# Patient Record
Sex: MALE | Race: WHITE | Employment: OTHER | URBAN - NONMETROPOLITAN AREA
[De-identification: names, ages, dates, MRNs, and addresses within clinical notes are randomized per-mention and may not be internally consistent; named-entity substitution may affect disease eponyms.]

---

## 2017-01-16 PROBLEM — R55 SYNCOPE AND COLLAPSE: Status: ACTIVE | Noted: 2017-01-16

## 2017-01-16 PROBLEM — R41.82 ALTERED MENTAL STATE: Status: ACTIVE | Noted: 2017-01-16

## 2023-02-21 ENCOUNTER — HOSPITAL ENCOUNTER (INPATIENT)
Age: 83
LOS: 20 days | Discharge: NURSING, REHAB OR LONG-TERM CARE | DRG: 233 | End: 2023-03-16
Attending: EMERGENCY MEDICINE | Admitting: THORACIC SURGERY (CARDIOTHORACIC VASCULAR SURGERY)
Payer: MEDICARE

## 2023-02-21 ENCOUNTER — APPOINTMENT (OUTPATIENT)
Dept: GENERAL RADIOLOGY | Age: 83
DRG: 233 | End: 2023-02-21
Payer: MEDICARE

## 2023-02-21 DIAGNOSIS — Z95.1 S/P CABG X 3: Primary | ICD-10-CM

## 2023-02-21 DIAGNOSIS — R79.89 ELEVATED BRAIN NATRIURETIC PEPTIDE (BNP) LEVEL: ICD-10-CM

## 2023-02-21 DIAGNOSIS — R77.8 ELEVATED TROPONIN: ICD-10-CM

## 2023-02-21 DIAGNOSIS — Z87.891 PERSONAL HISTORY OF TOBACCO USE, PRESENTING HAZARDS TO HEALTH: ICD-10-CM

## 2023-02-21 DIAGNOSIS — J44.9 CHRONIC OBSTRUCTIVE PULMONARY DISEASE, UNSPECIFIED COPD TYPE (HCC): ICD-10-CM

## 2023-02-21 DIAGNOSIS — R59.0 LAD (LYMPHADENOPATHY), MEDIASTINAL: ICD-10-CM

## 2023-02-21 DIAGNOSIS — G47.30 SLEEP APNEA, UNSPECIFIED TYPE: ICD-10-CM

## 2023-02-21 DIAGNOSIS — R06.83 SNORING: ICD-10-CM

## 2023-02-21 DIAGNOSIS — Z95.1 S/P CABG (CORONARY ARTERY BYPASS GRAFT): ICD-10-CM

## 2023-02-21 DIAGNOSIS — G47.19 EXCESSIVE DAYTIME SLEEPINESS: ICD-10-CM

## 2023-02-21 DIAGNOSIS — R06.00 DYSPNEA, UNSPECIFIED TYPE: ICD-10-CM

## 2023-02-21 DIAGNOSIS — J90 BILATERAL PLEURAL EFFUSION: ICD-10-CM

## 2023-02-21 LAB
ALBUMIN SERPL BCG-MCNC: 4 G/DL (ref 3.5–5.1)
ALP SERPL-CCNC: 38 U/L (ref 38–126)
ALT SERPL W/O P-5'-P-CCNC: 15 U/L (ref 11–66)
ANION GAP SERPL CALC-SCNC: 12 MEQ/L (ref 8–16)
ARTERIAL PATENCY WRIST A: POSITIVE
AST SERPL-CCNC: 17 U/L (ref 5–40)
BASE EXCESS BLDA CALC-SCNC: -1.3 MMOL/L (ref -2.5–2.5)
BASOPHILS ABSOLUTE: 0 THOU/MM3 (ref 0–0.1)
BASOPHILS NFR BLD AUTO: 0.3 %
BDY SITE: ABNORMAL
BILIRUB SERPL-MCNC: 0.6 MG/DL (ref 0.3–1.2)
BUN SERPL-MCNC: 15 MG/DL (ref 7–22)
CALCIUM SERPL-MCNC: 8.6 MG/DL (ref 8.5–10.5)
CHLORIDE SERPL-SCNC: 107 MEQ/L (ref 98–111)
CO2 SERPL-SCNC: 22 MEQ/L (ref 23–33)
COLLECTED BY:: ABNORMAL
CREAT SERPL-MCNC: 0.9 MG/DL (ref 0.4–1.2)
D DIMER PPP IA.FEU-MCNC: 544 NG/ML FEU (ref 0–500)
DEPRECATED RDW RBC AUTO: 46.1 FL (ref 35–45)
EKG ATRIAL RATE: 73 BPM
EKG P AXIS: -19 DEGREES
EKG P-R INTERVAL: 242 MS
EKG Q-T INTERVAL: 402 MS
EKG QRS DURATION: 90 MS
EKG QTC CALCULATION (BAZETT): 442 MS
EKG R AXIS: -12 DEGREES
EKG T AXIS: 145 DEGREES
EKG VENTRICULAR RATE: 73 BPM
EOSINOPHIL NFR BLD AUTO: 5.4 %
EOSINOPHILS ABSOLUTE: 0.3 THOU/MM3 (ref 0–0.4)
ERYTHROCYTE [DISTWIDTH] IN BLOOD BY AUTOMATED COUNT: 12.3 % (ref 11.5–14.5)
FLUAV RNA RESP QL NAA+PROBE: NOT DETECTED
FLUBV RNA RESP QL NAA+PROBE: NOT DETECTED
GFR SERPL CREATININE-BSD FRML MDRD: > 60 ML/MIN/1.73M2
GLUCOSE SERPL-MCNC: 114 MG/DL (ref 70–108)
HCO3 BLDA-SCNC: 21 MMOL/L (ref 23–28)
HCT VFR BLD AUTO: 39.5 % (ref 42–52)
HGB BLD-MCNC: 13 GM/DL (ref 14–18)
IMM GRANULOCYTES # BLD AUTO: 0.02 THOU/MM3 (ref 0–0.07)
IMM GRANULOCYTES NFR BLD AUTO: 0.3 %
LYMPHOCYTES ABSOLUTE: 0.7 THOU/MM3 (ref 1–4.8)
LYMPHOCYTES NFR BLD AUTO: 11.9 %
MCH RBC QN AUTO: 33.8 PG (ref 26–33)
MCHC RBC AUTO-ENTMCNC: 32.9 GM/DL (ref 32.2–35.5)
MCV RBC AUTO: 102.6 FL (ref 80–94)
MONOCYTES ABSOLUTE: 0.8 THOU/MM3 (ref 0.4–1.3)
MONOCYTES NFR BLD AUTO: 12.4 %
NEUTROPHILS NFR BLD AUTO: 69.7 %
NRBC BLD AUTO-RTO: 0 /100 WBC
NT-PROBNP SERPL IA-MCNC: 1675 PG/ML (ref 0–449)
OSMOLALITY SERPL CALC.SUM OF ELEC: 283 MOSMOL/KG (ref 275–300)
PCO2 BLDA: 30 MMHG (ref 35–45)
PH BLDA: 7.47 [PH] (ref 7.35–7.45)
PLATELET # BLD AUTO: 267 THOU/MM3 (ref 130–400)
PMV BLD AUTO: 9.7 FL (ref 9.4–12.4)
PO2 BLDA: 65 MMHG (ref 71–104)
POTASSIUM SERPL-SCNC: 3.6 MEQ/L (ref 3.5–5.2)
PROT SERPL-MCNC: 6.7 G/DL (ref 6.1–8)
RBC # BLD AUTO: 3.85 MILL/MM3 (ref 4.7–6.1)
SAO2 % BLDA: 94 %
SARS-COV-2 RNA RESP QL NAA+PROBE: NOT DETECTED
SEGMENTED NEUTROPHILS ABSOLUTE COUNT: 4.3 THOU/MM3 (ref 1.8–7.7)
SODIUM SERPL-SCNC: 141 MEQ/L (ref 135–145)
T4 FREE SERPL-MCNC: 0.92 NG/DL (ref 0.93–1.76)
TROPONIN T: 0.16 NG/ML
TROPONIN T: 0.16 NG/ML
TROPONIN T: 0.17 NG/ML
TROPONIN T: 0.17 NG/ML
TSH SERPL DL<=0.005 MIU/L-ACNC: 4.13 UIU/ML (ref 0.4–4.2)
WBC # BLD AUTO: 6.2 THOU/MM3 (ref 4.8–10.8)

## 2023-02-21 PROCEDURE — 80053 COMPREHEN METABOLIC PANEL: CPT

## 2023-02-21 PROCEDURE — G0378 HOSPITAL OBSERVATION PER HR: HCPCS

## 2023-02-21 PROCEDURE — 94760 N-INVAS EAR/PLS OXIMETRY 1: CPT

## 2023-02-21 PROCEDURE — 84443 ASSAY THYROID STIM HORMONE: CPT

## 2023-02-21 PROCEDURE — 6370000000 HC RX 637 (ALT 250 FOR IP)

## 2023-02-21 PROCEDURE — 94669 MECHANICAL CHEST WALL OSCILL: CPT

## 2023-02-21 PROCEDURE — 85379 FIBRIN DEGRADATION QUANT: CPT

## 2023-02-21 PROCEDURE — 87636 SARSCOV2 & INF A&B AMP PRB: CPT

## 2023-02-21 PROCEDURE — 82803 BLOOD GASES ANY COMBINATION: CPT

## 2023-02-21 PROCEDURE — 96368 THER/DIAG CONCURRENT INF: CPT

## 2023-02-21 PROCEDURE — 2580000003 HC RX 258

## 2023-02-21 PROCEDURE — 99285 EMERGENCY DEPT VISIT HI MDM: CPT

## 2023-02-21 PROCEDURE — 6360000002 HC RX W HCPCS

## 2023-02-21 PROCEDURE — 83880 ASSAY OF NATRIURETIC PEPTIDE: CPT

## 2023-02-21 PROCEDURE — 96365 THER/PROPH/DIAG IV INF INIT: CPT

## 2023-02-21 PROCEDURE — 85025 COMPLETE CBC W/AUTO DIFF WBC: CPT

## 2023-02-21 PROCEDURE — 94640 AIRWAY INHALATION TREATMENT: CPT

## 2023-02-21 PROCEDURE — 87449 NOS EACH ORGANISM AG IA: CPT

## 2023-02-21 PROCEDURE — 93010 ELECTROCARDIOGRAM REPORT: CPT | Performed by: NUCLEAR MEDICINE

## 2023-02-21 PROCEDURE — 36415 COLL VENOUS BLD VENIPUNCTURE: CPT

## 2023-02-21 PROCEDURE — 96366 THER/PROPH/DIAG IV INF ADDON: CPT

## 2023-02-21 PROCEDURE — 99222 1ST HOSP IP/OBS MODERATE 55: CPT | Performed by: INTERNAL MEDICINE

## 2023-02-21 PROCEDURE — 6370000000 HC RX 637 (ALT 250 FOR IP): Performed by: STUDENT IN AN ORGANIZED HEALTH CARE EDUCATION/TRAINING PROGRAM

## 2023-02-21 PROCEDURE — 87899 AGENT NOS ASSAY W/OPTIC: CPT

## 2023-02-21 PROCEDURE — 84439 ASSAY OF FREE THYROXINE: CPT

## 2023-02-21 PROCEDURE — 84484 ASSAY OF TROPONIN QUANT: CPT

## 2023-02-21 PROCEDURE — 93005 ELECTROCARDIOGRAM TRACING: CPT | Performed by: STUDENT IN AN ORGANIZED HEALTH CARE EDUCATION/TRAINING PROGRAM

## 2023-02-21 PROCEDURE — 36600 WITHDRAWAL OF ARTERIAL BLOOD: CPT

## 2023-02-21 PROCEDURE — 71046 X-RAY EXAM CHEST 2 VIEWS: CPT

## 2023-02-21 PROCEDURE — 96372 THER/PROPH/DIAG INJ SC/IM: CPT

## 2023-02-21 RX ORDER — ENOXAPARIN SODIUM 100 MG/ML
40 INJECTION SUBCUTANEOUS EVERY 24 HOURS
Status: DISCONTINUED | OUTPATIENT
Start: 2023-02-21 | End: 2023-02-23

## 2023-02-21 RX ORDER — ASPIRIN 81 MG/1
81 TABLET, CHEWABLE ORAL DAILY
Status: DISCONTINUED | OUTPATIENT
Start: 2023-02-22 | End: 2023-02-28 | Stop reason: SDUPTHER

## 2023-02-21 RX ORDER — ONDANSETRON 4 MG/1
4 TABLET, ORALLY DISINTEGRATING ORAL EVERY 8 HOURS PRN
Status: DISCONTINUED | OUTPATIENT
Start: 2023-02-21 | End: 2023-02-28

## 2023-02-21 RX ORDER — CARBAMAZEPINE 100 MG/1
600 TABLET, EXTENDED RELEASE ORAL 2 TIMES DAILY
Status: DISCONTINUED | OUTPATIENT
Start: 2023-02-21 | End: 2023-03-01

## 2023-02-21 RX ORDER — IPRATROPIUM BROMIDE AND ALBUTEROL SULFATE 2.5; .5 MG/3ML; MG/3ML
1 SOLUTION RESPIRATORY (INHALATION) ONCE
Status: DISCONTINUED | OUTPATIENT
Start: 2023-02-21 | End: 2023-02-21

## 2023-02-21 RX ORDER — ESCITALOPRAM OXALATE 20 MG/1
20 TABLET ORAL DAILY
Status: DISCONTINUED | OUTPATIENT
Start: 2023-02-22 | End: 2023-03-16 | Stop reason: HOSPADM

## 2023-02-21 RX ORDER — ONDANSETRON 2 MG/ML
4 INJECTION INTRAMUSCULAR; INTRAVENOUS EVERY 6 HOURS PRN
Status: DISCONTINUED | OUTPATIENT
Start: 2023-02-21 | End: 2023-02-28

## 2023-02-21 RX ORDER — POLYETHYLENE GLYCOL 3350 17 G/17G
17 POWDER, FOR SOLUTION ORAL DAILY PRN
Status: DISCONTINUED | OUTPATIENT
Start: 2023-02-21 | End: 2023-02-28

## 2023-02-21 RX ORDER — SODIUM CHLORIDE 9 MG/ML
INJECTION, SOLUTION INTRAVENOUS PRN
Status: DISCONTINUED | OUTPATIENT
Start: 2023-02-21 | End: 2023-02-28

## 2023-02-21 RX ORDER — SODIUM CHLORIDE 0.9 % (FLUSH) 0.9 %
5-40 SYRINGE (ML) INJECTION PRN
Status: DISCONTINUED | OUTPATIENT
Start: 2023-02-21 | End: 2023-02-28

## 2023-02-21 RX ORDER — CLOPIDOGREL BISULFATE 75 MG/1
75 TABLET ORAL DAILY
Status: DISCONTINUED | OUTPATIENT
Start: 2023-02-22 | End: 2023-02-23

## 2023-02-21 RX ORDER — IPRATROPIUM BROMIDE AND ALBUTEROL SULFATE 2.5; .5 MG/3ML; MG/3ML
1 SOLUTION RESPIRATORY (INHALATION) ONCE
Status: COMPLETED | OUTPATIENT
Start: 2023-02-21 | End: 2023-02-21

## 2023-02-21 RX ORDER — ATORVASTATIN CALCIUM 40 MG/1
40 TABLET, FILM COATED ORAL DAILY
Status: DISCONTINUED | OUTPATIENT
Start: 2023-02-22 | End: 2023-03-16 | Stop reason: HOSPADM

## 2023-02-21 RX ORDER — ESCITALOPRAM OXALATE 20 MG/1
20 TABLET ORAL DAILY
Status: ON HOLD | COMMUNITY
End: 2023-03-16 | Stop reason: HOSPADM

## 2023-02-21 RX ORDER — ACETAMINOPHEN 650 MG/1
650 SUPPOSITORY RECTAL EVERY 6 HOURS PRN
Status: DISCONTINUED | OUTPATIENT
Start: 2023-02-21 | End: 2023-02-28

## 2023-02-21 RX ORDER — RISPERIDONE 0.25 MG/1
0.5 TABLET ORAL 2 TIMES DAILY
Status: DISCONTINUED | OUTPATIENT
Start: 2023-02-21 | End: 2023-03-16 | Stop reason: HOSPADM

## 2023-02-21 RX ORDER — ACETAMINOPHEN 325 MG/1
650 TABLET ORAL EVERY 6 HOURS PRN
Status: DISCONTINUED | OUTPATIENT
Start: 2023-02-21 | End: 2023-02-28

## 2023-02-21 RX ORDER — MULTIVITAMIN WITH IRON
1 TABLET ORAL DAILY
Status: DISCONTINUED | OUTPATIENT
Start: 2023-02-22 | End: 2023-03-16 | Stop reason: HOSPADM

## 2023-02-21 RX ORDER — METHYLPREDNISOLONE SODIUM SUCCINATE 125 MG/2ML
125 INJECTION, POWDER, LYOPHILIZED, FOR SOLUTION INTRAMUSCULAR; INTRAVENOUS ONCE
Status: DISCONTINUED | OUTPATIENT
Start: 2023-02-21 | End: 2023-02-21

## 2023-02-21 RX ORDER — IPRATROPIUM BROMIDE AND ALBUTEROL SULFATE 2.5; .5 MG/3ML; MG/3ML
1 SOLUTION RESPIRATORY (INHALATION)
Status: DISCONTINUED | OUTPATIENT
Start: 2023-02-21 | End: 2023-02-22

## 2023-02-21 RX ORDER — SODIUM CHLORIDE 0.9 % (FLUSH) 0.9 %
5-40 SYRINGE (ML) INJECTION EVERY 12 HOURS SCHEDULED
Status: DISCONTINUED | OUTPATIENT
Start: 2023-02-21 | End: 2023-02-28

## 2023-02-21 RX ORDER — RISPERIDONE 0.5 MG/1
0.5 TABLET ORAL 2 TIMES DAILY
Status: ON HOLD | COMMUNITY
End: 2023-03-16 | Stop reason: HOSPADM

## 2023-02-21 RX ORDER — LEVOTHYROXINE SODIUM 0.05 MG/1
50 TABLET ORAL DAILY
Status: DISCONTINUED | OUTPATIENT
Start: 2023-02-22 | End: 2023-03-16 | Stop reason: HOSPADM

## 2023-02-21 RX ADMIN — AZITHROMYCIN DIHYDRATE 500 MG: 500 INJECTION, POWDER, LYOPHILIZED, FOR SOLUTION INTRAVENOUS at 18:14

## 2023-02-21 RX ADMIN — IPRATROPIUM BROMIDE AND ALBUTEROL SULFATE 1 AMPULE: .5; 3 SOLUTION RESPIRATORY (INHALATION) at 15:39

## 2023-02-21 RX ADMIN — CARBAMAZEPINE 600 MG: 200 TABLET, EXTENDED RELEASE ORAL at 20:37

## 2023-02-21 RX ADMIN — IPRATROPIUM BROMIDE AND ALBUTEROL SULFATE 1 AMPULE: .5; 3 SOLUTION RESPIRATORY (INHALATION) at 10:36

## 2023-02-21 RX ADMIN — IPRATROPIUM BROMIDE AND ALBUTEROL SULFATE 1 AMPULE: .5; 3 SOLUTION RESPIRATORY (INHALATION) at 21:44

## 2023-02-21 RX ADMIN — ENOXAPARIN SODIUM 40 MG: 100 INJECTION SUBCUTANEOUS at 18:14

## 2023-02-21 RX ADMIN — RISPERIDONE 0.5 MG: 0.25 TABLET, FILM COATED ORAL at 20:37

## 2023-02-21 RX ADMIN — CEFTRIAXONE SODIUM 1000 MG: 1 INJECTION, POWDER, FOR SOLUTION INTRAMUSCULAR; INTRAVENOUS at 18:05

## 2023-02-21 ASSESSMENT — PAIN SCALES - GENERAL
PAINLEVEL_OUTOF10: 0

## 2023-02-21 ASSESSMENT — PAIN DESCRIPTION - ORIENTATION
ORIENTATION: LEFT
ORIENTATION: LEFT

## 2023-02-21 ASSESSMENT — ENCOUNTER SYMPTOMS
ALLERGIC/IMMUNOLOGIC NEGATIVE: 1
DIARRHEA: 1
COUGH: 1
EYES NEGATIVE: 1
SHORTNESS OF BREATH: 1

## 2023-02-21 ASSESSMENT — PAIN DESCRIPTION - LOCATION
LOCATION: ARM
LOCATION: ARM

## 2023-02-21 ASSESSMENT — PAIN - FUNCTIONAL ASSESSMENT
PAIN_FUNCTIONAL_ASSESSMENT: 0-10
PAIN_FUNCTIONAL_ASSESSMENT: NONE - DENIES PAIN

## 2023-02-21 ASSESSMENT — PAIN DESCRIPTION - DESCRIPTORS: DESCRIPTORS: ACHING;THROBBING

## 2023-02-21 NOTE — PALLIATIVE CARE
Initial Evaluation          Patient:   Kate Adam  YOB: 1940  Age:  81 y.o. [facility]  [facility]  MRN:  [id number]   Acct: [de-identified]    Date of Admission:  2/21/2023  8:37 AM  Date of Service:  2/21/2023  Completed By:  Omi Mendes RN                 Reason for Palliative Care Evaluation:-             [x] Code Status Discussion              [x] Goals of Care              [] Pain/Symptom Management               [] Emotional Support              [] Other:   Pt to ER for ongoing fatigue and shortness of breath. Pt was found to have elevated troponin and admitted for work up. Pt also with cough and brown sputum. Cardiology and pulmonary have been consulted. PMH includes seizures, brain abscess. Writer to unit, pt resting quietly ,family just left. Per discussion with pt's nurse,HANS Barker, pt does not have capacity for decisions. Advance Directives:-none on file.  Financial POA on file listing his wife   [] [facility] DNR Form  [] Living Will  [] Medical POA             Current Code Status:-  [x] Full Resuscitation  [] DNR-Comfort Care-Arrest  [] DNR-Comfort Care       [] Limited Resuscitation             [] No CPR            [] No shock            [] No ET intubation/reintubation            [] No resuscitative medications            [] Other limitation:              Palliative Performance Status:        [] 100%  Full ambulation; normal activity and work; no evidence of disease; able to do own self care; normal intake; fully conscious     [] 90%   Full ambulation; normal activity and work; some evidence of disease; able to do own self care; normal intake; fully conscious    [] 80%   Full ambulation; normal activity with effort; some evidence of disease; able to do own self care; normal or reduced intake; fully conscious    [] 70%  Ambulation reduced; unable to perform normal job/work; significant  disease; able to do own self care; normal or reduced intake; fully conscious      [] 60%  Ambulation reduced; Significant disease;Can't do hobbies/housework; intake normal or reduced; occasional assist; LOC full/confusion        [x] 50%  Mainly sit/lie; Extensive disease; Can't do any work; Considerable assist; intake normal or reduced; LOC full/confusion        [] 40%  Mainly in bed; Extensive disease; Mainly assist; intake normal or reduced; LOC full/confusion         [] 30%  Bed Bound; Extensive disease; Total care; intake reduced; LOC full/confusion        [] 20%  Bed Bound; Extensive disease; Total care; intake minimal; Drowsy/coma        [] 10%  Bed Bound; Extensive disease; Total care; Mouth care only; Drowsy/coma        [] 0  Death        Goals of care evaluation:-        The patient goals of care are to provide comfort care/supportive services/palliation & relieve suffering:  Goals of care discussed with:  [] Patient independently  [] Patient and Family  [x] Family or Healthcare DPOA independently  [] Unable to discuss with patient, family/DPOA not present         Family/Patient Discussion:  Call placed to pt's daughter,Consuelo, to discuss pt condition and code status. Rochelle is able to accurately explain why pt is admitted, consultants have not yet seen or placed notes, recommendations are unknown at this time. Code status options explained, along with what is entailed with each level. Discussed possible complications of rib fractures, brain and organ damage associated with resuscitative measures. Intubation and MIV for resp failure also explained along with what is entailed with that. All questions answered and  Rochelle states understanding. Rochelle states that she wants to contact pt's PCP, Dr Shakira Figueredo, before making and changes. Rochelle states that she will call back tomorrow to let us know if she requests a code status change. Contact info for palliative care office given. Rochelle states no further questions or needs. Emotional support given. Secure text sent to Chencho Rivera to update her. Plan/Follow-Up:  Palliative care will await call back from pt's daughter,Consuelo, regarding if she requests code status change. Palliative care will continue to follow.           Electronically signed by Karyle Canales, RN on 2/21/2023 at 4:29 PM           Palliative Care Office: [phone]

## 2023-02-21 NOTE — ED TRIAGE NOTES
Family reports pt has been acting \"off\" for the past week. They report these past 3 days he has been more quiet and resting more. Today grandson reports he noticed patient coughing more, but it has been progressing over the past week. When asked about pain, pt reports he doesn't know how to explain it, stating both of his arms and legs ache. Pt alert and oriented at this time. Extremity strength bilaterally equal. Hooked up to monitor and telemetry. EKG will be completed.  INT established prior to arrival.

## 2023-02-21 NOTE — ED PROVIDER NOTES
[address] [facility],[address] DEPT  EMERGENCY DEPARTMENT ENCOUNTER      Pt Name: Shruthi Pardo  MRN: [id number]  [place] 1940  Date of evaluation: 2/21/2023  Resident Physician: Osmar De Leon MD  Supervising Physician: Dr. Alexei Andrea, [facility]       Chief Complaint   Patient presents with    Fatigue    Shortness of Breath     HISTORY OF PRESENT ILLNESS    HPI  Shruthi Pardo is a 81 y.o. male with past medical history of anemia, brain abscess, seizures, former smoker approximately 40 pack year who presents to the emergency department for evaluation of fatigue, shortness of breath. Patient brought in by family as family has been reporting he has been more tired, fatigued for the past week however more so in the past 3 days. Patient has also been coughing. Today he did have some brown sputum. Today, patient verbalized that he was short of breath and had generalized muscle aches which prompted today's ED visit. Patient lives in a two-story home and states that it has been getting harder to walk around. Has been having dyspnea on exertion. Patient denies any chest pain. Denies any fevers or chills. Daughter and grandson at bedside stating that patient is compliant with his medications. The patient has no other acute complaints at this time. PAST MEDICAL AND SURGICAL HISTORY     Past Medical History:   Diagnosis Date    Cancer ([facility] [address]     Coma ([facility] [address]     Hyperlipidemia     Leukemia ([facility] [address]     Seizures ([facility] [address]      Past Surgical History:   Procedure Laterality Date    BRAIN SURGERY      infection    OTHER SURGICAL HISTORY      Tooth Abscess removed '95       MEDICATIONS   No current facility-administered medications for this encounter.     Current Outpatient Medications:     risperiDONE (RISPERDAL) 0.5 MG tablet, Take 0.5 mg by mouth 2 times daily, Disp: , Rfl:     escitalopram (LEXAPRO) 20 MG tablet, Take 20 mg by mouth daily, Disp: , Rfl:     levothyroxine (SYNTHROID) 50 MCG tablet, Take 50 mcg by mouth Daily, Disp: , Rfl:     acetaminophen 650 MG TABS, Take 650 mg by mouth every 6 hours as needed. , Disp: 120 tablet, Rfl:     carBAMazepine (TEGRETOL XR) 200 MG SR tablet, Take 600 mg by mouth 2 times daily, Disp: , Rfl:     clopidogrel (PLAVIX) 75 MG tablet, Take 75 mg by mouth daily. , Disp: , Rfl:     atorvastatin (LIPITOR) 40 MG tablet, Take 40 mg by mouth daily. , Disp: , Rfl:     therapeutic multivitamin-minerals (THERAGRAN-M) tablet, Take 1 tablet by mouth daily. , Disp: , Rfl:     Previous Medications    ACETAMINOPHEN 650 MG TABS    Take 650 mg by mouth every 6 hours as needed. ATORVASTATIN (LIPITOR) 40 MG TABLET    Take 40 mg by mouth daily. CARBAMAZEPINE (TEGRETOL XR) 200 MG SR TABLET    Take 600 mg by mouth 2 times daily    CLOPIDOGREL (PLAVIX) 75 MG TABLET    Take 75 mg by mouth daily. ESCITALOPRAM (LEXAPRO) 20 MG TABLET    Take 20 mg by mouth daily    LEVOTHYROXINE (SYNTHROID) 50 MCG TABLET    Take 50 mcg by mouth Daily    RISPERIDONE (RISPERDAL) 0.5 MG TABLET    Take 0.5 mg by mouth 2 times daily    THERAPEUTIC MULTIVITAMIN-MINERALS (THERAGRAN-M) TABLET    Take 1 tablet by mouth daily. SOCIAL HISTORY     Social History     Social History Narrative    Not on file     Social History     Tobacco Use    Smoking status: Former    Tobacco comments:     doesn't remember when he quit   Substance Use Topics    Alcohol use: No    Drug use: No       ALLERGIES   No Known Allergies    FAMILY HISTORY   History reviewed. No pertinent family history. PHYSICAL EXAM     ED Triage Vitals   BP Temp Temp Source Heart Rate Resp SpO2 Height Weight   02/21/23 0842 02/21/23 0842 02/21/23 0842 02/21/23 0842 02/21/23 0842 02/21/23 0842 -- 02/21/23 0848   129/63 97.7 °F (36.5 °C) Oral 82 18 93 %  180 lb (81.6 kg)     Initial vital signs and nursing assessment reviewed and normal. Body mass index is 28.19 kg/m².      Additional Vital Signs:  Vitals:    02/21/23 1045   BP: 100/77   Pulse: 77   Resp:    Temp:    SpO2:        Physical Exam  Constitutional:       General: He is not in acute distress. Appearance: He is obese. He is ill-appearing. He is not toxic-appearing. HENT:      Head: Normocephalic and atraumatic. Mouth/Throat:      Pharynx: Oropharynx is clear. No pharyngeal swelling or oropharyngeal exudate. Eyes:      Extraocular Movements: Extraocular movements intact. Pupils: Pupils are equal, round, and reactive to light. Cardiovascular:      Rate and Rhythm: Normal rate. Pulmonary:      Effort: Pulmonary effort is normal.      Breath sounds: Examination of the right-upper field reveals decreased breath sounds. Examination of the left-upper field reveals decreased breath sounds. Examination of the right-middle field reveals decreased breath sounds. Examination of the left-middle field reveals decreased breath sounds. Examination of the right-lower field reveals decreased breath sounds. Examination of the left-lower field reveals decreased breath sounds. Decreased breath sounds present. Abdominal:      Palpations: Abdomen is soft. Musculoskeletal:      Cervical back: Normal range of motion and neck supple. Right lower leg: No tenderness. No edema. Left lower leg: No tenderness. No edema. Neurological:      General: No focal deficit present. Mental Status: He is alert.    Psychiatric:         Mood and Affect: Mood normal.         Behavior: Behavior normal.         Sputum sample     ED RESULTS   Laboratory results (none if blank):  Labs Reviewed   CBC WITH AUTO DIFFERENTIAL - Abnormal; Notable for the following components:       Result Value    RBC 3.85 (*)     Hemoglobin 13.0 (*)     Hematocrit 39.5 (*)     .6 (*)     MCH 33.8 (*)     RDW-SD 46.1 (*)     Lymphocytes Absolute 0.7 (*)     All other components within normal limits   COMPREHENSIVE METABOLIC PANEL - Abnormal; Notable for the following components:    Glucose 114 (*)     CO2 22 (*) All other components within normal limits   TROPONIN - Abnormal; Notable for the following components:    Troponin T 0.166 (*)     All other components within normal limits   BRAIN NATRIURETIC PEPTIDE - Abnormal; Notable for the following components:    Pro-BNP 1675.0 (*)     All other components within normal limits   COVID-19 & INFLUENZA COMBO   ANION GAP   GLOMERULAR FILTRATION RATE, ESTIMATED   OSMOLALITY   TROPONIN     All laboratory results are individually reviewed and interpreted by me. See ED course below for results interpretation if applicable. (Any cultures that may have been sent were not resulted at the time of this patient ED visit)    Radiologic studies results available at the moment of this note (None if blank):  XR CHEST (2 VW)   Final Result   1. No interval change since previous study dated 1/15/2017.   2. Diffuse COPD and thickening off the interstitial lung markings. 3. Borderline cardiomegaly. 4. Thoracic spondylosis. Halima Balling **This report has been created using voice recognition software. It may contain minor errors which are inherent in voice recognition technology. **      Final report electronically signed by DR Gayla Landau on 2/21/2023 9:37 AM        See ED course below for my interpretation if applicable. All radiology images independently reviewed by me in addition to interpretation provided by the radiologist.    EKG interpretation (none if blank):  EKG rate 73. This rhythm with first-degree AV block. Left axis deviation. Narrow QRS. ST segment depression noted in lateral leads, 1 aVL V4 through V6.  possible Q waves noted in leads III and aVF. Poor R wave progression. All EKG results are individually reviewed and interpreted by me. All EKGs are also interpreted by our Cardiology department, final interpretation may not be available as of the writing of this note.     INITIAL ASSESSMENT   Comorbid conditions pertinent to this ED encounter:  Significant smoking history    Differential Diagnosis includes but is not limited to:  COPD exacerbation, pneumonia, COVID, flu, viral illness, congestive heart failure, pulmonary edema, PE    Although some of these diagnoses are unlikely, they were consider in my medical decision making. Decision Rules/Clinical Scores utilized: Wells Criteria for PE and Not Applicable. Code Status:  Not addressed during this ED visit    Social determinants of health impacting treatment or disposition:   Lives in two-story home    PREVIOUS RECORDS  AND EXTERNAL INFORMATION REVIEWED   History obtained from: child and the patient. Pertinent previous and/or external records reviewed:  Last seen in the hospital 2 years ago on 7/10/2020 for syncope and collapse . Case discussed with specialties other than Emergency Medicine: Not Applicable    ED COURSE   ED Medications administered this visit (None if left blank):   Medications   ipratropium-albuterol (DUONEB) nebulizer solution 1 ampule (1 ampule Inhalation Given 2/21/23 1036)       ED Course as of 02/21/23 1127   Tue Feb 21, 2023   0911 Wells score 0 [EL]   1003 WBC: 6.2 [EL]   1003 XR CHEST (2 VW)  IMPRESSION:  1. No interval change since previous study dated 1/15/2017.  2. Diffuse COPD and thickening off the interstitial lung markings. 3. Borderline cardiomegaly. 4. Thoracic spondylosis. . [EL]   1010 Troponin T(!): 0.166 [EL]   1010 Pro-BNP(!): 1675.0 [EL]      ED Course User Index  [EL] Bijan Rouse MD       CRITICAL CARE:  None    PROCEDURES: (None if blank)  Procedures:     MEDICATION CHANGES     New Prescriptions    No medications on file       FINAL DISPOSITION and MEDICAL DECISION MAKING   Medical Decision Making  81-year-old male past medical history significant for 40-pack-year, leukemia in remission presents to the ED due to chief complaint of fatigue and shortness of breath more so on exertion for the past week. Also having cough productive of brown sputum.   Denies any chest pain, fever or sick contacts.    On ED arrival, patient is nontoxic-appearing but does appear tired.  SPO2 93% however upon physical exam and moving patient, did note desaturation to 87%.  Lung sounds were diminished bilaterally but no wheezing.    Cardiopulmonary labs and imaging were obtained.  COVID and flu were negative.  Chest x-ray showing stable findings of diffuse COPD.  Troponin was elevated at 0.166 as well as proBNP 1675.  At this time, pending repeat troponin.  Patient did receive 1 DuoNeb in the ED with improvement of saturations from the initial 93% to 97% on bedside telemetry.    At this time, we will would like to admit this patient for exertional dyspnea, elevated troponin, elevated BNP.    Shared Decision-Making was performed, disposition discussed with the patient/family and questions answered.    Outpatient follow up (If applicable):  No follow-up provider specified.  Not applicable      FINAL DIAGNOSES:  Final diagnoses:   Elevated troponin   Elevated brain natriuretic peptide (BNP) level   Dyspnea, unspecified type       Condition: condition: fair  Dispo: Admit to med/surg floor  DISPOSITION        This transcription was electronically signed. It was dictated by use of voice recognition software and electronically transcribed. The transcription may contain errors not detected in proofreading.       Karen Alcocer MD  Resident  02/21/23 5951

## 2023-02-21 NOTE — ED NOTES
ED to inpatient nurses report    Chief Complaint   Patient presents with    Fatigue    Shortness of Breath      Present to ED from home  LOC: alert and orientated to name and place  Vital signs   Vitals:    02/21/23 1026 02/21/23 1045 02/21/23 1130 02/21/23 1304   BP: (!) 87/54 100/77 100/69 (!) 121/96   Pulse: 70 77 71 71   Resp: 18  17 18   Temp: 97.9 °F (36.6 °C)  97.5 °F (36.4 °C) 97.7 °F (36.5 °C)   TempSrc: Oral  Oral Oral   SpO2: 94%  95% 94%   Weight:          Oxygen Baseline 94% on RA    Current needs required none Bipap/Cpap No  LDAs:   Peripheral IV 02/21/23 Left Antecubital (Active)   Site Assessment Clean, dry & intact 02/21/23 0854   Line Status Blood return noted; Flushed;Normal saline locked 02/21/23 0854   Dressing Status Clean, dry & intact 02/21/23 0854   Dressing Type Transparent 02/21/23 0854   Dressing Intervention New 02/21/23 0854     Mobility: Requires assistance * 1  Pending ED orders: none  Present condition: stable      C-SSRS Risk of Suicide: No Risk  Swallow Screening    Preferred Language: [place]     Electronically signed by London Barboza RN on 2/21/2023 at 2:28 PM       London Barboza RN  02/21/23 1564 Pharmacokinetic Initial Assessment: IV Vancomycin    Assessment/Plan:    Initiate intravenous vancomycin with loading dose of 1000 mg once with subsequent doses when random concentrations are less than 20 mcg/mL. Desired empiric serum trough concentration is 15 to 20 mcg/mL. Draw vancomycin random level on 9/13 at 1300.    Pharmacy will continue to follow and monitor vancomycin.      Please contact pharmacy at [phone] with any questions regarding this assessment.     Thank you for the consult,   Ovidio       Patient brief summary:  Tanya Madrid is a 85 y.o. female initiated on antimicrobial therapy with IV Vancomycin for treatment of suspected lower respiratory infection    Drug Allergies:   Review of patient's allergies indicates:   Allergen Reactions    Sinemet [carbidopa-levodopa] Anxiety and Other (See Comments)     Becomes agitated and fidgety.    Xanax [alprazolam] Anxiety and Other (See Comments)     Becomes agitated and fidgety.        Actual Body Weight:   52.4 kg    Renal Function:   Estimated Creatinine Clearance: 27.6 mL/min (based on SCr of 1.2 mg/dL).,     Dialysis Method (if applicable):  N/A    CBC (last 72 hours):  Recent Labs   Lab Result Units 09/12/22  1620   WBC K/uL 7.29   Hemoglobin g/dL 11.5*   Hematocrit % 40.7   Platelets K/uL 156   Gran % % 76.7*   Lymph % % 15.9*   Mono % % 6.0   Eosinophil % % 0.3   Basophil % % 0.4   Differential Method  Automated       Metabolic Panel (last 72 hours):  Recent Labs   Lab Result Units 09/12/22  1620 09/12/22  1840   Sodium mmol/L 165*  --    Potassium mmol/L 3.8  --    Chloride mmol/L 125*  --    CO2 mmol/L 26  --    Glucose mg/dL 235*  --    Glucose, UA   --  Negative   BUN mg/dL 50*  --    Creatinine mg/dL 1.2  --    Albumin g/dL 2.2*  --    Total Bilirubin mg/dL 0.7  --    Alkaline Phosphatase U/L 150*  --    AST U/L 83*  --    ALT U/L 40  --        Drug levels (last 3 results):  No results for input(s): VANCOMYCINRA, VANCORANDOM,  VANCOMYCINPE, VANCOPEAK, VANCOMYCINTR, VANCOTROUGH in the last 72 hours.    Microbiologic Results:  Microbiology Results (last 7 days)       Procedure Component Value Units Date/Time    Influenza A & B by Molecular [[id number]] Collected: 09/12/22 1726    Order Status: Completed Specimen: Nasopharyngeal Swab Updated: 09/12/22 1808     Influenza A, Molecular Negative     Influenza B, Molecular Negative     Flu A & B Source Nasal swab    Blood culture x two cultures. Draw prior to antibiotics. [[id number]] Collected: 09/12/22 1619    Order Status: Sent Specimen: Blood from Peripheral, Upper Arm, Left Updated: 09/12/22 1620    Blood culture x two cultures. Draw prior to antibiotics. [[id number]] Collected: 09/12/22 1619    Order Status: Sent Specimen: Blood from Peripheral, Antecubital, Left Updated: 09/12/22 1619

## 2023-02-21 NOTE — ED NOTES
Pt transported to [facility] on cart in stable condition. Floor contacted before transport and spoke with Jovan Garcia.      Alma Chang  02/21/23 5848

## 2023-02-21 NOTE — H&P
HISTORY AND PHYSICAL             Date: 2/21/2023        Patient Name: Abeba Ham     YOB: 1940      Age:  81 y.o. Chief Complaint     Chief Complaint   Patient presents with    Fatigue    Shortness of Breath          History Obtained From   patient, family member - daughter and grandson    History of Present Illness   81 yr old male who lives with his daughter was brought in for weakness, sob, nausea, and pain in left shoulder. Pt has also been coughing for 1 week and today started to be productive with brown sputum. Pt denies any fevers or body chills, no significant chest pain other than arm pain, he denies any heart palpitations. Pt does not wear oxygen at home. Per family they did not know of COPD dx. Pt stopped smoking in 1980 stated he smoked for 20+ years, unknown amount of packs day/week. Per ED  SPO2 93% on arrival, desat to 87% on exertion with moving in bed. CXR-   Impression:     1. No interval change since previous study dated 1/15/2017.   2. Diffuse COPD and thickening off the interstitial lung markings. 3. Borderline cardiomegaly. 4. Thoracic spondylosis. .       Elevated troponin 0.166 and elevated BNP 1675.0    EKG- Sinus rhythm with 1st degree A-V block  ST & T wave abnormality, consider lateral ischemia  Abnormal ECG  When compared with ECG of 15-TERESA-2017 20:10,  [place] interval has increased  ST now depressed in Lateral leads  T wave inversion now evident in Anterior leads       Past Medical History     Past Medical History:   Diagnosis Date    Cancer ([address]     Coma ([address]     Hyperlipidemia     Leukemia ([address]     Seizures ([address]         Past Surgical History     Past Surgical History:   Procedure Laterality Date    BRAIN SURGERY      infection    OTHER SURGICAL HISTORY      Tooth Abscess removed '95        Medications Prior to Admission     Prior to Admission medications    Medication Sig Start Date End Date Taking?  Authorizing Provider   risperiDONE (RISPERDAL) 0.5 MG tablet Take 0.5 mg by mouth 2 times daily   Yes Historical Provider, MD   escitalopram (LEXAPRO) 20 MG tablet Take 20 mg by mouth daily   Yes Historical Provider, MD   levothyroxine (SYNTHROID) 50 MCG tablet Take 50 mcg by mouth Daily    Historical Provider, MD   acetaminophen 650 MG TABS Take 650 mg by mouth every 6 hours as needed. 3/4/14   Eneida Mendez MD   carBAMazepine (TEGRETOL XR) 200 MG SR tablet Take 600 mg by mouth 2 times daily    Historical Provider, MD   clopidogrel (PLAVIX) 75 MG tablet Take 75 mg by mouth daily. Historical Provider, MD   atorvastatin (LIPITOR) 40 MG tablet Take 40 mg by mouth daily. Historical Provider, MD   therapeutic multivitamin-minerals (THERAGRAN-M) tablet Take 1 tablet by mouth daily. Historical Provider, MD        Allergies   Patient has no known allergies. Social History     Social History       Tobacco History       Smoking Status  Former      Smokeless Tobacco Use  Unknown      Tobacco Comments  doesn't remember when he quit              Alcohol History       Alcohol Use Status  No              Drug Use       Drug Use Status  No              Sexual Activity       Sexually Active  Never                    Family History   History reviewed. No pertinent family history. Review of Systems   Review of Systems   Constitutional:  Positive for activity change, appetite change and fatigue. HENT: Negative. Eyes: Negative. Respiratory:  Positive for cough and shortness of breath. Cardiovascular: Negative. Gastrointestinal:  Positive for diarrhea. Endocrine: Negative. Genitourinary: Negative. Musculoskeletal: Negative. Allergic/Immunologic: Negative. Neurological:  Positive for weakness. Physical Exam   BP (!) 121/96   Pulse 71   Temp 97.7 °F (36.5 °C) (Oral)   Resp 18   Wt 180 lb (81.6 kg)   SpO2 94%   BMI 28.19 kg/m²     Physical Exam  Constitutional:       Appearance: He is obese. HENT:      Head: Normocephalic. Mouth/Throat:      Mouth: Mucous membranes are moist.   Eyes:      Extraocular Movements: Extraocular movements intact. Pupils: Pupils are equal, round, and reactive to light. Cardiovascular:      Rate and Rhythm: Normal rate. Pulses: Normal pulses. Pulmonary:      Effort: Pulmonary effort is normal.      Breath sounds: Decreased breath sounds present. Abdominal:      General: Bowel sounds are normal.      Palpations: Abdomen is soft. Musculoskeletal:         General: Normal range of motion. Cervical back: Normal range of motion and neck supple. Skin:     General: Skin is warm and dry. Neurological:      General: No focal deficit present. Mental Status: He is alert. Mental status is at baseline.        Labs      Recent Results (from the past 24 hour(s))   COVID-19 & Influenza Combo    Collection Time: 02/21/23  8:54 AM    Specimen: Nasopharyngeal Swab   Result Value Ref Range    SARS-CoV-2 RNA, RT PCR NOT DETECTED NOT DETECTED    INFLUENZA A NOT DETECTED NOT DETECTED    INFLUENZA B NOT DETECTED NOT DETECTED   EKG SOB    Collection Time: 02/21/23  8:54 AM   Result Value Ref Range    Ventricular Rate 73 BPM    Atrial Rate 73 BPM    P-R Interval 242 ms    QRS Duration 90 ms    Q-T Interval 402 ms    QTc Calculation (Bazett) 442 ms    P Axis -19 degrees    R Axis -12 degrees    T Axis 145 degrees   CBC with Auto Differential    Collection Time: 02/21/23  9:32 AM   Result Value Ref Range    WBC 6.2 4.8 - 10.8 thou/mm3    RBC 3.85 (L) 4.70 - 6.10 mill/mm3    Hemoglobin 13.0 (L) 14.0 - 18.0 gm/dl    Hematocrit 39.5 (L) 42.0 - 52.0 %    .6 (H) 80.0 - 94.0 fL    MCH 33.8 (H) 26.0 - 33.0 pg    MCHC 32.9 32.2 - 35.5 gm/dl    RDW-CV 12.3 11.5 - 14.5 %    RDW-SD 46.1 (H) 35.0 - 45.0 fL    Platelets 592 200 - 693 thou/mm3    MPV 9.7 9.4 - 12.4 fL    Seg Neutrophils 69.7 %    Lymphocytes 11.9 %    Monocytes 12.4 %    Eosinophils 5.4 %    Basophils 0.3 %    Immature Granulocytes 0.3 %    Segs Absolute 4.3 1.8 - 7.7 thou/mm3    Lymphocytes Absolute 0.7 (L) 1.0 - 4.8 thou/mm3    Monocytes Absolute 0.8 0.4 - 1.3 thou/mm3    Eosinophils Absolute 0.3 0.0 - 0.4 thou/mm3    Basophils Absolute 0.0 0.0 - 0.1 thou/mm3    Immature Grans (Abs) 0.02 0.00 - 0.07 thou/mm3    nRBC 0 /100 wbc   CMP    Collection Time: 02/21/23  9:32 AM   Result Value Ref Range    Glucose 114 (H) 70 - 108 mg/dL    Creatinine 0.9 0.4 - 1.2 mg/dL    BUN 15 7 - 22 mg/dL    Sodium 141 135 - 145 meq/L    Potassium 3.6 3.5 - 5.2 meq/L    Chloride 107 98 - 111 meq/L    CO2 22 (L) 23 - 33 meq/L    Calcium 8.6 8.5 - 10.5 mg/dL    AST 17 5 - 40 U/L    Alkaline Phosphatase 38 38 - 126 U/L    Total Protein 6.7 6.1 - 8.0 g/dL    Albumin 4.0 3.5 - 5.1 g/dL    Total Bilirubin 0.6 0.3 - 1.2 mg/dL    ALT 15 11 - 66 U/L   Troponin    Collection Time: 02/21/23  9:32 AM   Result Value Ref Range    Troponin T 0.166 (A) ng/ml   Brain Natriuretic Peptide    Collection Time: 02/21/23  9:32 AM   Result Value Ref Range    Pro-BNP 1675.0 (H) 0.0 - 449.0 pg/mL   Anion Gap    Collection Time: 02/21/23  9:32 AM   Result Value Ref Range    Anion Gap 12.0 8.0 - 16.0 meq/L   Glomerular Filtration Rate, Estimated    Collection Time: 02/21/23  9:32 AM   Result Value Ref Range    Est, Glom Filt Rate >60 >60 ml/min/1.73m2   Osmolality    Collection Time: 02/21/23  9:32 AM   Result Value Ref Range    Osmolality Calc 283.0 275.0 - 300.0 mOsmol/kg   Troponin    Collection Time: 02/21/23 12:34 PM   Result Value Ref Range    Troponin T 0.162 (A) ng/ml        Imaging/Diagnostics Last 24 Hours   XR CHEST (2 VW)    Result Date: 2/21/2023  PROCEDURE: XR CHEST (2 VW) CLINICAL INFORMATION: fatigue sob cough. COMPARISON: Plain radiographs dated 1/15/2017.. TECHNIQUE: PA and lateral views the chest. FINDINGS: There is borderline cardiomegaly..  The mediastinum is not widened.  There is diffuse COPD and thickening of the interstitial lung markings. There are no new pulmonary infiltrates  or effusions. The pulmonary vascularity is normal. There is thoracic spondylosis. 1. No interval change since previous study dated 1/15/2017. 2. Diffuse COPD and thickening off the interstitial lung markings. 3. Borderline cardiomegaly. 4. Thoracic spondylosis. Maegan Snyder **This report has been created using voice recognition software. It may contain minor errors which are inherent in voice recognition technology. ** Final report electronically signed by DR Chase Vilchis on 2/21/2023 9:37 AM      Assessment      Hospital Problems             Last Modified POA    * (Principal) Elevated troponin level 2/21/2023 Yes     COPD  Weakness  Dementia    Plan   Start Rocephin and Zithromax  Duonebs   Obtain ECHO  Obtain TSH, T4, D Dimer, legionella, strep pneumoniae  Trop sequence  Am labs  Lovenox DVT prophylaxis  PTOT  Orthostatic BPs  Full code    Consultations Ordered:  PALLIATIVE CARE EVAL  IP CONSULT TO CARDIOLOGY  IP CONSULT TO PULMONOLOGY    Electronically signed by ANGELIC Lopez CNP on 2/21/23 at 2:21 PM EST    Assessment and plan of care discussed with supervising physician, Dr Lb Lloyd. Addendum/attestation by Lo Bagley MD:  I have seen and examined the patient independently. Face to face evaluation and examination was performed. The above evaluation and note has been reviewed. Laboratory and radiological data were reviewed. I Have discussed with Danette Webb CNP about this patient in detail. The above assessment and plan has been reviewed. Please see my modifications mentioned below.       My modifications:  Dr Sade Aguilar resumes care in a.m    Electronically signed by Tammy Caballero MD on 2/21/2023 at 3:52 PM

## 2023-02-21 NOTE — CONSULTS
[facility]      Patient - Danny Ho   MRN -  [id number]   [facility]   - 1940      Date of Admission -  2023  8:37 AM  Date of evaluation -  2023  Room - 8A--A   Hospital Day - [address] Lizeth Bunn MD Primary Care Physician - Otto Cervantes MD     Problem List      Active Hospital Problems    Diagnosis Date Noted    Elevated troponin level [R77.8] 2023     Priority: Medium     Reason for Consult    COPD management  HPI   History Obtained From: Patient and electronic medical record. Danny Ho is a 81 y.o. male with a history of seizures, HLD and hypothyroidism who presented to [facility] for evaluation of cough and increased sputum production. Patient is a poor historian. No family at bedside to obtain history. History was obtained mostly by chart review. Patient has been reportedly coughing for 1 week and having increased brown-colored sputum production. Patient does not wear oxygen at baseline. No known COPD diagnosis. Patient smoked cigarettes for 20+ years, quit in , unknown pack years. Denies fever, chills, nausea, vomiting. No PFTs or spirometry per chart review. Not on any inhalers.         PMHx   Past Medical History      Diagnosis Date    Cancer ([facility]     Coma ([facility]     Hyperlipidemia     Leukemia ([facility]     Seizures ([facility] [facility]       Past Surgical History        Procedure Laterality Date    BRAIN SURGERY      infection    OTHER SURGICAL HISTORY      Tooth Abscess removed '95     Meds    Current Medications    sodium chloride flush  5-40 mL IntraVENous 2 times per day    [START ON 2023] aspirin  81 mg Oral Daily    enoxaparin  40 mg SubCUTAneous Q24H    ipratropium-albuterol  1 ampule Inhalation Q4H WA    cefTRIAXone (ROCEPHIN) IV  1,000 mg IntraVENous Q24H    azithromycin  500 mg IntraVENous Q24H     sodium chloride flush, sodium chloride, ondansetron **OR** ondansetron, acetaminophen **OR** acetaminophen, polyethylene glycol, perflutren lipid microspheres  IV Drips/Infusions   sodium chloride       Home Medications  Medications Prior to Admission: risperiDONE (RISPERDAL) 0.5 MG tablet, Take 0.5 mg by mouth 2 times daily  escitalopram (LEXAPRO) 20 MG tablet, Take 20 mg by mouth daily  levothyroxine (SYNTHROID) 50 MCG tablet, Take 50 mcg by mouth Daily  acetaminophen 650 MG TABS, Take 650 mg by mouth every 6 hours as needed.  carBAMazepine (TEGRETOL XR) 200 MG SR tablet, Take 600 mg by mouth 2 times daily  clopidogrel (PLAVIX) 75 MG tablet, Take 75 mg by mouth daily.  atorvastatin (LIPITOR) 40 MG tablet, Take 40 mg by mouth daily.  therapeutic multivitamin-minerals (THERAGRAN-M) tablet, Take 1 tablet by mouth daily.  Diet    ADULT DIET; Regular; Low Fat/Low Chol/High Fiber/LIBRADO; No Caffeine  Allergies    Patient has no known allergies.  Social History     Social History     Socioeconomic History    Marital status:      Spouse name: Not on file    Number of children: 2    Years of education: Not on file    Highest education level: Not on file   Occupational History    Not on file   Tobacco Use    Smoking status: Former    Smokeless tobacco: Not on file    Tobacco comments:     doesn't remember when he quit   Substance and Sexual Activity    Alcohol use: No    Drug use: No    Sexual activity: Never   Other Topics Concern    Not on file   Social History Narrative    Not on file     Social Determinants of Health     Financial Resource Strain: Not on file   Food Insecurity: Not on file   Transportation Needs: Not on file   Physical Activity: Not on file   Stress: Not on file   Social Connections: Not on file   Intimate Partner Violence: Not on file   Housing Stability: Not on file     Family History    History reviewed. No pertinent family history.  Sleep History    Never diagnosed with sleep apnea in the past.  Occupational history   Occupation:  He is current working: No  Type of profession: retired.   History of tobacco smoking:Yes  History of recreational or IV drug use in the past:NO     History of exposure to coal mines/coal dust: NO  History of exposure to foundry dust/welding: NO  History of exposure to quarry/silica/sandblasting: NO  History of exposure to asbestos/working with breaks/ships: NO  History of exposure to farm dust: NO  History of recent travel to long distances: NO  History of exposure to birds, pigeons, or chickens in the past:NO      History of pulmonary embolism in the past: No            History of DVT in the past:No                [place] of systems   Review of Systems   General/Constitutional: No recent loss of weight or appetite changes. No fever or chills. HENT: Negative. Eyes: Negative. Upper respiratory tract: No nasal stuffiness or post nasal drip. Lower respiratory tract/ lungs: Positive for cough and sputum production. Cardiovascular: No palpitations or chest pain. Gastrointestinal: No nausea or vomiting. Neurological: No focal neurologiacal weakness. Extremities: No edema. Musculoskeletal: No complaints. Genitourinary: No complaints. Hematological: Negative. Psychiatric/Behavioral: Negative. Skin: No itching. Vitals     weight is 180 lb (81.6 kg). His oral temperature is 98 °F (36.7 °C). His blood pressure is 151/71 (abnormal) and his pulse is 74. His respiration is 18 and oxygen saturation is 91%. Body mass index is 28.19 kg/m². SUPPLEMENTAL O2:       I/O    No intake or output data in the 24 hours ending 02/21/23 1518  No intake/output data recorded. Patient Vitals for the past 96 hrs (Last 3 readings):   Weight   02/21/23 0848 180 lb (81.6 kg)       Exam   Nursing note and vitals reviewed. Constitutional: Patient is oriented to person, place, and time. Patient appears well-developed and well-nourished. No distress. HENT:   Head: Normocephalic and atraumatic.    Right Ear: External ear normal.   Left Ear: External ear normal.   Mouth/Throat: Oropharynx is clear and moist. No oropharyngeal exudate. No oral thrush.  Eyes: Conjunctivae are normal. Pupils are equal, round, and reactive to light. Right eye exhibits no discharge. Left eye exhibits no discharge. No scleral icterus.   Neck: Neck supple. No JVD present. No tracheal deviation present. No thyromegaly present.   Cardiovascular: Normal rate, regular rhythm, normal heart sounds. Exam reveals no gallop and no friction rub. No murmur heard.   Pulmonary/Chest: Effort normal. Distant breath sounds. No stridor. No respiratory distress.  No wheezes. No rales. Patient exhibits no tenderness.   Abdominal: Soft. Bowel sounds are normal. Patient exhibits no distension and no mass. No tenderness. Patient has no rebound and no guarding.   Musculoskeletal: Normal range of motion.  Extremities: Patient exhibits no edema and no tenderness.   Lymphadenopathy: No cervical adenopathy.   Neurological: Patient is alert and oriented to person, place, and time.   Skin: Skin is warm and dry. No rash noted. Patient is not diaphoretic.   Psychiatric: Patient  has a normal mood and affect. Patient behavior is normal.       Labs  - Old records and notes have been reviewed in [facility]   ABG  No results found for: PH, PO2, PCO2, HCO3, O2SAT  No results found for: IFIO2, MODE, SETTIDVOL, SETPEEP  CBC  Recent Labs     02/21/23  0932   WBC 6.2   RBC 3.85*   HGB 13.0*   HCT 39.5*   .6*   MCH 33.8*   MCHC 32.9      MPV 9.7      BMP  Recent Labs     02/21/23  0932      K 3.6      CO2 22*   BUN 15   CREATININE 0.9   GLUCOSE 114*   CALCIUM 8.6     LFT  Recent Labs     02/21/23  0932   AST 17   ALT 15   BILITOT 0.6   ALKPHOS 38     TROP  Lab Results   Component Value Date/Time    TROPONINT 0.162 02/21/2023 12:34 PM    TROPONINT 0.166 02/21/2023 09:32 AM    TROPONINT < 0.010 01/16/2017 05:29 AM     BNP  No results for input(s): BNP in the last 72 hours.  Lactic Acid  No results for input(s): LACTA  in the last 72 hours. INR  No results for input(s): INR, PROTIME in the last 72 hours. PTT  No results for input(s): APTT in the last 72 hours. Glucose  No results for input(s): POCGLU in the last 72 hours. UA No results for input(s): SPECGRAV, PHUR, COLORU, CLARITYU, MUCUS, PROTEINU, BLOODU, RBCUA, WBCUA, BACTERIA, NITRU, GLUCOSEU, BILIRUBINUR, UROBILINOGEN, KETUA, LABCAST, LABCASTTY, AMORPHOS in the last 72 hours. Invalid input(s): CRYSTALS. PFTs   None in [facility]    Sleep studies   None in Epic    Cultures        EKG   Sinus rhythm with 1st degree A-V block  ST & T wave abnormality, consider lateral ischemia  Abnormal ECG  When compared with ECG of 15-TERESA-2017 20:10,  [place] interval has increased  ST now depressed in Lateral leads  T wave inversion now evident in Anterior leads    Echocardiogram   None in Epic    Radiology    CXR    1. No interval change since previous study dated 1/15/2017. 2. Diffuse COPD and thickening off the interstitial lung markings. 3. Borderline cardiomegaly. 4. Thoracic spondylosis. .       Assessment   COPD unspecified - hx of heavy smoking  Unspecified Dementia  Hypothyroidism  Hyperlipidemia  Hx of Seizures  Plan   ABG to rule out retention of CO2 in setting of acute confusion  Duo-Nebs every 4 hours while awake  Continue with Rocephin/Zithromax   Await Resp Cx, legionella / Strep Ag results  Incentive Spirometry every 4 hours  Acapella routinely  Will schedule PFTs as outpatient  DVT prophylaxis with Lovenox     \"Thank you for asking us to see this patient\"    Questions and concerns addressed. Electronically signed by   Karis Galindo MD on 2/21/2023 at 3:18 PM    Addendum by Dr. Zulema Richardson MD:  Patient seen by me independently including key components of medical care. Face to face evaluation and examination was performed. Case discussed with Stanley Caceres MD -resident physician. Italicized font, if present,  represents changes to the note made by me.    More than 50% of the encounter time involved with patient care by the Pulmonary & Critical care service team spent by me. Please see my modifications mentioned below:  He had a history of chronic tobacco smoking for more than 20 years. He used to smoke up to 1 pack of cigarettes per day for 20 years and then cut down to 0.5 packs of cigarettes per day. He quit smoking several years back. He was never diagnosed with obstructive sleep apnea. Lab Results   Component Value Date/Time    PH 7.47 02/21/2023 05:56 PM    PCO2 30 02/21/2023 05:56 PM    PO2 65 02/21/2023 05:56 PM    HCO3 21 02/21/2023 05:56 PM    O2SAT 94 02/21/2023 05:56 PM     No results found for: IFIO2, MODE, SETTIDVOL, SETPEEP    He had a history of leukemia in the past.    Plan: We will obtain bedside spirometry to evaluate for the severity of COPD  Continue DuoNebs 3 Marcie nebulization every 4 hourly while awake  Cardiology was consulted for management of increasing troponins  Patient educated about my impression and plan.     Electronically signed by   Ger Butts MD on 2/21/2023 at 6:49 PM

## 2023-02-22 ENCOUNTER — APPOINTMENT (OUTPATIENT)
Dept: CT IMAGING | Age: 83
DRG: 233 | End: 2023-02-22
Payer: MEDICARE

## 2023-02-22 PROBLEM — Z87.891 PERSONAL HISTORY OF TOBACCO USE, PRESENTING HAZARDS TO HEALTH: Status: ACTIVE | Noted: 2023-02-22

## 2023-02-22 PROBLEM — J44.9 CHRONIC OBSTRUCTIVE PULMONARY DISEASE (HCC): Status: ACTIVE | Noted: 2023-02-22

## 2023-02-22 PROBLEM — J90 PLEURAL EFFUSION, BILATERAL: Status: ACTIVE | Noted: 2023-02-22

## 2023-02-22 PROBLEM — R59.0 MEDIASTINAL LYMPHADENOPATHY: Status: ACTIVE | Noted: 2023-02-22

## 2023-02-22 LAB
DEPRECATED RDW RBC AUTO: 46.2 FL (ref 35–45)
DIGOXIN SERPL-MCNC: < 0.3 NG/ML (ref 0.5–2)
EKG ATRIAL RATE: 73 BPM
EKG P AXIS: 20 DEGREES
EKG P-R INTERVAL: 230 MS
EKG Q-T INTERVAL: 406 MS
EKG QRS DURATION: 92 MS
EKG QTC CALCULATION (BAZETT): 447 MS
EKG R AXIS: -11 DEGREES
EKG T AXIS: 156 DEGREES
EKG VENTRICULAR RATE: 73 BPM
ERYTHROCYTE [DISTWIDTH] IN BLOOD BY AUTOMATED COUNT: 12.3 % (ref 11.5–14.5)
HCT VFR BLD AUTO: 39.7 % (ref 42–52)
HGB BLD-MCNC: 12.8 GM/DL (ref 14–18)
LV EF: 50 %
LVEF MODALITY: NORMAL
MCH RBC QN AUTO: 33.3 PG (ref 26–33)
MCHC RBC AUTO-ENTMCNC: 32.2 GM/DL (ref 32.2–35.5)
MCV RBC AUTO: 103.4 FL (ref 80–94)
PLATELET # BLD AUTO: 256 THOU/MM3 (ref 130–400)
PMV BLD AUTO: 9.5 FL (ref 9.4–12.4)
RBC # BLD AUTO: 3.84 MILL/MM3 (ref 4.7–6.1)
STREP PNEUMO AG, UR: NEGATIVE
WBC # BLD AUTO: 5.8 THOU/MM3 (ref 4.8–10.8)

## 2023-02-22 PROCEDURE — 94669 MECHANICAL CHEST WALL OSCILL: CPT

## 2023-02-22 PROCEDURE — 93005 ELECTROCARDIOGRAM TRACING: CPT

## 2023-02-22 PROCEDURE — 99215 OFFICE O/P EST HI 40 MIN: CPT | Performed by: INTERNAL MEDICINE

## 2023-02-22 PROCEDURE — 6360000002 HC RX W HCPCS

## 2023-02-22 PROCEDURE — 96372 THER/PROPH/DIAG INJ SC/IM: CPT

## 2023-02-22 PROCEDURE — 93306 TTE W/DOPPLER COMPLETE: CPT

## 2023-02-22 PROCEDURE — 99233 SBSQ HOSP IP/OBS HIGH 50: CPT | Performed by: INTERNAL MEDICINE

## 2023-02-22 PROCEDURE — G0378 HOSPITAL OBSERVATION PER HR: HCPCS

## 2023-02-22 PROCEDURE — 36415 COLL VENOUS BLD VENIPUNCTURE: CPT

## 2023-02-22 PROCEDURE — 85027 COMPLETE CBC AUTOMATED: CPT

## 2023-02-22 PROCEDURE — G1010 CDSM STANSON: HCPCS

## 2023-02-22 PROCEDURE — 6370000000 HC RX 637 (ALT 250 FOR IP)

## 2023-02-22 PROCEDURE — 6360000004 HC RX CONTRAST MEDICATION: Performed by: INTERNAL MEDICINE

## 2023-02-22 PROCEDURE — 93010 ELECTROCARDIOGRAM REPORT: CPT | Performed by: NUCLEAR MEDICINE

## 2023-02-22 PROCEDURE — 94640 AIRWAY INHALATION TREATMENT: CPT

## 2023-02-22 PROCEDURE — 2580000003 HC RX 258

## 2023-02-22 PROCEDURE — 80162 ASSAY OF DIGOXIN TOTAL: CPT

## 2023-02-22 PROCEDURE — 96366 THER/PROPH/DIAG IV INF ADDON: CPT

## 2023-02-22 PROCEDURE — 94010 BREATHING CAPACITY TEST: CPT

## 2023-02-22 RX ORDER — SODIUM CHLORIDE 0.9 % (FLUSH) 0.9 %
5-40 SYRINGE (ML) INJECTION EVERY 12 HOURS SCHEDULED
Status: CANCELLED | OUTPATIENT
Start: 2023-02-22

## 2023-02-22 RX ORDER — SODIUM CHLORIDE 9 MG/ML
INJECTION, SOLUTION INTRAVENOUS PRN
Status: CANCELLED | OUTPATIENT
Start: 2023-02-22

## 2023-02-22 RX ORDER — SODIUM CHLORIDE 0.9 % (FLUSH) 0.9 %
5-40 SYRINGE (ML) INJECTION PRN
Status: CANCELLED | OUTPATIENT
Start: 2023-02-22

## 2023-02-22 RX ORDER — ALBUTEROL SULFATE 2.5 MG/3ML
2.5 SOLUTION RESPIRATORY (INHALATION) 2 TIMES DAILY
Status: DISCONTINUED | OUTPATIENT
Start: 2023-02-22 | End: 2023-02-23

## 2023-02-22 RX ORDER — IPRATROPIUM BROMIDE AND ALBUTEROL SULFATE 2.5; .5 MG/3ML; MG/3ML
1 SOLUTION RESPIRATORY (INHALATION) 2 TIMES DAILY
Status: DISCONTINUED | OUTPATIENT
Start: 2023-02-22 | End: 2023-02-22

## 2023-02-22 RX ADMIN — CLOPIDOGREL BISULFATE 75 MG: 75 TABLET ORAL at 08:13

## 2023-02-22 RX ADMIN — ASPIRIN 81 MG 81 MG: 81 TABLET ORAL at 08:13

## 2023-02-22 RX ADMIN — ALBUTEROL SULFATE 2.5 MG: 2.5 SOLUTION RESPIRATORY (INHALATION) at 16:32

## 2023-02-22 RX ADMIN — SODIUM CHLORIDE, PRESERVATIVE FREE 10 ML: 5 INJECTION INTRAVENOUS at 20:13

## 2023-02-22 RX ADMIN — IPRATROPIUM BROMIDE 0.5 MG: 0.5 SOLUTION RESPIRATORY (INHALATION) at 16:34

## 2023-02-22 RX ADMIN — CARBAMAZEPINE 600 MG: 200 TABLET, EXTENDED RELEASE ORAL at 20:12

## 2023-02-22 RX ADMIN — Medication 1 TABLET: at 08:13

## 2023-02-22 RX ADMIN — ACETAMINOPHEN 650 MG: 325 TABLET ORAL at 12:56

## 2023-02-22 RX ADMIN — CARBAMAZEPINE 600 MG: 200 TABLET, EXTENDED RELEASE ORAL at 08:13

## 2023-02-22 RX ADMIN — RISPERIDONE 0.5 MG: 0.25 TABLET, FILM COATED ORAL at 08:13

## 2023-02-22 RX ADMIN — CEFTRIAXONE SODIUM 1000 MG: 1 INJECTION, POWDER, FOR SOLUTION INTRAMUSCULAR; INTRAVENOUS at 16:20

## 2023-02-22 RX ADMIN — LEVOTHYROXINE SODIUM 50 MCG: 0.05 TABLET ORAL at 05:50

## 2023-02-22 RX ADMIN — RISPERIDONE 0.5 MG: 0.25 TABLET, FILM COATED ORAL at 20:12

## 2023-02-22 RX ADMIN — ESCITALOPRAM 20 MG: 20 TABLET, FILM COATED ORAL at 08:13

## 2023-02-22 RX ADMIN — IOPAMIDOL 85 ML: 755 INJECTION, SOLUTION INTRAVENOUS at 12:22

## 2023-02-22 RX ADMIN — SODIUM CHLORIDE, PRESERVATIVE FREE 10 ML: 5 INJECTION INTRAVENOUS at 08:17

## 2023-02-22 RX ADMIN — IPRATROPIUM BROMIDE AND ALBUTEROL SULFATE 1 AMPULE: .5; 3 SOLUTION RESPIRATORY (INHALATION) at 07:41

## 2023-02-22 RX ADMIN — AZITHROMYCIN DIHYDRATE 500 MG: 500 INJECTION, POWDER, LYOPHILIZED, FOR SOLUTION INTRAVENOUS at 17:07

## 2023-02-22 RX ADMIN — ATORVASTATIN CALCIUM 40 MG: 40 TABLET, FILM COATED ORAL at 08:13

## 2023-02-22 RX ADMIN — ENOXAPARIN SODIUM 40 MG: 100 INJECTION SUBCUTANEOUS at 18:41

## 2023-02-22 ASSESSMENT — PAIN DESCRIPTION - LOCATION
LOCATION: ELBOW;ARM
LOCATION: ELBOW;ARM

## 2023-02-22 ASSESSMENT — PAIN SCALES - GENERAL
PAINLEVEL_OUTOF10: 5
PAINLEVEL_OUTOF10: 5
PAINLEVEL_OUTOF10: 0
PAINLEVEL_OUTOF10: 0

## 2023-02-22 ASSESSMENT — PAIN DESCRIPTION - ORIENTATION
ORIENTATION: LEFT
ORIENTATION: LEFT

## 2023-02-22 ASSESSMENT — PAIN DESCRIPTION - DESCRIPTORS
DESCRIPTORS: ACHING;THROBBING
DESCRIPTORS: ACHING;THROBBING

## 2023-02-22 NOTE — RT PROTOCOL NOTE
RT Inhaler-Nebulizer Bronchodilator Protocol Note    There is a bronchodilator order in the chart from a provider indicating to follow the RT Bronchodilator Protocol and there is an Initiate RT Inhaler-Nebulizer Bronchodilator Protocol order as well (see protocol at bottom of note). CXR Findings:  No results found. The findings from the last RT Protocol Assessment were as follows:   History Pulmonary Disease: Smoker 15 pack years or more  Respiratory Pattern: Regular pattern and RR 12-20 bpm  Breath Sounds: Slightly diminished and/or crackles  Cough: Strong, productive  Indication for Bronchodilator Therapy:    Bronchodilator Assessment Score: 4    Aerosolized bronchodilator medication orders have been revised according to the RT Inhaler-Nebulizer Bronchodilator Protocol below. Respiratory Therapist to perform RT Therapy Protocol Assessment initially then follow the protocol. Repeat RT Therapy Protocol Assessment PRN for score 0-3 or on second treatment, BID, and PRN for scores above 3. No Indications - adjust the frequency to every 6 hours PRN wheezing or bronchospasm, if no treatments needed after 48 hours then discontinue using Per Protocol order mode. If indication present, adjust the RT bronchodilator orders based on the Bronchodilator Assessment Score as indicated below. Use Inhaler orders unless patient has one or more of the following: on home nebulizer, not able to hold breath for 10 seconds, is not alert and oriented, cannot activate and use MDI correctly, or respiratory rate 25 breaths per minute or more, then use the equivalent nebulizer order(s) with same Frequency and PRN reasons based on the score. If a patient is on this medication at home then do not decrease Frequency below that used at home.     0-3 - enter or revise RT bronchodilator order(s) to equivalent RT Bronchodilator order with Frequency of every 4 hours PRN for wheezing or increased work of breathing using Per Protocol order mode. 4-6 - enter or revise RT Bronchodilator order(s) to two equivalent RT bronchodilator orders with one order with BID Frequency and one order with Frequency of every 4 hours PRN wheezing or increased work of breathing using Per Protocol order mode. 7-10 - enter or revise RT Bronchodilator order(s) to two equivalent RT bronchodilator orders with one order with TID Frequency and one order with Frequency of every 4 hours PRN wheezing or increased work of breathing using Per Protocol order mode. 11-13 - enter or revise RT Bronchodilator order(s) to one equivalent RT bronchodilator order with QID Frequency and an Albuterol order with Frequency of every 4 hours PRN wheezing or increased work of breathing using Per Protocol order mode. Greater than 13 - enter or revise RT Bronchodilator order(s) to one equivalent RT bronchodilator order with every 4 hours Frequency and an Albuterol order with Frequency of every 2 hours PRN wheezing or increased work of breathing using Per Protocol order mode. RT to enter RT Home Evaluation for COPD & MDI Assessment order using Per Protocol order mode. Electronically signed by Tom Vieira RCP on 2/22/2023 at 7:39 AMRT Inhaler-Nebulizer Bronchodilator Protocol Note    There is a bronchodilator order in the chart from a provider indicating to follow the RT Bronchodilator Protocol and there is an Initiate RT Inhaler-Nebulizer Bronchodilator Protocol order as well (see protocol at bottom of note). CXR Findings:  No results found. The findings from the last RT Protocol Assessment were as follows:   History Pulmonary Disease:    Respiratory Pattern:    Breath Sounds:    Cough: Indication for Bronchodilator Therapy:    Bronchodilator Assessment Score:      Aerosolized bronchodilator medication orders have been revised according to the RT Inhaler-Nebulizer Bronchodilator Protocol below.     Respiratory Therapist to perform RT Therapy Protocol Assessment initially then follow the protocol. Repeat RT Therapy Protocol Assessment PRN for score 0-3 or on second treatment, BID, and PRN for scores above 3. No Indications - adjust the frequency to every 6 hours PRN wheezing or bronchospasm, if no treatments needed after 48 hours then discontinue using Per Protocol order mode. If indication present, adjust the RT bronchodilator orders based on the Bronchodilator Assessment Score as indicated below. Use Inhaler orders unless patient has one or more of the following: on home nebulizer, not able to hold breath for 10 seconds, is not alert and oriented, cannot activate and use MDI correctly, or respiratory rate 25 breaths per minute or more, then use the equivalent nebulizer order(s) with same Frequency and PRN reasons based on the score. If a patient is on this medication at home then do not decrease Frequency below that used at home. 0-3 - enter or revise RT bronchodilator order(s) to equivalent RT Bronchodilator order with Frequency of every 4 hours PRN for wheezing or increased work of breathing using Per Protocol order mode. 4-6 - enter or revise RT Bronchodilator order(s) to two equivalent RT bronchodilator orders with one order with BID Frequency and one order with Frequency of every 4 hours PRN wheezing or increased work of breathing using Per Protocol order mode. 7-10 - enter or revise RT Bronchodilator order(s) to two equivalent RT bronchodilator orders with one order with TID Frequency and one order with Frequency of every 4 hours PRN wheezing or increased work of breathing using Per Protocol order mode. 11-13 - enter or revise RT Bronchodilator order(s) to one equivalent RT bronchodilator order with QID Frequency and an Albuterol order with Frequency of every 4 hours PRN wheezing or increased work of breathing using Per Protocol order mode.       Greater than 13 - enter or revise RT Bronchodilator order(s) to one equivalent RT bronchodilator order with every 4 hours Frequency and an Albuterol order with Frequency of every 2 hours PRN wheezing or increased work of breathing using Per Protocol order mode. RT to enter RT Home Evaluation for COPD & MDI Assessment order using Per Protocol order mode.     Electronically signed by Ashwin Bautista RCP on 2/22/2023 at 7:36 AM

## 2023-02-22 NOTE — PROGRESS NOTES
Family at bedside.  Daughter who is POA expressed that they do not want Dr. Luna Tian services any longer and wishes to go with hospitalist.

## 2023-02-22 NOTE — PLAN OF CARE
Problem: Respiratory - Adult  Goal: Clear lung sounds  2/22/2023 1637 by Linda Meng RCP  Outcome: Progressing   Patient mutually agreed on goals.
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
## 2023-02-22 NOTE — CONSULTS
[facility]    Patient's Name/Date of Birth: Leia Mosqueda / 1940 (6 y.o.)    Date: February 22, 2023     Referring Provider: Shakira Mims MD    CHIEF COMPLAINT: Shortness of breath and fatigue  Cardiology consulted for: Elevated troponins      HPI: This is a pleasant 81 y.o. male PMH HLD, seizures, hypothyroidism presented to the hospital with fatigue and shortness of breath. Patient has been coughing about 1 week and started to have productive brown sputum. He denies any fevers or chills. Patient is a former smoker. On labs he was found to have elevated troponins for which cardiology was consulted. Patient denies any chest pain,  N/V, no pain or SOB at this time. Patient has no echoes in the past.  On EKG there are ST depressions in leads V5 V6 however no reciprocal changes. No previous Echos on file.     All labs, EKG's, diagnostic testing and images as well as cardiac cath, stress testing were reviewed during this encounter    Past Medical History:   Diagnosis Date    Cancer ([facility]     Coma ([facility]     Hyperlipidemia     Leukemia ([facility]     Seizures ([facility]      Past Surgical History:   Procedure Laterality Date    BRAIN SURGERY      infection    OTHER SURGICAL HISTORY      Tooth Abscess removed '95     Current Facility-Administered Medications   Medication Dose Route Frequency Provider Last Rate Last Admin    albuterol (PROVENTIL) nebulizer solution 2.5 mg  2.5 mg Nebulization BID Rogue Remak, APRN - CNP        And    ipratropium (ATROVENT) 0.02 % nebulizer solution 0.5 mg  0.5 mg Nebulization BID Rogue Remak, APRN - CNP        sodium chloride flush 0.9 % injection 5-40 mL  5-40 mL IntraVENous 2 times per day Rogue Remak, APRN - CNP   10 mL at 02/22/23 0817    sodium chloride flush 0.9 % injection 5-40 mL  5-40 mL IntraVENous PRN Rogue Remak, APRN - CNP        0.9 % sodium chloride infusion   IntraVENous PRN Rogue Remak, APRN - CNP        ondansetron (ZOFRAN-ODT) disintegrating tablet 4 mg  4 mg Oral Q8H PRN Noemicristiana Wilkins APRN - CNP        Or    ondansetron [facility]) injection 4 mg  4 mg IntraVENous Q6H PRN Noemicristiana Wilkins, APRN - CNP        acetaminophen (TYLENOL) tablet 650 mg  650 mg Oral Q6H PRN Noemicristiana Wilkins, APRN - CNP        Or    acetaminophen (TYLENOL) suppository 650 mg  650 mg Rectal Q6H PRN Noemicristiana Wilkins, APRN - CNP        polyethylene glycol (GLYCOLAX) packet 17 g  17 g Oral Daily PRN Noemicristiana Wilkins, APRN - CNP        aspirin chewable tablet 81 mg  81 mg Oral Daily Noemi Claudette, APRN - CNP   81 mg at 02/22/23 3191    perflutren lipid microspheres (DEFINITY) injection 1.5 mL  1.5 mL IntraVENous ONCE PRN Noemi Wilkins, APRN - CNP        enoxaparin (LOVENOX) injection 40 mg  40 mg SubCUTAneous Q24H Noemicristiana Wilkins, APRN - CNP   40 mg at 02/21/23 1814    cefTRIAXone (ROCEPHIN) 1,000 mg in sodium chloride 0.9 % 50 mL IVPB (mini-bag)  1,000 mg IntraVENous Q24H Noemi Wilkins, APRN - CNP   Stopped at 02/1940    azithromycin (ZITHROMAX) 500 mg in sodium chloride 0.9 % 250 mL IVPB ([id number])  500 mg IntraVENous Q24H Noemi Wilkins, APRN - CNP   Stopped at 02/1940    atorvastatin (LIPITOR) tablet 40 mg  40 mg Oral Daily Noemi Claudette, APRN - CNP   40 mg at 02/22/23 0813    carBAMazepine (TEGRETOL XR) extended release tablet 600 mg  600 mg Oral BID Noemi Claudette, APRN - CNP   600 mg at 02/22/23 4026    clopidogrel (PLAVIX) tablet 75 mg  75 mg Oral Daily Noemi Claudette, APRN - CNP   75 mg at 02/22/23 0813    escitalopram (LEXAPRO) tablet 20 mg  20 mg Oral Daily Noemi Claudette, APRN - CNP   20 mg at 02/22/23 0813    levothyroxine (SYNTHROID) tablet 50 mcg  50 mcg Oral Daily Noemi Claudette, APRN - CNP   50 mcg at 02/22/23 0550    risperiDONE (RISPERDAL) tablet 0.5 mg  0.5 mg Oral BID ANGELIC Doe - CNP   0.5 mg at 02/22/23 0813    multivitamin 1 tablet  1 tablet Oral Daily ANGELIC Doe - CNP   1 tablet at 02/22/23 0813     Prior to Admission medications    Medication Sig Start Date End Date Taking? Authorizing Provider   risperiDONE (RISPERDAL) 0.5 MG tablet Take 0.5 mg by mouth 2 times daily   Yes Historical Provider, MD   escitalopram (LEXAPRO) 20 MG tablet Take 20 mg by mouth daily   Yes Historical Provider, MD   levothyroxine (SYNTHROID) 50 MCG tablet Take 50 mcg by mouth Daily    Historical Provider, MD   acetaminophen 650 MG TABS Take 650 mg by mouth every 6 hours as needed. 3/4/14   Hafsa Jones MD   carBAMazepine (TEGRETOL XR) 200 MG SR tablet Take 600 mg by mouth 2 times daily    Historical Provider, MD   clopidogrel (PLAVIX) 75 MG tablet Take 75 mg by mouth daily. Historical Provider, MD   atorvastatin (LIPITOR) 40 MG tablet Take 40 mg by mouth daily. Historical Provider, MD   therapeutic multivitamin-minerals (THERAGRAN-M) tablet Take 1 tablet by mouth daily. Historical Provider, MD   Scheduled Meds:   albuterol  2.5 mg Nebulization BID    And    ipratropium  0.5 mg Nebulization BID    sodium chloride flush  5-40 mL IntraVENous 2 times per day    aspirin  81 mg Oral Daily    enoxaparin  40 mg SubCUTAneous Q24H    cefTRIAXone (ROCEPHIN) IV  1,000 mg IntraVENous Q24H    azithromycin  500 mg IntraVENous Q24H    atorvastatin  40 mg Oral Daily    carBAMazepine  600 mg Oral BID    clopidogrel  75 mg Oral Daily    escitalopram  20 mg Oral Daily    levothyroxine  50 mcg Oral Daily    risperiDONE  0.5 mg Oral BID    multivitamin  1 tablet Oral Daily     Continuous Infusions:   sodium chloride       PRN Meds:.sodium chloride flush, sodium chloride, ondansetron **OR** ondansetron, acetaminophen **OR** acetaminophen, polyethylene glycol, perflutren lipid microspheres    No Known Allergies  History reviewed. No pertinent family history. Social History     Socioeconomic History    Marital status:       Spouse name: Not on file    Number of children: 2    Years of education: Not on file    Highest education level: Not on file   Occupational History    Not on file   Tobacco Use    Smoking status: Former    Smokeless tobacco: Not on file    Tobacco comments:     doesn't remember when he quit   Substance and Sexual Activity    Alcohol use: No    Drug use: No    Sexual activity: Never   Other Topics Concern    Not on file   Social History Narrative    Not on file     Social Determinants of Health     Financial Resource Strain: Not on file   Food Insecurity: Not on file   Transportation Needs: Not on file   Physical Activity: Not on file   Stress: Not on file   Social Connections: Not on file   Intimate Partner Violence: Not on file   Housing Stability: Not on file     ROS:   Constitutional: Denies any recent wt change. Eyes:  Denies any blurring or double vision, no glaucoma  Ears/Nose/Mouth/Throat:  Denies any chronic sinus/rhinitis, bleeding gums  Cardiovascular:  As described above. Respiratory:  Denies any frequent cough, wheezing or coughing up blood  Genitourinary:  Denies difficulty with urination and kidney stones  Gastrointestinal:  Denies any chronic problems with abdominal pain, nausea, vomiting or diarrhea  Musculoskeletal:  Denies any joint pain, back pain, or difficulty walking  Integumentary:  Denies any rash  Neurological:  No numbness or tingling  Endocrine:  Denies any polydipsia. Hematologic/Lymphatic:  Denies any hemorrhage or lymphatic drainage problems. Labs:  CBC:   Recent Labs     02/21/23  0932 02/22/23  0609   WBC 6.2 5.8   HGB 13.0* 12.8*   HCT 39.5* 39.7*   .6* 103.4*    256     BMP:   Recent Labs     02/21/23  0932      K 3.6      CO2 22*   BUN 15   CREATININE 0.9     Accucheck Glucoses: No results for input(s): POCGLU in the last 72 hours. Cardiac Enzymes: No results for input(s): CKTOTAL, CKMB, CKMBINDEX, TROPONINI in the last 72 hours. PT/INR: No results for input(s): PROTIME, INR in the last 72 hours. APTT: No results for input(s): APTT in the last 72 hours.   Liver Profile:  Lab Results Component Value Date/Time    AST 17 02/21/2023 09:32 AM    ALT 15 02/21/2023 09:32 AM    BILIDIR <0.2 12/30/2015 03:45 PM    BILITOT 0.6 02/21/2023 09:32 AM    ALKPHOS 38 02/21/2023 09:32 AM     Lab Results   Component Value Date/Time    CHOL 150 11/09/2021 08:28 AM    HDL 52 11/09/2021 08:28 AM    TRIG 178 11/09/2021 08:28 AM     TSH:   Lab Results   Component Value Date/Time    TSH 4.130 02/21/2023 03:44 PM     UA:   Lab Results   Component Value Date/Time    COLORU DK YELLOW 01/15/2017 08:16 PM    PHUR 6.0 01/15/2017 08:16 PM    WBCUA 0-2 01/15/2017 08:16 PM    RBCUA 0-2 01/15/2017 08:16 PM    YEAST NONE SEEN 01/15/2017 08:16 PM    BACTERIA NONE 01/15/2017 08:16 PM    LEUKOCYTESUR NEGATIVE 01/15/2017 08:16 PM    UROBILINOGEN 1.0 01/15/2017 08:16 PM    BILIRUBINUR NEGATIVE 01/15/2017 08:16 PM    BLOODU NEGATIVE 01/15/2017 08:16 PM    GLUCOSEU NEGATIVE 01/15/2017 08:16 PM         Physical Exam:  Vitals:    02/22/23 0800   BP: (!) 161/65   Pulse: 73   Resp: 18   Temp: 98.2 °F (36.8 °C)   SpO2: 93%      Intake/Output Summary (Last 24 hours) at 2/22/2023 0818  Last data filed at 2/22/2023 0345  Gross per 24 hour   Intake 400 ml   Output --   Net 400 ml      General:  No acute distress  Neck: Supple, no JVD, no carotid bruits  Heart: Regular rhythm normal S1 and S2, no rubs, murmurs or gallops  Lungs: clear to ascultation no rales, wheezes, or rhonchi  Abdomen: positive bowel sounds, soft, non-tender, non-distended, no bruits, no masses  Extremities:no clubbing, cyanosis or edema  Neurologic: alert and oriented x 3, cranial nerves 2-12 grossly intact, motor and sensory intact, moving all extremities  Skin: No rashes  Psych: AO x 3, no depression/demetria, no pressured speech, normal affect  Lymph: No obvious LAD      Assessment:  Troponin elevation however, troponins are equivocal at this time. Troponin level 0.162, 0.170, 0.164 likely secondary to demand. However, I cannot rule out ischemic causes.   COPD unspecified  Hypothyroidism  Hyperlipidemia  History of seizures  Elevated D-dimer  Pneumonia? Patient presents with elevated troponins most likely secondary to demand however on EKG he has ST depressions in lead V5 V6. It is likely he has some underlying stenosis as he has multiple risk factors. His troponins have not been elevated previously. [facility] Heart score 6. Plan:  -Agree with echo at this time it is pending. Based on echo will likely need a cath. Scheduled for left heart cath procedure tomorrow 2/23/2023  - N.p.o. at midnight  - Continue home medication Lipitor  - Continue home medication Plavix  -CTA chest to rule out PE      Thank you for allowing us to participate in the care of this patient. Please do not hesitate to call us with questions. Electronically signed by Valentine Posada DO on 2/22/2023 at 8:18 AM    Supervising [de-identified] Attestation Statement  I performed a history and physical examination on the patient and discussed the management with the resident physician. I reviewed and agree with the findings and plan as documented in the resident's note except for as noted below. Ongoing shortness of breath, possibly superimposed PNA? Has risk factors, elevated troponins and abnormal EKG. Will get Echo to evaluate WMA. Likely will need cath if no fevers, chills or infectious causes   Discussed with patient and family at bedside.      Electronically signed by Lucero Styles MD on 2/22/23 at 4:10 PM EST      Interventional Cardiology - [facility]

## 2023-02-22 NOTE — DISCHARGE INSTR - COC
Continuity of Care Form    Patient Name: Esme Treadwell   :  1940  MRN:  [id number]    Admit date:  2023  Discharge date:  23    Code Status Order: Full Code   Advance Directives:     Admitting Physician:  Collin Aviles MD  PCP: Ed Calero MD    Discharging Nurse: [address] Unit/Room#: 8A-09/009-A  Discharging Unit Phone Number: [phone]    Emergency Contact:   Extended Emergency Contact Information  Primary Emergency Contact: [address] [facility] [address] [facility] Phone: [phone]  Relation: Child  Secondary Emergency Contact: Aniya abbasi   [facility] [address] [facility] Phone: [phone]  Relation: Other    Past Surgical History:  Past Surgical History:   Procedure Laterality Date    BRAIN SURGERY      infection    OTHER SURGICAL HISTORY      Tooth Abscess removed '95       Immunization History: There is no immunization history for the selected administration types on file for this patient. Active Problems:  Patient Active Problem List   Diagnosis Code    Orthostatic hypotension I95.1    Seizure (HCC) R56.9    Dementia ([facility] F03.90    Depression F32. 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 B02.29    Altered mental state R41.82    Syncope and collapse R55    Elevated troponin level R77.8       Isolation/Infection:   Isolation            No Isolation          Patient Infection Status       Infection Onset Added Last Indicated Last Indicated By Review Planned Expiration Resolved Resolved By    None active    Resolved    COVID-19 (Rule Out) 23 COVID-19 & Influenza Combo (Ordered)   23 Rule-Out Test Resulted            Nurse Assessment:  Last Vital Signs: BP (!) 161/65   Pulse 73   Temp 98.2 °F (36.8 °C) (Oral)   Resp 18   Ht 5' 9\" (1.753 m)   Wt 199 lb 3.2 oz (90.4 kg)   SpO2 93%   BMI 29.42 kg/m²     Last documented pain score (0-10 scale): Pain Level: 0  Last Weight:    Wt Readings from Last 1 Encounters:   02/22/23 199 lb 3.2 oz (90.4 kg)     Mental Status:  disoriented    IV Access:  - None    Nursing Mobility/ADLs:  Walking   Dependent  Transfer  Assisted  Bathing  Assisted  Dressing  Assisted  Toileting  Assisted  Feeding  Assisted  Med Admin  Assisted  Med Delivery   whole    Wound Care Documentation and Therapy:        Elimination:  Continence: Bowel: No  Bladder: No  Urinary Catheter: None   Colostomy/Ileostomy/Ileal Conduit: No       Date of Last BM: 02/21/23    Intake/Output Summary (Last 24 hours) at 2/22/2023 1041  Last data filed at 2/22/2023 0345  Gross per 24 hour   Intake 400 ml   Output --   Net 400 ml     I/O last 3 completed shifts: In: 400 [P.O.:400]  Out: -     Safety Concerns:     None    Impairments/Disabilities:      None    Nutrition Therapy:  Current Nutrition Therapy:   - Oral Diet:  General    Routes of Feeding: Oral  Liquids: Thin Liquids  Daily Fluid Restriction: no  Last Modified Barium Swallow with Video (Video Swallowing Test): not done    Treatments at the Time of Hospital Discharge:   Respiratory Treatments: n/a  Oxygen Therapy:  is not on home oxygen therapy.   Ventilator:    - No ventilator support    Rehab Therapies: ***  Weight Bearing Status/Restrictions: No weight bearing restrictions  Other Medical Equipment (for information only, NOT a DME order):  wheelchair  Other Treatments: n/a    Patient's personal belongings (please select all that are sent with patient):  None    RN SIGNATURE:  Electronically signed by Ger Vines RN on 2/22/23 at 10:44 AM EST    CASE MANAGEMENT/SOCIAL WORK SECTION    Inpatient Status Date: 2-    Readmission Risk Assessment Score:  Readmission Risk              Risk of Unplanned Readmission:  0           Discharging to Facility/ Agency   Name: [facility]  Address: [address] [place] [address]  Phone: [phone]  Fax: [phone]    Dialysis Facility (if applicable)   Name:  Address:  Dialysis Schedule:  Phone:  Fax:    / signature: Electronically signed by MARLYN Rogers on 3/16/23 at 8:44 AM EDT    PHYSICIAN SECTION    Prognosis: Good    Condition at Discharge: Stable    Rehab Potential (if transferring to Rehab): Good    Recommended Labs or Other Treatments After Discharge: CBC, BMP and CXR just prior to routine OV 3 weeks after d/c from hospital    Physician Certification: I certify the above information and transfer of Jen Horan  is necessary for the continuing treatment of the diagnosis listed and that he requires [facility] for greater 30 days.      Update Admission H&P: No change in H&P    PHYSICIAN SIGNATURE:  Electronically signed by Vesta Mayorga MD on 9/43/52 at 8:55 AM EDT

## 2023-02-22 NOTE — CARE COORDINATION
Case Management Assessment  Initial Evaluation    Date/Time of Evaluation: 2/22/2023 11:19 AM  Assessment Completed by: Buck Nageotte, RN    If patient is discharged prior to next notation, then this note serves as note for discharge by case management. Patient Name: Mary Chery                   YOB: 1940  Diagnosis: Elevated troponin level [R77.8]  Elevated troponin [R77.8]  Elevated brain natriuretic peptide (BNP) level [R79.89]  Dyspnea, unspecified type [R06.00]                   Date / Time: 2/21/2023  8:37 AM  [facility]     Patient Admission Status: Observation   Readmission Risk Low 0-14, Mod 15-19), High > 20: No data recorded  Current PCP: Sol Felix MD  PCP verified by CM? Yes    Chart Reviewed: Yes      History Provided by: Child/Family (called daughter Sarah Castaneda)  Patient Orientation: Alert and Oriented, Other (see comment) (Confused, states its May 2020)    Patient Cognition: [place] Memory Deficit    Hospitalization in the last 30 days (Readmission):  No    If yes, Readmission Assessment in CM Navigator will be completed. Advance Directives:      Code Status: Full Code   Patient's Primary Decision Maker is: Legal Next of Kin (financial POA scanned in)      Discharge Planning:    Patient lives with: Children, Family Members Type of Home: House  Primary Care Giver: Self  Patient Support Systems include: Children, Family Members   Current Financial resources: Medicare  Current community resources: None  Current services prior to admission: None            Current DME:              Type of Home Care services:  None    ADLS  Prior functional level: Assistance with the following:, Shopping  Current functional level: Assistance with the following:, Shopping    Family can provide assistance at DC: Yes  Would you like Case Management to discuss the discharge plan with any other family members/significant others, and if so, who?  Yes (daughter Sarah Castaneda)  Plans to Return to Present Housing: Yes  Other Identified Issues/Barriers to RETURNING to current housing: no  Potential Assistance needed at discharge: N/A            Potential DME:    Patient expects to discharge to: [facility] for transportation at discharge: Family (patient hasn't drove since brain surgery in [address])    Financial    Payor: MEDICARE / Plan: MEDICARE PART A / Product Type: *No Product type* /     Does insurance require precert for SNF: No    Potential assistance Purchasing Medications: No  Meds-to-Beds request: Yes      [id number] [place] [facility] [address]   [facility] [address]  Phone: [phone] Fax: [phone]      Notes:    Factors facilitating achievement of predicted outcomes: Family support, Cooperative, and Pleasant    Barriers to discharge: Medical complications    Additional Case Management Notes: Admit from ER as observation with reports of fatigue and SOB. D [address], BNP 1675. SpO2 dropped to 87% with ambulation in ER. Consult to cardiology, pulmonology, and palliative care. Bedside spirometry completed by RT. Now on room air. - COVID and Flu. IV Zithromax and Rocephin.    2/21/23 09:32 2/21/23 12:34 2/21/23 15:44 2/21/23 18:31   Troponin T 0.166 ! 0.162 ! 0.170 ! 0.164 ! Orthostatic Vitals:  Orthostatic Vitals 2/22/2023   Orthostatic B/P and Pulse? -   Blood Pressure Lying 119/93   Pulse Lying 78   Blood Pressure Sitting 134/109   Pulse Sitting 78   Blood Pressure Standing 181/87   Pulse Standing 84   Arterial Blood Gas result:  pO2 30; pCO2 30; pH 7.47;  HCO3 21, %O2 Sat 94. Procedure: 2/21 CXR: No interval change since previous study dated 1/15/2017. Diffuse COPD and thickening off the interstitial lung markings. Borderline cardiomegaly. Thoracic spondylosis. .   2/22 ECHO: completed  2/22 CTA Chest: ordered    The Plan for Transition of Care is related to the following treatment goals of Elevated troponin level [R77.8]  Elevated troponin [R77.8]  Elevated brain natriuretic peptide (BNP) level [R79.89]  Dyspnea, unspecified type [R06.00]    Patient Goals/Plan/Treatment Preferences: From home with daughter and grandchildren. CM spoke with daughter. She states this is patient's baseline mentation since 1995 when he had brain surgery. He is independent in his care at home. Daughter drives him due to visual deficits from previous brain sx. They deny having any needs for discharge and plan for him to return home with them. Transportation/Food Security/Housekeeping Addressed: No issues identified.      Melly Choudhury RN  Case Management Department

## 2023-02-22 NOTE — PROGRESS NOTES
Pt was alone as he was dealing with elevated troponin level. He was anointed.     02/22/23 0696   Encounter Summary   Encounter Overview/Reason  Initial Encounter   Service Provided For: Patient   Referral/Consult From: [facility]   Last Encounter  02/22/23  (Anointed)   Complexity of Encounter Low   Spiritual/Emotional needs   Type Spiritual Support   Rituals, Rites and Sacraments   Type Anointing   Assessment/Intervention/Outcome   Assessment Calm   Intervention Empowerment

## 2023-02-22 NOTE — PLAN OF CARE
Problem: Discharge Planning  Goal: Discharge to home or other facility with appropriate resources  Outcome: Progressing     Problem: Pain  Goal: Verbalizes/displays adequate comfort level or baseline comfort level  Outcome: Progressing   Care plan reviewed with patient. Patient verbalize understanding of the plan of care and contribute to goal setting.

## 2023-02-23 ENCOUNTER — APPOINTMENT (OUTPATIENT)
Dept: CARDIAC CATH/INVASIVE PROCEDURES | Age: 83
DRG: 233 | End: 2023-02-23
Payer: MEDICARE

## 2023-02-23 LAB
ABO: NORMAL
ANION GAP SERPL CALC-SCNC: 11 MEQ/L (ref 8–16)
ANION GAP SERPL CALC-SCNC: 13 MEQ/L (ref 8–16)
ANTIBODY SCREEN: NORMAL
APTT PPP: 53.7 SECONDS (ref 22–38)
BASOPHILS ABSOLUTE: 0 THOU/MM3 (ref 0–0.1)
BASOPHILS NFR BLD AUTO: 0.3 %
BUN SERPL-MCNC: 13 MG/DL (ref 7–22)
BUN SERPL-MCNC: 15 MG/DL (ref 7–22)
CALCIUM SERPL-MCNC: 8.1 MG/DL (ref 8.5–10.5)
CALCIUM SERPL-MCNC: 8.2 MG/DL (ref 8.5–10.5)
CHLORIDE SERPL-SCNC: 109 MEQ/L (ref 98–111)
CHLORIDE SERPL-SCNC: 109 MEQ/L (ref 98–111)
CO2 SERPL-SCNC: 22 MEQ/L (ref 23–33)
CO2 SERPL-SCNC: 22 MEQ/L (ref 23–33)
CREAT SERPL-MCNC: 0.7 MG/DL (ref 0.4–1.2)
CREAT SERPL-MCNC: 0.7 MG/DL (ref 0.4–1.2)
DEPRECATED RDW RBC AUTO: 45.7 FL (ref 35–45)
DEPRECATED RDW RBC AUTO: 46.2 FL (ref 35–45)
DEPRECATED RDW RBC AUTO: 46.5 FL (ref 35–45)
EOSINOPHIL NFR BLD AUTO: 7.7 %
EOSINOPHILS ABSOLUTE: 0.5 THOU/MM3 (ref 0–0.4)
ERYTHROCYTE [DISTWIDTH] IN BLOOD BY AUTOMATED COUNT: 12.1 % (ref 11.5–14.5)
ERYTHROCYTE [DISTWIDTH] IN BLOOD BY AUTOMATED COUNT: 12.2 % (ref 11.5–14.5)
ERYTHROCYTE [DISTWIDTH] IN BLOOD BY AUTOMATED COUNT: 12.2 % (ref 11.5–14.5)
FOLATE SERPL-MCNC: 11.6 NG/ML (ref 4.8–24.2)
GFR SERPL CREATININE-BSD FRML MDRD: > 60 ML/MIN/1.73M2
GFR SERPL CREATININE-BSD FRML MDRD: > 60 ML/MIN/1.73M2
GLUCOSE SERPL-MCNC: 106 MG/DL (ref 70–108)
GLUCOSE SERPL-MCNC: 99 MG/DL (ref 70–108)
HCT VFR BLD AUTO: 36.3 % (ref 42–52)
HCT VFR BLD AUTO: 37.8 % (ref 42–52)
HCT VFR BLD AUTO: 38.5 % (ref 42–52)
HGB BLD-MCNC: 11.9 GM/DL (ref 14–18)
HGB BLD-MCNC: 12.4 GM/DL (ref 14–18)
HGB BLD-MCNC: 12.6 GM/DL (ref 14–18)
IMM GRANULOCYTES # BLD AUTO: 0.03 THOU/MM3 (ref 0–0.07)
IMM GRANULOCYTES NFR BLD AUTO: 0.5 %
INR PPP: 1.09 (ref 0.85–1.13)
IRON SATN MFR SERPL: 23 % (ref 20–50)
IRON SERPL-MCNC: 64 UG/DL (ref 65–195)
LYMPHOCYTES ABSOLUTE: 0.8 THOU/MM3 (ref 1–4.8)
LYMPHOCYTES NFR BLD AUTO: 11.9 %
MAGNESIUM SERPL-MCNC: 2.2 MG/DL (ref 1.6–2.4)
MCH RBC QN AUTO: 34 PG (ref 26–33)
MCH RBC QN AUTO: 34.1 PG (ref 26–33)
MCH RBC QN AUTO: 34.3 PG (ref 26–33)
MCHC RBC AUTO-ENTMCNC: 32.7 GM/DL (ref 32.2–35.5)
MCHC RBC AUTO-ENTMCNC: 32.8 GM/DL (ref 32.2–35.5)
MCHC RBC AUTO-ENTMCNC: 32.8 GM/DL (ref 32.2–35.5)
MCV RBC AUTO: 103.8 FL (ref 80–94)
MCV RBC AUTO: 104 FL (ref 80–94)
MCV RBC AUTO: 104.4 FL (ref 80–94)
MONOCYTES ABSOLUTE: 0.7 THOU/MM3 (ref 0.4–1.3)
MONOCYTES NFR BLD AUTO: 11.1 %
NEUTROPHILS NFR BLD AUTO: 68.5 %
NRBC BLD AUTO-RTO: 0 /100 WBC
PHOSPHATE SERPL-MCNC: 3.7 MG/DL (ref 2.4–4.7)
PLATELET # BLD AUTO: 236 THOU/MM3 (ref 130–400)
PLATELET # BLD AUTO: 257 THOU/MM3 (ref 130–400)
PLATELET # BLD AUTO: 278 THOU/MM3 (ref 130–400)
PMV BLD AUTO: 9.6 FL (ref 9.4–12.4)
POTASSIUM SERPL-SCNC: 3.7 MEQ/L (ref 3.5–5.2)
POTASSIUM SERPL-SCNC: 3.9 MEQ/L (ref 3.5–5.2)
RBC # BLD AUTO: 3.49 MILL/MM3 (ref 4.7–6.1)
RBC # BLD AUTO: 3.62 MILL/MM3 (ref 4.7–6.1)
RBC # BLD AUTO: 3.71 MILL/MM3 (ref 4.7–6.1)
RH FACTOR: NORMAL
SEGMENTED NEUTROPHILS ABSOLUTE COUNT: 4.5 THOU/MM3 (ref 1.8–7.7)
SODIUM SERPL-SCNC: 142 MEQ/L (ref 135–145)
SODIUM SERPL-SCNC: 144 MEQ/L (ref 135–145)
TIBC SERPL-MCNC: 278 UG/DL (ref 171–450)
VIT B12 SERPL-MCNC: 259 PG/ML (ref 211–911)
WBC # BLD AUTO: 4.5 THOU/MM3 (ref 4.8–10.8)
WBC # BLD AUTO: 6.1 THOU/MM3 (ref 4.8–10.8)
WBC # BLD AUTO: 6.5 THOU/MM3 (ref 4.8–10.8)

## 2023-02-23 PROCEDURE — 6360000002 HC RX W HCPCS

## 2023-02-23 PROCEDURE — G0378 HOSPITAL OBSERVATION PER HR: HCPCS | Performed by: PHYSICIAN ASSISTANT

## 2023-02-23 PROCEDURE — 85730 THROMBOPLASTIN TIME PARTIAL: CPT

## 2023-02-23 PROCEDURE — C1887 CATHETER, GUIDING: HCPCS

## 2023-02-23 PROCEDURE — 83550 IRON BINDING TEST: CPT

## 2023-02-23 PROCEDURE — B2111ZZ FLUOROSCOPY OF MULTIPLE CORONARY ARTERIES USING LOW OSMOLAR CONTRAST: ICD-10-PCS | Performed by: INTERNAL MEDICINE

## 2023-02-23 PROCEDURE — 96366 THER/PROPH/DIAG IV INF ADDON: CPT

## 2023-02-23 PROCEDURE — 94640 AIRWAY INHALATION TREATMENT: CPT

## 2023-02-23 PROCEDURE — 99223 1ST HOSP IP/OBS HIGH 75: CPT | Performed by: INTERNAL MEDICINE

## 2023-02-23 PROCEDURE — 97166 OT EVAL MOD COMPLEX 45 MIN: CPT

## 2023-02-23 PROCEDURE — 83735 ASSAY OF MAGNESIUM: CPT

## 2023-02-23 PROCEDURE — 84100 ASSAY OF PHOSPHORUS: CPT

## 2023-02-23 PROCEDURE — 2500000003 HC RX 250 WO HCPCS

## 2023-02-23 PROCEDURE — C1894 INTRO/SHEATH, NON-LASER: HCPCS

## 2023-02-23 PROCEDURE — 4A023N7 MEASUREMENT OF CARDIAC SAMPLING AND PRESSURE, LEFT HEART, PERCUTANEOUS APPROACH: ICD-10-PCS | Performed by: INTERNAL MEDICINE

## 2023-02-23 PROCEDURE — 97530 THERAPEUTIC ACTIVITIES: CPT

## 2023-02-23 PROCEDURE — 85025 COMPLETE CBC W/AUTO DIFF WBC: CPT

## 2023-02-23 PROCEDURE — 96375 TX/PRO/DX INJ NEW DRUG ADDON: CPT

## 2023-02-23 PROCEDURE — 6360000002 HC RX W HCPCS: Performed by: STUDENT IN AN ORGANIZED HEALTH CARE EDUCATION/TRAINING PROGRAM

## 2023-02-23 PROCEDURE — 6370000000 HC RX 637 (ALT 250 FOR IP)

## 2023-02-23 PROCEDURE — 99232 SBSQ HOSP IP/OBS MODERATE 35: CPT | Performed by: INTERNAL MEDICINE

## 2023-02-23 PROCEDURE — 99232 SBSQ HOSP IP/OBS MODERATE 35: CPT | Performed by: PHYSICIAN ASSISTANT

## 2023-02-23 PROCEDURE — G0378 HOSPITAL OBSERVATION PER HR: HCPCS

## 2023-02-23 PROCEDURE — 86850 RBC ANTIBODY SCREEN: CPT

## 2023-02-23 PROCEDURE — 86901 BLOOD TYPING SEROLOGIC RH(D): CPT

## 2023-02-23 PROCEDURE — 36415 COLL VENOUS BLD VENIPUNCTURE: CPT

## 2023-02-23 PROCEDURE — 75625 CONTRAST EXAM ABDOMINL AORTA: CPT

## 2023-02-23 PROCEDURE — 82746 ASSAY OF FOLIC ACID SERUM: CPT

## 2023-02-23 PROCEDURE — 80048 BASIC METABOLIC PNL TOTAL CA: CPT

## 2023-02-23 PROCEDURE — 85027 COMPLETE CBC AUTOMATED: CPT

## 2023-02-23 PROCEDURE — 85610 PROTHROMBIN TIME: CPT

## 2023-02-23 PROCEDURE — 93005 ELECTROCARDIOGRAM TRACING: CPT | Performed by: INTERNAL MEDICINE

## 2023-02-23 PROCEDURE — C1769 GUIDE WIRE: HCPCS

## 2023-02-23 PROCEDURE — 83921 ORGANIC ACID SINGLE QUANT: CPT

## 2023-02-23 PROCEDURE — 2580000003 HC RX 258

## 2023-02-23 PROCEDURE — 93458 L HRT ARTERY/VENTRICLE ANGIO: CPT

## 2023-02-23 PROCEDURE — 82607 VITAMIN B-12: CPT

## 2023-02-23 PROCEDURE — 75630 X-RAY AORTA LEG ARTERIES: CPT | Performed by: INTERNAL MEDICINE

## 2023-02-23 PROCEDURE — 6360000004 HC RX CONTRAST MEDICATION: Performed by: INTERNAL MEDICINE

## 2023-02-23 PROCEDURE — 86900 BLOOD TYPING SEROLOGIC ABO: CPT

## 2023-02-23 PROCEDURE — 93458 L HRT ARTERY/VENTRICLE ANGIO: CPT | Performed by: INTERNAL MEDICINE

## 2023-02-23 PROCEDURE — 83090 ASSAY OF HOMOCYSTEINE: CPT

## 2023-02-23 PROCEDURE — 97535 SELF CARE MNGMENT TRAINING: CPT

## 2023-02-23 PROCEDURE — 83540 ASSAY OF IRON: CPT

## 2023-02-23 PROCEDURE — 93010 ELECTROCARDIOGRAM REPORT: CPT | Performed by: NUCLEAR MEDICINE

## 2023-02-23 PROCEDURE — 2580000003 HC RX 258: Performed by: INTERNAL MEDICINE

## 2023-02-23 RX ORDER — SODIUM CHLORIDE 9 MG/ML
INJECTION, SOLUTION INTRAVENOUS CONTINUOUS
Status: DISCONTINUED | OUTPATIENT
Start: 2023-02-23 | End: 2023-02-28

## 2023-02-23 RX ORDER — HEPARIN SODIUM 1000 [USP'U]/ML
2000 INJECTION, SOLUTION INTRAVENOUS; SUBCUTANEOUS PRN
Status: DISCONTINUED | OUTPATIENT
Start: 2023-02-23 | End: 2023-02-28

## 2023-02-23 RX ORDER — SODIUM CHLORIDE 0.9 % (FLUSH) 0.9 %
5-40 SYRINGE (ML) INJECTION PRN
Status: DISCONTINUED | OUTPATIENT
Start: 2023-02-23 | End: 2023-02-28

## 2023-02-23 RX ORDER — HEPARIN SODIUM 1000 [USP'U]/ML
4000 INJECTION, SOLUTION INTRAVENOUS; SUBCUTANEOUS ONCE
Status: COMPLETED | OUTPATIENT
Start: 2023-02-23 | End: 2023-02-23

## 2023-02-23 RX ORDER — ATROPINE SULFATE 0.4 MG/ML
0.5 INJECTION, SOLUTION INTRAVENOUS
Status: ACTIVE | OUTPATIENT
Start: 2023-02-23 | End: 2023-02-24

## 2023-02-23 RX ORDER — HEPARIN SODIUM 1000 [USP'U]/ML
4000 INJECTION, SOLUTION INTRAVENOUS; SUBCUTANEOUS PRN
Status: DISCONTINUED | OUTPATIENT
Start: 2023-02-23 | End: 2023-02-28

## 2023-02-23 RX ORDER — HEPARIN SODIUM 10000 [USP'U]/100ML
5-30 INJECTION, SOLUTION INTRAVENOUS CONTINUOUS
Status: DISCONTINUED | OUTPATIENT
Start: 2023-02-23 | End: 2023-02-28

## 2023-02-23 RX ORDER — SODIUM CHLORIDE 9 MG/ML
INJECTION, SOLUTION INTRAVENOUS PRN
Status: DISCONTINUED | OUTPATIENT
Start: 2023-02-23 | End: 2023-02-28

## 2023-02-23 RX ORDER — SODIUM CHLORIDE 0.9 % (FLUSH) 0.9 %
5-40 SYRINGE (ML) INJECTION EVERY 12 HOURS SCHEDULED
Status: DISCONTINUED | OUTPATIENT
Start: 2023-02-23 | End: 2023-02-28

## 2023-02-23 RX ORDER — CLOPIDOGREL BISULFATE 75 MG/1
75 TABLET ORAL DAILY
Status: CANCELLED | OUTPATIENT
Start: 2023-02-24

## 2023-02-23 RX ORDER — ACETAMINOPHEN 325 MG/1
650 TABLET ORAL EVERY 4 HOURS PRN
Status: DISCONTINUED | OUTPATIENT
Start: 2023-02-23 | End: 2023-02-23 | Stop reason: SDUPTHER

## 2023-02-23 RX ADMIN — Medication 1 TABLET: at 09:35

## 2023-02-23 RX ADMIN — AZITHROMYCIN DIHYDRATE 500 MG: 500 INJECTION, POWDER, LYOPHILIZED, FOR SOLUTION INTRAVENOUS at 17:34

## 2023-02-23 RX ADMIN — CLOPIDOGREL BISULFATE 75 MG: 75 TABLET ORAL at 09:41

## 2023-02-23 RX ADMIN — CARBAMAZEPINE 600 MG: 200 TABLET, EXTENDED RELEASE ORAL at 21:36

## 2023-02-23 RX ADMIN — IPRATROPIUM BROMIDE 0.5 MG: 0.5 SOLUTION RESPIRATORY (INHALATION) at 09:14

## 2023-02-23 RX ADMIN — RISPERIDONE 0.5 MG: 0.25 TABLET, FILM COATED ORAL at 21:36

## 2023-02-23 RX ADMIN — SODIUM CHLORIDE, PRESERVATIVE FREE 10 ML: 5 INJECTION INTRAVENOUS at 09:35

## 2023-02-23 RX ADMIN — SODIUM CHLORIDE: 9 INJECTION, SOLUTION INTRAVENOUS at 12:36

## 2023-02-23 RX ADMIN — HEPARIN SODIUM AND DEXTROSE 10 UNITS/KG/HR: 10000; 5 INJECTION INTRAVENOUS at 17:17

## 2023-02-23 RX ADMIN — ASPIRIN 81 MG 81 MG: 81 TABLET ORAL at 09:36

## 2023-02-23 RX ADMIN — CARBAMAZEPINE 600 MG: 200 TABLET, EXTENDED RELEASE ORAL at 09:35

## 2023-02-23 RX ADMIN — CEFTRIAXONE SODIUM 1000 MG: 1 INJECTION, POWDER, FOR SOLUTION INTRAMUSCULAR; INTRAVENOUS at 16:05

## 2023-02-23 RX ADMIN — ATORVASTATIN CALCIUM 40 MG: 40 TABLET, FILM COATED ORAL at 09:36

## 2023-02-23 RX ADMIN — IPRATROPIUM BROMIDE 0.5 MG: 0.5 SOLUTION RESPIRATORY (INHALATION) at 20:28

## 2023-02-23 RX ADMIN — ALBUTEROL SULFATE 2.5 MG: 2.5 SOLUTION RESPIRATORY (INHALATION) at 20:28

## 2023-02-23 RX ADMIN — LEVOTHYROXINE SODIUM 50 MCG: 0.05 TABLET ORAL at 05:28

## 2023-02-23 RX ADMIN — ACETAMINOPHEN 650 MG: 325 TABLET ORAL at 14:09

## 2023-02-23 RX ADMIN — RISPERIDONE 0.5 MG: 0.25 TABLET, FILM COATED ORAL at 09:35

## 2023-02-23 RX ADMIN — IOPAMIDOL 135 ML: 755 INJECTION, SOLUTION INTRAVENOUS at 11:33

## 2023-02-23 RX ADMIN — ALBUTEROL SULFATE 2.5 MG: 2.5 SOLUTION RESPIRATORY (INHALATION) at 09:14

## 2023-02-23 RX ADMIN — HEPARIN SODIUM 4000 UNITS: 1000 INJECTION, SOLUTION INTRAVENOUS; SUBCUTANEOUS at 17:11

## 2023-02-23 RX ADMIN — ESCITALOPRAM 20 MG: 20 TABLET, FILM COATED ORAL at 09:36

## 2023-02-23 ASSESSMENT — PAIN SCALES - GENERAL
PAINLEVEL_OUTOF10: 10
PAINLEVEL_OUTOF10: 0

## 2023-02-23 ASSESSMENT — PAIN DESCRIPTION - ORIENTATION: ORIENTATION: RIGHT

## 2023-02-23 ASSESSMENT — PAIN DESCRIPTION - LOCATION: LOCATION: OTHER (COMMENT)

## 2023-02-23 NOTE — PLAN OF CARE
Problem: Respiratory - Adult  Goal: Clear lung sounds  Outcome: Progressing   Continue mdi tx to improve lung sounds. Pt agrees to plan of care.

## 2023-02-23 NOTE — PROGRESS NOTES
Cardiology Progress Note      Patient:  Esme Treadwell  YOB: 1940  MRN: [id number]   Acct: [de-identified]  Admit Date:  2/21/2023  Primary Cardiologist:  none    Note per dr bowman \"CHIEF COMPLAINT: Shortness of breath and fatigue  Cardiology consulted for: Elevated troponins        HPI: This is a pleasant 81 y.o. male PMH HLD, seizures, hypothyroidism presented to the hospital with fatigue and shortness of breath. Patient has been coughing about 1 week and started to have productive brown sputum. He denies any fevers or chills. Patient is a former smoker. On labs he was found to have elevated troponins for which cardiology was consulted. Patient denies any chest pain,  N/V, no pain or SOB at this time. Patient has no echoes in the past.  On EKG there are ST depressions in leads V5 V6 however no reciprocal changes. No previous Echos on file. \"    Subjective (Events in last 24 hours): pt awake and alert. NAD. No cp or sob. Intermittent LEWIS.  No edema or orthopnea  On RA  He is hoping to go home soon      Objective:   /81   Pulse 81   Temp 97.5 °F (36.4 °C) (Oral)   Resp 20   Ht 5' 9\" (1.753 m)   Wt 201 lb 12.8 oz (91.5 kg)   SpO2 95%   BMI 29.80 kg/m²        TELEMETRY: nsr    Physical Exam:  General Appearance: alert and oriented to person, place and time, in no acute distress  Cardiovascular: normal rate, regular rhythm, normal S1 and S2, no murmurs, rubs, clicks, or gallops, distal pulses intact, no carotid bruits, no JVD  Pulmonary/Chest: clear to auscultation bilaterally- no wheezes, rales or rhonchi, normal air movement, no respiratory distress  Abdomen: soft, non-tender, non-distended, normal bowel sounds, no masses Extremities: no cyanosis, clubbing or edema, pulse   Skin: warm and dry  Head: normocephalic and atraumatic  Eyes: pupils equal, round, and reactive to light  Neck: supple and non-tender without mass, no thyromegaly   Neurological: alert, oriented, normal speech, no focal findings or movement disorder noted    Medications:    sodium chloride flush  5-40 mL IntraVENous 2 times per day    albuterol  2.5 mg Nebulization BID    And    ipratropium  0.5 mg Nebulization BID    sodium chloride flush  5-40 mL IntraVENous 2 times per day    aspirin  81 mg Oral Daily    enoxaparin  40 mg SubCUTAneous Q24H    cefTRIAXone (ROCEPHIN) IV  1,000 mg IntraVENous Q24H    azithromycin  500 mg IntraVENous Q24H    atorvastatin  40 mg Oral Daily    carBAMazepine  600 mg Oral BID    clopidogrel  75 mg Oral Daily    escitalopram  20 mg Oral Daily    levothyroxine  50 mcg Oral Daily    risperiDONE  0.5 mg Oral BID    multivitamin  1 tablet Oral Daily      sodium chloride      sodium chloride       sodium chloride flush, 5-40 mL, PRN  sodium chloride, , PRN  sodium chloride flush, 5-40 mL, PRN  sodium chloride, , PRN  ondansetron, 4 mg, Q8H PRN   Or  ondansetron, 4 mg, Q6H PRN  acetaminophen, 650 mg, Q6H PRN   Or  acetaminophen, 650 mg, Q6H PRN  polyethylene glycol, 17 g, Daily PRN  perflutren lipid microspheres, 1.5 mL, ONCE PRN        Diagnostics:  TTE  Summary   Technically difficult examination. Left ventricular size and systolic function is normal. Ejection fraction   was estimated at 50%. LV wall thickness is within normal limits. The right ventricular size appears normal with normal systolic function   and wall thickness. Mild mitral regurgitation is present. Signature      ----------------------------------------------------------------   Electronically signed by Kristi Ta MD (Interpreting   physician) on 02/22/2023 at 02:52 PM      Lab Data:    Cardiac Enzymes:  No results for input(s): CKTOTAL, CKMB, CKMBINDEX, TROPONINI in the last 72 hours.     CBC:   Lab Results   Component Value Date/Time    WBC 6.5 02/23/2023 06:00 AM    RBC 3.62 02/23/2023 06:00 AM    RBC 4.25 11/09/2021 08:28 AM    HGB 12.4 02/23/2023 06:00 AM    HCT 37.8 02/23/2023 06:00 AM     02/23/2023 06:00 AM       CMP:    Lab Results   Component Value Date/Time     02/23/2023 06:00 AM    K 3.9 02/23/2023 06:00 AM     02/23/2023 06:00 AM    CO2 22 02/23/2023 06:00 AM    BUN 13 02/23/2023 06:00 AM    CREATININE 0.7 02/23/2023 06:00 AM    LABGLOM >60 02/23/2023 06:00 AM    GLUCOSE 106 02/23/2023 06:00 AM    GLUCOSE 95 11/09/2021 08:28 AM    CALCIUM 8.2 02/23/2023 06:00 AM       Hepatic Function Panel:    Lab Results   Component Value Date/Time    ALKPHOS 38 02/21/2023 09:32 AM    ALT 15 02/21/2023 09:32 AM    AST 17 02/21/2023 09:32 AM    PROT 6.7 02/21/2023 09:32 AM    BILITOT 0.6 02/21/2023 09:32 AM    BILIDIR <0.2 12/30/2015 03:45 PM    LABALBU 4.0 02/21/2023 09:32 AM       Magnesium:  No results found for: MG    PT/INR:    Lab Results   Component Value Date/Time    INR 1.09 01/15/2017 09:13 PM       HgBA1c:    Lab Results   Component Value Date/Time    LABA1C 5.5 01/13/2020 11:32 AM       FLP:    Lab Results   Component Value Date/Time    TRIG 178 11/09/2021 08:28 AM    HDL 52 11/09/2021 08:28 AM    LDLCALC 62 11/09/2021 08:28 AM    LABVLDL 36 11/09/2021 08:28 AM       TSH:    Lab Results   Component Value Date/Time    TSH 4.130 02/21/2023 03:44 PM         Assessment:    Dyspnea on exertion  Bilateral pleural effusions  mediastinal and subcarinal adenopathy - pulmonary following  Acute diastolic CHF  Elevated troponins  Abn EKG  Ef 50 per TTE 2/22/23  ? PNA  HLD  ? COPD  Ex-smoker  Hx CVA - on plavix  Hx brain abscess  Hx seizures      Plan:    Cont asa/statin/plavix  Npo  LHC today  Daily weights and I/os  2 liter fluid restriction and 2gm sodium diet  Keep mag >2 and k >4  Consider adding lasix/potassium upon discharge    Patient and Practitioner mutually agreed upon goal:   Patient and provider goals: Feel better and have more energy and Start exercise program      Electronically signed by Monster Escalante PA-C on 2/23/2023 at 9:43 AM

## 2023-02-23 NOTE — BRIEF OP NOTE
[address] [address]  Sedation/Analgesia Post Sedation Record    Pt Name: Jeanne Russo  Account number: [de-identified]  MRN: [id number]  YOB: 1940  Procedure Performed By: MD MD Zeyad Collins, [address]  Primary Care Physician: Kristine Pabol MD  Date: 2/23/2023    POST-PROCEDURE    Physicians/Assistants: MD MD Zeyad Collins, CARSON    Procedure Performed:Cath      Sedation/Anesthesia: Versed/ Fentanyl and 2% xylocaine local anesthesia. Estimated Blood Loss: < 50 ml. Specimens Removed: None         Disposition of Specimen: N/A        Complications: No Immediate Complications.        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

## 2023-02-23 NOTE — PROCEDURES
ROUTINE 12-LEAD EKG completed and given to [facility].

## 2023-02-23 NOTE — PROGRESS NOTES
[facility] [address]  PHYSICAL THERAPY MISSED TREATMENT NOTE  [facility]    Date: 2023  Patient Name: Michelle Hager        MRN: [id number]   : 1940  (81 y.o.)  Gender: male                REASON FOR MISSED TREATMENT:  Hold treatment per nursing request.  Pt just got back from his heart cath, has to remain on bedrest for 4 hours post procedure, will check back next available date.

## 2023-02-23 NOTE — H&P
[facility]  Sedation/Analgesia History & Physical    Pt Name: Connie Johnson  Account number: [de-identified]  MRN: [id number]  YOB: 1940  Provider Performing Procedure: Sandra Gandara MD MD  Referring Provider: Pako Rabago MD   Primary Care Physician: Dawit Morales MD  Date: 2/23/2023    PRE-PROCEDURE    Code Status: FULL CODE  Brief History/Pre-Procedure Diagnosis:   NSTEMI    Consent: : I have discussed with the patient risks, benefits, and alternatives (and relevant risks, benefits, and side effects related to alternatives or not receiving care), and likelihood of the success. The patient and/or representative appear to understand and agree to proceed. The discussion encompasses risks, benefits, and side effects related to the alternatives and the risks related to not receiving the proposed care, treatment, and services. The indication, risks and benefits of the procedure and possible therapeutic consequences and alternatives were discussed with the patient. The patient was given the opportunity to ask questions and to have them answered to his/her satisfaction. Risks of the procedure include but are not limited to the following: Bleeding, hematoma including retroperitoneal hemmorhage, infection, pain and discomfort, injury to the aorta and other blood vessels, rhythm disturbance, low blood pressure, myocardial infarction, stroke, kidney damage/failure, myocardial perforation, allergic reactions to sedatives/contrast material, loss of pulse/vascular compromise requiring surgery, aneurysm/pseudoaneurysm formation, possible loss of a limb/hand/leg, needing blood transfusion, requiring emergent open heart surgery or emergent coronary intervention, the need for intubation/respiratory support, the requirement for defibrillation/cardioversion, and death. Alternatives to and omission of the suggested procedure were discussed.  The patient had no further questions and wished to proceed; the consent form was signed. MEDICAL HISTORY   has a past medical history of Cancer ([facility] [address] Coma ([facility] [address] Hyperlipidemia, Leukemia ([facility] [address] and [facility] SURGICAL HISTORY   has a past surgical history that includes other surgical history and brain surgery.   Additional information:       ALLERGIES   Allergies as of 02/21/2023    (No Known Allergies)     Additional information:       MEDICATIONS     Current Facility-Administered Medications:     sodium chloride flush 0.9 % injection 5-40 mL, 5-40 mL, IntraVENous, 2 times per day, Deepali Swann MD    sodium chloride flush 0.9 % injection 5-40 mL, 5-40 mL, IntraVENous, PRN, Deepali Swann MD    0.9 % sodium chloride infusion, , IntraVENous, PRN, Deepali Swann MD    albuterol (PROVENTIL) nebulizer solution 2.5 mg, 2.5 mg, Nebulization, BID, 2.5 mg at 02/22/23 1632 **AND** ipratropium (ATROVENT) 0.02 % nebulizer solution 0.5 mg, 0.5 mg, Nebulization, BID, Albertus Fus, APRN - CNP, 0.5 mg at 02/22/23 1634    sodium chloride flush 0.9 % injection 5-40 mL, 5-40 mL, IntraVENous, 2 times per day, Albertus Fus, APRN - CNP, 10 mL at 02/22/23 2013    sodium chloride flush 0.9 % injection 5-40 mL, 5-40 mL, IntraVENous, PRN, Albertus Fus, APRN - CNP    0.9 % sodium chloride infusion, , IntraVENous, PRN, Albertus Fus, APRN - CNP    ondansetron (ZOFRAN-ODT) disintegrating tablet 4 mg, 4 mg, Oral, Q8H PRN **OR** ondansetron (ZOFRAN) injection 4 mg, 4 mg, IntraVENous, Q6H PRN, Albertus Fus, APRN - CNP    acetaminophen (TYLENOL) tablet 650 mg, 650 mg, Oral, Q6H PRN, 650 mg at 02/22/23 1256 **OR** acetaminophen (TYLENOL) suppository 650 mg, 650 mg, Rectal, Q6H PRN, Albertus Fus, APRN - CNP    polyethylene glycol (GLYCOLAX) packet 17 g, 17 g, Oral, Daily PRN, Arely Tesfaye APRN - CNP    aspirin chewable tablet 81 mg, 81 mg, Oral, Daily, Arely Tesfaye, APRN - CNP, 81 mg at 02/22/23 0813    perflutren lipid microspheres (DEFINITY) injection 1.5 mL, 1.5 mL, IntraVENous, ONCE PRN, Brandee Part, APRN - CNP    enoxaparin (LOVENOX) injection 40 mg, 40 mg, SubCUTAneous, Q24H, Brandee Part, APRN - CNP, 40 mg at 02/22/23 1841    cefTRIAXone (ROCEPHIN) 1,000 mg in sodium chloride 0.9 % 50 mL IVPB (mini-bag), 1,000 mg, IntraVENous, Q24H, Brandee Part, APRN - CNP, Stopped at 02/22/23 1707    azithromycin (ZITHROMAX) 500 mg in sodium chloride 0.9 % 250 mL IVPB ([id number]), 500 mg, IntraVENous, Q24H, Brandee Part, APRN - CNP, Stopped at 02/22/23 2006    atorvastatin (LIPITOR) tablet 40 mg, 40 mg, Oral, Daily, [facility] Part, APRN - CNP, 40 mg at 02/22/23 2690    carBAMazepine (TEGRETOL XR) extended release tablet 600 mg, 600 mg, Oral, BID, [facility] Part, APRN - CNP, 600 mg at 02/22/23 2012    clopidogrel (PLAVIX) tablet 75 mg, 75 mg, Oral, Daily, [facility] Part, APRN - CNP, 75 mg at 02/22/23 0813    escitalopram (LEXAPRO) tablet 20 mg, 20 mg, Oral, Daily, [facility] Part, APRN - CNP, 20 mg at 02/22/23 0813    levothyroxine (SYNTHROID) tablet 50 mcg, 50 mcg, Oral, Daily, [facility] Part, APRN - CNP, 50 mcg at 02/23/23 4789    risperiDONE (RISPERDAL) tablet 0.5 mg, 0.5 mg, Oral, BID, [facility] Part, APRN - CNP, 0.5 mg at 02/22/23 2012    multivitamin 1 tablet, 1 tablet, Oral, Daily, [facility] Part, APRN - CNP, 1 tablet at 02/22/23 0813  Prior to Admission medications    Medication Sig Start Date End Date Taking? Authorizing Provider   risperiDONE (RISPERDAL) 0.5 MG tablet Take 0.5 mg by mouth 2 times daily   Yes Historical Provider, MD   escitalopram (LEXAPRO) 20 MG tablet Take 20 mg by mouth daily   Yes Historical Provider, MD   levothyroxine (SYNTHROID) 50 MCG tablet Take 50 mcg by mouth Daily    Historical Provider, MD   acetaminophen 650 MG TABS Take 650 mg by mouth every 6 hours as needed.  3/4/14   Renan Sim MD   carBAMazepine (TEGRETOL XR) 200 MG SR tablet Take 600 mg by mouth 2 times daily    Historical Provider, MD   clopidogrel (PLAVIX) 75 MG tablet Take 75 mg by mouth daily.    Historical Provider, MD   atorvastatin (LIPITOR) 40 MG tablet Take 40 mg by mouth daily. Historical Provider, MD   therapeutic multivitamin-minerals (THERAGRAN-M) tablet Take 1 tablet by mouth daily. Historical Provider, MD     Additional information:       VITAL SIGNS   Vitals:    02/23/23 0445   BP: 131/81   Pulse: 81   Resp: 20   Temp: 97.5 °F (36.4 °C)   SpO2: 95%       PHYSICAL:   General: No acute distress  HEENT:  Unremarkable for age  Neck: without increased JVD, carotid pulses 2+ bilaterally without bruits  Heart: RRR, S1 & S2 WNL, S4 gallop, without murmurs or rubs   NYHA: 2  Lungs: Clear to auscultation    Abdomen: BS present, without HSM, masses, or tenderness    Extremities: without C,C,E.  Pulses 2+ bilaterally  Mental Status: Alert & Oriented        PLANNED PROCEDURE   [x]Cath  [x]PCI                []Pacemaker/AICD  []STONEY             []Cardioversion []Peripheral angiography/PTA  []Other:      SEDATION  Planned agent:[x]Midazolam []Meperidine [x]Sublimaze []Morphine  []Diazepam  []Other:     ASA Classification:  []1 []2 [x]3 []4 []5  Class 1: A normal healthy patient  Class 2: Pt with mild to moderate systemic disease  Class 3: Severe systemic disease or disturbance  Class 4: Severe systemic disorders that are already life threatening. Class 5: Moribund pt with little chances of survival, for more than 24 hours. Mallampati I Airway Classification:   []1 []2 [x]3 []4    [x]Pre-procedure diagnostic studies complete and results available. Comment:    [x]Previous sedation/anesthesia experiences assessed. Comment:    [x]The patient is an appropriate candidate to undergo the planned procedure sedation and anesthesia. (Refer to nursing sedation/analgesia documentation record)  [x]Formulation and discussion of sedation/procedure plan, risks, and expectations with patient and/or responsible adult completed. [x]Patient examined immediately prior to the procedure.  (Refer to nursing sedation/analgesia documentation record)    Jose Wray MD MD   Electronically signed 2/23/2023 at 8:15 AM
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
## 2023-02-23 NOTE — PROGRESS NOTES
Occupational Therapy  Aliyah Carrasco 61  INPATIENT OCCUPATIONAL THERAPY  [facility] 8A  EVALUATION    Time:   Time In: 8286  Time Out: 5805  Timed Code Treatment Minutes: 36 Minutes  Minutes: 55          Date: 2023  Patient Name: Sandre Nissen,   Gender: male      MRN: [id number]  : 1940  (81 y.o.)  Referring Practitioner: ANGELIC Caban CNP  Diagnosis: elevated troponin  Additional Pertinent Hx: This is a pleasant 81 y.o. male PMH HLD, seizures, hypothyroidism presented to the hospital with fatigue and shortness of breath. Patient has been coughing about 1 week and started to have productive brown sputum. On labs he was found to have elevated troponins for which cardiology was consulted. Restrictions/Precautions:  Restrictions/Precautions: Fall Risk, Up as Tolerated  Position Activity Restriction  Other position/activity restrictions: hx of brain injury    Subjective  Chart Reviewed: Yes, Orders, Progress Notes    Subjective: Nurse approved OT evaluation. Pt in bed asleep on OT arrival, easily aroused. Pt is agreeable to OT eval, A+Ox4. Pain: denies pain     Vitals: Heart Rate: 90s with activity. Social/Functional History:  Lives With: Daughter (+ son in law)  Type of Home: House  Home Layout: Two level, Bed/Bath upstairs  Home Access: Stairs to enter with rails  Entrance Stairs - Number of Steps: 2 LEVY and ~15 steps to second level  Home Equipment: Laurence Tellez, rolling, Cane   Bathroom Shower/Tub: Tub/Shower unit    Receives Help From: Family  ADL Assistance: Independent  Homemaking Assistance: Independent  Homemaking Responsibilities: Yes  Ambulation Assistance: Independent  Transfer Assistance: Independent    Active : No  Occupation: Retired  Additional Comments: Daughter and son in law both work outside of the home. Pt was previously independent with ADLs and functional mobility. Pt was not previously using AD for functional mobility. Pt is a questionable historian.  Hx of brain surgery in 1995 and visual deficits. VISION: chart reports visual deficits but pt declines that he has visual deficits. HEARING:  WFL    COGNITION: Slow Processing, Decreased Insight, Decreased Problem Solving, and Decreased Safety Awareness    RANGE OF MOTION:  Bilateral Upper Extremity:  WFL    STRENGTH:  Bilateral Upper Extremity:  WFL    SENSATION:   WFL    ADL:   Grooming: Stand By Assistance. In standing at the sink to brush dentures, wash face and wash hands. Pt tolerates x3-5 minutes stranding with sink side tasks. CGA required for overhead reaching and cross body reaching due to posterior lean tendencies. Bathing: Contact Guard Assistance. Seated on toilet. Pt performs otis care bathing in standing with wide TIMOTHY but no LOB noted  Upper Extremity Dressing: Minimal Assistance. For hospital gown management   Lower Extremity Dressing: [employer]. Pt able to doff and zita underwear from seated position on toilet  Footwear Management: Stand By Assistance. For slippers  Toileting: [employer]. Toilet Transfer: Contact Guard Assistance. For turning in small space prior to sitting . BALANCE:  Standing Balance: Stand By Assistance, [employer]. SBA for static standing and table top activities, CGA for reaching cross body and overhead    BED MOBILITY:  Supine to Sit: Modified Independent      TRANSFERS:  Sit to Stand:  Stand By Assistance. Stand to Sit: Stand By Assistance. FUNCTIONAL MOBILITY:  Assistive Device: Rolling Walker and none  Assist Level:  Stand By Assistance when using RW and [place] with no AD. Distance:  HH distances       Activity Tolerance:  Patient tolerance of  treatment: Good treatment tolerance      Assessment:  Assessment: Steffany Wagner is a 81 y.o.male that presents with above new performance deficits secondary to elevated troponin.  Pt is requiring increased assistance for ADLs, functional mobility, ADL transfers compared to baseline level of function. Skilled OT services is warranted to improve above performance deficits and progress pt towards PLOF. Without OT pt is at risk for falls, further decline in functional abilities, increased caregiver burden, increased risk for medical complication as a result of reduce mobility and inability to return to prior level of living. Performance deficits / Impairments: Decreased functional mobility , Decreased ADL status, Decreased cognition, Decreased safe awareness, Decreased endurance, Decreased strength, Decreased high-level IADLs, Decreased fine motor control  Prognosis: Good  REQUIRES OT FOLLOW-UP: Yes  Decision Making: Medium Complexity    Treatment Initiated: Treatment and education initiated within context of evaluation. Evaluation time included review of current medical information, gathering information related to past medical, social and functional history, completion of standardized testing, formal and informal observation of tasks, assessment of data and development of plan of care and goals. Treatment time included skilled education and facilitation of tasks to increase safety and independence with ADL's for improved functional independence and quality of life. Discharge Recommendations:  Home with assist PRN, Home with Home health OT    Patient Education:     Patient Education  Education Given To: Patient  Education Provided: Role of Therapy, Plan of Care, Transfer Training, Fall Prevention Strategies, Energy Conservation  Education Provided Comments: Education on importance of using shower chair for fall prevention, use of life alert due to no 24/7 supervision available, and walker use for reduced risk for falls.   Education Method: Demonstration, Verbal  Barriers to Learning: Cognition  Education Outcome: Verbalized understanding, Demonstrated understanding    Equipment Recommendations:  Equipment Needed: No  Other: Pt reports he has a rolling walker and a shower chair but was only using off and on. Recommendations provided to use both for fall prevention. Plan:  Times Per Week: 3-5x  Times Per Day: Once a day  Current Treatment Recommendations: Strengthening, Balance training, Functional mobility training, Neuromuscular re-education, Safety education & training, Self-Care / ADL, Endurance training, Patient/Caregiver education & training, Equipment evaluation, education, & procurement. See long-term goal time frame for expected duration of plan of care. If no long-term goals established, a short length of stay is anticipated. Goals:  Patient goals : To return home with family  Short Term Goals  Time Frame for Short Term Goals: until discharge  Short Term Goal 1: Pt will navigate to/from bathroom utilziing least restrictive AD with MI and 0-1 cues for maneuverability in small spaces to improve safe access to ADLs. Short Term Goal 2: Pt will tolerate x5-8 minute dynamic standing task including overhead reaching with (S) to improve independence with sinkside ADLs and I/ADL prep/clean up. Short Term Goal 3: Pt will perform UB/LB bathing and dressing routine with (S0 and 0-1 cues fro problem solving, sequencing, and ECT to improve independence with ADL routine. Short Term Goal 4: Pt will toelrate moderate resistive UB HEP with minimal cues for technique to improve strength required for IADLs. Following session, patient left in safe position with all fall risk precautions in place.

## 2023-02-23 NOTE — CONSULTS
CT/CV Surgery Consult Note    2023 1:07 PM  Surgeon:  Dr. 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 Aleksandr García  is a 81year old with hx of fatigue and SOB with productive cough for around 1 week. He was worked up and found to have elevated troponins and abn EKG. He denies any chest pain, pressure or tightness. 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 Vital Signs: BP (!) 144/61   Pulse 67   Temp 98.4 °F (36.9 °C) (Oral)   Resp 18   Ht 5' 9\" (1.753 m)   Wt 201 lb 12.8 oz (91.5 kg)   SpO2 92% Comment: JOE mcnamara  BMI 29.80 kg/m²    Temp (24hrs), Av °F (36.7 °C), Min:97.5 °F (36.4 °C), Max:98.4 °F (36.9 °C)    Labs:   CBC:  Recent Labs     23  0609 23  0130 23  0600   WBC 5.8 6.1 6.5   HGB 12.8* 11.9* 12.4*   HCT 39.7* 36.3* 37.8*   .4* 104.0* 104.4*    236 257     BMP:   Recent Labs     23  0932 23  0130 23  0600    142 144   K 3.6 3.7 3.9    109 109   CO2 22* 22* 22*   BUN 15 15 13   CREATININE 0.9 0.7 0.7     Imaging:  Echo: 23   Technically difficult examination. Left ventricular size and systolic function is normal. Ejection fraction   was estimated at 50%. LV wall thickness is within normal limits. The right ventricular size appears normal with normal systolic function   and wall thickness. Mild mitral regurgitation is present. LHC: 23  See procedure note for full disclosure    CTA Chest: 23  FINDINGS:           There is adequate opacification of the pulmonary arterial system. No pulmonary emboli are present. The aorta is within acceptable limits. The heart size is normal. There is no pericardial effusion  there is a right paratracheal 12 mm short axis node. There is a left paratracheal 11 mm node. There is subcarinal adenopathy measuring 2.8 cm. There are small bilateral hilar lymph nodes. There    is no axillary lymphadenopathy. Multifocal scattered hazy groundglass opacities are present in both lungs. 4 mm nodule anterior right middle lobe. Image 1:15 series 4   Bilateral pleural effusions right greater than left. Bibasilar bronchiectasis. Compressive atelectasis right right base No suspicious osseous lesions are present. There are no suspicious findings in the imaged aspects of the upper abdomen. Impression    There is no PE. There are multifocal groundglass infiltrates. There are bilateral pleural effusions. There is mediastinal and subcarinal adenopathy. Intake/Output Summary (Last 24 hours) at 2/23/2023 1307  Last data filed at 2/23/2023 1208  Gross per 24 hour   Intake 525 ml   Output --   Net 525 ml       Scheduled Meds:    sodium chloride flush  5-40 mL IntraVENous 2 times per day    sodium chloride flush  5-40 mL IntraVENous 2 times per day    albuterol  2.5 mg Nebulization BID    And    ipratropium  0.5 mg Nebulization BID    sodium chloride flush  5-40 mL IntraVENous 2 times per day    aspirin  81 mg Oral Daily    enoxaparin  40 mg SubCUTAneous Q24H    cefTRIAXone (ROCEPHIN) IV  1,000 mg IntraVENous Q24H    azithromycin  500 mg IntraVENous Q24H    atorvastatin  40 mg Oral Daily    carBAMazepine  600 mg Oral BID    clopidogrel  75 mg Oral Daily    escitalopram  20 mg Oral Daily    levothyroxine  50 mcg Oral Daily    risperiDONE  0.5 mg Oral BID    multivitamin  1 tablet Oral Daily     PastMedical History:  Quyen Perry  has a past medical history of Cancer ([facility] [address] Coma ([facility] [address] Hyperlipidemia, Leukemia ([facility] [address] and Seizures ([facility] [address] Past Surgical History:  The patient  has a past surgical history that includes other surgical history and brain surgery. Allergies: The patient has No Known Allergies. Family History: This patient's family history is not on file. Social History:  Quyen Perry  reports that he has quit smoking. He does not have any smokeless tobacco history on file.  He reports that he does not drink alcohol and does not use drugs. ROS:  Constitutional: Negative for activity change, chills, fatigue, fever and unexpected weight change. HENT: Negative for congestion, facial swelling, sore throat, and changes in voice. Eyes: Negative for photophobia, redness, itching and visual disturbance. Respiratory: Negative for apnea, choking, shortness of breath, wheezing and stridor. Cardiovascular: Negative for chest pain, palpitations and leg swelling. Gastrointestinal: Negative for abdominal distention, constipation, nausea and vomiting. Endocrine: Negative for cold intolerance, heat intolerance, polyphagia and polyuria. Musculoskeletal: Negative for arthralgias, gait problem, and myalgias. Skin: Negative for color change, rash and wound. Allergic/Immunologic: Negative for food allergies and immunocompromised state. Neurological: Negative for dizziness, tremors, speech difficulty, weakness, numbness and headaches. Hematological: Negative for adenopathy. Does not bruise/bleed easily. Psychiatric/Behavioral: Negative for agitation, confusion, and dysphoric mood. Physical Exam:   General appearance:  No apparent distress, appears stated age and cooperative. HEENT:  Normal cephalic, atraumatic without obvious deformity. Conjunctivae/corneas clear. Neck: Supple, with full range of motion. No jugular venous distention. Trachea midline. Respiratory:  Normal respiratory effort. Clear to auscultation, bilaterally without rales/wheezes/rhonchi. Cardiovascular:  Regular rate and rhythm with normal S1/S2 without murmurs, rubs or gallops. Abdomen: Soft, non-tender, non-distended with normal bowel sounds. Musculoskeletal:  No clubbing, cyanosis or edema bilaterally. Full range of motion without deformity. Skin: Skin color, texture, turgor normal.  No rashes or lesions. Neurologic:  Neurovascularly intact without any focal sensory/motor deficits.    Psychiatric:  Alert and oriented, thought content appropriate, normal insight. 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 Risks, benefits and alternatives were discussed. The plan of care was discussed in detail with Dr. Billy Kohler discussed in detail with the patient, who understands and has no further questions. Time spent with patient: 80 minutes, of which more than 50% was spent counseling/coordinating the patient's care.     Jessica Andersen PA-C

## 2023-02-23 NOTE — PROGRESS NOTES
Patient seen and evaluated. Full consult to follow. I reviewed cardiac catheterization and echocardiogram  Discussed the case with Dr. Woody Abdi  Also discussed the case with the patient who has some mental status problems and memory issues  So I discussed the patient's condition with his daughter on the phone today and plan to meet her tomorrow to further discuss possibility of CABG surgery  I believe he is an acceptable candidate for CABG, albeit at higher than normal risk. Discussion with Dr. Woody Abdi reveals that he is not a good candidate for PCI or medical management. I believe he will do better with CABG but his family needs to understand the risks involved. I have explained these risks to his daughter on the phone today and we will discuss it further with her tomorrow in person.

## 2023-02-23 NOTE — PROGRESS NOTES
Hospitalist Progress Note      Patient:  Eliana Sanchez    Unit/Bed:8A-09/009-A  YOB: 1940  MRN: [id number]   Acct: [de-identified]   PCP: Ami Hidalgo MD  Date of Admission: 2/21/2023    Assessment/Plan:  1. Non-ST Segment Myocardial Infarction. No chest pain but suspicious EKG findings and significant troponin T bump. Coronary angiography on 2/23/2024 demonstrated three-vessel disease with severe calcification of the left main artery, preserved ejection fraction, and patent aorto iliacs. Dr. Raegan Giraldo recommend CTS versus high risk PCI. Patient and family need extensive discussion with cardiology and CT surgery with regards to which option they would rather pursue. In the interim, prophylaxis Lovenox stopped, Plavix held, aspirin maintain, and heparin drip started in preparation for potential CTS surgery. Maintain on Lipitor 40 mg daily. 2.  Bilateral Right Greater than Left-Sided Pleural Effusion. Differential includes pneumonia, pulmonary edema, and malignancy from any potential lung cancer or recurrent leukemia. Thoracentesis would aid greatly in diagnosis. However, family elected defer at this time until they have more time to discuss. If family opts to not pursue thoracentesis, trial of diuresis. However, no other signs or symptoms of volume overload. Pulmonology will follow as an inpatient and as an outpatient. 4.  Community-Acquired Pneumonia. Consistent symptomatology of cough and shortness of breath which improved with antibiotics. Rocephin and azithromycin started 2/21/2023 will be maintained through 2/26/2023. Follow-up with PCP as an outpatient. 5.  Hyperlipidemia, unspecified. Overdue for lipid panel which can be obtained as an outpatient. Maintain on Lipitor 40 mg daily and follow-up with PCP as an outpatient. 6.  Epilepsy, unspecified.   Maintain on Tegretol 600 mg twice daily and follow-up with PCP and neurology as an outpatient. 7.  Acquired Hypothyroidism. Last known TSH of 4.130 in 2/21/2023. Free T4 0.92 In 2/21/2023. Maintain on Synthroid 50 mcg daily and adjust as an outpatient. 8.  Mild, Acute Macrocytic Anemia. Unclear cause. Iron very slightly low but all iron studies normal.  Vitamin B12 and folate within the normal range. Therefore, will obtain reticulocyte count, soluble transferrin receptor, methylmalonic acid level, and homocystine level and reevaluate. 9.  Mild Leukopenia, unspecified. Unclear cause. Follows daily CBC. 8.  4 mm Nodule in the Anterior Right Middle Lobe Along with Areas of Suspicious Lymphadenopathy of the Lung. Given clinical history and slightly suspicious findings, would recommend a repeat CT scan in 6 to 12 months. However, will defer to the outpatient setting. 9.  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 Still some residual cognitive deficits. Previously on Plavix which need to be stopped for potential upcoming CT surgery as discussed above. Antiplatelets and statin as above. 10.  Personal History of Leukemia with Remission in 1984. Follow-up with PCP as an outpatient. 11.  Other. On Lexapro 20 mg daily for unclear reasons presumably related to anxiety or depression. On risperidone 0.5 mg twice daily for unclear reasons. Maintain on these medications as an inpatient and follow-up with PCP and psychiatry as an outpatient. Expected discharge date: To be determined. Disposition:    [] Home       [] TCU       [] Rehab       [] Psych       [] SNF       [] [facility]       [x] Other- To be determined.      Chief Complaint: Fatigue and shortness of breath    Opening Statement:  The patient is an 81year old male former smoker with an approximately 20-pack-year history who quit in 1980 with a past medical history of leukemia with remission in 1984, hypothyroidism, \"seizures,\" hyperlipidemia, \"coma,\" and stroke secondary to septic embolus from dental procedure in 1995 who presents the chief complaint of shortness of breath and managing primarily for severe calcification of the left main not amenable to normal stenting and bilateral pleural effusion. Hospital Treatment Course:   Cough and shortness of breath which started on 2/14/2023. Came to the ED on 2/21/2023 because cough came productive of red phlegm. Patient never had chest pain at any point. However, did have shortness of breath. Was appropriately treated with Rocephin, azithromycin, and DuoNebs. Azithromycin and Rocephin started on 2/21/2023 with tentative plan to complete through 2/26/2023. Pulmonology was consulted on 2/21/2023 for management of COPD. Cardiology was consulted for progressively elevating troponins and an EKG that demonstrated old lateral lead ischemic changes and new anterior lead once. Hospitalist group took over care in 2/23/2023 as the family wish to switch from the Dr. Luna Tian group. Echocardiogram on 2/22/2023 demonstrated normal left ventricular size and systolic function, normal ejection fraction 50%, normal left ventricular wall thickness, mild mitral regurgitation, and normal right ventricle. Given elevated troponin T and EKG abnormalities, decision was made to proceed with left heart cath. Left heart cath in 2/23/2023 demonstrated three-vessel disease with severe calcification of the left main artery, preserved ejection fraction, and patent aorto iliacs. Cardiothoracic surgery versus high risk PCI suggested. Dr. Vickie Tee, the cardiothoracic surgeon, discussed the case with Dr. Costa Peterson. Also discussed the risks and benefits of CT surgery with the patient. Dr. Keon Tenorio believes that the patient is an appropriate candidate for CT surgery even if he is somewhat higher risk than the norm.   Per his report, Dr. Costa Peterson feels that he is not a good candidate for PCI or medical management alone. In preparation for potential upcoming CT surgery, Plavix was held, prophylactic dose Lovenox was stopped, aspirin was maintained, and patient was started on heparin drip. This was done per instructions from CT surgery. CTA of the chest on 2/22/2023 demonstrated no pulmonary embolism, normal heart size, no pericardial effusion, right paratracheal 12 mm short axis node, left side 11 mm paratracheal node, subcarinal adenopathy measuring 2.8 cm, small bilateral hilar lymph nodes, no axilla lymphadenopathy, multifocal scattered hazy groundglass opacities present in both lungs, 4 mm nodule in the anterior right middle lobe, bilateral pleural effusions right greater than left, bibasilar bronchiectasis, compressive atelectasis of the right base, no suspicious bony lesions, and no suspicious findings in the imaged aspect of the upper abdomen; overall, no PE, multifocal groundglass infiltrates, bilateral pleural effusions, and mediastinal and subcarinal adenopathy. Subjective (past 24 hours):   Note: Patient is a poor historian and history is largely given by family. Patient has residual deficits from previous stroke from septic embolism from dental procedure. Patient and family affirm medical history and story as described above. Extensive discussion of treatment options with the patient and family. Grandson would like to pursue high risk PCI over stent. Family strongly encouraged to discuss treatment options with Dr. Roberto Robb and cardiology prior to making a final decision. Patient states that he is feeling well and has no new specific complaints or concerns. Affirms dramatic improvement in cough and shortness of breath. Patient denies current fever, chills, headache, dizziness, chest pain, palpitations, shortness of breath, abdominal pain, nausea, vomiting, constipation, diarrhea, dysuria, and hematuria. All questions and concerns addressed.       Past medical history, family history, social history and allergies reviewed again and is unchanged since admission. ROS (12 point review of systems completed. Pertinent positives noted. Otherwise ROS is negative)     Medications:  Reviewed    Infusion Medications    sodium chloride      sodium chloride 75 mL/hr at 02/23/23 1236    sodium chloride      heparin (PORCINE) Infusion 10 Units/kg/hr (02/24/23 0017)    sodium chloride       Scheduled Medications    sodium chloride flush  5-40 mL IntraVENous 2 times per day    sodium chloride flush  5-40 mL IntraVENous 2 times per day    sodium chloride flush  5-40 mL IntraVENous 2 times per day    aspirin  81 mg Oral Daily    cefTRIAXone (ROCEPHIN) IV  1,000 mg IntraVENous Q24H    azithromycin  500 mg IntraVENous Q24H    atorvastatin  40 mg Oral Daily    carBAMazepine  600 mg Oral BID    escitalopram  20 mg Oral Daily    levothyroxine  50 mcg Oral Daily    risperiDONE  0.5 mg Oral BID    multivitamin  1 tablet Oral Daily     PRN Meds: sodium chloride flush, sodium chloride, sodium chloride flush, sodium chloride, atropine, heparin (porcine), heparin (porcine), albuterol **AND** ipratropium, sodium chloride flush, sodium chloride, ondansetron **OR** ondansetron, acetaminophen **OR** acetaminophen, polyethylene glycol, perflutren lipid microspheres      Intake/Output Summary (Last 24 hours) at 2/24/2023 0044  Last data filed at 2/23/2023 2133  Gross per 24 hour   Intake 1855 ml   Output 300 ml   Net 1555 ml       Diet:  ADULT DIET; Clear Liquid    Exam:  BP (!) 137/91   Pulse 71   Temp 98.5 °F (36.9 °C) (Oral)   Resp 18   Ht 5' 9\" (1.753 m)   Wt 201 lb 12.8 oz (91.5 kg)   SpO2 97%   BMI 29.80 kg/m²   Physical Exam  Constitutional:       General: He is not in acute distress. Appearance: Normal appearance. He is obese. He is not ill-appearing, toxic-appearing or diaphoretic. Comments: Patient is awake, alert, in no acute distress.   Patient is slightly confused and is a poor historian. Per family report, patient is at his mental baseline. HENT:      Head: Normocephalic and atraumatic. Right Ear: External ear normal.      Left Ear: External ear normal.      Nose: Nose normal.      Mouth/Throat:      Mouth: Mucous membranes are moist.      Pharynx: Oropharynx is clear. Eyes:      General: No scleral icterus. Extraocular Movements: Extraocular movements intact. Cardiovascular:      Rate and Rhythm: Normal rate and regular rhythm. Pulses: Normal pulses. Heart sounds: Normal heart sounds. Pulmonary:      Effort: Pulmonary effort is normal. No respiratory distress. Breath sounds: Normal breath sounds. No wheezing or rales. Abdominal:      General: Abdomen is flat. There is no distension. Palpations: Abdomen is soft. Tenderness: There is no abdominal tenderness. There is no guarding. Musculoskeletal:         General: Normal range of motion. Right lower leg: No edema. Left lower leg: No edema. Skin:     General: Skin is warm and dry. Capillary Refill: Capillary refill takes less than 2 seconds. Neurological:      Mental Status: He is alert. GCS: GCS eye subscore is 4. GCS verbal subscore is 5. GCS motor subscore is 6. Psychiatric:         Mood and Affect: Mood normal.         Behavior: Behavior normal.         Thought Content:  Thought content normal.         Judgment: Judgment normal.        Labs:   Recent Labs     02/23/23  0130 02/23/23  0600 02/23/23  1457   WBC 6.1 6.5 4.5*   HGB 11.9* 12.4* 12.6*   HCT 36.3* 37.8* 38.5*    257 278     Recent Labs     02/21/23  0932 02/23/23  0130 02/23/23  0600    142 144   K 3.6 3.7 3.9    109 109   CO2 22* 22* 22*   BUN 15 15 13   CREATININE 0.9 0.7 0.7   CALCIUM 8.6 8.1* 8.2*   PHOS  --   --  3.7     Recent Labs     02/21/23  0932   AST 17   ALT 15   BILITOT 0.6   ALKPHOS 38     Recent Labs     02/23/23  1457   INR 1.09     No results for input(s): CKTOTAL, TROPONINI in the last 72 hours. Microbiology:    Blood culture #1:   Lab Results   Component Value Date/Time    BC No growth-preliminary  No growth   01/15/2017 09:18 PM       Blood culture #2:No results found for: Ashley Zuñiga    Organism:No results found for: ORG    No results found for: LABGRAM    MRSA culture only:No results found for: [facility]    Urine culture: No results found for: LABURIN    Respiratory culture: No results found for: CULTRESP    Aerobic and Anaerobic :  No results found for: LABAERO  No results found for: LABANAE    Urinalysis:      Lab Results   Component Value Date/Time    NITRU NEGATIVE 01/15/2017 08:16 PM    WBCUA 0-2 01/15/2017 08:16 PM    BACTERIA NONE 01/15/2017 08:16 PM    RBCUA 0-2 01/15/2017 08:16 PM    BLOODU NEGATIVE 01/15/2017 08:16 PM    GLUCOSEU NEGATIVE 01/15/2017 08:16 PM       Radiology:  CTA CHEST W WO CONTRAST   Final Result    There is no PE. There are multifocal groundglass infiltrates. There are bilateral pleural effusions. There is mediastinal and subcarinal adenopathy. **This report has been created using voice recognition software. It may contain minor errors which are inherent in voice recognition technology. **      Final report electronically signed by Dr. Brandee Pulido on 2/22/2023 2:05 PM      XR CHEST (2 VW)   Final Result   1. No interval change since previous study dated 1/15/2017.   2. Diffuse COPD and thickening off the interstitial lung markings. 3. Borderline cardiomegaly. 4. Thoracic spondylosis. [employer] **This report has been created using voice recognition software. It may contain minor errors which are inherent in voice recognition technology. **      Final report electronically signed by DR Shi Brandon on 2/21/2023 9:37 AM        XR CHEST (2 VW)    Result Date: 2/21/2023  PROCEDURE: XR CHEST (2 VW) CLINICAL INFORMATION: fatigue sob cough. COMPARISON: Plain radiographs dated 1/15/2017. [employer]  TECHNIQUE: PA and lateral views the chest. FINDINGS: There is borderline cardiomegaly. .  The mediastinum is not widened. There is diffuse COPD and thickening of the interstitial lung markings. There are no new pulmonary infiltrates or effusions. The pulmonary vascularity is normal. There is thoracic spondylosis. 1. No interval change since previous study dated 1/15/2017. 2. Diffuse COPD and thickening off the interstitial lung markings. 3. Borderline cardiomegaly. 4. Thoracic spondylosis. Jestine Courts **This report has been created using voice recognition software. It may contain minor errors which are inherent in voice recognition technology. ** Final report electronically signed by DR Shi Brandon on 2/21/2023 9:37 AM    CTA CHEST W WO CONTRAST    Result Date: 2/22/2023  PROCEDURE: CTA CHEST W WO CONTRAST CLINICAL INFORMATION: DYSPNEA, ELEVATED D-DIMER. COMPARISON: No prior study. TECHNIQUE: 3 mm axial images were obtained through the chest after the administration of IV contrast.  A non-contrast localizer was obtained. 3D reconstructions were performed on the scanner to include MIP coronal and sagittal images through the chest. Isovue was the intravenous contrast utilized. All CT scans at this facility use dose modulation, iterative reconstruction, and/or weight-based dosing when appropriate to reduce radiation dose to as low as reasonably achievable. FINDINGS: There is adequate opacification of the pulmonary arterial system. No pulmonary emboli are present. The aorta is within acceptable limits. The heart size is normal. There is no pericardial effusion  there is a right paratracheal 12 mm short axis node. There is a left paratracheal 11 mm node. There is subcarinal adenopathy measuring 2.8 cm. There are small bilateral hilar lymph nodes. There is no axillary lymphadenopathy. Multifocal scattered hazy groundglass opacities are present in both lungs. 4 mm nodule anterior right middle lobe. Image 1:15 series 4 Bilateral pleural effusions right greater than left. Bibasilar bronchiectasis. Compressive atelectasis right right base No suspicious osseous lesions are present. There are no suspicious findings in the imaged aspects of the upper abdomen. There is no PE. There are multifocal groundglass infiltrates. There are bilateral pleural effusions. There is mediastinal and subcarinal adenopathy. This report has been created using voice recognition software. It may contain minor errors which are inherent in voice recognition technology.   Final report electronically signed by Dr. Adria Hearn on 2/22/2023 2:05 PM      Support Devices (date placed):  [] ETT []Oral / [] Nasal  [] Gastric Tube [] OG / [] NG    [] Central Venous Line (Specify Site):  [] Urinary Catheter  [] Arterial Line (Specify Site)  [x] Peripheral IV access  [] Other:    DVT prophylaxis: [] Lovenox                                 [] SCDs                                 [] SQ Heparin                                 [] Encourage ambulation           [x] Already on Anticoagulation     Code Status: Full Code    Tele:   [x] yes             [] no    Electronically signed by Isaac Nam MD, MPH, [facility] on 2/24/2023 at 12:44 AM   Supervised by Dr. Bradley Woodson

## 2023-02-24 ENCOUNTER — APPOINTMENT (OUTPATIENT)
Dept: INTERVENTIONAL RADIOLOGY/VASCULAR | Age: 83
DRG: 233 | End: 2023-02-24
Payer: MEDICARE

## 2023-02-24 PROBLEM — I21.4 NSTEMI (NON-ST ELEVATED MYOCARDIAL INFARCTION) (HCC): Status: ACTIVE | Noted: 2023-02-24

## 2023-02-24 LAB
ANION GAP SERPL CALC-SCNC: 14 MEQ/L (ref 8–16)
APTT PPP: 68.6 SECONDS (ref 22–38)
APTT PPP: 81.7 SECONDS (ref 22–38)
APTT PPP: 84.6 SECONDS (ref 22–38)
APTT PPP: 87.8 SECONDS (ref 22–38)
BUN SERPL-MCNC: 8 MG/DL (ref 7–22)
CALCIUM SERPL-MCNC: 8 MG/DL (ref 8.5–10.5)
CHLORIDE SERPL-SCNC: 109 MEQ/L (ref 98–111)
CO2 SERPL-SCNC: 20 MEQ/L (ref 23–33)
CREAT SERPL-MCNC: 0.8 MG/DL (ref 0.4–1.2)
DEPRECATED RDW RBC AUTO: 47.6 FL (ref 35–45)
ERYTHROCYTE [DISTWIDTH] IN BLOOD BY AUTOMATED COUNT: 12.2 % (ref 11.5–14.5)
GFR SERPL CREATININE-BSD FRML MDRD: > 60 ML/MIN/1.73M2
GLUCOSE SERPL-MCNC: 101 MG/DL (ref 70–108)
HCT VFR BLD AUTO: 37.8 % (ref 42–52)
HGB BLD-MCNC: 12.2 GM/DL (ref 14–18)
HGB RETIC QN AUTO: 35.8 PG (ref 28.2–35.7)
HOMOCYSTEINE: 10.8 UMOL/L
IMM RETICS NFR: 19.4 % (ref 2.3–13.4)
MCH RBC QN AUTO: 34.3 PG (ref 26–33)
MCHC RBC AUTO-ENTMCNC: 32.3 GM/DL (ref 32.2–35.5)
MCV RBC AUTO: 106.2 FL (ref 80–94)
PLATELET # BLD AUTO: 246 THOU/MM3 (ref 130–400)
PMV BLD AUTO: 10 FL (ref 9.4–12.4)
POTASSIUM SERPL-SCNC: 4.2 MEQ/L (ref 3.5–5.2)
RBC # BLD AUTO: 3.56 MILL/MM3 (ref 4.7–6.1)
RETICS # AUTO: 110 THOU/MM3 (ref 20–115)
RETICS/RBC NFR AUTO: 3.2 % (ref 0.5–2)
REVIEWED BY: NORMAL
SMEAR REVIEW: NORMAL
SODIUM SERPL-SCNC: 143 MEQ/L (ref 135–145)
WBC # BLD AUTO: 4.7 THOU/MM3 (ref 4.8–10.8)

## 2023-02-24 PROCEDURE — 97116 GAIT TRAINING THERAPY: CPT

## 2023-02-24 PROCEDURE — 93880 EXTRACRANIAL BILAT STUDY: CPT

## 2023-02-24 PROCEDURE — 99232 SBSQ HOSP IP/OBS MODERATE 35: CPT | Performed by: INTERNAL MEDICINE

## 2023-02-24 PROCEDURE — 93971 EXTREMITY STUDY: CPT

## 2023-02-24 PROCEDURE — 99223 1ST HOSP IP/OBS HIGH 75: CPT | Performed by: THORACIC SURGERY (CARDIOTHORACIC VASCULAR SURGERY)

## 2023-02-24 PROCEDURE — 85046 RETICYTE/HGB CONCENTRATE: CPT

## 2023-02-24 PROCEDURE — 97530 THERAPEUTIC ACTIVITIES: CPT

## 2023-02-24 PROCEDURE — 36415 COLL VENOUS BLD VENIPUNCTURE: CPT

## 2023-02-24 PROCEDURE — 1200000000 HC SEMI PRIVATE

## 2023-02-24 PROCEDURE — 85027 COMPLETE CBC AUTOMATED: CPT

## 2023-02-24 PROCEDURE — 6370000000 HC RX 637 (ALT 250 FOR IP)

## 2023-02-24 PROCEDURE — 84238 ASSAY NONENDOCRINE RECEPTOR: CPT

## 2023-02-24 PROCEDURE — 6360000002 HC RX W HCPCS

## 2023-02-24 PROCEDURE — 2580000003 HC RX 258

## 2023-02-24 PROCEDURE — 80048 BASIC METABOLIC PNL TOTAL CA: CPT

## 2023-02-24 PROCEDURE — 85730 THROMBOPLASTIN TIME PARTIAL: CPT

## 2023-02-24 PROCEDURE — 2580000003 HC RX 258: Performed by: INTERNAL MEDICINE

## 2023-02-24 PROCEDURE — 94669 MECHANICAL CHEST WALL OSCILL: CPT

## 2023-02-24 PROCEDURE — 83921 ORGANIC ACID SINGLE QUANT: CPT

## 2023-02-24 PROCEDURE — 97162 PT EVAL MOD COMPLEX 30 MIN: CPT

## 2023-02-24 PROCEDURE — 94760 N-INVAS EAR/PLS OXIMETRY 1: CPT

## 2023-02-24 PROCEDURE — APPSS60 APP SPLIT SHARED TIME 46-60 MINUTES: Performed by: PHYSICIAN ASSISTANT

## 2023-02-24 RX ADMIN — ATORVASTATIN CALCIUM 40 MG: 40 TABLET, FILM COATED ORAL at 08:24

## 2023-02-24 RX ADMIN — ESCITALOPRAM 20 MG: 20 TABLET, FILM COATED ORAL at 08:24

## 2023-02-24 RX ADMIN — Medication 1 TABLET: at 08:24

## 2023-02-24 RX ADMIN — ASPIRIN 81 MG 81 MG: 81 TABLET ORAL at 08:24

## 2023-02-24 RX ADMIN — CARBAMAZEPINE 600 MG: 200 TABLET, EXTENDED RELEASE ORAL at 21:19

## 2023-02-24 RX ADMIN — SODIUM CHLORIDE, PRESERVATIVE FREE 10 ML: 5 INJECTION INTRAVENOUS at 21:19

## 2023-02-24 RX ADMIN — CARBAMAZEPINE 600 MG: 200 TABLET, EXTENDED RELEASE ORAL at 08:24

## 2023-02-24 RX ADMIN — CEFTRIAXONE SODIUM 1000 MG: 1 INJECTION, POWDER, FOR SOLUTION INTRAMUSCULAR; INTRAVENOUS at 16:00

## 2023-02-24 RX ADMIN — RISPERIDONE 0.5 MG: 0.25 TABLET, FILM COATED ORAL at 21:19

## 2023-02-24 RX ADMIN — SODIUM CHLORIDE, PRESERVATIVE FREE 10 ML: 5 INJECTION INTRAVENOUS at 21:20

## 2023-02-24 RX ADMIN — RISPERIDONE 0.5 MG: 0.25 TABLET, FILM COATED ORAL at 08:24

## 2023-02-24 RX ADMIN — AZITHROMYCIN DIHYDRATE 500 MG: 500 INJECTION, POWDER, LYOPHILIZED, FOR SOLUTION INTRAVENOUS at 17:17

## 2023-02-24 RX ADMIN — LEVOTHYROXINE SODIUM 50 MCG: 0.05 TABLET ORAL at 05:54

## 2023-02-24 RX ADMIN — SODIUM CHLORIDE, PRESERVATIVE FREE 10 ML: 5 INJECTION INTRAVENOUS at 08:29

## 2023-02-24 ASSESSMENT — PAIN DESCRIPTION - LOCATION: LOCATION: ARM

## 2023-02-24 ASSESSMENT — PAIN DESCRIPTION - FREQUENCY: FREQUENCY: INTERMITTENT

## 2023-02-24 ASSESSMENT — PAIN DESCRIPTION - ORIENTATION: ORIENTATION: LEFT

## 2023-02-24 ASSESSMENT — PAIN - FUNCTIONAL ASSESSMENT: PAIN_FUNCTIONAL_ASSESSMENT: ACTIVITIES ARE NOT PREVENTED

## 2023-02-24 ASSESSMENT — PAIN DESCRIPTION - DESCRIPTORS: DESCRIPTORS: ACHING

## 2023-02-24 ASSESSMENT — PAIN DESCRIPTION - PAIN TYPE: TYPE: ACUTE PAIN

## 2023-02-24 ASSESSMENT — PAIN DESCRIPTION - DIRECTION: RADIATING_TOWARDS: NO

## 2023-02-24 ASSESSMENT — PAIN SCALES - GENERAL: PAINLEVEL_OUTOF10: 5
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
## 2023-02-24 NOTE — PLAN OF CARE
Problem: Cardiovascular - Adult  Goal: Maintains optimal cardiac output and hemodynamic stability  Outcome: Progressing     Pt BP remains elevated this evening. Vitals every four hours to trend BP. Pt denies any chest pain or SOB.

## 2023-02-24 NOTE — PROGRESS NOTES
Cardiology Progress Note      Patient:  Jen Horan  YOB: 1940  MRN: [id number]   Acct: [de-identified]  Admit Date:  2/21/2023  Primary Cardiologist:  none    Note per dr bowman \"CHIEF COMPLAINT: Shortness of breath and fatigue  Cardiology consulted for: Elevated troponins        HPI: This is a pleasant 81 y.o. male PMH HLD, seizures, hypothyroidism presented to the hospital with fatigue and shortness of breath. Patient has been coughing about 1 week and started to have productive brown sputum. He denies any fevers or chills. Patient is a former smoker. On labs he was found to have elevated troponins for which cardiology was consulted. Patient denies any chest pain,  N/V, no pain or SOB at this time. Patient has no echoes in the past.  On EKG there are ST depressions in leads V5 V6 however no reciprocal changes. No previous Echos on file. \"    Subjective (Events in last 24 hours):   Pt awake and alert. NAD. No cp or sob. No edema or orthopnea. No complaints.   Reports hx LEWIS at times  On RA  On heparin gtt      Objective:   BP (!) 118/55   Pulse 69   Temp 97.7 °F (36.5 °C) (Oral)   Resp 15   Ht 5' 9\" (1.753 m)   Wt 201 lb 12.8 oz (91.5 kg)   SpO2 96%   BMI 29.80 kg/m²        TELEMETRY: nsr    Physical Exam:  General Appearance: alert and oriented to person, place and time, in no acute distress  Cardiovascular: normal rate, regular rhythm, normal S1 and S2, no murmurs, rubs, clicks, or gallops, distal pulses intact, no carotid bruits, no JVD  Pulmonary/Chest: clear to auscultation bilaterally- no wheezes, rales or rhonchi, normal air movement, no respiratory distress  Abdomen: soft, non-tender, non-distended, normal bowel sounds, no masses Extremities: no cyanosis, clubbing or edema, pulse   Skin: warm and dry  Head: normocephalic and atraumatic  Eyes: pupils equal, round, and reactive to light  Neck: supple and non-tender without mass, no thyromegaly   Neurological: alert, oriented, normal speech, no focal findings or movement disorder noted  Right wrist - no hematoma, +2 radial pulse    Medications:    sodium chloride flush  5-40 mL IntraVENous 2 times per day    sodium chloride flush  5-40 mL IntraVENous 2 times per day    sodium chloride flush  5-40 mL IntraVENous 2 times per day    aspirin  81 mg Oral Daily    cefTRIAXone (ROCEPHIN) IV  1,000 mg IntraVENous Q24H    azithromycin  500 mg IntraVENous Q24H    atorvastatin  40 mg Oral Daily    carBAMazepine  600 mg Oral BID    escitalopram  20 mg Oral Daily    levothyroxine  50 mcg Oral Daily    risperiDONE  0.5 mg Oral BID    multivitamin  1 tablet Oral Daily      sodium chloride      sodium chloride 75 mL/hr at 02/23/23 1236    sodium chloride      heparin (PORCINE) Infusion 10 Units/kg/hr (02/24/23 0017)    sodium chloride       sodium chloride flush, 5-40 mL, PRN  sodium chloride, , PRN  sodium chloride flush, 5-40 mL, PRN  sodium chloride, , PRN  atropine, 0.5 mg, Once PRN  heparin (porcine), 4,000 Units, PRN  heparin (porcine), 2,000 Units, PRN  albuterol, 2.5 mg, Q4H PRN   And  ipratropium, 0.5 mg, Q4H PRN  sodium chloride flush, 5-40 mL, PRN  sodium chloride, , PRN  ondansetron, 4 mg, Q8H PRN   Or  ondansetron, 4 mg, Q6H PRN  acetaminophen, 650 mg, Q6H PRN   Or  acetaminophen, 650 mg, Q6H PRN  polyethylene glycol, 17 g, Daily PRN  perflutren lipid microspheres, 1.5 mL, ONCE PRN      Diagnostics:  TTE  Summary   Technically difficult examination. Left ventricular size and systolic function is normal. Ejection fraction   was estimated at 50%. LV wall thickness is within normal limits. The right ventricular size appears normal with normal systolic function   and wall thickness. Mild mitral regurgitation is present.       Signature      ----------------------------------------------------------------   Electronically signed by Kaleigh Peralta MD (Interpreting   physician) on 02/22/2023 at 02:52 PM    Cath 2/23/23  CORONARY ANGIOGRAM:  LEFT MAIN:  Distal left main has severe calcific stenosis about 90%. Proximal LAD 90% stenosis. Small diagonal one, diagonal two, and  diagonal three branches without any significant obstruction. LCX:  Proximal OM that is slightly retroflexed has 50% stenosis  proximally. OM-2 has about 60% to 70% stenosis. RCA:  Diffusely calcified with about 80% stenosis. Bifurcates into the  PDA and the PLB. The ostium of the PLB has about a 70% stenosis. The  ostium of the PDA has about a 60% to 70% stenosis. RCA is dominant. LV:  LVEF 55% to 60%. Aortic valve is tricuspid. No significant  aneurysm or dissection in the visualized portions of the aorta. No  significant mitral regurgitation. LVEDP is 35. PERIPHERAL ANGIOGRAM:  Abdominal aorta:  No significant aneurysm or dissection noted. Renal arteries:  Difficult to opacify but appeared to have patency. The  ostium of the right renal artery may have about 80% stenosis. Left  renal artery is difficult to evaluate, but again may also have about a  50% to 60% stenosis. Common iliac arteries:  Bilaterally patent. External iliac arteries:  Bilaterally patent. Common femoral arteries:  Bilaterally patent. IMMEDIATE COMPLICATIONS:  None. MEDICATIONS:  See EMR. ACCESS:  Vasc Band used for hemostasis. ESTIMATED BLOOD LOSS:  Less than 50 mL. SUMMARY:  Severe multi-vessel CAD with heavy calcification of left main  stenosis with preserved LV ejection fraction and patent aortoiliac  system. PLAN:  1. Bedrest.  2.  Optimal medical therapy. 3.  Risk factor management. 4.  Routine access site care. 5.  Avoid DAPT. 6.  IV heparin. 7.  IV nitroglycerin. 8.  If he has any chest pain or discomfort, we can consider for balloon  pump. 9.  CT Surgery consultation for CABG. If he is not a candidate, we can  consider high-risk PCI with rotational atherectomy and _____ support.      All the above was explained to the patient and the patient's family. They were agreeable and amenable to the above plan. Kelvin Case MD    Lab Data:    Cardiac Enzymes:  No results for input(s): CKTOTAL, CKMB, CKMBINDEX, TROPONINI in the last 72 hours.     CBC:   Lab Results   Component Value Date/Time    WBC 4.7 02/24/2023 06:34 AM    RBC 3.56 02/24/2023 06:34 AM    RBC 4.25 11/09/2021 08:28 AM    HGB 12.2 02/24/2023 06:34 AM    HCT 37.8 02/24/2023 06:34 AM     02/24/2023 06:34 AM       CMP:    Lab Results   Component Value Date/Time     02/24/2023 06:34 AM    K 4.2 02/24/2023 06:34 AM     02/24/2023 06:34 AM    CO2 20 02/24/2023 06:34 AM    BUN 8 02/24/2023 06:34 AM    CREATININE 0.8 02/24/2023 06:34 AM    LABGLOM >60 02/24/2023 06:34 AM    GLUCOSE 101 02/24/2023 06:34 AM    GLUCOSE 95 11/09/2021 08:28 AM    CALCIUM 8.0 02/24/2023 06:34 AM       Hepatic Function Panel:    Lab Results   Component Value Date/Time    ALKPHOS 38 02/21/2023 09:32 AM    ALT 15 02/21/2023 09:32 AM    AST 17 02/21/2023 09:32 AM    PROT 6.7 02/21/2023 09:32 AM    BILITOT 0.6 02/21/2023 09:32 AM    BILIDIR <0.2 12/30/2015 03:45 PM    LABALBU 4.0 02/21/2023 09:32 AM       Magnesium:    Lab Results   Component Value Date/Time    MG 2.2 02/23/2023 06:00 AM       PT/INR:    Lab Results   Component Value Date/Time    INR 1.09 02/23/2023 02:57 PM       HgBA1c:    Lab Results   Component Value Date/Time    LABA1C 5.5 01/13/2020 11:32 AM       FLP:    Lab Results   Component Value Date/Time    TRIG 178 11/09/2021 08:28 AM    HDL 52 11/09/2021 08:28 AM    LDLCALC 62 11/09/2021 08:28 AM    LABVLDL 36 11/09/2021 08:28 AM       TSH:    Lab Results   Component Value Date/Time    TSH 4.130 02/21/2023 03:44 PM         Assessment:    Dyspnea on exertion  Bilateral pleural effusions  mediastinal and subcarinal adenopathy - pulmonary following  Acute diastolic CHF  Elevated troponins/NSTEMI  Abn EKG  Ef 50 per TTE 2/22/23  S/p cath 2/23/23 - Severe multi-vessel CAD with heavy calcification of left main stenosis with preserved LV ejection fraction and patent aortoiliac system. - [facility] consulted  HLD  ? PNA  ? COPD  Ex-smoker  Hx CVA - on plavix PTA - stopped for plavix washout for CABG  Hx brain abscess  Hx seizures      Plan:    Cont asa/statin  Cont heparin gtt  No BB now due to lower bp  Daily weights and I/os  2 liter fluid restriction and 2gm sodium diet  Keep mag >2 and k >4  Sl ntg upon dc  CTS consulted - planning CABG Tuesday after plavix washout  Call cardiology and CTS for any cp or sob asap with stat ekg  F/up dr bowman 6-8 weeks    Patient and Practitioner mutually agreed upon goal:   Patient and provider goals: Feel better and have more energy and Start exercise program      Electronically signed by Marbin Kendall PA-C on 2/24/2023 at 10:20 AM

## 2023-02-24 NOTE — PROGRESS NOTES
[facility] Adult Cardiac Surgery Database Version 4.20  RISK SCORES  Procedure: Isolated CAB  Risk of Mortality:  2.368%  Renal Failure:  1.376%  Permanent Stroke:  0.923%  Prolonged Ventilation:  6.418%  DSW Infection:  0.274%  Reoperation:  1.900%  Morbidity or Mortality:  9.745%  Short Length of Stay:  44.455%  Long Length of Stay:  4.741%

## 2023-02-24 NOTE — PLAN OF CARE
Problem: Discharge Planning  Goal: Discharge to home or other facility with appropriate resources  Outcome: Progressing       Consult received. Please see SW note dated 2/24.

## 2023-02-24 NOTE — DISCHARGE INSTRUCTIONS
F/up dr bowman 6-8 weeks    Expect call from sleep lab to schedule for baseline sleep study after discharge if you do not hear from them call [phone]

## 2023-02-24 NOTE — CARE COORDINATION
2/24/23, 3:31 PM EST    DISCHARGE ON GOING EVALUATION    Ana Cristina Santillan day: 0  Location: 8A-09/009-A Reason for admit: Elevated troponin level [R77.8]  Elevated troponin [R77.8]  Elevated brain natriuretic peptide (BNP) level [R79.89]  Dyspnea, unspecified type [R06.00]  NSTEMI (non-ST elevated myocardial infarction) [facility]) [I21.4]   Procedure: 2/23 Cardiac cath: CTS vs high risk PCI     Barriers to Discharge: Hospitalist, Cardiology, Pulmonology, Cardiothoracic surgery, and therapy following. Planning for CABG Tuesday after Plavix washout. PCP: Julio Cesar Toussaint MD  Readmission Risk Score: 11.6%  Patient Goals/Plan/Treatment Preferences: From home with daughter and grandchildren. Daughter is POA, spiritual care consulted to scan in updated documents. SW consulted to follow with CABG post-op planning.

## 2023-02-24 NOTE — PROGRESS NOTES
[address] [address]  INPATIENT PHYSICAL THERAPY  EVALUATION  STR MED SURG 8A - 8A-09/009-A    Time In: 1105  Time Out: 1358  Timed Code Treatment Minutes: 26 Minutes  Minutes: 35          Date: 2023  Patient Name: Steffany Wagner,  Gender:  male        MRN: [id number]  : 1940  (81 y.o.)      Referring Practitioner: ANGELIC Giordano CNP  Diagnosis: elevated tropnin level  Additional Pertinent Hx: per EMR Jeremiah Beverly is a 81 y.o. male with past medical history of anemia, brain abscess, seizures, former smoker approximately 40 pack year who presents to the emergency department for evaluation of fatigue, shortness of breath. Patient brought in by family as family has been reporting he has been more tired, fatigued for the past week however more so in the past 3 days. Patient has also been coughing. Today he did have some brown sputum. Today, patient verbalized that he was short of breath and had generalized muscle aches which prompted today's ED visit. Patient lives in a two-story home and states that it has been getting harder to walk around. Has been having dyspnea on exertion. Patient denies any chest pain. Denies any fevers or chills. \"     Restrictions/Precautions:  Restrictions/Precautions: Fall Risk, Up as Tolerated  Position Activity Restriction  Other position/activity restrictions: hx of brain injury, planning CABG on . Subjective:  Chart Reviewed: Yes  Patient assessed for rehabilitation services?: Yes  Family / Caregiver Present: No  Subjective: OK to see pt per nursing. Pt in bed when PT arrived, sleeping, easily aroused with cues. Pt requesting to get back in bed following session to rest and sleep. Once pt back in bed, requesting to use the restroom.     General:  Overall Orientation Status: Impaired  Orientation Level: Oriented to person, Disoriented to time, Oriented to place  Overall Cognitive Status: Exceptions  Vision: Impaired  Vision Exceptions: Wears glasses for reading  Hearing: Within functional limits       Pain: L hip pain 5/10    Vitals: Oxygen: room air 95-97%  Heart Rate: 96-98    Social/Functional History:    Lives With: Daughter  Type of Home: House  Home Layout: Two level, Bed/Bath upstairs  Home Access: Stairs to enter with rails  Entrance Stairs - Number of Steps: 2 LEVY and ~15 steps to second level  Home Equipment: Alise Snowman, rolling, Cane     Bathroom Shower/Tub: Tub/Shower unit    Receives Help From: Family  ADL Assistance: Independent  Homemaking Assistance: Needs assistance (family completes most of the cooking and cleaning tasks)  Homemaking Responsibilities: Yes  Ambulation Assistance: Independent  Transfer Assistance: Independent    Active : No  Occupation: Retired  Additional Comments: Daughter and son in law both work outside of the home. Pt was previously independent with ADLs and functional mobility. Pt was not previously using AD for functional mobility. Pt is a questionable historian. Hx of brain surgery in 1995 and visual deficits. OBJECTIVE:  Range of Motion:  Bilateral Lower Extremity: WNL    Strength:  Bilateral Lower Extremity: WFL    Balance:  Static Sitting Balance:  Supervision  Dynamic Sitting Balance: Supervision  Static Standing Balance: Stand By Assistance, Contact Guard Assistance  Dynamic Standing Balance: Stand By Assistance, Contact Guard Assistance  Pt donned shoes x2 in sitting on EOB prior to mobility. Pt completed toileting, able to complete self otis care in sitting. Hand washing completed, cues for safety, no LOB noted. Bed Mobility:  Rolling to Left: Supervision, with head of bed flat   Supine to Sit: Supervision, with head of bed flat  Sit to Supine: Supervision, X 1, with head of bed flat   Scooting: Supervision, X 1, with head of bed flat  x2 completed. Once pt back in bed, wanting to use restroom.    Transfers:  Sit to Stand: Stand By Assistance, [address], X 1, with verbal cues  Stand to Sit:Stand By Assistance, [employer], X 1, with verbal cues  Intermittent use of walker for support, cues for safety. Completed transfers from various surfaces and heights during session  Ambulation:  Contact Guard Assistance, Minimal Assistance, X 1, with cues for safety, with verbal cues , with increased time for completion  Distance: 140 feet, 10 feet x2  Surface: Level Tile  Device:No Device  Gait Deviations:  Slow So, Decreased Step Length Bilaterally, Decreased Gait Speed, Decreased Heel Strike Bilaterally, Narrow Base of Support, Moderate Path Deviations, and Unsteady Gait  Cues for safety, with shuffling pattern noted, 1 LOB, required min A for self correction. Pt with a slow pace. Ambulation: 2nd trial  Contact Guard Assistance, X 1, with cues for safety, with verbal cues , with increased time for completion  Distance: 140 feet  Surface: Level Tile  Device:Rolling Walker  Gait Deviations:  Slow So, Decreased Step Length Bilaterally, and Decreased Gait Speed, shuffling gait pattern  Cues for safety, increased stability with RW. Educated pt to maintain walker close to self at all times with fair-good demo. Exercise:  Patient was guided in 1 set(s) 10 reps of exercise to both lower extremities. Ankle pumps, Heelslides, Hip abduction/adduction, and Straight leg raises. Exercises were completed for increased independence with functional mobility. Pt required VC and visual cues for proper technique of exercises with good demo. Functional Outcome Measures: Completed  Balance Score: 11  Gait Score: 5  Tinetti Total Score: 16  AM-PAC Inpatient Mobility Raw Score : 20  AM-PAC Inpatient T-Scale Score : 47.67  High risk for falls    ASSESSMENT:  Activity Tolerance:  Patient tolerance of  treatment: good. Treatment Initiated: Treatment and education initiated within context of evaluation.   Evaluation time included review of current medical information, gathering information related to past medical, social and functional history, completion of standardized testing, formal and informal observation of tasks, assessment of data and development of plan of care and goals. Treatment time included skilled education and facilitation of tasks to increase safety and independence with functional mobility for improved independence and quality of life. Assessment: Body Structures, Functions, Activity Limitations Requiring Skilled Therapeutic Intervention: Decreased functional mobility , Decreased cognition, Decreased endurance, Decreased posture, Decreased balance, Decreased tolerance to work activity, Decreased strength, Decreased safe awareness  Assessment: Connie Johnson is a 81 y.o. male who presents with the deficits stated previously. Pt requires 1 person assist for functional tasks with use of walker for support. Pt cont to require skilled PT services to increase IND with functional tasks and progress towards PLOF to return to home environment safely. Therapy Prognosis: Good    Requires PT Follow-Up: Yes    Discharge Recommendations:  Discharge Recommendations: Continue to assess pending progress, 24 hour supervision or assist, Patient would benefit from continued therapy after discharge, depending how sx goes, family may be requesting rehab prior to return home. Patient Education:      .     Patient Education  Education Given To: Patient  Education Provided: Role of Therapy, Plan of Care, Transfer Training, Equipment  Education Method: Verbal  Education Outcome: Verbalized understanding, Demonstrated understanding, Continued education needed       Equipment Recommendations:  Equipment Needed: No    Plan:  Current Treatment Recommendations: Strengthening, Balance training, Functional mobility training, Transfer training, Gait training, Safety education & training, Equipment evaluation, education, & procurement, Home exercise program, Therapeutic activities, Patient/Caregiver education & training, Neuromuscular re-education, Stair training, Endurance training  General Plan:  (3-5x GM)    Goals:  Patient Goals : family wants rehab following CABG  Short Term Goals  Time Frame for Short Term Goals: by discharge  Short Term Goal 1: Pt will amb for >200 feet with S with LRAD for suport to progress with mobility. Short Term Goal 2: Pt will demo sit to/from stand transfers with LRAD for support to progress with mobility. Short Term Goal 3: Pt will demo improved tinetti score to >20/28 to increase safety and decrease risk for falls. Short Term Goal 4: Pt will negotiate 15 steps for LEVY and in the home with S to return home safely. Long Term Goals  Time Frame for Long Term Goals : NA due to short ELOS    Following session, patient left in safe position with all fall risk precautions in place. Pt in bed following session, all needs and call light in reach, alarm on.

## 2023-02-24 NOTE — PROGRESS NOTES
CT/CV Surgery Progress Note    2023 9:40 AM  Surgeon:  Dr. Rubi Olsen:  Mr. Aleksandr García is resting comfortably in bed, alert, and in no acute distress. Pt denies chest pressure, SOB, fever,chills, N/V/D. IV heparin drip running. Vital Signs: BP (!) 118/55   Pulse 69   Temp 97.7 °F (36.5 °C) (Oral)   Resp 15   Ht 5' 9\" (1.753 m)   Wt 201 lb 12.8 oz (91.5 kg)   SpO2 91%   BMI 29.80 kg/m²    Temp (24hrs), Av °F (36.7 °C), Min:97.6 °F (36.4 °C), Max:98.5 °F (36.9 °C)       Labs:   CBC:  Recent Labs     23  0600 23  1457 23  2256 23  0634   WBC 6.5 4.5*  --  4.7*   HGB 12.4* 12.6*  --  12.2*   HCT 37.8* 38.5*  --  37.8*   .4* 103.8*  --  106.2*    278  --  246   APTT  --  53.7* 68.6* 87.8*   INR  --  1.09  --   --      BMP:   Recent Labs     23  0130 23  0600 23  0634    144 143   K 3.7 3.9 4.2    109 109   CO2 22* 22* 20*   PHOS  --  3.7  --    BUN 15 13 8   CREATININE 0.7 0.7 0.8   MG  --  2.2  --      Last HgA1C:   Lab Results   Component Value Date    LABA1C 5.5 2020       Imaging:  CXR: 2023      Intake/Output Summary (Last 24 hours) at 2023 0940  Last data filed at 2023 7054  Gross per 24 hour   Intake 2595 ml   Output 600 ml   Net 1995 ml       Scheduled Meds:    sodium chloride flush  5-40 mL IntraVENous 2 times per day    sodium chloride flush  5-40 mL IntraVENous 2 times per day    sodium chloride flush  5-40 mL IntraVENous 2 times per day    aspirin  81 mg Oral Daily    cefTRIAXone (ROCEPHIN) IV  1,000 mg IntraVENous Q24H    azithromycin  500 mg IntraVENous Q24H    atorvastatin  40 mg Oral Daily    carBAMazepine  600 mg Oral BID    escitalopram  20 mg Oral Daily    levothyroxine  50 mcg Oral Daily    risperiDONE  0.5 mg Oral BID    multivitamin  1 tablet Oral Daily       ROS: All neg unless specifically mentioned in subjective section.      Exam:  General Appearance: alert ,conversing, in no acute distress  Cardiovascular: normal rate, regular rhythm, normal S1 and S2, no murmurs, rubs, clicks, or gallops  Pulmonary/Chest: clear to auscultation bilaterally- no wheezes, rales or rhonchi, normal air movement, no respiratory distress  Neurological: alert, oriented, normal speech, no focal findings or movement disorder noted  Sternum: Incision healing appropriately and no wound dehiscence noted. 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 [address]       Plan:   CABG scheduled for Tuesday. Plavix washout    The plan of care was discussed in detail with Dr. Mahesh Gracia PA-C      Long discussion with daughter grandson and other family members in front of patient today  Plan for CABG on Tuesday next week  Need to wait for Plavix washout  The risks, benefits and alternatives were discussed in detail with the patient and family. The risks include, but are not limited to: Death, stroke, bleeding requiring reoperation, infection, Heart attack with graft failure, cardiac arrhythmias, thromboembolism, renal failure requiring dialysis, pneumonia with respiratory failure requiring tracheostomy. The patient expressed understanding of these issues, confirmed that all questions were answered, and desires to proceed.

## 2023-02-24 NOTE — CARE COORDINATION
DISCHARGE PLANNING EVALUATION  2/24/23, 10:08 AM EST    Reason for Referral: OHS next week   Mental Status: Answered questions, spoke mostly with daughter Mariah Cote Making: Family   Family/Social/Home Environment: Lives at home with daughter and adult grandson  Current Services including food security, transportation and housekeeping: No current services, family helps with transportation   Current Equipment: None   Payment Source:Medicare and BCBS  Concerns or Barriers to Discharge:  None  Post-acute [facility]) provider list was provided to patient. Patient was informed of their freedom to choose [facility] provider. Discussed and offered to show the patient the relevant [facility] Providers quality and resource use measures on Medicare Compare web site via computer based on patient's goals of care and treatment preferences. Questions regarding selection process were answered. Teach Back Method used with Keisha Dew and family regarding care plan and discharge planning  Patient and family verbalize understanding of the plan of care and contribute to goal setting. Patient goals, treatment preferences and discharge plan: Plan is undecided at this time, however family is predicting ECF after OHS. SW will follow up with family Tuesday afternoon or Wednesday after surgery to discuss placement. This SW did receive a call from [address] that patient would be having OHS on Tuesday, 2/28. SW met with family and patient to begin making plans for discharge after surgery. SW met with patient's daughter Luis Enrique White and grandson, with other family in the room. Patient lives with daughter Luis Enrique White and she is agreeable to Keisha Dew going to a facility short-term following OHS, as she is not going to be able to care for him at home. Patient has had brain surgery in the past and has not been \"normal\" with his ADLs since then. Luis Enrique White did mention [facility] as their first choice for ECF.  SW will continue to follow and made family aware that someone will meet with them after his surgery to finalize plans.      Electronically signed by MARLYN Bonner on 2/24/2023 at 10:08 AM

## 2023-02-24 NOTE — RT PROTOCOL NOTE
RT Inhaler-Nebulizer Bronchodilator Protocol Note    There is a bronchodilator order in the chart from a provider indicating to follow the RT Bronchodilator Protocol and there is an Initiate RT Inhaler-Nebulizer Bronchodilator Protocol order as well (see protocol at bottom of note). CXR Findings:  No results found. The findings from the last RT Protocol Assessment were as follows:   History Pulmonary Disease: Smoker 15 pack years or more  Respiratory Pattern: Regular pattern and RR 12-20 bpm  Breath Sounds: Slightly diminished and/or crackles  Cough: Strong, spontaneous, non-productive  Indication for Bronchodilator Therapy: Decreased or absent breath sounds  Bronchodilator Assessment Score: 3    Aerosolized bronchodilator medication orders have been revised according to the RT Inhaler-Nebulizer Bronchodilator Protocol below. Respiratory Therapist to perform RT Therapy Protocol Assessment initially then follow the protocol. Repeat RT Therapy Protocol Assessment PRN for score 0-3 or on second treatment, BID, and PRN for scores above 3. No Indications - adjust the frequency to every 6 hours PRN wheezing or bronchospasm, if no treatments needed after 48 hours then discontinue using Per Protocol order mode. If indication present, adjust the RT bronchodilator orders based on the Bronchodilator Assessment Score as indicated below. Use Inhaler orders unless patient has one or more of the following: on home nebulizer, not able to hold breath for 10 seconds, is not alert and oriented, cannot activate and use MDI correctly, or respiratory rate 25 breaths per minute or more, then use the equivalent nebulizer order(s) with same Frequency and PRN reasons based on the score. If a patient is on this medication at home then do not decrease Frequency below that used at home.     0-3 - enter or revise RT bronchodilator order(s) to equivalent RT Bronchodilator order with Frequency of every 4 hours PRN for wheezing or increased work of breathing using Per Protocol order mode. 4-6 - enter or revise RT Bronchodilator order(s) to two equivalent RT bronchodilator orders with one order with BID Frequency and one order with Frequency of every 4 hours PRN wheezing or increased work of breathing using Per Protocol order mode. 7-10 - enter or revise RT Bronchodilator order(s) to two equivalent RT bronchodilator orders with one order with TID Frequency and one order with Frequency of every 4 hours PRN wheezing or increased work of breathing using Per Protocol order mode. 11-13 - enter or revise RT Bronchodilator order(s) to one equivalent RT bronchodilator order with QID Frequency and an Albuterol order with Frequency of every 4 hours PRN wheezing or increased work of breathing using Per Protocol order mode. Greater than 13 - enter or revise RT Bronchodilator order(s) to one equivalent RT bronchodilator order with every 4 hours Frequency and an Albuterol order with Frequency of every 2 hours PRN wheezing or increased work of breathing using Per Protocol order mode. RT to enter RT Home Evaluation for COPD & MDI Assessment order using Per Protocol order mode.     Electronically signed by Tyrell Bridges RCP on 2/23/2023 at 8:31 PM

## 2023-02-24 NOTE — PLAN OF CARE
Problem: Respiratory - Adult  Goal: Clear lung sounds  2/23/2023 2033 by Shauna Torres RCP  Outcome: Progressing   Pt scored for treatments Prn at this time. Patient mutually agreed on goals.

## 2023-02-24 NOTE — PROGRESS NOTES
Hospitalist Progress Note    Patient:  Esme Apa    Unit/Bed:8A-09/009-A  YOB: 1940  MRN: [id number]   Acct: [de-identified]   PCP: Ed Calero MD  Code Status: Full Code  Date of Admission: 2/21/2023    Expected Discharge: 3/1? Disposition: Plan for CABG 2/28    Assessment/Plan:    NSTEMI: Coronary angiography on 2/23/2024 demonstrated three-vessel disease with severe calcification of the left main artery, preserved ejection fraction, and patent aorto iliacs. Cardiology recommended CTS versus high risk PCI. Continue heparin drip, ASA. Hold plavix. CT surgery following, plan for CABG on 2/28 after Plavix washout. Bilateral pleural effusion, R>L: Pulmonary following. Unclear etiology, differential includes malignancy, infection, CHF. Patient is stable on room air, no plan for thoracentesis at this time, needs optimization of cardiac status prior. Plan to repeat imaging as outpatient. CAP: Rocephin and azithromycin started 2/21/2023 will be maintained through 2/26/2023. Hx Epilepsy: Tegretol 600 mg twice daily     Acquired hypothyroidism: on Synthroid 50 mcg daily    Mild, acute macrocytic anemia: Mild, no signs of acute bleeding. Anemia work-up unimpressive. RML lung nodule: CT reports 4 mm nodule in right anterior and right middle lobe with lymphadenopathy. Pulm following. Planning for repeat CT in 3 months. H/o CVA: With residual cognitive deficits. H/o Leukemia: With remission in 1984. Anxiety/depression: On Lexapro, risperidone. Chief Complaint: Fatigue, SOB    HPI / Hospital Course: Per last progress note:  \"The patient is an 81year old male former smoker with an approximately 20-pack-year history who quit in 1980 with a past medical history of leukemia with remission in 1984, hypothyroidism, \"seizures,\" hyperlipidemia, \"coma,\" and stroke secondary to septic embolus from dental procedure in 1995 who presents the chief complaint of shortness of breath and managing primarily for severe calcification of the left main not amenable to normal stenting and bilateral pleural effusion. Cough and shortness of breath which started on 2/14/2023. Came to the ED on 2/21/2023 because cough came productive of red phlegm. Patient never had chest pain at any point. However, did have shortness of breath. Was appropriately treated with Rocephin, azithromycin, and DuoNebs. Azithromycin and Rocephin started on 2/21/2023 with tentative plan to complete through 2/26/2023. Pulmonology was consulted on 2/21/2023 for management of COPD. Cardiology was consulted for progressively elevating troponins and an EKG that demonstrated old lateral lead ischemic changes and new anterior lead once. Hospitalist group took over care in 2/23/2023 as the family wish to switch from the Dr. Fritz [place] group. Echocardiogram on 2/22/2023 demonstrated normal left ventricular size and systolic function, normal ejection fraction 50%, normal left ventricular wall thickness, mild mitral regurgitation, and normal right ventricle. Given elevated troponin T and EKG abnormalities, decision was made to proceed with left heart cath. Left heart cath in 2/23/2023 demonstrated three-vessel disease with severe calcification of the left main artery, preserved ejection fraction, and patent aorto iliacs. Cardiothoracic surgery versus high risk PCI suggested. Dr. Julia Moyer, the cardiothoracic surgeon, discussed the case with Dr. Yani Torres. Also discussed the risks and benefits of CT surgery with the patient. Dr. Maura Cote believes that the patient is an appropriate candidate for CT surgery even if he is somewhat higher risk than the norm. Per his report, Dr. Yani Torres feels that he is not a good candidate for PCI or medical management alone.      In preparation for potential upcoming CT surgery, Plavix was held, prophylactic dose Lovenox was stopped, aspirin was maintained, and patient was started on heparin drip. This was done per instructions from CT surgery. CTA of the chest on 2/22/2023 demonstrated no pulmonary embolism, normal heart size, no pericardial effusion, right paratracheal 12 mm short axis node, left side 11 mm paratracheal node, subcarinal adenopathy measuring 2.8 cm, small bilateral hilar lymph nodes, no axilla lymphadenopathy, multifocal scattered hazy groundglass opacities present in both lungs, 4 mm nodule in the anterior right middle lobe, bilateral pleural effusions right greater than left, bibasilar bronchiectasis, compressive atelectasis of the right base, no suspicious bony lesions, and no suspicious findings in the imaged aspect of the upper abdomen; overall, no PE, multifocal groundglass infiltrates, bilateral pleural effusions, and mediastinal and subcarinal adenopathy. \"    Subjective (past 24 hours): Patient sitting at side of bed, eating lunch. States he is feeling well. He denies chest pain, shortness of breath, fever/chills, abdominal pain, N/V/D. Eating and drinking well. Awaiting CABG on Tuesday. Family at bedside, questions answered. ROS: Pertinent positives as noted in HPI. All other systems reviewed and negative. Past medical history, family history, social history and allergies reviewed again and is unchanged since admission. Medications:  Reviewed.   Infusion Medications    sodium chloride      sodium chloride 75 mL/hr at 02/23/23 1236    sodium chloride      heparin (PORCINE) Infusion 10 Units/kg/hr (02/24/23 0017)    sodium chloride       Scheduled Medications    sodium chloride flush  5-40 mL IntraVENous 2 times per day    sodium chloride flush  5-40 mL IntraVENous 2 times per day    sodium chloride flush  5-40 mL IntraVENous 2 times per day    aspirin  81 mg Oral Daily    cefTRIAXone (ROCEPHIN) IV  1,000 mg IntraVENous Q24H    azithromycin  500 mg IntraVENous Q24H    atorvastatin  40 mg Oral Daily    carBAMazepine  600 mg Oral BID    escitalopram  20 mg Oral Daily    levothyroxine  50 mcg Oral Daily    risperiDONE  0.5 mg Oral BID    multivitamin  1 tablet Oral Daily     PRN Meds: sodium chloride flush, sodium chloride, sodium chloride flush, sodium chloride, heparin (porcine), heparin (porcine), albuterol **AND** ipratropium, sodium chloride flush, sodium chloride, ondansetron **OR** ondansetron, acetaminophen **OR** acetaminophen, polyethylene glycol, perflutren lipid microspheres    I/O:     Intake/Output Summary (Last 24 hours) at 2/24/2023 1504  Last data filed at 2/24/2023 0937  Gross per 24 hour   Intake 1220 ml   Output 600 ml   Net 620 ml       Diet:  ADULT DIET; Regular  ADULT ORAL NUTRITION SUPPLEMENT; Breakfast, Lunch, Dinner; Other Oral Supplement; send hot decaf beverage TID  ADULT ORAL NUTRITION SUPPLEMENT; Breakfast; Other Oral Supplement; send activia at breakfast daily    Exam:  BP (!) 118/55   Pulse 69   Temp 97.7 °F (36.5 °C) (Oral)   Resp 15   Ht 5' 9\" (1.753 m)   Wt 201 lb 12.8 oz (91.5 kg)   SpO2 96%   BMI 29.80 kg/m²   General:   Pleasant male.  NAD  HEENT:  normocephalic and atraumatic.  No scleral icterus. PERR.  Neck: supple.  No JVD. No thyromegaly.  Lungs: clear to auscultation.  No retractions  Cardiac: RRR without murmur.  Abdomen: soft.  Nontender.  Bowel sounds positive.  Extremities:  No clubbing, cyanosis, or edema x 4.    Vasculature: capillary refill < 3 seconds. Palpable LE pulses bilaterally.  Skin:  warm and dry.  Psych:  Alert and oriented x3.  Affect appropriate  Lymph:  No supraclavicular adenopathy.  Neurologic:  No focal deficit.  No seizures.      Data: (All radiographs, tracings, PFTs, and imaging are personally viewed and interpreted unless otherwise noted)  Labs:   Recent Labs     02/23/23  0600 02/23/23  1457 02/24/23  0634   WBC 6.5 4.5* 4.7*   HGB 12.4* 12.6* 12.2*   HCT 37.8* 38.5* 37.8*    278 246     Recent Labs     02/23/23  0130 02/23/23  0600 02/24/23  0634   NA  142 144 143   K 3.7 3.9 4.2    109 109   CO2 22* 22* 20*   BUN 15 13 8   CREATININE 0.7 0.7 0.8   CALCIUM 8.1* 8.2* 8.0*   PHOS  --  3.7  --      No results for input(s): AST, ALT, BILIDIR, BILITOT, ALKPHOS in the last 72 hours. Recent Labs     02/23/23  1457   INR 1.09     No results for input(s): [place] in the last 72 hours. Urinalysis:   Lab Results   Component Value Date/Time    NITRU NEGATIVE 01/15/2017 08:16 PM    WBCUA 0-2 01/15/2017 08:16 PM    BACTERIA NONE 01/15/2017 08:16 PM    RBCUA 0-2 01/15/2017 08:16 PM    BLOODU NEGATIVE 01/15/2017 08:16 PM    GLUCOSEU NEGATIVE 01/15/2017 08:16 PM     Urine culture: No results found for: LABURIN  Micro:   Blood culture #1:   Lab Results   Component Value Date/Time    BC No growth-preliminary  No growth   01/15/2017 09:18 PM     Blood culture #2:No results found for: Sam Deng  Organism:No results found for: ORG    No results found for: LABGRAM  MRSA culture only:No results found for: [facility]  Respiratory culture: No results found for: CULTRESP  Aerobic and Anaerobic :  No results found for: LABAERO  No results found for: [facility]    Radiology Reports:  CTA CHEST W WO CONTRAST   Final Result    There is no PE. There are multifocal groundglass infiltrates. There are bilateral pleural effusions. There is mediastinal and subcarinal adenopathy. **This report has been created using voice recognition software. It may contain minor errors which are inherent in voice recognition technology. **      Final report electronically signed by Dr. Katarina Ernst on 2/22/2023 2:05 PM      XR CHEST (2 VW)   Final Result   1. No interval change since previous study dated 1/15/2017.   2. Diffuse COPD and thickening off the interstitial lung markings. 3. Borderline cardiomegaly. 4. Thoracic spondylosis. James Reyna **This report has been created using voice recognition software.  It may contain minor errors which are inherent in voice recognition technology. **      Final report electronically signed by DR Vern Staley on 2/21/2023 9:37 AM      CT CHEST W CONTRAST    (Results Pending)   VL DUP CAROTID BILATERAL    (Results Pending)   VL PRE OP VEIN MAPPING    (Results Pending)     XR CHEST (2 VW)    Result Date: 2/21/2023  PROCEDURE: XR CHEST (2 VW) CLINICAL INFORMATION: fatigue sob cough. COMPARISON: Plain radiographs dated 1/15/2017. Val Louis TECHNIQUE: PA and lateral views the chest. FINDINGS: There is borderline cardiomegaly. .  The mediastinum is not widened. There is diffuse COPD and thickening of the interstitial lung markings. There are no new pulmonary infiltrates or effusions. The pulmonary vascularity is normal. There is thoracic spondylosis. 1. No interval change since previous study dated 1/15/2017. 2. Diffuse COPD and thickening off the interstitial lung markings. 3. Borderline cardiomegaly. 4. Thoracic spondylosis. Val Louis **This report has been created using voice recognition software. It may contain minor errors which are inherent in voice recognition technology. ** Final report electronically signed by DR Vern Staley on 2/21/2023 9:37 AM    CTA CHEST W WO CONTRAST    Result Date: 2/22/2023  PROCEDURE: CTA CHEST W WO CONTRAST CLINICAL INFORMATION: DYSPNEA, ELEVATED D-DIMER. COMPARISON: No prior study. TECHNIQUE: 3 mm axial images were obtained through the chest after the administration of IV contrast.  A non-contrast localizer was obtained. 3D reconstructions were performed on the scanner to include MIP coronal and sagittal images through the chest. Isovue was the intravenous contrast utilized. All CT scans at this facility use dose modulation, iterative reconstruction, and/or weight-based dosing when appropriate to reduce radiation dose to as low as reasonably achievable. FINDINGS: There is adequate opacification of the pulmonary arterial system. No pulmonary emboli are present. The aorta is within acceptable limits.  The heart size is normal. There is no pericardial effusion  there is a right paratracheal 12 mm short axis node. There is a left paratracheal 11 mm node. There is subcarinal adenopathy measuring 2.8 cm. There are small bilateral hilar lymph nodes. There is no axillary lymphadenopathy. Multifocal scattered hazy groundglass opacities are present in both lungs. 4 mm nodule anterior right middle lobe. Image 1:15 series 4 Bilateral pleural effusions right greater than left. Bibasilar bronchiectasis. Compressive atelectasis right right base No suspicious osseous lesions are present. There are no suspicious findings in the imaged aspects of the upper abdomen. There is no PE. There are multifocal groundglass infiltrates. There are bilateral pleural effusions. There is mediastinal and subcarinal adenopathy. **This report has been created using voice recognition software. It may contain minor errors which are inherent in voice recognition technology. ** Final report electronically signed by Dr. Brandee Pulido on 2/22/2023 2:05 PM        Tele:   [] yes             [] no      Active Hospital Problems    Diagnosis Date Noted    NSTEMI (non-ST elevated myocardial infarction) ([facility] [address] [I21.4] 02/24/2023     Priority: Medium    Chronic obstructive pulmonary disease ([address] [J44.9] 02/22/2023     Priority: Medium    Personal history of tobacco use, presenting hazards to health [Z87.891] 02/22/2023     Priority: Medium    Pleural effusion, bilateral [J90] 02/22/2023     Priority: Medium    Mediastinal lymphadenopathy [R59.0] 02/22/2023     Priority: Medium    Elevated troponin [R77.8] 02/21/2023     Priority: Medium       Electronically signed by Jovanny Villalobos PA-C on 2/24/2023 at 3:04 PM

## 2023-02-24 NOTE — PROCEDURES
[address] [place] [address]                            CARDIAC CATHETERIZATION    PATIENT NAME: Maryjane Baer                    :        1940  MED REC NO:   [id number]                           ROOM:       0009  ACCOUNT NO:   [de-identified]                           ADMIT DATE: 2023  PROVIDER:     Apolinar Leahy MD    DATE OF PROCEDURE:  2023    INDICATION:  NSTEMI. DESCRIPTION OF PROCEDURE:  After informed consent was obtained from the  patient, the patient was brought to cardiac catheterization laboratory  and prepped in sterile fashion. Right radial artery was chosen as the  primary point of access. Preprocedure time-out was completed. After  infiltration of the right wrist with 2% lidocaine using micropuncture  and modified Seldinger technique under fluoroscopic guidance and  ultrasound guidance, I was able to insert a 6-[place] Slender sheath in  the right radial artery. Standard antispasmodic/antithrombotic cocktail  was given intra-arterially. We then performed diagnostic coronary  angiography using 5-E[place]an JL3.5 and 5-Erit[place]an JR4 catheters. CORONARY ANGIOGRAM:  LEFT MAIN:  Distal left main has severe calcific stenosis about 90%. Proximal LAD 90% stenosis. Small diagonal one, diagonal two, and  diagonal three branches without any significant obstruction. LCX:  Proximal OM that is slightly retroflexed has 50% stenosis  proximally. OM-2 has about 60% to 70% stenosis. RCA:  Diffusely calcified with about 80% stenosis. Bifurcates into the  PDA and the PLB. The ostium of the PLB has about a 70% stenosis. The  ostium of the PDA has about a 60% to 70% stenosis. RCA is dominant. LV:  LVEF 55% to 60%. Aortic valve is tricuspid. No significant  aneurysm or dissection in the visualized portions of the aorta. No  significant mitral regurgitation. LVEDP is 35.     PERIPHERAL ANGIOGRAM:  Abdominal aorta: No significant aneurysm or dissection noted. Renal arteries:  Difficult to opacify but appeared to have patency. The  ostium of the right renal artery may have about 80% stenosis. Left  renal artery is difficult to evaluate, but again may also have about a  50% to 60% stenosis. Common iliac arteries:  Bilaterally patent. External iliac arteries:  Bilaterally patent. Common femoral arteries:  Bilaterally patent. IMMEDIATE COMPLICATIONS:  None. MEDICATIONS:  See EMR. ACCESS:  Vasc Band used for hemostasis. ESTIMATED BLOOD LOSS:  Less than 50 mL. SUMMARY:  Severe multi-vessel CAD with heavy calcification of left main  stenosis with preserved LV ejection fraction and patent aortoiliac  system. PLAN:  1. Bedrest.  2.  Optimal medical therapy. 3.  Risk factor management. 4.  Routine access site care. 5.  Avoid DAPT. 6.  IV heparin. 7.  IV nitroglycerin. 8.  If he has any chest pain or discomfort, we can consider for balloon  pump. 9.  CT Surgery consultation for CABG. If he is not a candidate, we can  consider high-risk PCI with rotational atherectomy and hemodynamic support. All the above was explained to the patient and the patient's family. They were agreeable and amenable to the above plan.         Griselda Agustin MD    D: 02/24/2023 7:49:58       T: 02/24/2023 9:07:57     [email or web address]  Job#: [id number]     Doc#: [id number]    CC:

## 2023-02-25 LAB
ANION GAP SERPL CALC-SCNC: 12 MEQ/L (ref 8–16)
APTT PPP: 159.3 SECONDS (ref 22–38)
APTT PPP: 48.4 SECONDS (ref 22–38)
APTT PPP: 50.2 SECONDS (ref 22–38)
BUN SERPL-MCNC: 8 MG/DL (ref 7–22)
CALCIUM SERPL-MCNC: 8.1 MG/DL (ref 8.5–10.5)
CHLORIDE SERPL-SCNC: 106 MEQ/L (ref 98–111)
CO2 SERPL-SCNC: 23 MEQ/L (ref 23–33)
CREAT SERPL-MCNC: 0.6 MG/DL (ref 0.4–1.2)
DEPRECATED RDW RBC AUTO: 46.1 FL (ref 35–45)
ERYTHROCYTE [DISTWIDTH] IN BLOOD BY AUTOMATED COUNT: 11.9 % (ref 11.5–14.5)
GFR SERPL CREATININE-BSD FRML MDRD: > 60 ML/MIN/1.73M2
GLUCOSE SERPL-MCNC: 98 MG/DL (ref 70–108)
HCT VFR BLD AUTO: 36 % (ref 42–52)
HEPARIN UNFRACTIONATED: 0.15 U/ML (ref 0.3–0.7)
HGB BLD-MCNC: 11.7 GM/DL (ref 14–18)
MCH RBC QN AUTO: 34.1 PG (ref 26–33)
MCHC RBC AUTO-ENTMCNC: 32.5 GM/DL (ref 32.2–35.5)
MCV RBC AUTO: 105 FL (ref 80–94)
PLATELET # BLD AUTO: 248 THOU/MM3 (ref 130–400)
PMV BLD AUTO: 9.7 FL (ref 9.4–12.4)
POTASSIUM SERPL-SCNC: 3.6 MEQ/L (ref 3.5–5.2)
RBC # BLD AUTO: 3.43 MILL/MM3 (ref 4.7–6.1)
SODIUM SERPL-SCNC: 141 MEQ/L (ref 135–145)
WBC # BLD AUTO: 4.6 THOU/MM3 (ref 4.8–10.8)

## 2023-02-25 PROCEDURE — 85027 COMPLETE CBC AUTOMATED: CPT

## 2023-02-25 PROCEDURE — 99232 SBSQ HOSP IP/OBS MODERATE 35: CPT | Performed by: PHYSICIAN ASSISTANT

## 2023-02-25 PROCEDURE — 80048 BASIC METABOLIC PNL TOTAL CA: CPT

## 2023-02-25 PROCEDURE — 6370000000 HC RX 637 (ALT 250 FOR IP)

## 2023-02-25 PROCEDURE — 2580000003 HC RX 258: Performed by: INTERNAL MEDICINE

## 2023-02-25 PROCEDURE — 36415 COLL VENOUS BLD VENIPUNCTURE: CPT

## 2023-02-25 PROCEDURE — 1200000000 HC SEMI PRIVATE

## 2023-02-25 PROCEDURE — 85520 HEPARIN ASSAY: CPT

## 2023-02-25 PROCEDURE — 6360000002 HC RX W HCPCS: Performed by: STUDENT IN AN ORGANIZED HEALTH CARE EDUCATION/TRAINING PROGRAM

## 2023-02-25 PROCEDURE — 85730 THROMBOPLASTIN TIME PARTIAL: CPT

## 2023-02-25 PROCEDURE — 2580000003 HC RX 258

## 2023-02-25 PROCEDURE — 6360000002 HC RX W HCPCS

## 2023-02-25 RX ADMIN — LEVOTHYROXINE SODIUM 50 MCG: 0.05 TABLET ORAL at 06:22

## 2023-02-25 RX ADMIN — Medication 1 TABLET: at 08:50

## 2023-02-25 RX ADMIN — CARBAMAZEPINE 600 MG: 200 TABLET, EXTENDED RELEASE ORAL at 21:11

## 2023-02-25 RX ADMIN — SODIUM CHLORIDE: 9 INJECTION, SOLUTION INTRAVENOUS at 08:14

## 2023-02-25 RX ADMIN — ASPIRIN 81 MG 81 MG: 81 TABLET ORAL at 08:50

## 2023-02-25 RX ADMIN — HEPARIN SODIUM 2000 UNITS: 1000 INJECTION INTRAVENOUS; SUBCUTANEOUS at 20:32

## 2023-02-25 RX ADMIN — AZITHROMYCIN DIHYDRATE 500 MG: 500 INJECTION, POWDER, LYOPHILIZED, FOR SOLUTION INTRAVENOUS at 16:25

## 2023-02-25 RX ADMIN — CEFTRIAXONE SODIUM 1000 MG: 1 INJECTION, POWDER, FOR SOLUTION INTRAMUSCULAR; INTRAVENOUS at 15:46

## 2023-02-25 RX ADMIN — ESCITALOPRAM 20 MG: 20 TABLET, FILM COATED ORAL at 08:50

## 2023-02-25 RX ADMIN — HEPARIN SODIUM AND DEXTROSE 13 UNITS/KG/HR: 10000; 5 INJECTION INTRAVENOUS at 01:53

## 2023-02-25 RX ADMIN — SODIUM CHLORIDE, PRESERVATIVE FREE 10 ML: 5 INJECTION INTRAVENOUS at 20:41

## 2023-02-25 RX ADMIN — RISPERIDONE 0.5 MG: 0.25 TABLET, FILM COATED ORAL at 08:50

## 2023-02-25 RX ADMIN — SODIUM CHLORIDE, PRESERVATIVE FREE 10 ML: 5 INJECTION INTRAVENOUS at 08:50

## 2023-02-25 RX ADMIN — ATORVASTATIN CALCIUM 40 MG: 40 TABLET, FILM COATED ORAL at 08:50

## 2023-02-25 RX ADMIN — HEPARIN SODIUM 2000 UNITS: 1000 INJECTION INTRAVENOUS; SUBCUTANEOUS at 01:51

## 2023-02-25 RX ADMIN — CARBAMAZEPINE 600 MG: 200 TABLET, EXTENDED RELEASE ORAL at 08:50

## 2023-02-25 RX ADMIN — RISPERIDONE 0.5 MG: 0.25 TABLET, FILM COATED ORAL at 20:41

## 2023-02-25 ASSESSMENT — PAIN SCALES - GENERAL: PAINLEVEL_OUTOF10: 4

## 2023-02-25 NOTE — ACP (ADVANCE CARE PLANNING)
Advance Care Planning     Advance Care Planning Inpatient Note  [facility]    Today's Date: 2023  Unit: [facility] SURG 8A    Received request from IDT Member. Per chart, patient has diminished capacity. Phone call made to patient's daughter, Ange Liao  Goals of ACP Conversation:  Review presented documents. Health Care Decision Makers:       Primary Decision Maker: Gala Campoverdeyl Next of Kin - Child - [phone]  Summary:  Documented next of kin. Jacinto Cruz is the only child of patient. Advance Care Planning Documents (Patient Wishes):  None     Assessment:  Received consult that POA paperwork was in patient's chart. Retrieved document and reviewed it. The POA presented was completed 2022 ,is financial and does not cover health care concerns. It was prepared by an . Contacted Jacinto Cruz and asked if there was a separate Healthcare POA. She said she does not have one. Suggested she contact the  to see if one was prepared at the same time. Jacinto Cruz s caregiver for patient as well as for a daughter with significant special needs. Patient's wife and Consuelo's  are both . Jacinto Cruz is quite emotional about her father's pending \"open heart surgery. \"     Outcomes/Plan:  - Follow up with family for support.      Electronically signed by Marcella Guadalupe on 2023 at 8:54 PM

## 2023-02-25 NOTE — PROGRESS NOTES
Hospitalist Progress Note    Patient:  Yesenia Bliss    Unit/Bed:8A-09/009-A  YOB: 1940  MRN: [id number]   Acct: [de-identified]   PCP: Ayla Mohan MD  Code Status: Full Code  Date of Admission: 2/21/2023    Expected Discharge: 3/1? Disposition: Plan for CABG 2/28    Assessment/Plan:    NSTEMI: Coronary angiography on 2/23/2024 demonstrated three-vessel disease with severe calcification of the left main artery, preserved ejection fraction, and patent aorto iliacs. Cardiology recommended CTS versus high risk PCI. Continue heparin drip, ASA. Hold plavix. CT surgery following, plan for CABG on 2/28 after Plavix washout. Bilateral pleural effusion, R>L: Pulmonary following. Unclear etiology, differential includes malignancy, infection, CHF. Patient is stable on room air, no plan for thoracentesis at this time, needs optimization of cardiac status prior. Plan to repeat imaging as outpatient. CAP: Rocephin and azithromycin started 2/21/2023 will be maintained through 2/26/2023. Carotid stenosis: preop carotid doppler reports >70% stenosis of right ICA. Hx Epilepsy: Tegretol 600 mg twice daily     Acquired hypothyroidism: on Synthroid 50 mcg daily    Mild, acute macrocytic anemia: Mild, no signs of acute bleeding. Anemia work-up unimpressive. RML lung nodule: CT reports 4 mm nodule in right anterior and right middle lobe with lymphadenopathy. Pulm following. Planning for repeat CT in 3 months. H/o CVA: With residual cognitive deficits. H/o Leukemia: With remission in 1984. Anxiety/depression: On Lexapro, risperidone. Chief Complaint: Fatigue, SOB    HPI / Hospital Course: Per last progress note:  \"The patient is an 81year old male former smoker with an approximately 20-pack-year history who quit in 1980 with a past medical history of leukemia with remission in 1984, hypothyroidism, \"seizures,\" hyperlipidemia, \"coma,\" and stroke secondary to septic embolus from dental procedure in [address] [place] [address],[facility] who presents the chief complaint of shortness of breath and managing primarily for severe calcification of the left main not amenable to normal stenting and bilateral pleural effusion. Cough and shortness of breath which started on 2/14/2023. Came to the ED on 2/21/2023 because cough came productive of red phlegm. Patient never had chest pain at any point. However, did have shortness of breath. Was appropriately treated with Rocephin, azithromycin, and DuoNebs. Azithromycin and Rocephin started on 2/21/2023 with tentative plan to complete through 2/26/2023. Pulmonology was consulted on 2/21/2023 for management of COPD. Cardiology was consulted for progressively elevating troponins and an EKG that demonstrated old lateral lead ischemic changes and new anterior lead once. Hospitalist group took over care in 2/23/2023 as the family wish to switch from the Dr. Daniel Vázquez group. Echocardiogram on 2/22/2023 demonstrated normal left ventricular size and systolic function, normal ejection fraction 50%, normal left ventricular wall thickness, mild mitral regurgitation, and normal right ventricle. Given elevated troponin T and EKG abnormalities, decision was made to proceed with left heart cath. Left heart cath in 2/23/2023 demonstrated three-vessel disease with severe calcification of the left main artery, preserved ejection fraction, and patent aorto iliacs. Cardiothoracic surgery versus high risk PCI suggested. Dr. Yeni Lea, the cardiothoracic surgeon, discussed the case with Dr. Jada Shaw. Also discussed the risks and benefits of CT surgery with the patient. Dr. Ann Marie Moulton believes that the patient is an appropriate candidate for CT surgery even if he is somewhat higher risk than the norm. Per his report, Dr. Jada Shaw feels that he is not a good candidate for PCI or medical management alone.      In preparation for potential upcoming CT surgery, Plavix was held, prophylactic dose Lovenox was stopped, aspirin was maintained, and patient was started on heparin drip. This was done per instructions from CT surgery. CTA of the chest on 2/22/2023 demonstrated no pulmonary embolism, normal heart size, no pericardial effusion, right paratracheal 12 mm short axis node, left side 11 mm paratracheal node, subcarinal adenopathy measuring 2.8 cm, small bilateral hilar lymph nodes, no axilla lymphadenopathy, multifocal scattered hazy groundglass opacities present in both lungs, 4 mm nodule in the anterior right middle lobe, bilateral pleural effusions right greater than left, bibasilar bronchiectasis, compressive atelectasis of the right base, no suspicious bony lesions, and no suspicious findings in the imaged aspect of the upper abdomen; overall, no PE, multifocal groundglass infiltrates, bilateral pleural effusions, and mediastinal and subcarinal adenopathy. \"    Subjective (past 24 hours): Patient is doing well. He denies fever/chills, SOB, CP, abd pain, nvd, le edema. CABG Tuesday       ROS: Pertinent positives as noted in HPI. All other systems reviewed and negative. Past medical history, family history, social history and allergies reviewed again and is unchanged since admission. Medications:  Reviewed.   Infusion Medications    sodium chloride      sodium chloride 75 mL/hr at 02/25/23 0814    sodium chloride      heparin (PORCINE) Infusion 9 Units/kg/hr (02/25/23 1020)    sodium chloride       Scheduled Medications    sodium chloride flush  5-40 mL IntraVENous 2 times per day    sodium chloride flush  5-40 mL IntraVENous 2 times per day    sodium chloride flush  5-40 mL IntraVENous 2 times per day    aspirin  81 mg Oral Daily    cefTRIAXone (ROCEPHIN) IV  1,000 mg IntraVENous Q24H    azithromycin  500 mg IntraVENous Q24H    atorvastatin  40 mg Oral Daily    carBAMazepine  600 mg Oral BID    escitalopram  20 mg Oral Daily    levothyroxine  50 mcg Oral Daily risperiDONE  0.5 mg Oral BID    multivitamin  1 tablet Oral Daily     PRN Meds: sodium chloride flush, sodium chloride, sodium chloride flush, sodium chloride, heparin (porcine), heparin (porcine), albuterol **AND** ipratropium, sodium chloride flush, sodium chloride, ondansetron **OR** ondansetron, acetaminophen **OR** acetaminophen, polyethylene glycol, perflutren lipid microspheres    I/O:     Intake/Output Summary (Last 24 hours) at 2/25/2023 1527  Last data filed at 2/25/2023 1414  Gross per 24 hour   Intake 1630 ml   Output 350 ml   Net 1280 ml         Diet:  ADULT DIET; Regular  ADULT ORAL NUTRITION SUPPLEMENT; Breakfast, Lunch, Dinner; Other Oral Supplement; send hot decaf beverage TID  ADULT ORAL NUTRITION SUPPLEMENT; Breakfast; Other Oral Supplement; send activia at breakfast daily    Exam:  /66   Pulse 70   Temp 98.1 °F (36.7 °C) (Oral)   Resp 16   Ht 5' 9\" (1.753 m)   Wt 201 lb 12.8 oz (91.5 kg)   SpO2 95%   BMI 29.80 kg/m²   General:   Pleasant male. NAD  HEENT:  normocephalic and atraumatic. No scleral icterus. PERR. Neck: supple. No JVD. No thyromegaly. Lungs: clear to auscultation. No retractions  Cardiac: RRR without murmur. Abdomen: soft. Nontender. Bowel sounds positive. Extremities:  No clubbing, cyanosis, or edema x 4. Vasculature: capillary refill < 3 seconds. Palpable LE pulses bilaterally. Skin:  warm and dry. Psych:  Alert and oriented x3. Affect appropriate  Lymph:  No supraclavicular adenopathy. Neurologic:  No focal deficit. No seizures.       Data: (All radiographs, tracings, PFTs, and imaging are personally viewed and interpreted unless otherwise noted)  Labs:   Recent Labs     02/23/23  1457 02/24/23  0634 02/25/23  0542   WBC 4.5* 4.7* 4.6*   HGB 12.6* 12.2* 11.7*   HCT 38.5* 37.8* 36.0*    246 248       Recent Labs     02/23/23  0600 02/24/23  0634 02/25/23  0542    143 141   K 3.9 4.2 3.6    109 106   CO2 22* 20* 23   BUN 13 8 8 CREATININE 0.7 0.8 0.6   CALCIUM 8.2* 8.0* 8.1*   PHOS 3.7  --   --        No results for input(s): AST, ALT, BILIDIR, BILITOT, ALKPHOS in the last 72 hours. Recent Labs     02/23/23  1457   INR 1.09       No results for input(s): Lattie Thierry in the last 72 hours. Urinalysis:   Lab Results   Component Value Date/Time    NITRU NEGATIVE 01/15/2017 08:16 PM    WBCUA 0-2 01/15/2017 08:16 PM    BACTERIA NONE 01/15/2017 08:16 PM    RBCUA 0-2 01/15/2017 08:16 PM    BLOODU NEGATIVE 01/15/2017 08:16 PM    GLUCOSEU NEGATIVE 01/15/2017 08:16 PM     Urine culture: No results found for: LABURIN  Micro:   Blood culture #1:   Lab Results   Component Value Date/Time    BC No growth-preliminary  No growth   01/15/2017 09:18 PM     Blood culture #2:No results found for: [place] [place]  Organism:No results found for: ORG    No results found for: LABGRAM  MRSA culture only:No results found for: [facility]  Respiratory culture: No results found for: CULTRESP  Aerobic and Anaerobic :  No results found for: LABAERO  No results found for: [facility]    Radiology Reports:  VL PRE OP VEIN MAPPING   Final Result   Status post vein mapping procedure. **This report has been created using voice recognition software. It may contain minor errors which are inherent in voice recognition technology. **      Final report electronically signed by Dr Johnson Walsh on 2/24/2023 5:32 PM      VL DUP CAROTID BILATERAL   Final Result      1. Large amount of atherosclerosis in the right carotid bulb resulting in greater than 70% stenosis of the internal carotid artery. 2. Moderate atherosclerosis in the left carotid bulb resulting in less than 50% stenosis of the internal carotid artery. 3. Alternating retrograde and antegrade flow in the right vertebral artery suspicious for subclavian steal phenomenon. **This report has been created using voice recognition software.  It may contain minor errors which are inherent in voice recognition technology. **      Final report electronically signed by Dr Porter Hairston on 2/24/2023 6:10 PM      CTA CHEST [address]   Final Result    There is no PE. There are multifocal groundglass infiltrates. There are bilateral pleural effusions. There is mediastinal and subcarinal adenopathy. **This report has been created using voice recognition software. It may contain minor errors which are inherent in voice recognition technology. **      Final report electronically signed by Dr. Naila Ring on 2/22/2023 2:05 PM      XR CHEST (2 VW)   Final Result   1. No interval change since previous study dated 1/15/2017.   2. Diffuse COPD and thickening off the interstitial lung markings. 3. Borderline cardiomegaly. 4. Thoracic spondylosis. Lucyann Push **This report has been created using voice recognition software. It may contain minor errors which are inherent in voice recognition technology. **      Final report electronically signed by DR Jethro Frederick on 2/21/2023 9:37 AM      CT CHEST W CONTRAST    (Results Pending)     XR CHEST (2 VW)    Result Date: 2/21/2023  PROCEDURE: XR CHEST (2 VW) CLINICAL INFORMATION: fatigue sob cough. COMPARISON: Plain radiographs dated 1/15/2017. Lucyann Push TECHNIQUE: PA and lateral views the chest. FINDINGS: There is borderline cardiomegaly. .  The mediastinum is not widened. There is diffuse COPD and thickening of the interstitial lung markings. There are no new pulmonary infiltrates or effusions. The pulmonary vascularity is normal. There is thoracic spondylosis. 1. No interval change since previous study dated 1/15/2017. 2. Diffuse COPD and thickening off the interstitial lung markings. 3. Borderline cardiomegaly. 4. Thoracic spondylosis. Lucyann Push **This report has been created using voice recognition software. It may contain minor errors which are inherent in voice recognition technology. ** Final report electronically signed by DR Jethro Frederick on 2/21/2023 9:37 AM    CTA CHEST W WO CONTRAST    Result Date: 2/22/2023  PROCEDURE: CTA CHEST W WO CONTRAST CLINICAL INFORMATION: DYSPNEA, ELEVATED D-DIMER. COMPARISON: No prior study. TECHNIQUE: 3 mm axial images were obtained through the chest after the administration of IV contrast.  A non-contrast localizer was obtained. 3D reconstructions were performed on the scanner to include MIP coronal and sagittal images through the chest. Isovue was the intravenous contrast utilized. All CT scans at this facility use dose modulation, iterative reconstruction, and/or weight-based dosing when appropriate to reduce radiation dose to as low as reasonably achievable. FINDINGS: There is adequate opacification of the pulmonary arterial system. No pulmonary emboli are present. The aorta is within acceptable limits. The heart size is normal. There is no pericardial effusion  there is a right paratracheal 12 mm short axis node. There is a left paratracheal 11 mm node. There is subcarinal adenopathy measuring 2.8 cm. There are small bilateral hilar lymph nodes. There is no axillary lymphadenopathy. Multifocal scattered hazy groundglass opacities are present in both lungs. 4 mm nodule anterior right middle lobe. Image 1:15 series 4 Bilateral pleural effusions right greater than left. Bibasilar bronchiectasis. Compressive atelectasis right right base No suspicious osseous lesions are present. There are no suspicious findings in the imaged aspects of the upper abdomen. There is no PE. There are multifocal groundglass infiltrates. There are bilateral pleural effusions. There is mediastinal and subcarinal adenopathy. **This report has been created using voice recognition software. It may contain minor errors which are inherent in voice recognition technology. ** Final report electronically signed by Dr. Deborah Hunter on 2/22/2023 2:05 PM        Tele:   [] yes             [] no      Active Hospital Problems    Diagnosis Date Noted    NSTEMI (non-ST elevated myocardial infarction) (HCC) [I21.4] 02/24/2023     Priority: Medium    Chronic obstructive pulmonary disease (HCC) [J44.9] 02/22/2023     Priority: Medium    Personal history of tobacco use, presenting hazards to health [Z87.891] 02/22/2023     Priority: Medium    Pleural effusion, bilateral [J90] 02/22/2023     Priority: Medium    Mediastinal lymphadenopathy [R59.0] 02/22/2023     Priority: Medium    Elevated troponin level [R77.8] 02/21/2023     Priority: Medium       Electronically signed by Myriam Garnica PA-C on 2/25/2023 at 3:27 PM

## 2023-02-25 NOTE — PROGRESS NOTES
CT/CV Surgery Progress Note    2023 9:08 AM  Surgeon:  Dr. Gilberto Whitman:  Mr. Deepti Cabrera is resting comfortably in bed, alert, and in no acute distress. Pt denies chest pressure, SOB, fever,chills, N/V/D. IV heparin drip running. Vital Signs: /60   Pulse 68   Temp 98.1 °F (36.7 °C) (Oral)   Resp 16   Ht 5' 9\" (1.753 m)   Wt 201 lb 12.8 oz (91.5 kg)   SpO2 95%   BMI 29.80 kg/m²    Temp (24hrs), Av.8 °F (36.6 °C), Min:97.5 °F (36.4 °C), Max:98.2 °F (36.8 °C)       Labs:   CBC:  Recent Labs     23  1457 23  2256 23  0634 23  1221 23  1748 23  2356 23  0542 23  0707   WBC 4.5*  --  4.7*  --   --   --  4.6*  --    HGB 12.6*  --  12.2*  --   --   --  11.7*  --    HCT 38.5*  --  37.8*  --   --   --  36.0*  --    .8*  --  106.2*  --   --   --  105.0*  --      --  246  --   --   --  248  --    APTT 53.7*   < > 87.8*   < > 81.7* 48.4*  --  159.3*   INR 1.09  --   --   --   --   --   --   --     < > = values in this interval not displayed.        BMP:   Recent Labs     23  0600 23  0634 23  0542    143 141   K 3.9 4.2 3.6    109 106   CO2 22* 20* 23   PHOS 3.7  --   --    BUN 13 8 8   CREATININE 0.7 0.8 0.6   MG 2.2  --   --        Last HgA1C:   Lab Results   Component Value Date    LABA1C 5.5 2020       Imaging:  CXR: 2023      Intake/Output Summary (Last 24 hours) at 2023 0908  Last data filed at 2023 1827  Gross per 24 hour   Intake 1850 ml   Output --   Net 1850 ml         Scheduled Meds:    sodium chloride flush  5-40 mL IntraVENous 2 times per day    sodium chloride flush  5-40 mL IntraVENous 2 times per day    sodium chloride flush  5-40 mL IntraVENous 2 times per day    aspirin  81 mg Oral Daily    cefTRIAXone (ROCEPHIN) IV  1,000 mg IntraVENous Q24H    azithromycin  500 mg IntraVENous Q24H    atorvastatin  40 mg Oral Daily    carBAMazepine  600 mg Oral BID    escitalopram  20 mg Oral Daily    levothyroxine  50 mcg Oral Daily    risperiDONE  0.5 mg Oral BID    multivitamin  1 tablet Oral Daily       ROS: All neg unless specifically mentioned in subjective section. Exam:  General Appearance: alert ,conversing, in no acute distress  Cardiovascular: normal rate, regular rhythm, normal S1 and S2, no murmurs, rubs, clicks, or gallops  Pulmonary/Chest: clear to auscultation bilaterally- no wheezes, rales or rhonchi, normal air movement, no respiratory distress  Neurological: alert, oriented, normal speech, no focal findings or movement disorder noted  Sternum: Incision healing appropriately and no wound dehiscence noted. 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   Plavix washout  Pt has  a  high grade carotid stenosis this will need to be address        Ahsan Glez MD

## 2023-02-26 LAB
ANION GAP SERPL CALC-SCNC: 13 MEQ/L (ref 8–16)
BUN SERPL-MCNC: 9 MG/DL (ref 7–22)
CALCIUM SERPL-MCNC: 8.3 MG/DL (ref 8.5–10.5)
CHLORIDE SERPL-SCNC: 110 MEQ/L (ref 98–111)
CO2 SERPL-SCNC: 20 MEQ/L (ref 23–33)
CREAT SERPL-MCNC: 0.7 MG/DL (ref 0.4–1.2)
DEPRECATED RDW RBC AUTO: 45.5 FL (ref 35–45)
ERYTHROCYTE [DISTWIDTH] IN BLOOD BY AUTOMATED COUNT: 12 % (ref 11.5–14.5)
GFR SERPL CREATININE-BSD FRML MDRD: > 60 ML/MIN/1.73M2
GLUCOSE SERPL-MCNC: 104 MG/DL (ref 70–108)
HCT VFR BLD AUTO: 36.2 % (ref 42–52)
HEPARIN UNFRACTIONATED: 0.35 U/ML (ref 0.3–0.7)
HEPARIN UNFRACTIONATED: 0.41 U/ML (ref 0.3–0.7)
HGB BLD-MCNC: 11.9 GM/DL (ref 14–18)
MCH RBC QN AUTO: 34.1 PG (ref 26–33)
MCHC RBC AUTO-ENTMCNC: 32.9 GM/DL (ref 32.2–35.5)
MCV RBC AUTO: 103.7 FL (ref 80–94)
PLATELET # BLD AUTO: 246 THOU/MM3 (ref 130–400)
PMV BLD AUTO: 9.7 FL (ref 9.4–12.4)
POTASSIUM SERPL-SCNC: 4 MEQ/L (ref 3.5–5.2)
RBC # BLD AUTO: 3.49 MILL/MM3 (ref 4.7–6.1)
SODIUM SERPL-SCNC: 143 MEQ/L (ref 135–145)
STFR SERPL-MCNC: 3.1 MG/L (ref 2.2–5)
WBC # BLD AUTO: 4.5 THOU/MM3 (ref 4.8–10.8)

## 2023-02-26 PROCEDURE — 80048 BASIC METABOLIC PNL TOTAL CA: CPT

## 2023-02-26 PROCEDURE — 85027 COMPLETE CBC AUTOMATED: CPT

## 2023-02-26 PROCEDURE — 6370000000 HC RX 637 (ALT 250 FOR IP)

## 2023-02-26 PROCEDURE — 2580000003 HC RX 258: Performed by: INTERNAL MEDICINE

## 2023-02-26 PROCEDURE — 85520 HEPARIN ASSAY: CPT

## 2023-02-26 PROCEDURE — 36415 COLL VENOUS BLD VENIPUNCTURE: CPT

## 2023-02-26 PROCEDURE — 6360000002 HC RX W HCPCS: Performed by: STUDENT IN AN ORGANIZED HEALTH CARE EDUCATION/TRAINING PROGRAM

## 2023-02-26 PROCEDURE — 99232 SBSQ HOSP IP/OBS MODERATE 35: CPT | Performed by: PHYSICIAN ASSISTANT

## 2023-02-26 PROCEDURE — 1200000000 HC SEMI PRIVATE

## 2023-02-26 RX ADMIN — SODIUM CHLORIDE, PRESERVATIVE FREE 10 ML: 5 INJECTION INTRAVENOUS at 08:09

## 2023-02-26 RX ADMIN — SODIUM CHLORIDE, PRESERVATIVE FREE 10 ML: 5 INJECTION INTRAVENOUS at 20:21

## 2023-02-26 RX ADMIN — SODIUM CHLORIDE: 9 INJECTION, SOLUTION INTRAVENOUS at 23:15

## 2023-02-26 RX ADMIN — ESCITALOPRAM 20 MG: 20 TABLET, FILM COATED ORAL at 08:09

## 2023-02-26 RX ADMIN — ASPIRIN 81 MG 81 MG: 81 TABLET ORAL at 08:09

## 2023-02-26 RX ADMIN — HEPARIN SODIUM AND DEXTROSE 14.21 UNITS/KG/HR: 10000; 5 INJECTION INTRAVENOUS at 04:40

## 2023-02-26 RX ADMIN — Medication 1 TABLET: at 08:09

## 2023-02-26 RX ADMIN — RISPERIDONE 0.5 MG: 0.25 TABLET, FILM COATED ORAL at 08:09

## 2023-02-26 RX ADMIN — CARBAMAZEPINE 600 MG: 200 TABLET, EXTENDED RELEASE ORAL at 20:20

## 2023-02-26 RX ADMIN — SODIUM CHLORIDE, PRESERVATIVE FREE 10 ML: 5 INJECTION INTRAVENOUS at 20:20

## 2023-02-26 RX ADMIN — ACETAMINOPHEN 650 MG: 325 TABLET ORAL at 20:19

## 2023-02-26 RX ADMIN — HEPARIN SODIUM AND DEXTROSE 13.01 UNITS/KG/HR: 10000; 5 INJECTION INTRAVENOUS at 23:42

## 2023-02-26 RX ADMIN — RISPERIDONE 0.5 MG: 0.25 TABLET, FILM COATED ORAL at 20:20

## 2023-02-26 RX ADMIN — CARBAMAZEPINE 600 MG: 200 TABLET, EXTENDED RELEASE ORAL at 08:08

## 2023-02-26 RX ADMIN — LEVOTHYROXINE SODIUM 50 MCG: 0.05 TABLET ORAL at 08:09

## 2023-02-26 RX ADMIN — ATORVASTATIN CALCIUM 40 MG: 40 TABLET, FILM COATED ORAL at 08:09

## 2023-02-26 ASSESSMENT — PAIN - FUNCTIONAL ASSESSMENT: PAIN_FUNCTIONAL_ASSESSMENT: PREVENTS OR INTERFERES SOME ACTIVE ACTIVITIES AND ADLS

## 2023-02-26 ASSESSMENT — PAIN DESCRIPTION - ONSET: ONSET: GRADUAL

## 2023-02-26 ASSESSMENT — PAIN DESCRIPTION - LOCATION: LOCATION: BACK

## 2023-02-26 ASSESSMENT — PAIN DESCRIPTION - PAIN TYPE: TYPE: CHRONIC PAIN

## 2023-02-26 ASSESSMENT — PAIN DESCRIPTION - FREQUENCY: FREQUENCY: INTERMITTENT

## 2023-02-26 ASSESSMENT — PAIN DESCRIPTION - ORIENTATION: ORIENTATION: LOWER

## 2023-02-26 ASSESSMENT — PAIN SCALES - GENERAL: PAINLEVEL_OUTOF10: 4

## 2023-02-26 ASSESSMENT — PAIN DESCRIPTION - DESCRIPTORS: DESCRIPTORS: ACHING

## 2023-02-26 NOTE — PROGRESS NOTES
Hospitalist Progress Note    Patient:  Nathan Yepez    Unit/Bed:8A-09/009-A  YOB: 1940  MRN: [id number]   Acct: [de-identified]   PCP: Nicola Thornton MD  Code Status: Full Code  Date of Admission: 2/21/2023    Expected Discharge: 3/1? Disposition: Plan for CABG 2/28    Assessment/Plan:    NSTEMI: Coronary angiography on 2/23/2024 demonstrated three-vessel disease with severe calcification of the left main artery, preserved ejection fraction, and patent aorto iliacs. Cardiology recommended CTS versus high risk PCI. Continue heparin drip, ASA. Hold plavix. CT surgery following, plan for CABG on 2/28 after Plavix washout. Bilateral pleural effusion, R>L: Pulmonary following. Unclear etiology, differential includes malignancy, infection, CHF. Patient is stable on room air, no plan for thoracentesis at this time, needs optimization of cardiac status prior. Plan to repeat imaging as outpatient. CAP: Completed 5 day therapy of Rocephin and azithromycin. Carotid stenosis: preop carotid doppler reports >70% stenosis of right ICA. Vascular surgery following, will address tomorrow per note. Hx Epilepsy: Tegretol 600 mg twice daily     Acquired hypothyroidism: on Synthroid 50 mcg daily    Mild, acute macrocytic anemia: Mild, no signs of acute bleeding. Anemia work-up unimpressive. RML lung nodule: CT reports 4 mm nodule in right anterior and right middle lobe with lymphadenopathy. Pulm following. Planning for repeat CT in 3 months. H/o CVA: With residual cognitive deficits. H/o Leukemia: With remission in 1984. Anxiety/depression: On Lexapro, risperidone. Chief Complaint: Fatigue, SOB    HPI / Hospital Course: Per last progress note:  \"The patient is an 81year old male former smoker with an approximately 20-pack-year history who quit in 1980 with a past medical history of leukemia with remission in 1984, hypothyroidism, \"seizures,\" hyperlipidemia, \"coma,\" and stroke secondary to septic embolus from dental procedure in 1995 who presents the chief complaint of shortness of breath and managing primarily for severe calcification of the left main not amenable to normal stenting and bilateral pleural effusion. Cough and shortness of breath which started on 2/14/2023. Came to the ED on 2/21/2023 because cough came productive of red phlegm. Patient never had chest pain at any point. However, did have shortness of breath. Was appropriately treated with Rocephin, azithromycin, and DuoNebs. Azithromycin and Rocephin started on 2/21/2023 with tentative plan to complete through 2/26/2023. Pulmonology was consulted on 2/21/2023 for management of COPD. Cardiology was consulted for progressively elevating troponins and an EKG that demonstrated old lateral lead ischemic changes and new anterior lead once. Hospitalist group took over care in 2/23/2023 as the family wish to switch from the Dr. Melissa King group. Echocardiogram on 2/22/2023 demonstrated normal left ventricular size and systolic function, normal ejection fraction 50%, normal left ventricular wall thickness, mild mitral regurgitation, and normal right ventricle. Given elevated troponin T and EKG abnormalities, decision was made to proceed with left heart cath. Left heart cath in 2/23/2023 demonstrated three-vessel disease with severe calcification of the left main artery, preserved ejection fraction, and patent aorto iliacs. Cardiothoracic surgery versus high risk PCI suggested. Dr. Nida Thomas, the cardiothoracic surgeon, discussed the case with Dr. Camron Coombs. Also discussed the risks and benefits of CT surgery with the patient. Dr. Opal Fernandez believes that the patient is an appropriate candidate for CT surgery even if he is somewhat higher risk than the norm. Per his report, Dr. Camron Coombs feels that he is not a good candidate for PCI or medical management alone.      In preparation for potential upcoming CT surgery, Plavix was held, prophylactic dose Lovenox was stopped, aspirin was maintained, and patient was started on heparin drip. This was done per instructions from CT surgery. CTA of the chest on 2/22/2023 demonstrated no pulmonary embolism, normal heart size, no pericardial effusion, right paratracheal 12 mm short axis node, left side 11 mm paratracheal node, subcarinal adenopathy measuring 2.8 cm, small bilateral hilar lymph nodes, no axilla lymphadenopathy, multifocal scattered hazy groundglass opacities present in both lungs, 4 mm nodule in the anterior right middle lobe, bilateral pleural effusions right greater than left, bibasilar bronchiectasis, compressive atelectasis of the right base, no suspicious bony lesions, and no suspicious findings in the imaged aspect of the upper abdomen; overall, no PE, multifocal groundglass infiltrates, bilateral pleural effusions, and mediastinal and subcarinal adenopathy. \"    Subjective (past 24 hours): Pt doing well. No fever/chills, sob, cp, abd pain, nvd. ABX therapy complete. Family asked about previous echo and ekg results, questions answered. ROS: Pertinent positives as noted in HPI. All other systems reviewed and negative. Past medical history, family history, social history and allergies reviewed again and is unchanged since admission. Medications:  Reviewed.   Infusion Medications    sodium chloride      sodium chloride 75 mL/hr at 02/25/23 0814    sodium chloride      heparin (PORCINE) Infusion 13 Units/kg/hr (02/26/23 0932)    sodium chloride       Scheduled Medications    sodium chloride flush  5-40 mL IntraVENous 2 times per day    sodium chloride flush  5-40 mL IntraVENous 2 times per day    sodium chloride flush  5-40 mL IntraVENous 2 times per day    aspirin  81 mg Oral Daily    atorvastatin  40 mg Oral Daily    carBAMazepine  600 mg Oral BID    escitalopram  20 mg Oral Daily    levothyroxine  50 mcg Oral Daily risperiDONE  0.5 mg Oral BID    multivitamin  1 tablet Oral Daily     PRN Meds: sodium chloride flush, sodium chloride, sodium chloride flush, sodium chloride, heparin (porcine), heparin (porcine), albuterol **AND** ipratropium, sodium chloride flush, sodium chloride, ondansetron **OR** ondansetron, acetaminophen **OR** acetaminophen, polyethylene glycol, perflutren lipid microspheres    I/O:     Intake/Output Summary (Last 24 hours) at 2/26/2023 1018  Last data filed at 2/25/2023 2041  Gross per 24 hour   Intake 370 ml   Output 350 ml   Net 20 ml         Diet:  ADULT DIET; Regular  ADULT ORAL NUTRITION SUPPLEMENT; Breakfast, Lunch, Dinner; Other Oral Supplement; send hot decaf beverage TID  ADULT ORAL NUTRITION SUPPLEMENT; Breakfast; Other Oral Supplement; send activia at breakfast daily    Exam:  /75   Pulse 66   Temp 97.9 °F (36.6 °C) (Oral)   Resp 18   Ht 5' 9\" (1.753 m)   Wt 210 lb 12.8 oz (95.6 kg)   SpO2 94%   BMI 31.13 kg/m²   General:   Pleasant male. NAD  HEENT:  normocephalic and atraumatic. No scleral icterus. PERR. Neck: supple. No JVD. No thyromegaly. Lungs: clear to auscultation. No retractions  Cardiac: RRR without murmur. Abdomen: soft. Nontender. Bowel sounds positive. Extremities:  No clubbing, cyanosis, or edema x 4. Vasculature: capillary refill < 3 seconds. Palpable LE pulses bilaterally. Skin:  warm and dry. Psych:  Alert and oriented x3. Affect appropriate  Lymph:  No supraclavicular adenopathy. Neurologic:  No focal deficit. No seizures.       Data: (All radiographs, tracings, PFTs, and imaging are personally viewed and interpreted unless otherwise noted)  Labs:   Recent Labs     02/24/23  0634 02/25/23  0542 02/26/23  0706   WBC 4.7* 4.6* 4.5*   HGB 12.2* 11.7* 11.9*   HCT 37.8* 36.0* 36.2*    248 246       Recent Labs     02/24/23  0634 02/25/23  0542 02/26/23  0706    141 143   K 4.2 3.6 4.0    106 110   CO2 20* 23 20*   BUN 8 8 9 CREATININE 0.8 0.6 0.7   CALCIUM 8.0* 8.1* 8.3*       No results for input(s): AST, ALT, BILIDIR, BILITOT, ALKPHOS in the last 72 hours. Recent Labs     02/23/23  1457   INR 1.09       No results for input(s): [place] Álvarez in the last 72 hours. Urinalysis:   Lab Results   Component Value Date/Time    NITRU NEGATIVE 01/15/2017 08:16 PM    WBCUA 0-2 01/15/2017 08:16 PM    BACTERIA NONE 01/15/2017 08:16 PM    RBCUA 0-2 01/15/2017 08:16 PM    BLOODU NEGATIVE 01/15/2017 08:16 PM    GLUCOSEU NEGATIVE 01/15/2017 08:16 PM     Urine culture: No results found for: LABURIN  Micro:   Blood culture #1:   Lab Results   Component Value Date/Time    BC No growth-preliminary  No growth   01/15/2017 09:18 PM     Blood culture #2:No results found for: Alma Flaming  Organism:No results found for: ORG    No results found for: LABGRAM  MRSA culture only:No results found for: [facility]  Respiratory culture: No results found for: CULTRESP  Aerobic and Anaerobic :  No results found for: LABAERO  No results found for: [facility]    Radiology Reports:  VL PRE OP VEIN MAPPING   Final Result   Status post vein mapping procedure. **This report has been created using voice recognition software. It may contain minor errors which are inherent in voice recognition technology. **      Final report electronically signed by Dr Dereck Reyes on 2/24/2023 5:32 PM      VL DUP CAROTID BILATERAL   Final Result      1. Large amount of atherosclerosis in the right carotid bulb resulting in greater than 70% stenosis of the internal carotid artery. 2. Moderate atherosclerosis in the left carotid bulb resulting in less than 50% stenosis of the internal carotid artery. 3. Alternating retrograde and antegrade flow in the right vertebral artery suspicious for subclavian steal phenomenon. **This report has been created using voice recognition software. It may contain minor errors which are inherent in voice recognition technology. ** Final report electronically signed by Dr Sebastian Figueroa on 2/24/2023 6:10 PM      CTA CHEST [address]   Final Result    There is no PE. There are multifocal groundglass infiltrates. There are bilateral pleural effusions. There is mediastinal and subcarinal adenopathy. **This report has been created using voice recognition software. It may contain minor errors which are inherent in voice recognition technology. **      Final report electronically signed by Dr. Clay Bloom on 2/22/2023 2:05 PM      XR CHEST (2 VW)   Final Result   1. No interval change since previous study dated 1/15/2017.   2. Diffuse COPD and thickening off the interstitial lung markings. 3. Borderline cardiomegaly. 4. Thoracic spondylosis. [facility] **This report has been created using voice recognition software. It may contain minor errors which are inherent in voice recognition technology. **      Final report electronically signed by DR Sanya Christiansen on 2/21/2023 9:37 AM      CT CHEST W CONTRAST    (Results Pending)     XR CHEST (2 VW)    Result Date: 2/21/2023  PROCEDURE: XR CHEST (2 VW) CLINICAL INFORMATION: fatigue sob cough. COMPARISON: Plain radiographs dated 1/15/2017. [facility] TECHNIQUE: PA and lateral views the chest. FINDINGS: There is borderline cardiomegaly. .  The mediastinum is not widened. There is diffuse COPD and thickening of the interstitial lung markings. There are no new pulmonary infiltrates or effusions. The pulmonary vascularity is normal. There is thoracic spondylosis. 1. No interval change since previous study dated 1/15/2017. 2. Diffuse COPD and thickening off the interstitial lung markings. 3. Borderline cardiomegaly. 4. Thoracic spondylosis. [facility] **This report has been created using voice recognition software. It may contain minor errors which are inherent in voice recognition technology. ** Final report electronically signed by DR Sanya Christiansen on 2/21/2023 9:37 AM    CTA CHEST W WO CONTRAST    Result Date: 2/22/2023  PROCEDURE: CTA CHEST W WO CONTRAST CLINICAL INFORMATION: DYSPNEA, ELEVATED D-DIMER. COMPARISON: No prior study. TECHNIQUE: 3 mm axial images were obtained through the chest after the administration of IV contrast.  A non-contrast localizer was obtained. 3D reconstructions were performed on the scanner to include MIP coronal and sagittal images through the chest. Isovue was the intravenous contrast utilized. All CT scans at this facility use dose modulation, iterative reconstruction, and/or weight-based dosing when appropriate to reduce radiation dose to as low as reasonably achievable. FINDINGS: There is adequate opacification of the pulmonary arterial system. No pulmonary emboli are present. The aorta is within acceptable limits. The heart size is normal. There is no pericardial effusion  there is a right paratracheal 12 mm short axis node. There is a left paratracheal 11 mm node. There is subcarinal adenopathy measuring 2.8 cm. There are small bilateral hilar lymph nodes. There is no axillary lymphadenopathy. Multifocal scattered hazy groundglass opacities are present in both lungs. 4 mm nodule anterior right middle lobe. Image 1:15 series 4 Bilateral pleural effusions right greater than left. Bibasilar bronchiectasis. Compressive atelectasis right right base No suspicious osseous lesions are present. There are no suspicious findings in the imaged aspects of the upper abdomen. There is no PE. There are multifocal groundglass infiltrates. There are bilateral pleural effusions. There is mediastinal and subcarinal adenopathy. **This report has been created using voice recognition software. It may contain minor errors which are inherent in voice recognition technology. ** Final report electronically signed by Dr. Merline Spoon on 2/22/2023 2:05 PM        Tele:   [] yes             [] no      Active Hospital Problems    Diagnosis Date Noted    NSTEMI (non-ST elevated myocardial infarction) ([facility] [place] [facility] [I21.4] 02/24/2023     Priority: Medium    Chronic obstructive pulmonary disease ([facility] [place] [facility] [J44.9] 02/22/2023     Priority: Medium    Personal history of tobacco use, presenting hazards to health [Z87.891] 02/22/2023     Priority: Medium    Pleural effusion, bilateral [J90] 02/22/2023     Priority: Medium    Mediastinal lymphadenopathy [R59.0] 02/22/2023     Priority: Medium    Elevated troponin level [R77.8] 02/21/2023     Priority: Medium       Electronically signed by Althea Perez PA-C on 2/26/2023 at 10:18 AM

## 2023-02-26 NOTE — PROGRESS NOTES
CT/CV Surgery Progress Note    2023 9:53 AM  Surgeon:  Dr. Paul Breeding:  Mr. Aragon Sportsman is resting comfortably in bed, alert, and in no acute distress. Pt denies chest pressure, SOB, fever,chills, N/V/D. IV heparin drip running. Vital Signs: /75   Pulse 66   Temp 97.9 °F (36.6 °C) (Oral)   Resp 18   Ht 5' 9\" (1.753 m)   Wt 210 lb 12.8 oz (95.6 kg)   SpO2 94%   BMI 31.13 kg/m²    Temp (24hrs), Av.9 °F (36.6 °C), Min:97.7 °F (36.5 °C), Max:98 °F (36.7 °C)       Labs:   CBC:  Recent Labs     23  1457 23  2256 23  0634 23  1221 23  2356 23  0542 23  0707 23  1419 23  0706   WBC 4.5*  --  4.7*  --   --  4.6*  --   --  4.5*   HGB 12.6*  --  12.2*  --   --  11.7*  --   --  11.9*   HCT 38.5*  --  37.8*  --   --  36.0*  --   --  36.2*   .8*  --  106.2*  --   --  105.0*  --   --  103.7*     --  246  --   --  248  --   --  246   APTT 53.7*   < > 87.8*   < > 48.4*  --  159.3* 50.2*  --    INR 1.09  --   --   --   --   --   --   --   --     < > = values in this interval not displayed. BMP:   Recent Labs     23  0634 23  0542 23  0706    141 143   K 4.2 3.6 4.0    106 110   CO2 20* 23 20*   BUN 8 8 9   CREATININE 0.8 0.6 0.7       Last HgA1C:   Lab Results   Component Value Date    LABA1C 5.5 2020       Imaging:  CXR: 2023  There is adequate opacification of the pulmonary arterial system. No pulmonary emboli are present. The aorta is within acceptable limits. The heart size is normal. There is no pericardial effusion  there is a right paratracheal 12 mm short axis node. There is a left paratracheal 11 mm node. There is subcarinal adenopathy measuring 2.8 cm. There are small bilateral hilar lymph nodes. There    is no axillary lymphadenopathy. Multifocal scattered hazy groundglass opacities are present in both lungs. 4 mm nodule anterior right middle lobe.  Image 1:15 series 4   Bilateral pleural effusions right greater than left. Bibasilar bronchiectasis. Compressive atelectasis right right base No suspicious osseous lesions are present. There are no suspicious findings in the imaged aspects of the upper abdomen. Intake/Output Summary (Last 24 hours) at 2/26/2023 9583  Last data filed at 2/25/2023 2041  Gross per 24 hour   Intake 370 ml   Output 350 ml   Net 20 ml         Scheduled Meds:    sodium chloride flush  5-40 mL IntraVENous 2 times per day    sodium chloride flush  5-40 mL IntraVENous 2 times per day    sodium chloride flush  5-40 mL IntraVENous 2 times per day    aspirin  81 mg Oral Daily    atorvastatin  40 mg Oral Daily    carBAMazepine  600 mg Oral BID    escitalopram  20 mg Oral Daily    levothyroxine  50 mcg Oral Daily    risperiDONE  0.5 mg Oral BID    multivitamin  1 tablet Oral Daily       ROS: All neg unless specifically mentioned in subjective section. 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   Plavix washout  Pt has  a  high grade carotid stenosis this will address tomorrow by vascular surgery        Wallace Kapadia MD

## 2023-02-27 ENCOUNTER — APPOINTMENT (OUTPATIENT)
Dept: CT IMAGING | Age: 83
DRG: 233 | End: 2023-02-27
Payer: MEDICARE

## 2023-02-27 LAB
ABO: NORMAL
ALBUMIN SERPL BCG-MCNC: 4 G/DL (ref 3.5–5.1)
ALP SERPL-CCNC: 39 U/L (ref 38–126)
ALT SERPL W/O P-5'-P-CCNC: 35 U/L (ref 11–66)
ANTIBODY SCREEN: NORMAL
APTT PPP: 164.5 SECONDS (ref 22–38)
ARTERIAL PATENCY WRIST A: POSITIVE
AST SERPL-CCNC: 32 U/L (ref 5–40)
BASE EXCESS BLDA CALC-SCNC: -0.9 MMOL/L (ref -2.5–2.5)
BASOPHILS ABSOLUTE: 0 THOU/MM3 (ref 0–0.1)
BASOPHILS NFR BLD AUTO: 0.5 %
BDY SITE: ABNORMAL
BILIRUB CONJ SERPL-MCNC: < 0.2 MG/DL (ref 0–0.3)
BILIRUB SERPL-MCNC: 0.2 MG/DL (ref 0.3–1.2)
COLLECTED BY:: ABNORMAL
DEPRECATED MEAN GLUCOSE BLD GHB EST-ACNC: 93 MG/DL (ref 70–126)
DEPRECATED RDW RBC AUTO: 46.8 FL (ref 35–45)
EKG ATRIAL RATE: 73 BPM
EKG P AXIS: 15 DEGREES
EKG P-R INTERVAL: 242 MS
EKG Q-T INTERVAL: 374 MS
EKG QRS DURATION: 100 MS
EKG QTC CALCULATION (BAZETT): 412 MS
EKG R AXIS: -6 DEGREES
EKG T AXIS: 146 DEGREES
EKG VENTRICULAR RATE: 73 BPM
EOSINOPHIL NFR BLD AUTO: 12.4 %
EOSINOPHILS ABSOLUTE: 0.5 THOU/MM3 (ref 0–0.4)
ERYTHROCYTE [DISTWIDTH] IN BLOOD BY AUTOMATED COUNT: 12 % (ref 11.5–14.5)
HBA1C MFR BLD HPLC: 5.1 % (ref 4.4–6.4)
HCO3 BLDA-SCNC: 23 MMOL/L (ref 23–28)
HCT VFR BLD AUTO: 36.5 % (ref 42–52)
HEPARIN UNFRACTIONATED: 0.17 U/ML (ref 0.3–0.7)
HEPARIN UNFRACTIONATED: 0.4 U/ML (ref 0.3–0.7)
HGB BLD-MCNC: 11.8 GM/DL (ref 14–18)
IMM GRANULOCYTES # BLD AUTO: 0.02 THOU/MM3 (ref 0–0.07)
IMM GRANULOCYTES NFR BLD AUTO: 0.5 %
INR PPP: 1.06 (ref 0.85–1.13)
LYMPHOCYTES ABSOLUTE: 0.9 THOU/MM3 (ref 1–4.8)
LYMPHOCYTES NFR BLD AUTO: 20 %
MCH RBC QN AUTO: 34.3 PG (ref 26–33)
MCHC RBC AUTO-ENTMCNC: 32.3 GM/DL (ref 32.2–35.5)
MCV RBC AUTO: 106.1 FL (ref 80–94)
MONOCYTES ABSOLUTE: 0.5 THOU/MM3 (ref 0.4–1.3)
MONOCYTES NFR BLD AUTO: 12.1 %
NEUTROPHILS NFR BLD AUTO: 54.5 %
NRBC BLD AUTO-RTO: 0 /100 WBC
PCO2 BLDA: 33 MMHG (ref 35–45)
PH BLDA: 7.45 [PH] (ref 7.35–7.45)
PLATELET # BLD AUTO: 274 THOU/MM3 (ref 130–400)
PMV BLD AUTO: 9.9 FL (ref 9.4–12.4)
PO2 BLDA: 64 MMHG (ref 71–104)
PROT SERPL-MCNC: 6.4 G/DL (ref 6.1–8)
RBC # BLD AUTO: 3.44 MILL/MM3 (ref 4.7–6.1)
RH FACTOR: NORMAL
SAO2 % BLDA: 93 %
SEGMENTED NEUTROPHILS ABSOLUTE COUNT: 2.3 THOU/MM3 (ref 1.8–7.7)
WBC # BLD AUTO: 4.3 THOU/MM3 (ref 4.8–10.8)

## 2023-02-27 PROCEDURE — 80076 HEPATIC FUNCTION PANEL: CPT

## 2023-02-27 PROCEDURE — 82803 BLOOD GASES ANY COMBINATION: CPT

## 2023-02-27 PROCEDURE — 6370000000 HC RX 637 (ALT 250 FOR IP): Performed by: PHYSICIAN ASSISTANT

## 2023-02-27 PROCEDURE — 85025 COMPLETE CBC W/AUTO DIFF WBC: CPT

## 2023-02-27 PROCEDURE — 36600 WITHDRAWAL OF ARTERIAL BLOOD: CPT

## 2023-02-27 PROCEDURE — 2060000000 HC ICU INTERMEDIATE R&B

## 2023-02-27 PROCEDURE — 85520 HEPARIN ASSAY: CPT

## 2023-02-27 PROCEDURE — 86900 BLOOD TYPING SEROLOGIC ABO: CPT

## 2023-02-27 PROCEDURE — 36415 COLL VENOUS BLD VENIPUNCTURE: CPT

## 2023-02-27 PROCEDURE — 85610 PROTHROMBIN TIME: CPT

## 2023-02-27 PROCEDURE — 2580000003 HC RX 258: Performed by: PHYSICIAN ASSISTANT

## 2023-02-27 PROCEDURE — 6360000004 HC RX CONTRAST MEDICATION: Performed by: PHYSICIAN ASSISTANT

## 2023-02-27 PROCEDURE — 6370000000 HC RX 637 (ALT 250 FOR IP)

## 2023-02-27 PROCEDURE — 86901 BLOOD TYPING SEROLOGIC RH(D): CPT

## 2023-02-27 PROCEDURE — 86923 COMPATIBILITY TEST ELECTRIC: CPT

## 2023-02-27 PROCEDURE — 86850 RBC ANTIBODY SCREEN: CPT

## 2023-02-27 PROCEDURE — 6360000004 HC RX CONTRAST MEDICATION: Performed by: THORACIC SURGERY (CARDIOTHORACIC VASCULAR SURGERY)

## 2023-02-27 PROCEDURE — 6360000002 HC RX W HCPCS: Performed by: STUDENT IN AN ORGANIZED HEALTH CARE EDUCATION/TRAINING PROGRAM

## 2023-02-27 PROCEDURE — APPSS30 APP SPLIT SHARED TIME 16-30 MINUTES: Performed by: PHYSICIAN ASSISTANT

## 2023-02-27 PROCEDURE — 83036 HEMOGLOBIN GLYCOSYLATED A1C: CPT

## 2023-02-27 PROCEDURE — 2580000003 HC RX 258: Performed by: INTERNAL MEDICINE

## 2023-02-27 PROCEDURE — 70498 CT ANGIOGRAPHY NECK: CPT

## 2023-02-27 PROCEDURE — 99232 SBSQ HOSP IP/OBS MODERATE 35: CPT | Performed by: PHYSICIAN ASSISTANT

## 2023-02-27 PROCEDURE — 85730 THROMBOPLASTIN TIME PARTIAL: CPT

## 2023-02-27 RX ORDER — SODIUM CHLORIDE 0.9 % (FLUSH) 0.9 %
10 SYRINGE (ML) INJECTION PRN
Status: DISCONTINUED | OUTPATIENT
Start: 2023-02-27 | End: 2023-02-28

## 2023-02-27 RX ORDER — SODIUM CHLORIDE 9 MG/ML
INJECTION, SOLUTION INTRAVENOUS PRN
Status: DISCONTINUED | OUTPATIENT
Start: 2023-02-27 | End: 2023-02-28

## 2023-02-27 RX ORDER — AMIODARONE HYDROCHLORIDE 200 MG/1
200 TABLET ORAL 3 TIMES DAILY
Status: DISCONTINUED | OUTPATIENT
Start: 2023-02-27 | End: 2023-02-28

## 2023-02-27 RX ORDER — SODIUM CHLORIDE 0.9 % (FLUSH) 0.9 %
10 SYRINGE (ML) INJECTION EVERY 12 HOURS SCHEDULED
Status: DISCONTINUED | OUTPATIENT
Start: 2023-02-27 | End: 2023-02-28

## 2023-02-27 RX ADMIN — ESCITALOPRAM 20 MG: 20 TABLET, FILM COATED ORAL at 09:31

## 2023-02-27 RX ADMIN — METOPROLOL TARTRATE 12.5 MG: 25 TABLET, FILM COATED ORAL at 17:01

## 2023-02-27 RX ADMIN — ASPIRIN 81 MG 81 MG: 81 TABLET ORAL at 09:31

## 2023-02-27 RX ADMIN — IOPAMIDOL 80 ML: 755 INJECTION, SOLUTION INTRAVENOUS at 10:16

## 2023-02-27 RX ADMIN — SODIUM CHLORIDE, PRESERVATIVE FREE 10 ML: 5 INJECTION INTRAVENOUS at 09:31

## 2023-02-27 RX ADMIN — AMIODARONE HYDROCHLORIDE 200 MG: 200 TABLET ORAL at 17:01

## 2023-02-27 RX ADMIN — SODIUM CHLORIDE, PRESERVATIVE FREE 10 ML: 5 INJECTION INTRAVENOUS at 20:21

## 2023-02-27 RX ADMIN — AMIODARONE HYDROCHLORIDE 200 MG: 200 TABLET ORAL at 20:24

## 2023-02-27 RX ADMIN — Medication 1 TABLET: at 09:31

## 2023-02-27 RX ADMIN — RISPERIDONE 0.5 MG: 0.25 TABLET, FILM COATED ORAL at 09:31

## 2023-02-27 RX ADMIN — CARBAMAZEPINE 600 MG: 200 TABLET, EXTENDED RELEASE ORAL at 20:24

## 2023-02-27 RX ADMIN — METOPROLOL TARTRATE 12.5 MG: 25 TABLET, FILM COATED ORAL at 20:24

## 2023-02-27 RX ADMIN — LEVOTHYROXINE SODIUM 50 MCG: 0.05 TABLET ORAL at 06:51

## 2023-02-27 RX ADMIN — ATORVASTATIN CALCIUM 40 MG: 40 TABLET, FILM COATED ORAL at 09:31

## 2023-02-27 RX ADMIN — CARBAMAZEPINE 600 MG: 200 TABLET, EXTENDED RELEASE ORAL at 09:31

## 2023-02-27 RX ADMIN — HEPARIN SODIUM AND DEXTROSE 10 UNITS/KG/HR: 10000; 5 INJECTION INTRAVENOUS at 22:44

## 2023-02-27 RX ADMIN — RISPERIDONE 0.5 MG: 0.25 TABLET, FILM COATED ORAL at 20:24

## 2023-02-27 NOTE — PROGRESS NOTES
Inpatient Cardiac Rehabilitation Consult    Received consult for Phase II Cardiac Rehabilitation. Pt had NSTEMI on 2/27/23. CABG scheduled for 2/28/23. Will follow pt's progress for cardiac rehab consult.

## 2023-02-27 NOTE — PROGRESS NOTES
MI Documentation    MI: : NSTEMI    PCI: NO - patient is scheduled for CABG on 2/28/23    EF: 50%  ASA w/i first 24 hours: YES   BB w/i first 24 hours: YES       ------------------------------------------  1. ASA: YES  2.  BB: CONTRAINDICATION hypotensive  3. ACE/ARB: NO  4.   Statin: YES  5.  P2Y12: NO - plavix on hold for washout for CABG    ))))))REMEMBER NTG SL AT DISCHARGE((((((

## 2023-02-27 NOTE — PROGRESS NOTES
Hospitalist Progress Note    Patient:  Nguyễn Jauregui    Unit/Bed:8A-09/009-A  YOB: 1940  MRN: [id number]   Acct: [de-identified]   PCP: Ahmet Gillette MD  Code Status: Full Code  Date of Admission: 2/21/2023    Expected Discharge: 3/2? Disposition: Plan for CABG 2/28    Assessment/Plan:    NSTEMI: Coronary angiography on 2/23/2024 demonstrated three-vessel disease with severe calcification of the left main artery, preserved ejection fraction, and patent aorto iliacs. Cardiology recommended CTS versus high risk PCI. Continue heparin drip, ASA. Hold plavix. CT surgery following, plan for CABG on 2/28 after Plavix washout. Bilateral pleural effusion, R>L: Pulmonary following. Unclear etiology, differential includes malignancy, infection, CHF. Patient is stable on room air, no plan for thoracentesis at this time, needs optimization of cardiac status prior. Plan to repeat imaging as outpatient. CAP: Completed 5 day therapy of Rocephin and azithromycin. Carotid stenosis: preop carotid doppler reports >70% stenosis of right ICA. Vascular surgery consulted. Hx Epilepsy: Tegretol 600 mg twice daily     Acquired hypothyroidism: on Synthroid 50 mcg daily    Mild, acute macrocytic anemia: Mild, no signs of acute bleeding. Anemia work-up unimpressive. RML lung nodule: CT reports 4 mm nodule in right anterior and right middle lobe with lymphadenopathy. Pulm following. Planning for repeat CT in 3 months. H/o CVA: With residual cognitive deficits. H/o Leukemia: With remission in 1984. Anxiety/depression: On Lexapro, risperidone. Chief Complaint: Fatigue, SOB    HPI / Hospital Course: Per last progress note:  \"The patient is an 81year old male former smoker with an approximately 20-pack-year history who quit in 1980 with a past medical history of leukemia with remission in 1984, hypothyroidism, \"seizures,\" hyperlipidemia, \"coma,\" and stroke secondary to septic embolus from dental procedure in [address] [place] [address],[facility] who presents the chief complaint of shortness of breath and managing primarily for severe calcification of the left main not amenable to normal stenting and bilateral pleural effusion. Cough and shortness of breath which started on 2/14/2023. Came to the ED on 2/21/2023 because cough came productive of red phlegm. Patient never had chest pain at any point. However, did have shortness of breath. Was appropriately treated with Rocephin, azithromycin, and DuoNebs. Azithromycin and Rocephin started on 2/21/2023 with tentative plan to complete through 2/26/2023. Pulmonology was consulted on 2/21/2023 for management of COPD. Cardiology was consulted for progressively elevating troponins and an EKG that demonstrated old lateral lead ischemic changes and new anterior lead once. Hospitalist group took over care in 2/23/2023 as the family wish to switch from the Dr. Marni Meier group. Echocardiogram on 2/22/2023 demonstrated normal left ventricular size and systolic function, normal ejection fraction 50%, normal left ventricular wall thickness, mild mitral regurgitation, and normal right ventricle. Given elevated troponin T and EKG abnormalities, decision was made to proceed with left heart cath. Left heart cath in 2/23/2023 demonstrated three-vessel disease with severe calcification of the left main artery, preserved ejection fraction, and patent aorto iliacs. Cardiothoracic surgery versus high risk PCI suggested. Dr. Diamond Rodriguez, the cardiothoracic surgeon, discussed the case with Dr. Jose Pandya. Also discussed the risks and benefits of CT surgery with the patient. Dr. Jet Curtis believes that the patient is an appropriate candidate for CT surgery even if he is somewhat higher risk than the norm. Per his report, Dr. Jose Pandya feels that he is not a good candidate for PCI or medical management alone.      In preparation for potential upcoming CT surgery, Plavix was held, prophylactic dose Lovenox was stopped, aspirin was maintained, and patient was started on heparin drip. This was done per instructions from CT surgery. CTA of the chest on 2/22/2023 demonstrated no pulmonary embolism, normal heart size, no pericardial effusion, right paratracheal 12 mm short axis node, left side 11 mm paratracheal node, subcarinal adenopathy measuring 2.8 cm, small bilateral hilar lymph nodes, no axilla lymphadenopathy, multifocal scattered hazy groundglass opacities present in both lungs, 4 mm nodule in the anterior right middle lobe, bilateral pleural effusions right greater than left, bibasilar bronchiectasis, compressive atelectasis of the right base, no suspicious bony lesions, and no suspicious findings in the imaged aspect of the upper abdomen; overall, no PE, multifocal groundglass infiltrates, bilateral pleural effusions, and mediastinal and subcarinal adenopathy. \"    2/26: Pt doing well. No fever/chills, sob, cp, abd pain, nvd. ABX therapy complete. Family asked about previous echo and ekg results, questions answered. Subjective (past 24 hours): Patient is complaining of back pain, states it has been present for 3-4 days. Appears to have muscle tenderness bilaterally. Family states he uses a heating pad at home for back pain. Denies hx kidney stones, no urinary sx. He denies f/c, sob, cp. ROS: Pertinent positives as noted in HPI. All other systems reviewed and negative. Past medical history, family history, social history and allergies reviewed again and is unchanged since admission. Medications:  Reviewed.   Infusion Medications    sodium chloride      sodium chloride 75 mL/hr at 02/26/23 2315    sodium chloride      heparin (PORCINE) Infusion 13.005 Units/kg/hr (02/26/23 9945)    sodium chloride       Scheduled Medications    sodium chloride flush  5-40 mL IntraVENous 2 times per day    sodium chloride flush  5-40 mL IntraVENous 2 times per day    sodium chloride flush  5-40 mL IntraVENous 2 times per day    aspirin  81 mg Oral Daily    atorvastatin  40 mg Oral Daily    carBAMazepine  600 mg Oral BID    escitalopram  20 mg Oral Daily    levothyroxine  50 mcg Oral Daily    risperiDONE  0.5 mg Oral BID    multivitamin  1 tablet Oral Daily     PRN Meds: sodium chloride flush, sodium chloride, sodium chloride flush, sodium chloride, heparin (porcine), heparin (porcine), albuterol **AND** ipratropium, sodium chloride flush, sodium chloride, ondansetron **OR** ondansetron, acetaminophen **OR** acetaminophen, polyethylene glycol, perflutren lipid microspheres    I/O:     Intake/Output Summary (Last 24 hours) at 2/27/2023 9770  Last data filed at 2/26/2023 1031  Gross per 24 hour   Intake --   Output 200 ml   Net -200 ml         Diet:  ADULT DIET; Regular  ADULT ORAL NUTRITION SUPPLEMENT; Breakfast, Lunch, Dinner; Other Oral Supplement; send hot decaf beverage TID  ADULT ORAL NUTRITION SUPPLEMENT; Breakfast; Other Oral Supplement; send activia at breakfast daily    Exam:  /60   Pulse 66   Temp 97.7 °F (36.5 °C) (Oral)   Resp 12   Ht 5' 9\" (1.753 m)   Wt 210 lb 12.8 oz (95.6 kg)   SpO2 92%   BMI 31.13 kg/m²   General:   Pleasant male. NAD  HEENT:  normocephalic and atraumatic. No scleral icterus. PERR. Neck: supple. No JVD. No thyromegaly. Lungs: clear to auscultation. No retractions  Cardiac: RRR without murmur. Abdomen: soft. Nontender. Bowel sounds positive. Extremities:  No clubbing, cyanosis, or edema x 4. Vasculature: capillary refill < 3 seconds. Palpable LE pulses bilaterally. Skin:  warm and dry. Psych:  Alert and oriented x3. Affect appropriate  Lymph:  No supraclavicular adenopathy. Neurologic:  No focal deficit. No seizures.       Data: (All radiographs, tracings, PFTs, and imaging are personally viewed and interpreted unless otherwise noted)  Labs:   Recent Labs     02/25/23  0542 02/26/23  0706   WBC 4.6* 4.5*   HGB 11.7* 11.9*   HCT 36.0* 36.2*    246       Recent Labs     02/25/23  0542 02/26/23  0706    143   K 3.6 4.0    110   CO2 23 20*   BUN 8 9   CREATININE 0.6 0.7   CALCIUM 8.1* 8.3*       No results for input(s): AST, ALT, BILIDIR, BILITOT, ALKPHOS in the last 72 hours.  No results for input(s): INR in the last 72 hours.    No results for input(s): CKTOTAL, TROPONINI in the last 72 hours.  Urinalysis:   Lab Results   Component Value Date/Time    NITRU NEGATIVE 01/15/2017 08:16 PM    WBCUA 0-2 01/15/2017 08:16 PM    BACTERIA NONE 01/15/2017 08:16 PM    RBCUA 0-2 01/15/2017 08:16 PM    BLOODU NEGATIVE 01/15/2017 08:16 PM    GLUCOSEU NEGATIVE 01/15/2017 08:16 PM     Urine culture: No results found for: LABURIN  Micro:   Blood culture #1:   Lab Results   Component Value Date/Time    BC No growth-preliminary  No growth   01/15/2017 09:18 PM     Blood culture #2:No results found for: BLOODCULT2  Organism:No results found for: ORG    No results found for: LABGRAM  MRSA culture only:No results found for: MRSAC  Respiratory culture: No results found for: CULTRESP  Aerobic and Anaerobic :  No results found for: LABAERO  No results found for: LABANAE    Radiology Reports:  VL PRE OP VEIN MAPPING   Final Result   Status post vein mapping procedure.            **This report has been created using voice recognition software. It may contain minor errors which are inherent in voice recognition technology.**      Final report electronically signed by Dr Leandro Gan on 2/24/2023 5:32 PM      VL DUP CAROTID BILATERAL   Final Result      1. Large amount of atherosclerosis in the right carotid bulb resulting in greater than 70% stenosis of the internal carotid artery.   2. Moderate atherosclerosis in the left carotid bulb resulting in less than 50% stenosis of the internal carotid artery.   3. Alternating retrograde and antegrade flow in the right vertebral artery suspicious for subclavian steal phenomenon.         **This  report has been created using voice recognition software. It may contain minor errors which are inherent in voice recognition technology. **      Final report electronically signed by Dr Mario Byrne on 2/24/2023 6:10 PM      CTA CHEST [address]   Final Result    There is no PE. There are multifocal groundglass infiltrates. There are bilateral pleural effusions. There is mediastinal and subcarinal adenopathy. **This report has been created using voice recognition software. It may contain minor errors which are inherent in voice recognition technology. **      Final report electronically signed by Dr. Ane Boast on 2/22/2023 2:05 PM      XR CHEST (2 VW)   Final Result   1. No interval change since previous study dated 1/15/2017.   2. Diffuse COPD and thickening off the interstitial lung markings. 3. Borderline cardiomegaly. 4. Thoracic spondylosis. Stacie Patton **This report has been created using voice recognition software. It may contain minor errors which are inherent in voice recognition technology. **      Final report electronically signed by DR Anshu Blakely on 2/21/2023 9:37 AM      CT CHEST W CONTRAST    (Results Pending)   CTA NECK W WO CONTRAST    (Results Pending)     XR CHEST (2 VW)    Result Date: 2/21/2023  PROCEDURE: XR CHEST (2 VW) CLINICAL INFORMATION: fatigue sob cough. COMPARISON: Plain radiographs dated 1/15/2017. Stacie Patton TECHNIQUE: PA and lateral views the chest. FINDINGS: There is borderline cardiomegaly. .  The mediastinum is not widened. There is diffuse COPD and thickening of the interstitial lung markings. There are no new pulmonary infiltrates or effusions. The pulmonary vascularity is normal. There is thoracic spondylosis. 1. No interval change since previous study dated 1/15/2017. 2. Diffuse COPD and thickening off the interstitial lung markings. 3. Borderline cardiomegaly. 4. Thoracic spondylosis. Stacie Patton  **This report has been created using voice recognition software. It may contain minor errors which are inherent in voice recognition technology. ** Final report electronically signed by DR Chase Vilchis on 2/21/2023 9:37 AM    CTA CHEST W WO CONTRAST    Result Date: 2/22/2023  PROCEDURE: CTA CHEST W WO CONTRAST CLINICAL INFORMATION: DYSPNEA, ELEVATED D-DIMER. COMPARISON: No prior study. TECHNIQUE: 3 mm axial images were obtained through the chest after the administration of IV contrast.  A non-contrast localizer was obtained. 3D reconstructions were performed on the scanner to include MIP coronal and sagittal images through the chest. Isovue was the intravenous contrast utilized. All CT scans at this facility use dose modulation, iterative reconstruction, and/or weight-based dosing when appropriate to reduce radiation dose to as low as reasonably achievable. FINDINGS: There is adequate opacification of the pulmonary arterial system. No pulmonary emboli are present. The aorta is within acceptable limits. The heart size is normal. There is no pericardial effusion  there is a right paratracheal 12 mm short axis node. There is a left paratracheal 11 mm node. There is subcarinal adenopathy measuring 2.8 cm. There are small bilateral hilar lymph nodes. There is no axillary lymphadenopathy. Multifocal scattered hazy groundglass opacities are present in both lungs. 4 mm nodule anterior right middle lobe. Image 1:15 series 4 Bilateral pleural effusions right greater than left. Bibasilar bronchiectasis. Compressive atelectasis right right base No suspicious osseous lesions are present. There are no suspicious findings in the imaged aspects of the upper abdomen. There is no PE. There are multifocal groundglass infiltrates. There are bilateral pleural effusions. There is mediastinal and subcarinal adenopathy. **This report has been created using voice recognition software. It may contain minor errors which are inherent in voice recognition technology. ** Final report electronically signed by Dr. Aleyda Arriaga on 2/22/2023 2:05 PM        Tele:   [] yes             [] no      Active Hospital Problems    Diagnosis Date Noted    NSTEM[facility] (non-ST elevated [facility] infarction) [facility] [I21.4] 02/24/2023     Priority: Medium    Chronic obstructive pulmonary disease ([facility] [J44.9] 02/22/2023     Priority: Medium    Personal history of tobacco use, presenting hazards to health [Z87.891] 02/22/2023     Priority: Medium    Pleural effusion, bilateral [J90] 02/22/2023     Priority: Medium    Mediastinal lymphadenopathy [R59.0] 02/22/2023     Priority: Medium    Elevated troponin level [R77.8] 02/21/2023     Priority: Medium       Electronically signed by Maurizio Raman PA-C on 2/27/2023 at 9:21 AM

## 2023-02-27 NOTE — PLAN OF CARE
Problem: Discharge Planning  Goal: Discharge to home or other facility with appropriate resources  Outcome: Progressing  Flowsheets (Taken 2/26/2023 1940)  Discharge to home or other facility with appropriate resources:   Identify barriers to discharge with patient and caregiver   Arrange for needed discharge resources and transportation as appropriate   Identify discharge learning needs (meds, wound care, etc)   Arrange for interpreters to assist at discharge as needed   Refer to discharge planning if patient needs post-hospital services based on physician order or complex needs related to functional status, cognitive ability or social support system     Problem: Pain  Goal: Verbalizes/displays adequate comfort level or baseline comfort level  Outcome: Progressing  Flowsheets (Taken 2/27/2023 0456)  Verbalizes/displays adequate comfort level or baseline comfort level:   Encourage patient to monitor pain and request assistance   Assess pain using appropriate pain scale   Administer analgesics based on type and severity of pain and evaluate response   Implement non-pharmacological measures as appropriate and evaluate response   Consider cultural and social influences on pain and pain management   Notify Licensed Independent Practitioner if interventions unsuccessful or patient reports new pain     Problem: Cardiovascular - Adult  Goal: Maintains optimal cardiac output and hemodynamic stability  Outcome: Progressing  Flowsheets (Taken 2/26/2023 1940)  Maintains optimal cardiac output and hemodynamic stability:   Monitor blood pressure and heart rate   Monitor urine output and notify Licensed Independent Practitioner for values outside of normal range   Assess for signs of decreased cardiac output   Administer fluid and/or volume expanders as ordered   Administer vasoactive medications as ordered     Problem: Cardiovascular - Adult  Goal: Absence of cardiac dysrhythmias or at baseline  Outcome: Progressing  Flowsheets (Taken 2/26/2023 1940)  Absence of cardiac dysrhythmias or at baseline:   Monitor cardiac rate and rhythm   Assess for signs of decreased cardiac output   Administer antiarrhythmia medication and electrolyte replacement as ordered

## 2023-02-27 NOTE — PROGRESS NOTES
CT/CV Surgery Progress Note    2023 8:13 AM  Surgeon:  Dr. Concepcion Less:  Mr. Guaman Liner is resting comfortably in bed, alert, and in no acute distress. Pt denies chest pressure, SOB, fever,chills, N/V/D. Vital Signs: /60   Pulse 66   Temp 97.7 °F (36.5 °C) (Oral)   Resp 12   Ht 5' 9\" (1.753 m)   Wt 210 lb 12.8 oz (95.6 kg)   SpO2 92%   BMI 31.13 kg/m²    Temp (24hrs), Av.7 °F (36.5 °C), Min:97.5 °F (36.4 °C), Max:98 °F (36.7 °C)       Labs:   CBC:  Recent Labs     23  2356 23  0542 23  0707 23  1419 23  0706   WBC  --  4.6*  --   --  4.5*   HGB  --  11.7*  --   --  11.9*   HCT  --  36.0*  --   --  36.2*   MCV  --  105.0*  --   --  103.7*   PLT  --  248  --   --  246   APTT 48.4*  --  159.3* 50.2*  --      BMP:   Recent Labs     23  0542 23  0706    143   K 3.6 4.0    110   CO2 23 20*   BUN 8 9   CREATININE 0.6 0.7     Last HgA1C:   Lab Results   Component Value Date    LABA1C 5.5 2020   Imaging:  CTA Neck: pending      Intake/Output Summary (Last 24 hours) at 2023 0813  Last data filed at 2023 1031  Gross per 24 hour   Intake --   Output 200 ml   Net -200 ml       Scheduled Meds:    sodium chloride flush  5-40 mL IntraVENous 2 times per day    sodium chloride flush  5-40 mL IntraVENous 2 times per day    sodium chloride flush  5-40 mL IntraVENous 2 times per day    aspirin  81 mg Oral Daily    atorvastatin  40 mg Oral Daily    carBAMazepine  600 mg Oral BID    escitalopram  20 mg Oral Daily    levothyroxine  50 mcg Oral Daily    risperiDONE  0.5 mg Oral BID    multivitamin  1 tablet Oral Daily       ROS: All neg unless specifically mentioned in subjective section.      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 Theo Case PA-C

## 2023-02-27 NOTE — CONSULTS
Department of Cardiovascular & Thoracic Surgery  Attending Consult Note        Reason for Consult:  Severe right internal carotid stenosis, right subclavian stenosis    Requesting Physician:  Dr. Thu Gilbert:  Chest pain/SOB    History Obtained From:  patient, family member - Dr. Lily Mercedes staff, electronic medical record,     HISTORY OF PRESENT ILLNESS:              The patient is a 81 y.o. male who presents with PMHx of Leukemia, HLD, and previous brain abscess, s/p craniotomy  in the 1990s presented to [facility] on 2/21/23 for SOB, fatigue, and productive cough. Found on workup to have elevated cardiac enzymes and abnormal EKG. Extensive workup with cardiology and CTS revealed multi-vessel CAD and LM disease who is scheduled for CABG surgery tomorrow 2/28/23. As part of the routine cardiac workup, he had a carotid duplex, which revealed 50-79% (on the higher end of range) right ICAstenosis, no significant Left ICA stenosis and bidirectional right vertebral artery flow. CT angiogram was performed which revealed:    1. Atherosclerosis of the right carotid bulb with a 76% stenosis at the origin the right internal carotid artery. 2. No significant stenosis at the origin of the left internal carotid artery. 3. Moderate stenosis at the origin of the left vertebral artery. Mild stenosis at the origin of the right vertebral artery. 4. Severe stenosis of the origin of the right subclavian artery. Thus, due to the severe right ICA stenosis and right subclavian stenosis, vascular surgery consult was obtained for preoperative opinion. He was seen in his room with his family present. 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 Most history obtained from grandson and daughter. They state he has not had any new mental or neurologic changes in the past 6-12 months.  No CVA/TIA, speech changes, ,facial or extremity weakness, visual changes, lower or upper extremity weakness, claudication, rest pain, numbness or paresthesias. No coolness in extremities noted. He is active at home and walks to [facility] without a walker. No previous carotid or vascular workups prior to this admission.       Past Medical History:        Diagnosis Date    Cancer ([facility] [address]     Coma ([facility]     Hyperlipidemia     Leukemia ([facility]     Seizures ([facility]      Past Surgical History:        Procedure Laterality Date    BRAIN SURGERY      infection    OTHER SURGICAL HISTORY      Tooth Abscess removed '95     Current Medications:   Current Facility-Administered Medications: iopamidol (ISOVUE-370) 76 % injection 80 mL, 80 mL, IntraVENous, ONCE PRN  sodium chloride flush 0.9 % injection 10 mL, 10 mL, IntraVENous, 2 times per day  sodium chloride flush 0.9 % injection 10 mL, 10 mL, IntraVENous, PRN  0.9 % sodium chloride infusion, , IntraVENous, PRN  amiodarone (CORDARONE) tablet 200 mg, 200 mg, Oral, TID  metoprolol tartrate (LOPRESSOR) tablet 12.5 mg, 12.5 mg, Oral, BID  sodium chloride flush 0.9 % injection 5-40 mL, 5-40 mL, IntraVENous, 2 times per day  sodium chloride flush 0.9 % injection 5-40 mL, 5-40 mL, IntraVENous, PRN  0.9 % sodium chloride infusion, , IntraVENous, PRN  0.9 % sodium chloride infusion, , IntraVENous, Continuous  sodium chloride flush 0.9 % injection 5-40 mL, 5-40 mL, IntraVENous, 2 times per day  sodium chloride flush 0.9 % injection 5-40 mL, 5-40 mL, IntraVENous, PRN  0.9 % sodium chloride infusion, , IntraVENous, PRN  heparin (porcine) injection 4,000 Units, 4,000 Units, IntraVENous, PRN  heparin (porcine) injection 2,000 Units, 2,000 Units, IntraVENous, PRN  heparin 25,000 units in dextrose 5% 250 mL (premix) infusion, 5-30 Units/kg/hr, IntraVENous, Continuous  albuterol (PROVENTIL) nebulizer solution 2.5 mg, 2.5 mg, Nebulization, Q4H PRN **AND** ipratropium (ATROVENT) 0.02 % nebulizer solution 0.5 mg, 0.5 mg, Nebulization, Q4H PRN  sodium chloride flush 0.9 % injection 5-40 mL, 5-40 mL, IntraVENous, 2 times per day  sodium chloride flush 0.9 % injection 5-40 mL, 5-40 mL, IntraVENous, PRN  0.9 % sodium chloride infusion, , IntraVENous, PRN  ondansetron (ZOFRAN-ODT) disintegrating tablet 4 mg, 4 mg, Oral, Q8H PRN **OR** ondansetron (ZOFRAN) injection 4 mg, 4 mg, IntraVENous, Q6H PRN  acetaminophen (TYLENOL) tablet 650 mg, 650 mg, Oral, Q6H PRN **OR** acetaminophen (TYLENOL) suppository 650 mg, 650 mg, Rectal, Q6H PRN  polyethylene glycol (GLYCOLAX) packet 17 g, 17 g, Oral, Daily PRN  aspirin chewable tablet 81 mg, 81 mg, Oral, Daily  perflutren lipid microspheres (DEFINITY) injection 1.5 mL, 1.5 mL, IntraVENous, ONCE PRN  atorvastatin (LIPITOR) tablet 40 mg, 40 mg, Oral, Daily  carBAMazepine (TEGRETOL XR) extended release tablet 600 mg, 600 mg, Oral, BID  escitalopram (LEXAPRO) tablet 20 mg, 20 mg, Oral, Daily  levothyroxine (SYNTHROID) tablet 50 mcg, 50 mcg, Oral, Daily  risperiDONE (RISPERDAL) tablet 0.5 mg, 0.5 mg, Oral, BID  multivitamin 1 tablet, 1 tablet, Oral, Daily  Allergies:  Patient has no known allergies.    Social History:   TOBACCO:  Former smoker.  Type of tobacco used:  Cigarettes.  Quantity per day:  1 pack(s).  Duration of tobacco use:  30+ years.  Approximate Quit Date:  unknown quit date.  Family History:    History reviewed. No pertinent family history.  REVIEW OF SYSTEMS:  + for chest pain, SOB, weakness, tiredness  - for claudication in legs/arms    PHYSICAL EXAM:  VITALS:  /65   Pulse 64   Temp 97.8 °F (36.6 °C) (Oral)   Resp 16   Ht 5' 9\" (1.753 m)   Wt 210 lb 12.8 oz (95.6 kg)   SpO2 96%   BMI 31.13 kg/m²   24HR INTAKE/OUTPUT:    Intake/Output Summary (Last 24 hours) at 2/27/2023 3509  Last data filed at 2/27/2023 1609  Gross per 24 hour   Intake 1250 ml   Output 200 ml   Net 1050 ml     Physical exam:  General appearance:  No apparent distress, appears stated age and cooperative.  HEENT:  Normal cephalic, atraumatic without obvious deformity.  Conjunctivae/corneas clear. Neck: Supple, with full range of motion. No jugular venous distention. Trachea midline. Respiratory:  Normal respiratory effort. Clear to auscultation, bilaterally without rales/wheezes/rhonchi. Cardiovascular:  Regular rate and rhythm with normal S1/S2 without murmurs, rubs or gallops. Abdomen: Soft, non-tender, non-distended with normal bowel sounds. Musculoskeletal:  No clubbing, cyanosis or edema bilaterally. Full range of motion without deformity. Skin: Skin color, texture, turgor normal.  No rashes or lesions. Neurologic:  Neurovascularly intact without any focal sensory/motor deficits. Psychiatric:  Alert and oriented, thought content appropriate, normal insight. Capillary Refill: Brisk,< 3 seconds   Peripheral Pulses: +2 palpable, equal bilaterally in lower extremities.   Right upper extremities 1+ radial/ulnar and brachial artery  Left upper extremties: 2+ radial/ulnar and brachial artery      DATA:  CBC:   Lab Results   Component Value Date/Time    WBC 4.3 02/27/2023 03:08 PM    RBC 3.44 02/27/2023 03:08 PM    RBC 4.25 11/09/2021 08:28 AM    HGB 11.8 02/27/2023 03:08 PM    HCT 36.5 02/27/2023 03:08 PM    .1 02/27/2023 03:08 PM    MCH 34.3 02/27/2023 03:08 PM    MCHC 32.3 02/27/2023 03:08 PM    RDW 12.4 11/09/2021 08:28 AM     02/27/2023 03:08 PM    MPV 9.9 02/27/2023 03:08 PM     BMP:    Lab Results   Component Value Date/Time     02/26/2023 07:06 AM    K 4.0 02/26/2023 07:06 AM     02/26/2023 07:06 AM    CO2 20 02/26/2023 07:06 AM    BUN 9 02/26/2023 07:06 AM    LABALBU 4.0 02/27/2023 03:08 PM    CREATININE 0.7 02/26/2023 07:06 AM    CALCIUM 8.3 02/26/2023 07:06 AM    LABGLOM >60 02/26/2023 07:06 AM    GLUCOSE 104 02/26/2023 07:06 AM    GLUCOSE 95 11/09/2021 08:28 AM     PT/INR:    Lab Results   Component Value Date/Time    INR 1.06 02/27/2023 03:09 PM     PTT:    Lab Results   Component Value Date/Time    APTT 164.5 02/27/2023 03:09 PM [APTT}  U/A:    Lab Results   Component Value Date/Time    COLORU DK YELLOW 01/15/2017 08:16 PM    PROTEINU 30 01/15/2017 08:16 PM    PHUR 6.0 01/15/2017 08:16 PM    WBCUA 0-2 01/15/2017 08:16 PM    RBCUA 0-2 01/15/2017 08:16 PM    YEAST NONE SEEN 01/15/2017 08:16 PM    BACTERIA NONE 01/15/2017 08:16 PM    LEUKOCYTESUR NEGATIVE 01/15/2017 08:16 PM    UROBILINOGEN 1.0 01/15/2017 08:16 PM    BILIRUBINUR NEGATIVE 01/15/2017 08:16 PM    BLOODU NEGATIVE 01/15/2017 08:16 PM    GLUCOSEU NEGATIVE 01/15/2017 08:16 PM       IMPRESSION/RECOMMENDATIONS:    Multi-vessel CAD with Left main stenosis with symptomatic angina  Severe right ICA stenosis without TIA/CVA or symptoms  Severe right subclavian stenosis without right upper extremity symptoms or syncope; no clinical steal syndrome. PLAN:    Suggest to proceed with CABG surgery alone. No indication for combined Carotid endarterectomy/CABG as his cerebrovascular system is asymptomatic, or his subclavian artery either. Suggest followup with Vascular in clinic 2-4 weeks after discharge. Would need ASA/Plavix DAPT after surgery for his Peripheral arterial disease. Timing of initiation of such per CTS.

## 2023-02-28 ENCOUNTER — ANESTHESIA EVENT (OUTPATIENT)
Dept: OPERATING ROOM | Age: 83
End: 2023-02-28
Payer: MEDICARE

## 2023-02-28 ENCOUNTER — APPOINTMENT (OUTPATIENT)
Dept: GENERAL RADIOLOGY | Age: 83
DRG: 233 | End: 2023-02-28
Payer: MEDICARE

## 2023-02-28 ENCOUNTER — ANESTHESIA (OUTPATIENT)
Dept: OPERATING ROOM | Age: 83
End: 2023-02-28
Payer: MEDICARE

## 2023-02-28 PROBLEM — Z95.1 S/P CABG X 3: Status: ACTIVE | Noted: 2023-02-28

## 2023-02-28 LAB
ACTIVATED CLOTTING TIME: 157 SECONDS (ref 99–130)
ACTIVATED CLOTTING TIME: 180 SECONDS (ref 99–130)
ACTIVATED CLOTTING TIME: 718 SECONDS (ref 99–130)
ACTIVATED CLOTTING TIME: 843 SECONDS (ref 99–130)
ACTIVATED CLOTTING TIME: 908 SECONDS (ref 99–130)
ANION GAP SERPL CALC-SCNC: 10 MEQ/L (ref 8–16)
ANION GAP SERPL CALC-SCNC: 11 MEQ/L (ref 8–16)
ARTERIAL PATENCY WRIST A: ABNORMAL
BASE EXCESS BLDA CALC-SCNC: -0.3 MMOL/L (ref -2.5–2.5)
BASE EXCESS BLDA CALC-SCNC: -0.4 MMOL/L (ref -2.5–2.5)
BASE EXCESS BLDA CALC-SCNC: -1.5 MMOL/L (ref -2–3)
BASE EXCESS BLDA CALC-SCNC: -2.8 MMOL/L (ref -2.5–2.5)
BASE EXCESS BLDA CALC-SCNC: -3.1 MMOL/L (ref -2.5–2.5)
BASE EXCESS BLDA CALC-SCNC: -3.6 MMOL/L (ref -2.5–2.5)
BASE EXCESS BLDA CALC-SCNC: -4.5 MMOL/L (ref -2.5–2.5)
BASE EXCESS BLDA CALC-SCNC: -5.5 MMOL/L (ref -2.5–2.5)
BASE EXCESS BLDA CALC-SCNC: -6.3 MMOL/L (ref -2.5–2.5)
BASE EXCESS BLDA CALC-SCNC: -7 MMOL/L (ref -2.5–2.5)
BASE EXCESS BLDA CALC-SCNC: -7.5 MMOL/L (ref -2.5–2.5)
BASE EXCESS BLDA CALC-SCNC: -8.5 MMOL/L (ref -2.5–2.5)
BASE EXCESS BLDA CALC-SCNC: 0.7 MMOL/L (ref -2–3)
BDY SITE: ABNORMAL
BILIRUB UR QL STRIP: NEGATIVE
BREATHS SETTING VENT: 16 BPM
BUN SERPL-MCNC: 10 MG/DL (ref 7–22)
BUN SERPL-MCNC: 9 MG/DL (ref 7–22)
CA-I BLD ISE-SCNC: 0.82 MMOL/L (ref 1.12–1.32)
CA-I BLD ISE-SCNC: 0.93 MMOL/L (ref 1.12–1.32)
CA-I BLD ISE-SCNC: 0.96 MMOL/L (ref 1.12–1.32)
CA-I BLD ISE-SCNC: 1.02 MMOL/L (ref 1.12–1.32)
CA-I BLD ISE-SCNC: 1.04 MMOL/L (ref 1.12–1.32)
CA-I BLD ISE-SCNC: 1.04 MMOL/L (ref 1.12–1.32)
CA-I BLD ISE-SCNC: 1.07 MMOL/L (ref 1.12–1.32)
CA-I BLD ISE-SCNC: 1.09 MMOL/L (ref 1.12–1.32)
CA-I BLD ISE-SCNC: 1.15 MMOL/L (ref 1.12–1.32)
CALCIUM SERPL-MCNC: 7.1 MG/DL (ref 8.5–10.5)
CALCIUM SERPL-MCNC: 8.2 MG/DL (ref 8.5–10.5)
CARTRIDGE COLOR: NORMAL
CHARACTER UR: CLEAR
CHLORIDE SERPL-SCNC: 110 MEQ/L (ref 98–111)
CHLORIDE SERPL-SCNC: 110 MEQ/L (ref 98–111)
CO2 SERPL-SCNC: 22 MEQ/L (ref 23–33)
CO2 SERPL-SCNC: 24 MEQ/L (ref 23–33)
COLLECTED BY:: ABNORMAL
COLOR UR: YELLOW
COMMENT: ABNORMAL
CREAT SERPL-MCNC: 0.9 MG/DL (ref 0.4–1.2)
CREAT SERPL-MCNC: 0.9 MG/DL (ref 0.4–1.2)
DEPRECATED RDW RBC AUTO: 46.5 FL (ref 35–45)
DEPRECATED RDW RBC AUTO: 46.7 FL (ref 35–45)
DEPRECATED RDW RBC AUTO: 47.3 FL (ref 35–45)
DEVICE: ABNORMAL
EKG ATRIAL RATE: 78 BPM
EKG P AXIS: -15 DEGREES
EKG P-R INTERVAL: 248 MS
EKG Q-T INTERVAL: 468 MS
EKG QRS DURATION: 88 MS
EKG QTC CALCULATION (BAZETT): 533 MS
EKG R AXIS: 0 DEGREES
EKG T AXIS: 48 DEGREES
EKG VENTRICULAR RATE: 78 BPM
ERYTHROCYTE [DISTWIDTH] IN BLOOD BY AUTOMATED COUNT: 12.1 % (ref 11.5–14.5)
ERYTHROCYTE [DISTWIDTH] IN BLOOD BY AUTOMATED COUNT: 12.2 % (ref 11.5–14.5)
ERYTHROCYTE [DISTWIDTH] IN BLOOD BY AUTOMATED COUNT: 12.4 % (ref 11.5–14.5)
FIO2 ON VENT O2 ANALYZER: 50 %
FIO2 ON VENT O2 ANALYZER: 60 %
FIO2 ON VENT O2 ANALYZER: 60 %
FIO2 ON VENT O2 ANALYZER: 80 %
GFR SERPL CREATININE-BSD FRML MDRD: > 60 ML/MIN/1.73M2
GFR SERPL CREATININE-BSD FRML MDRD: > 60 ML/MIN/1.73M2
GLUCOSE BLD STRIP.AUTO-MCNC: 106 MG/DL (ref 70–108)
GLUCOSE BLD STRIP.AUTO-MCNC: 112 MG/DL (ref 70–108)
GLUCOSE BLD STRIP.AUTO-MCNC: 123 MG/DL (ref 70–108)
GLUCOSE BLD STRIP.AUTO-MCNC: 129 MG/DL (ref 70–108)
GLUCOSE BLD STRIP.AUTO-MCNC: 141 MG/DL (ref 70–108)
GLUCOSE BLD STRIP.AUTO-MCNC: 145 MG/DL (ref 70–108)
GLUCOSE BLD STRIP.AUTO-MCNC: 164 MG/DL (ref 70–108)
GLUCOSE BLD STRIP.AUTO-MCNC: 98 MG/DL (ref 70–108)
GLUCOSE BLD-MCNC: 108 MG/DL (ref 70–108)
GLUCOSE BLD-MCNC: 127 MG/DL (ref 70–108)
GLUCOSE BLD-MCNC: 128 MG/DL (ref 70–108)
GLUCOSE BLD-MCNC: 153 MG/DL (ref 70–108)
GLUCOSE BLD-MCNC: 160 MG/DL (ref 70–108)
GLUCOSE BLD-MCNC: 166 MG/DL (ref 70–108)
GLUCOSE BLD-MCNC: 169 MG/DL (ref 70–108)
GLUCOSE BLD-MCNC: 186 MG/DL (ref 70–108)
GLUCOSE BLD-MCNC: 196 MG/DL (ref 70–108)
GLUCOSE BLD-MCNC: NORMAL MG/DL (ref 70–108)
GLUCOSE SERPL-MCNC: 175 MG/DL (ref 70–108)
GLUCOSE SERPL-MCNC: 96 MG/DL (ref 70–108)
GLUCOSE UR QL STRIP.AUTO: NEGATIVE MG/DL
HCO3 BLDA-SCNC: 18 MMOL/L (ref 23–28)
HCO3 BLDA-SCNC: 18 MMOL/L (ref 23–28)
HCO3 BLDA-SCNC: 19 MMOL/L (ref 23–28)
HCO3 BLDA-SCNC: 19 MMOL/L (ref 23–28)
HCO3 BLDA-SCNC: 20 MMOL/L (ref 23–28)
HCO3 BLDA-SCNC: 21 MMOL/L (ref 23–28)
HCO3 BLDA-SCNC: 22 MMOL/L (ref 23–28)
HCO3 BLDA-SCNC: 24 MMOL/L (ref 23–28)
HCO3 BLDA-SCNC: 25 MMOL/L (ref 23–28)
HCO3 BLDA-SCNC: 25 MMOL/L (ref 23–28)
HCO3 BLDA-SCNC: 26 MMOL/L (ref 23–28)
HCT VFR BLD AUTO: 22.2 % (ref 42–52)
HCT VFR BLD AUTO: 27.1 % (ref 42–52)
HCT VFR BLD AUTO: 28.2 % (ref 42–52)
HCT VFR BLD AUTO: 32 % (ref 42–52)
HCT VFR BLD AUTO: 35.1 % (ref 42–52)
HEMOGLOBIN FINGERSTICK, POC: 10 G/DL (ref 14–18)
HEMOGLOBIN FINGERSTICK, POC: 7.1 G/DL (ref 14–18)
HEMOGLOBIN FINGERSTICK, POC: 7.6 G/DL (ref 14–18)
HEMOGLOBIN FINGERSTICK, POC: 8 G/DL (ref 14–18)
HEPARIN UNFRACTIONATED: 0.05 U/ML (ref 0.3–0.7)
HGB BLD-MCNC: 10.9 GM/DL (ref 14–18)
HGB BLD-MCNC: 11.3 GM/DL (ref 14–18)
HGB BLD-MCNC: 7.5 GM/DL (ref 14–18)
HGB BLD-MCNC: 8.8 GM/DL (ref 14–18)
HGB BLD-MCNC: 9.1 GM/DL (ref 14–18)
HGB UR QL STRIP.AUTO: NEGATIVE
KETONES UR QL STRIP.AUTO: NEGATIVE
LEUKOCYTE ESTERASE UR QL STRIP.AUTO: NEGATIVE
MAGNESIUM SERPL-MCNC: 2.9 MG/DL (ref 1.6–2.4)
MCH RBC QN AUTO: 34.1 PG (ref 26–33)
MCH RBC QN AUTO: 34.1 PG (ref 26–33)
MCH RBC QN AUTO: 34.2 PG (ref 26–33)
MCHC RBC AUTO-ENTMCNC: 32.2 GM/DL (ref 32.2–35.5)
MCHC RBC AUTO-ENTMCNC: 32.3 GM/DL (ref 32.2–35.5)
MCHC RBC AUTO-ENTMCNC: 32.5 GM/DL (ref 32.2–35.5)
MCV RBC AUTO: 105.4 FL (ref 80–94)
MCV RBC AUTO: 105.6 FL (ref 80–94)
MCV RBC AUTO: 106 FL (ref 80–94)
METHYLMALONATE SERPL-SCNC: NORMAL
MRSA DNA SPEC QL NAA+PROBE: NEGATIVE
NITRITE UR QL STRIP.AUTO: NEGATIVE
PATIENT BOLUS: NORMAL
PATIENT HEPARIN CONCENTRATION: 0
PATIENT HEPARIN CONCENTRATION: 3
PATIENT HEPARIN CONCENTRATION: 4
PATIENT HEPARIN CONCENTRATION: 4
PCO2 BLDA: 36 MMHG (ref 35–45)
PCO2 BLDA: 37 MMHG (ref 35–45)
PCO2 BLDA: 38 MMHG (ref 35–45)
PCO2 BLDA: 38 MMHG (ref 35–45)
PCO2 BLDA: 40 MMHG (ref 35–45)
PCO2 BLDA: 41 MMHG (ref 35–45)
PCO2 BLDA: 42 MMHG (ref 35–45)
PCO2 TEMP ADJ BLDMV: 41 MMHG (ref 41–51)
PCO2 TEMP ADJ BLDMV: 44 MMHG (ref 41–51)
PEEP SETTING VENT: 10 MMHG
PEEP SETTING VENT: 6 MMHG
PEEP SETTING VENT: 8 MMHG
PH BLDA: 7.26 [PH] (ref 7.35–7.45)
PH BLDA: 7.3 [PH] (ref 7.35–7.45)
PH BLDA: 7.3 [PH] (ref 7.35–7.45)
PH BLDA: 7.32 [PH] (ref 7.35–7.45)
PH BLDA: 7.35 [PH] (ref 7.35–7.45)
PH BLDA: 7.36 [PH] (ref 7.35–7.45)
PH BLDA: 7.38 [PH] (ref 7.35–7.45)
PH BLDA: 7.38 [PH] (ref 7.35–7.45)
PH BLDA: 7.4 [PH] (ref 7.35–7.45)
PH BLDMV: 7.34 [PH] (ref 7.31–7.41)
PH BLDMV: 7.4 [PH] (ref 7.31–7.41)
PH UR STRIP.AUTO: 6 [PH] (ref 5–9)
PIP: 20 CMH2O
PIP: 20 CMH2O
PLATELET # BLD AUTO: 149 THOU/MM3 (ref 130–400)
PLATELET # BLD AUTO: 179 THOU/MM3 (ref 130–400)
PLATELET # BLD AUTO: 240 THOU/MM3 (ref 130–400)
PLATELET # BLD AUTO: 249 THOU/MM3 (ref 130–400)
PLATELET # BLD AUTO: 274 THOU/MM3 (ref 130–400)
PMV BLD AUTO: 10 FL (ref 9.4–12.4)
PMV BLD AUTO: 9.6 FL (ref 9.4–12.4)
PMV BLD AUTO: 9.8 FL (ref 9.4–12.4)
PO2 BLDA: 106 MMHG (ref 71–104)
PO2 BLDA: 221 MMHG (ref 71–104)
PO2 BLDA: 252 MMHG (ref 71–104)
PO2 BLDA: 335 MMHG (ref 71–104)
PO2 BLDA: 63 MMHG (ref 71–104)
PO2 BLDA: 71 MMHG (ref 71–104)
PO2 BLDA: 72 MMHG (ref 71–104)
PO2 BLDA: 75 MMHG (ref 71–104)
PO2 BLDA: 78 MMHG (ref 71–104)
PO2 BLDA: 90 MMHG (ref 71–104)
PO2 BLDA: 90 MMHG (ref 71–104)
PO2 BLDMV: 335 MMHG (ref 25–40)
PO2 BLDMV: 47 MMHG (ref 25–40)
POTASSIUM BLD-SCNC: 3.4 MEQ/L (ref 3.5–4.9)
POTASSIUM BLD-SCNC: 3.5 MEQ/L (ref 3.5–4.9)
POTASSIUM BLD-SCNC: 3.6 MEQ/L (ref 3.5–4.9)
POTASSIUM BLD-SCNC: 3.7 MEQ/L (ref 3.5–4.9)
POTASSIUM BLD-SCNC: 3.8 MEQ/L (ref 3.5–4.9)
POTASSIUM BLD-SCNC: 4.2 MEQ/L (ref 3.5–4.9)
POTASSIUM BLD-SCNC: 4.2 MEQ/L (ref 3.5–4.9)
POTASSIUM SERPL-SCNC: 3.6 MEQ/L (ref 3.5–5.2)
POTASSIUM SERPL-SCNC: 3.7 MEQ/L (ref 3.5–5.2)
POTASSIUM SERPL-SCNC: 4.3 MEQ/L (ref 3.5–5.2)
PRESSURE SUPPORT SETTING VENT: 10 CMH2O
PRESSURE SUPPORT SETTING VENT: 10 CMH2O
PRESSURE SUPPORT SETTING VENT: 14 CMH2O
PROJECTED HEPARIN CONCENTATION: 2
PROT UR STRIP.AUTO-MCNC: NEGATIVE MG/DL
RANGE: NORMAL
RBC # BLD AUTO: 2.57 MILL/MM3 (ref 4.7–6.1)
RBC # BLD AUTO: 2.67 MILL/MM3 (ref 4.7–6.1)
RBC # BLD AUTO: 3.31 MILL/MM3 (ref 4.7–6.1)
S AUREUS DNA SPEC QL NAA+PROBE: NEGATIVE
SAO2 % BLDA: 100 %
SAO2 % BLDA: 89 %
SAO2 % BLDA: 92 %
SAO2 % BLDA: 93 %
SAO2 % BLDA: 94 %
SAO2 % BLDA: 94 %
SAO2 % BLDA: 96 %
SAO2 % BLDA: 97 %
SAO2 % BLDA: 97 %
SAO2 % BLDMV: 100 %
SAO2 % BLDMV: 80 %
SODIUM BLD-SCNC: 143 MEQ/L (ref 138–146)
SODIUM BLD-SCNC: 143 MEQ/L (ref 138–146)
SODIUM BLD-SCNC: 144 MEQ/L (ref 138–146)
SODIUM BLD-SCNC: 145 MEQ/L (ref 138–146)
SODIUM SERPL-SCNC: 143 MEQ/L (ref 135–145)
SODIUM SERPL-SCNC: 144 MEQ/L (ref 135–145)
SP GR UR REFRACT.AUTO: 1.01 (ref 1–1.03)
UROBILINOGEN UR QL STRIP.AUTO: 0.2 EU/DL (ref 0–1)
VANA ISLT/SPM QL: POSITIVE
VENTILATION MODE VENT: ABNORMAL
WBC # BLD AUTO: 14.8 THOU/MM3 (ref 4.8–10.8)
WBC # BLD AUTO: 16.9 THOU/MM3 (ref 4.8–10.8)
WBC # BLD AUTO: 4.9 THOU/MM3 (ref 4.8–10.8)

## 2023-02-28 PROCEDURE — 87070 CULTURE OTHR SPECIMN AEROBIC: CPT

## 2023-02-28 PROCEDURE — 021109W BYPASS CORONARY ARTERY, TWO ARTERIES FROM AORTA WITH AUTOLOGOUS VENOUS TISSUE, OPEN APPROACH: ICD-10-PCS | Performed by: THORACIC SURGERY (CARDIOTHORACIC VASCULAR SURGERY)

## 2023-02-28 PROCEDURE — 6370000000 HC RX 637 (ALT 250 FOR IP): Performed by: PHYSICIAN ASSISTANT

## 2023-02-28 PROCEDURE — 36415 COLL VENOUS BLD VENIPUNCTURE: CPT

## 2023-02-28 PROCEDURE — C1889 IMPLANT/INSERT DEVICE, NOC: HCPCS | Performed by: THORACIC SURGERY (CARDIOTHORACIC VASCULAR SURGERY)

## 2023-02-28 PROCEDURE — 84132 ASSAY OF SERUM POTASSIUM: CPT

## 2023-02-28 PROCEDURE — 71045 X-RAY EXAM CHEST 1 VIEW: CPT

## 2023-02-28 PROCEDURE — 6360000002 HC RX W HCPCS: Performed by: REGISTERED NURSE

## 2023-02-28 PROCEDURE — 3700000001 HC ADD 15 MINUTES (ANESTHESIA): Performed by: THORACIC SURGERY (CARDIOTHORACIC VASCULAR SURGERY)

## 2023-02-28 PROCEDURE — 82947 ASSAY GLUCOSE BLOOD QUANT: CPT

## 2023-02-28 PROCEDURE — 51702 INSERT TEMP BLADDER CATH: CPT

## 2023-02-28 PROCEDURE — 93010 ELECTROCARDIOGRAM REPORT: CPT | Performed by: INTERNAL MEDICINE

## 2023-02-28 PROCEDURE — 6370000000 HC RX 637 (ALT 250 FOR IP)

## 2023-02-28 PROCEDURE — 81003 URINALYSIS AUTO W/O SCOPE: CPT

## 2023-02-28 PROCEDURE — 02100Z9 BYPASS CORONARY ARTERY, ONE ARTERY FROM LEFT INTERNAL MAMMARY, OPEN APPROACH: ICD-10-PCS | Performed by: THORACIC SURGERY (CARDIOTHORACIC VASCULAR SURGERY)

## 2023-02-28 PROCEDURE — 82330 ASSAY OF CALCIUM: CPT

## 2023-02-28 PROCEDURE — 2580000003 HC RX 258: Performed by: PHYSICIAN ASSISTANT

## 2023-02-28 PROCEDURE — A4216 STERILE WATER/SALINE, 10 ML: HCPCS | Performed by: PHYSICIAN ASSISTANT

## 2023-02-28 PROCEDURE — 94660 CPAP INITIATION&MGMT: CPT

## 2023-02-28 PROCEDURE — 85520 HEPARIN ASSAY: CPT

## 2023-02-28 PROCEDURE — 87500 VANOMYCIN DNA AMP PROBE: CPT

## 2023-02-28 PROCEDURE — 3700000000 HC ANESTHESIA ATTENDED CARE: Performed by: THORACIC SURGERY (CARDIOTHORACIC VASCULAR SURGERY)

## 2023-02-28 PROCEDURE — 94640 AIRWAY INHALATION TREATMENT: CPT

## 2023-02-28 PROCEDURE — 6370000000 HC RX 637 (ALT 250 FOR IP): Performed by: REGISTERED NURSE

## 2023-02-28 PROCEDURE — 99291 CRITICAL CARE FIRST HOUR: CPT | Performed by: INTERNAL MEDICINE

## 2023-02-28 PROCEDURE — 6360000002 HC RX W HCPCS: Performed by: INTERNAL MEDICINE

## 2023-02-28 PROCEDURE — 85018 HEMOGLOBIN: CPT

## 2023-02-28 PROCEDURE — 93005 ELECTROCARDIOGRAM TRACING: CPT | Performed by: THORACIC SURGERY (CARDIOTHORACIC VASCULAR SURGERY)

## 2023-02-28 PROCEDURE — 87640 STAPH A DNA AMP PROBE: CPT

## 2023-02-28 PROCEDURE — 2700000000 HC OXYGEN THERAPY PER DAY

## 2023-02-28 PROCEDURE — 6370000000 HC RX 637 (ALT 250 FOR IP): Performed by: THORACIC SURGERY (CARDIOTHORACIC VASCULAR SURGERY)

## 2023-02-28 PROCEDURE — 2709999900 HC NON-CHARGEABLE SUPPLY: Performed by: THORACIC SURGERY (CARDIOTHORACIC VASCULAR SURGERY)

## 2023-02-28 PROCEDURE — 84295 ASSAY OF SERUM SODIUM: CPT

## 2023-02-28 PROCEDURE — 06BY4ZZ EXCISION OF LOWER VEIN, PERCUTANEOUS ENDOSCOPIC APPROACH: ICD-10-PCS | Performed by: THORACIC SURGERY (CARDIOTHORACIC VASCULAR SURGERY)

## 2023-02-28 PROCEDURE — 3600000008 HC SURGERY OHS BASE: Performed by: THORACIC SURGERY (CARDIOTHORACIC VASCULAR SURGERY)

## 2023-02-28 PROCEDURE — 32551 INSERTION OF CHEST TUBE: CPT

## 2023-02-28 PROCEDURE — 2720000010 HC SURG SUPPLY STERILE: Performed by: THORACIC SURGERY (CARDIOTHORACIC VASCULAR SURGERY)

## 2023-02-28 PROCEDURE — 2580000003 HC RX 258: Performed by: REGISTERED NURSE

## 2023-02-28 PROCEDURE — 2500000003 HC RX 250 WO HCPCS: Performed by: PHYSICIAN ASSISTANT

## 2023-02-28 PROCEDURE — 6360000002 HC RX W HCPCS: Performed by: PHYSICIAN ASSISTANT

## 2023-02-28 PROCEDURE — 83735 ASSAY OF MAGNESIUM: CPT

## 2023-02-28 PROCEDURE — 3600000018 HC SURGERY OHS ADDTL 15MIN: Performed by: THORACIC SURGERY (CARDIOTHORACIC VASCULAR SURGERY)

## 2023-02-28 PROCEDURE — 33508 ENDOSCOPIC VEIN HARVEST: CPT | Performed by: THORACIC SURGERY (CARDIOTHORACIC VASCULAR SURGERY)

## 2023-02-28 PROCEDURE — 82948 REAGENT STRIP/BLOOD GLUCOSE: CPT

## 2023-02-28 PROCEDURE — 87086 URINE CULTURE/COLONY COUNT: CPT

## 2023-02-28 PROCEDURE — 2580000003 HC RX 258: Performed by: INTERNAL MEDICINE

## 2023-02-28 PROCEDURE — 33533 CABG ARTERIAL SINGLE: CPT | Performed by: THORACIC SURGERY (CARDIOTHORACIC VASCULAR SURGERY)

## 2023-02-28 PROCEDURE — 5A1221Z PERFORMANCE OF CARDIAC OUTPUT, CONTINUOUS: ICD-10-PCS | Performed by: THORACIC SURGERY (CARDIOTHORACIC VASCULAR SURGERY)

## 2023-02-28 PROCEDURE — 33518 CABG ARTERY-VEIN TWO: CPT | Performed by: THORACIC SURGERY (CARDIOTHORACIC VASCULAR SURGERY)

## 2023-02-28 PROCEDURE — 2500000003 HC RX 250 WO HCPCS: Performed by: REGISTERED NURSE

## 2023-02-28 PROCEDURE — 82803 BLOOD GASES ANY COMBINATION: CPT

## 2023-02-28 PROCEDURE — 2500000003 HC RX 250 WO HCPCS: Performed by: THORACIC SURGERY (CARDIOTHORACIC VASCULAR SURGERY)

## 2023-02-28 PROCEDURE — B24BZZ4 ULTRASONOGRAPHY OF HEART WITH AORTA, TRANSESOPHAGEAL: ICD-10-PCS | Performed by: THORACIC SURGERY (CARDIOTHORACIC VASCULAR SURGERY)

## 2023-02-28 PROCEDURE — 37799 UNLISTED PX VASCULAR SURGERY: CPT

## 2023-02-28 PROCEDURE — 87641 MR-STAPH DNA AMP PROBE: CPT

## 2023-02-28 PROCEDURE — 2000000000 HC ICU R&B

## 2023-02-28 PROCEDURE — 94761 N-INVAS EAR/PLS OXIMETRY MLT: CPT

## 2023-02-28 PROCEDURE — 80048 BASIC METABOLIC PNL TOTAL CA: CPT

## 2023-02-28 PROCEDURE — 85027 COMPLETE CBC AUTOMATED: CPT

## 2023-02-28 PROCEDURE — 94002 VENT MGMT INPAT INIT DAY: CPT

## 2023-02-28 PROCEDURE — 99999 PR OFFICE/OUTPT VISIT,PROCEDURE ONLY: CPT | Performed by: PHYSICIAN ASSISTANT

## 2023-02-28 PROCEDURE — C1729 CATH, DRAINAGE: HCPCS | Performed by: THORACIC SURGERY (CARDIOTHORACIC VASCULAR SURGERY)

## 2023-02-28 DEVICE — SHUNT COR 6FR L1.8CM DIA2MM NONPROGRAMMABLE REG TAPR VLV: Type: IMPLANTABLE DEVICE | Site: HEART | Status: FUNCTIONAL

## 2023-02-28 DEVICE — SHUNT COR L165MM DIA150MM NONPROGRAMMABLE GUIDANT W/O: Type: IMPLANTABLE DEVICE | Site: HEART | Status: FUNCTIONAL

## 2023-02-28 RX ORDER — ALBUTEROL SULFATE 90 UG/1
2 AEROSOL, METERED RESPIRATORY (INHALATION) EVERY 6 HOURS PRN
Status: DISCONTINUED | OUTPATIENT
Start: 2023-02-28 | End: 2023-03-09

## 2023-02-28 RX ORDER — ROCURONIUM BROMIDE 10 MG/ML
INJECTION, SOLUTION INTRAVENOUS PRN
Status: DISCONTINUED | OUTPATIENT
Start: 2023-02-28 | End: 2023-02-28 | Stop reason: SDUPTHER

## 2023-02-28 RX ORDER — OXYCODONE HYDROCHLORIDE 5 MG/1
10 TABLET ORAL EVERY 4 HOURS PRN
Status: DISCONTINUED | OUTPATIENT
Start: 2023-02-28 | End: 2023-03-13

## 2023-02-28 RX ORDER — ATORVASTATIN CALCIUM 40 MG/1
40 TABLET, FILM COATED ORAL NIGHTLY
Status: DISCONTINUED | OUTPATIENT
Start: 2023-03-01 | End: 2023-02-28 | Stop reason: SDUPTHER

## 2023-02-28 RX ORDER — SODIUM CHLORIDE 9 MG/ML
INJECTION, SOLUTION INTRAVENOUS PRN
Status: DISCONTINUED | OUTPATIENT
Start: 2023-02-28 | End: 2023-03-13

## 2023-02-28 RX ORDER — ALBUTEROL SULFATE 2.5 MG/3ML
10 SOLUTION RESPIRATORY (INHALATION) ONCE
Status: COMPLETED | OUTPATIENT
Start: 2023-02-28 | End: 2023-02-28

## 2023-02-28 RX ORDER — SODIUM CHLORIDE 9 MG/ML
INJECTION, SOLUTION INTRAVENOUS CONTINUOUS
Status: DISCONTINUED | OUTPATIENT
Start: 2023-02-28 | End: 2023-03-04

## 2023-02-28 RX ORDER — PROTAMINE SULFATE 10 MG/ML
50 INJECTION, SOLUTION INTRAVENOUS
Status: ACTIVE | OUTPATIENT
Start: 2023-02-28 | End: 2023-03-01

## 2023-02-28 RX ORDER — CHLORHEXIDINE GLUCONATE 4 G/100ML
SOLUTION TOPICAL SEE ADMIN INSTRUCTIONS
Status: DISCONTINUED | OUTPATIENT
Start: 2023-02-28 | End: 2023-02-28

## 2023-02-28 RX ORDER — PROTAMINE SULFATE 10 MG/ML
INJECTION, SOLUTION INTRAVENOUS PRN
Status: DISCONTINUED | OUTPATIENT
Start: 2023-02-28 | End: 2023-02-28 | Stop reason: SDUPTHER

## 2023-02-28 RX ORDER — POTASSIUM CHLORIDE 29.8 MG/ML
20 INJECTION INTRAVENOUS PRN
Status: DISCONTINUED | OUTPATIENT
Start: 2023-02-28 | End: 2023-03-16 | Stop reason: HOSPADM

## 2023-02-28 RX ORDER — METOPROLOL TARTRATE 5 MG/5ML
2.5 INJECTION INTRAVENOUS EVERY 10 MIN PRN
Status: DISCONTINUED | OUTPATIENT
Start: 2023-02-28 | End: 2023-03-16 | Stop reason: HOSPADM

## 2023-02-28 RX ORDER — SENNA AND DOCUSATE SODIUM 50; 8.6 MG/1; MG/1
1 TABLET, FILM COATED ORAL 2 TIMES DAILY
Status: DISCONTINUED | OUTPATIENT
Start: 2023-02-28 | End: 2023-03-16 | Stop reason: HOSPADM

## 2023-02-28 RX ORDER — MULTIVITAMIN WITH IRON
1 TABLET ORAL
Status: DISCONTINUED | OUTPATIENT
Start: 2023-03-01 | End: 2023-02-28 | Stop reason: SDUPTHER

## 2023-02-28 RX ORDER — CEFAZOLIN SODIUM 1 G/3ML
INJECTION, POWDER, FOR SOLUTION INTRAMUSCULAR; INTRAVENOUS PRN
Status: DISCONTINUED | OUTPATIENT
Start: 2023-02-28 | End: 2023-02-28 | Stop reason: SDUPTHER

## 2023-02-28 RX ORDER — MAGNESIUM SULFATE IN WATER 40 MG/ML
2000 INJECTION, SOLUTION INTRAVENOUS PRN
Status: DISCONTINUED | OUTPATIENT
Start: 2023-02-28 | End: 2023-03-16 | Stop reason: HOSPADM

## 2023-02-28 RX ORDER — MIDAZOLAM HYDROCHLORIDE 1 MG/ML
INJECTION INTRAMUSCULAR; INTRAVENOUS PRN
Status: DISCONTINUED | OUTPATIENT
Start: 2023-02-28 | End: 2023-02-28 | Stop reason: SDUPTHER

## 2023-02-28 RX ORDER — DEXMEDETOMIDINE HYDROCHLORIDE 4 UG/ML
.1-1.5 INJECTION, SOLUTION INTRAVENOUS CONTINUOUS
Status: DISCONTINUED | OUTPATIENT
Start: 2023-02-28 | End: 2023-03-01

## 2023-02-28 RX ORDER — ONDANSETRON 2 MG/ML
INJECTION INTRAMUSCULAR; INTRAVENOUS PRN
Status: DISCONTINUED | OUTPATIENT
Start: 2023-02-28 | End: 2023-02-28 | Stop reason: SDUPTHER

## 2023-02-28 RX ORDER — MUSCLE RUB CREAM 100; 150 MG/G; MG/G
CREAM TOPICAL PRN
Status: DISCONTINUED | OUTPATIENT
Start: 2023-02-28 | End: 2023-03-16 | Stop reason: HOSPADM

## 2023-02-28 RX ORDER — EPHEDRINE SULFATE/0.9% NACL/PF 50 MG/5 ML
SYRINGE (ML) INTRAVENOUS PRN
Status: DISCONTINUED | OUTPATIENT
Start: 2023-02-28 | End: 2023-02-28 | Stop reason: SDUPTHER

## 2023-02-28 RX ORDER — ONDANSETRON 4 MG/1
4 TABLET, ORALLY DISINTEGRATING ORAL EVERY 8 HOURS PRN
Status: DISCONTINUED | OUTPATIENT
Start: 2023-02-28 | End: 2023-03-16 | Stop reason: HOSPADM

## 2023-02-28 RX ORDER — DEXAMETHASONE SODIUM PHOSPHATE 10 MG/ML
INJECTION, EMULSION INTRAMUSCULAR; INTRAVENOUS PRN
Status: DISCONTINUED | OUTPATIENT
Start: 2023-02-28 | End: 2023-02-28 | Stop reason: SDUPTHER

## 2023-02-28 RX ORDER — AMIODARONE HYDROCHLORIDE 200 MG/1
200 TABLET ORAL 2 TIMES DAILY
Status: DISCONTINUED | OUTPATIENT
Start: 2023-02-28 | End: 2023-03-04

## 2023-02-28 RX ORDER — POTASSIUM CHLORIDE 7.45 MG/ML
INJECTION INTRAVENOUS PRN
Status: DISCONTINUED | OUTPATIENT
Start: 2023-02-28 | End: 2023-02-28 | Stop reason: SDUPTHER

## 2023-02-28 RX ORDER — ENOXAPARIN SODIUM 100 MG/ML
40 INJECTION SUBCUTANEOUS DAILY
Status: DISCONTINUED | OUTPATIENT
Start: 2023-03-01 | End: 2023-03-16 | Stop reason: HOSPADM

## 2023-02-28 RX ORDER — SODIUM CHLORIDE 0.9 % (FLUSH) 0.9 %
5-40 SYRINGE (ML) INJECTION EVERY 12 HOURS SCHEDULED
Status: DISCONTINUED | OUTPATIENT
Start: 2023-02-28 | End: 2023-03-16 | Stop reason: HOSPADM

## 2023-02-28 RX ORDER — AMINOCAPROIC ACID 250 MG/ML
INJECTION, SOLUTION INTRAVENOUS PRN
Status: DISCONTINUED | OUTPATIENT
Start: 2023-02-28 | End: 2023-02-28 | Stop reason: SDUPTHER

## 2023-02-28 RX ORDER — FENTANYL CITRATE 50 UG/ML
INJECTION, SOLUTION INTRAMUSCULAR; INTRAVENOUS PRN
Status: DISCONTINUED | OUTPATIENT
Start: 2023-02-28 | End: 2023-02-28 | Stop reason: SDUPTHER

## 2023-02-28 RX ORDER — SENNA PLUS 8.6 MG/1
1 TABLET ORAL DAILY PRN
Status: DISCONTINUED | OUTPATIENT
Start: 2023-02-28 | End: 2023-03-16 | Stop reason: HOSPADM

## 2023-02-28 RX ORDER — ALPRAZOLAM 0.5 MG/1
0.5 TABLET ORAL ONCE
Status: COMPLETED | OUTPATIENT
Start: 2023-02-28 | End: 2023-02-28

## 2023-02-28 RX ORDER — 0.9 % SODIUM CHLORIDE 0.9 %
500 INTRAVENOUS SOLUTION INTRAVENOUS CONTINUOUS PRN
Status: DISCONTINUED | OUTPATIENT
Start: 2023-02-28 | End: 2023-03-13

## 2023-02-28 RX ORDER — SODIUM CHLORIDE 0.9 % (FLUSH) 0.9 %
5-40 SYRINGE (ML) INJECTION PRN
Status: DISCONTINUED | OUTPATIENT
Start: 2023-02-28 | End: 2023-03-16 | Stop reason: HOSPADM

## 2023-02-28 RX ORDER — OXYCODONE HYDROCHLORIDE 5 MG/1
5 TABLET ORAL EVERY 4 HOURS PRN
Status: DISCONTINUED | OUTPATIENT
Start: 2023-02-28 | End: 2023-03-13

## 2023-02-28 RX ORDER — CHLORHEXIDINE GLUCONATE 0.12 MG/ML
15 RINSE ORAL ONCE
Status: COMPLETED | OUTPATIENT
Start: 2023-02-28 | End: 2023-02-28

## 2023-02-28 RX ORDER — ACETAMINOPHEN 325 MG/1
650 TABLET ORAL EVERY 4 HOURS PRN
Status: DISCONTINUED | OUTPATIENT
Start: 2023-02-28 | End: 2023-03-16 | Stop reason: HOSPADM

## 2023-02-28 RX ORDER — DEXTROSE MONOHYDRATE 100 MG/ML
INJECTION, SOLUTION INTRAVENOUS CONTINUOUS PRN
Status: DISCONTINUED | OUTPATIENT
Start: 2023-02-28 | End: 2023-03-13

## 2023-02-28 RX ORDER — ONDANSETRON 2 MG/ML
4 INJECTION INTRAMUSCULAR; INTRAVENOUS EVERY 6 HOURS PRN
Status: DISCONTINUED | OUTPATIENT
Start: 2023-02-28 | End: 2023-03-16 | Stop reason: HOSPADM

## 2023-02-28 RX ORDER — HEPARIN SODIUM 1000 [USP'U]/ML
INJECTION, SOLUTION INTRAVENOUS; SUBCUTANEOUS PRN
Status: DISCONTINUED | OUTPATIENT
Start: 2023-02-28 | End: 2023-02-28 | Stop reason: SDUPTHER

## 2023-02-28 RX ORDER — MORPHINE SULFATE 2 MG/ML
2 INJECTION, SOLUTION INTRAMUSCULAR; INTRAVENOUS
Status: DISCONTINUED | OUTPATIENT
Start: 2023-02-28 | End: 2023-03-03

## 2023-02-28 RX ORDER — FAMOTIDINE 20 MG/1
20 TABLET, FILM COATED ORAL 2 TIMES DAILY
Status: DISCONTINUED | OUTPATIENT
Start: 2023-02-28 | End: 2023-03-16 | Stop reason: HOSPADM

## 2023-02-28 RX ORDER — SODIUM CHLORIDE, SODIUM GLUCONATE, SODIUM ACETATE, POTASSIUM CHLORIDE AND MAGNESIUM CHLORIDE 526; 502; 368; 37; 30 MG/100ML; MG/100ML; MG/100ML; MG/100ML; MG/100ML
INJECTION, SOLUTION INTRAVENOUS CONTINUOUS PRN
Status: DISCONTINUED | OUTPATIENT
Start: 2023-02-28 | End: 2023-02-28 | Stop reason: SDUPTHER

## 2023-02-28 RX ORDER — PROPOFOL 10 MG/ML
INJECTION, EMULSION INTRAVENOUS PRN
Status: DISCONTINUED | OUTPATIENT
Start: 2023-02-28 | End: 2023-02-28 | Stop reason: SDUPTHER

## 2023-02-28 RX ADMIN — Medication 10 MG: at 08:55

## 2023-02-28 RX ADMIN — CEFAZOLIN 2000 MG: 10 INJECTION, POWDER, FOR SOLUTION INTRAVENOUS at 19:40

## 2023-02-28 RX ADMIN — SODIUM CHLORIDE: 9 INJECTION, SOLUTION INTRAVENOUS at 05:20

## 2023-02-28 RX ADMIN — PROPOFOL 130 MG: 10 INJECTION, EMULSION INTRAVENOUS at 07:55

## 2023-02-28 RX ADMIN — METOPROLOL TARTRATE 12.5 MG: 25 TABLET, FILM COATED ORAL at 06:19

## 2023-02-28 RX ADMIN — OXYCODONE HYDROCHLORIDE 5 MG: 5 TABLET ORAL at 17:10

## 2023-02-28 RX ADMIN — FENTANYL CITRATE 100 MCG: 50 INJECTION INTRAMUSCULAR; INTRAVENOUS at 09:02

## 2023-02-28 RX ADMIN — SODIUM CHLORIDE, PRESERVATIVE FREE 20 MG: 5 INJECTION INTRAVENOUS at 19:50

## 2023-02-28 RX ADMIN — Medication 10 MG: at 09:25

## 2023-02-28 RX ADMIN — SODIUM CHLORIDE 2 UNITS/HR: 9 INJECTION, SOLUTION INTRAVENOUS at 12:15

## 2023-02-28 RX ADMIN — FENTANYL CITRATE 100 MCG: 50 INJECTION INTRAMUSCULAR; INTRAVENOUS at 08:49

## 2023-02-28 RX ADMIN — Medication 10 MG: at 09:49

## 2023-02-28 RX ADMIN — POTASSIUM CHLORIDE 20 MEQ: 29.8 INJECTION, SOLUTION INTRAVENOUS at 14:29

## 2023-02-28 RX ADMIN — ROCURONIUM BROMIDE 50 MG: 10 INJECTION, SOLUTION INTRAVENOUS at 09:39

## 2023-02-28 RX ADMIN — MORPHINE SULFATE 2 MG: 2 INJECTION, SOLUTION INTRAMUSCULAR; INTRAVENOUS at 13:10

## 2023-02-28 RX ADMIN — ALPRAZOLAM 0.5 MG: 0.5 TABLET ORAL at 21:33

## 2023-02-28 RX ADMIN — EPINEPHRINE 2 MCG/MIN: 1 INJECTION INTRAMUSCULAR; INTRAVENOUS; SUBCUTANEOUS at 11:14

## 2023-02-28 RX ADMIN — SODIUM CHLORIDE: 9 INJECTION, SOLUTION INTRAVENOUS at 12:27

## 2023-02-28 RX ADMIN — CEFAZOLIN 2000 MG: 10 INJECTION, POWDER, FOR SOLUTION INTRAVENOUS at 08:45

## 2023-02-28 RX ADMIN — POTASSIUM CHLORIDE 10 MEQ: 7.46 INJECTION, SOLUTION INTRAVENOUS at 12:02

## 2023-02-28 RX ADMIN — Medication 10 MG: at 08:59

## 2023-02-28 RX ADMIN — POTASSIUM CHLORIDE 10 MEQ: 7.46 INJECTION, SOLUTION INTRAVENOUS at 11:35

## 2023-02-28 RX ADMIN — ALBUTEROL SULFATE 10 MG: 2.5 SOLUTION RESPIRATORY (INHALATION) at 13:19

## 2023-02-28 RX ADMIN — Medication 0.5 MCG/KG/HR: at 22:04

## 2023-02-28 RX ADMIN — Medication 3 UNITS: at 10:36

## 2023-02-28 RX ADMIN — MIDAZOLAM 2 MG: 1 INJECTION INTRAMUSCULAR; INTRAVENOUS at 07:46

## 2023-02-28 RX ADMIN — ONDANSETRON 4 MG: 2 INJECTION INTRAMUSCULAR; INTRAVENOUS at 08:01

## 2023-02-28 RX ADMIN — DESMOPRESSIN ACETATE 24 MCG: 4 INJECTION INTRAVENOUS; SUBCUTANEOUS at 11:13

## 2023-02-28 RX ADMIN — PROTAMINE SULFATE 212 MG: 10 INJECTION, SOLUTION INTRAVENOUS at 11:06

## 2023-02-28 RX ADMIN — CALCIUM CHLORIDE 1000 MG: 100 INJECTION, SOLUTION INTRAVENOUS at 19:37

## 2023-02-28 RX ADMIN — MORPHINE SULFATE 2 MG: 2 INJECTION, SOLUTION INTRAMUSCULAR; INTRAVENOUS at 20:17

## 2023-02-28 RX ADMIN — HYDROMORPHONE HYDROCHLORIDE 0.5 MG: 1 INJECTION, SOLUTION INTRAMUSCULAR; INTRAVENOUS; SUBCUTANEOUS at 14:14

## 2023-02-28 RX ADMIN — SENNOSIDES AND DOCUSATE SODIUM 1 TABLET: 50; 8.6 TABLET ORAL at 19:50

## 2023-02-28 RX ADMIN — SODIUM CHLORIDE, SODIUM GLUCONATE, SODIUM ACETATE, POTASSIUM CHLORIDE AND MAGNESIUM CHLORIDE: 526; 502; 368; 37; 30 INJECTION, SOLUTION INTRAVENOUS at 08:30

## 2023-02-28 RX ADMIN — MORPHINE SULFATE 2 MG: 2 INJECTION, SOLUTION INTRAMUSCULAR; INTRAVENOUS at 18:31

## 2023-02-28 RX ADMIN — OXYCODONE HYDROCHLORIDE 10 MG: 5 TABLET ORAL at 21:10

## 2023-02-28 RX ADMIN — RISPERIDONE 0.5 MG: 0.25 TABLET, FILM COATED ORAL at 21:33

## 2023-02-28 RX ADMIN — AMINOCAPROIC ACID 5000 MG: 250 INJECTION, SOLUTION INTRAVENOUS at 11:12

## 2023-02-28 RX ADMIN — DEXAMETHASONE SODIUM PHOSPHATE 8 MG: 10 INJECTION, EMULSION INTRAMUSCULAR; INTRAVENOUS at 08:01

## 2023-02-28 RX ADMIN — AMIODARONE HYDROCHLORIDE 200 MG: 200 TABLET ORAL at 06:20

## 2023-02-28 RX ADMIN — Medication 60 MG: at 07:55

## 2023-02-28 RX ADMIN — Medication 10 MG: at 09:35

## 2023-02-28 RX ADMIN — MORPHINE SULFATE 2 MG: 2 INJECTION, SOLUTION INTRAMUSCULAR; INTRAVENOUS at 14:02

## 2023-02-28 RX ADMIN — SODIUM BICARBONATE 50 MEQ: 84 INJECTION INTRAVENOUS at 14:53

## 2023-02-28 RX ADMIN — HEPARIN SODIUM 33000 UNITS: 1000 INJECTION INTRAVENOUS; SUBCUTANEOUS at 09:37

## 2023-02-28 RX ADMIN — FENTANYL CITRATE 50 MCG: 50 INJECTION INTRAMUSCULAR; INTRAVENOUS at 11:09

## 2023-02-28 RX ADMIN — MAGNESIUM SULFATE HEPTAHYDRATE 1500 MG: 500 INJECTION, SOLUTION INTRAMUSCULAR; INTRAVENOUS at 05:19

## 2023-02-28 RX ADMIN — POTASSIUM BICARBONATE 20 MEQ: 782 TABLET, EFFERVESCENT ORAL at 18:29

## 2023-02-28 RX ADMIN — Medication 0.5 MCG/KG/HR: at 14:59

## 2023-02-28 RX ADMIN — SODIUM BICARBONATE 50 MEQ: 84 INJECTION INTRAVENOUS at 13:12

## 2023-02-28 RX ADMIN — SODIUM CHLORIDE, PRESERVATIVE FREE 10 ML: 5 INJECTION INTRAVENOUS at 19:51

## 2023-02-28 RX ADMIN — CEFAZOLIN 2 G: 1 INJECTION, POWDER, FOR SOLUTION INTRAMUSCULAR; INTRAVENOUS at 11:21

## 2023-02-28 RX ADMIN — ROCURONIUM BROMIDE 100 MG: 10 INJECTION, SOLUTION INTRAVENOUS at 07:55

## 2023-02-28 RX ADMIN — CALCIUM CHLORIDE 1000 MG: 100 INJECTION, SOLUTION INTRAVENOUS at 14:50

## 2023-02-28 RX ADMIN — MORPHINE SULFATE 2 MG: 2 INJECTION, SOLUTION INTRAMUSCULAR; INTRAVENOUS at 16:32

## 2023-02-28 RX ADMIN — MIDAZOLAM 3 MG: 1 INJECTION INTRAMUSCULAR; INTRAVENOUS at 11:02

## 2023-02-28 RX ADMIN — AMINOCAPROIC ACID 5000 MG: 250 INJECTION, SOLUTION INTRAVENOUS at 08:31

## 2023-02-28 RX ADMIN — POTASSIUM CHLORIDE 20 MEQ: 29.8 INJECTION, SOLUTION INTRAVENOUS at 13:42

## 2023-02-28 RX ADMIN — 0.12% CHLORHEXIDINE GLUCONATE 15 ML: 1.2 RINSE ORAL at 06:21

## 2023-02-28 RX ADMIN — MORPHINE SULFATE 2 MG: 2 INJECTION, SOLUTION INTRAMUSCULAR; INTRAVENOUS at 15:02

## 2023-02-28 RX ADMIN — AMINOCAPROIC ACID 1 G/HR: 250 INJECTION, SOLUTION INTRAVENOUS at 11:52

## 2023-02-28 RX ADMIN — SODIUM BICARBONATE 50 MEQ: 84 INJECTION INTRAVENOUS at 16:32

## 2023-02-28 RX ADMIN — Medication 4 UNITS/HR: at 10:36

## 2023-02-28 RX ADMIN — Medication 4 MCG/MIN: at 12:15

## 2023-02-28 RX ADMIN — ROCURONIUM BROMIDE 50 MG: 10 INJECTION, SOLUTION INTRAVENOUS at 10:01

## 2023-02-28 RX ADMIN — SODIUM BICARBONATE 50 MEQ: 84 INJECTION INTRAVENOUS at 14:01

## 2023-02-28 RX ADMIN — SODIUM BICARBONATE 50 MEQ: 84 INJECTION INTRAVENOUS at 15:37

## 2023-02-28 RX ADMIN — AMIODARONE HYDROCHLORIDE 200 MG: 200 TABLET ORAL at 19:50

## 2023-02-28 RX ADMIN — POTASSIUM CHLORIDE 20 MEQ: 29.8 INJECTION, SOLUTION INTRAVENOUS at 18:30

## 2023-02-28 ASSESSMENT — PAIN - FUNCTIONAL ASSESSMENT
PAIN_FUNCTIONAL_ASSESSMENT: PREVENTS OR INTERFERES WITH MANY ACTIVE NOT PASSIVE ACTIVITIES
PAIN_FUNCTIONAL_ASSESSMENT: PREVENTS OR INTERFERES SOME ACTIVE ACTIVITIES AND ADLS

## 2023-02-28 ASSESSMENT — PAIN DESCRIPTION - FREQUENCY: FREQUENCY: CONTINUOUS

## 2023-02-28 ASSESSMENT — PAIN DESCRIPTION - ORIENTATION
ORIENTATION: LOWER
ORIENTATION: MID

## 2023-02-28 ASSESSMENT — PAIN SCALES - GENERAL
PAINLEVEL_OUTOF10: 7
PAINLEVEL_OUTOF10: 6
PAINLEVEL_OUTOF10: 7
PAINLEVEL_OUTOF10: 6
PAINLEVEL_OUTOF10: 5

## 2023-02-28 ASSESSMENT — PULMONARY FUNCTION TESTS
PIF_VALUE: 18
PIF_VALUE: 19

## 2023-02-28 ASSESSMENT — PAIN DESCRIPTION - DESCRIPTORS
DESCRIPTORS: ACHING;DISCOMFORT;SORE
DESCRIPTORS: ACHING;DISCOMFORT;SORE

## 2023-02-28 ASSESSMENT — PAIN DESCRIPTION - LOCATION
LOCATION: CHEST;INCISION
LOCATION: BACK

## 2023-02-28 ASSESSMENT — PAIN DESCRIPTION - ONSET
ONSET: ON-GOING
ONSET: GRADUAL

## 2023-02-28 ASSESSMENT — PAIN DESCRIPTION - PAIN TYPE
TYPE: SURGICAL PAIN
TYPE: CHRONIC PAIN

## 2023-02-28 NOTE — ANESTHESIA PROCEDURE NOTES
Arterial Line:    An arterial line was placed using surface landmarks, in the OR for the following indication(s): continuous blood pressure monitoring and blood sampling needed. A 20 gauge (size), 1 and 1/4 inch (length), Arrow (type) catheter was placed, Seldinger technique used, into the left radial artery, secured by suture, tape and Tegaderm. Anesthesia type: Local  Local infiltration: Injection    Events:  patient tolerated procedure well with no complications and EBL < 5mL. 2/28/2023 7:50 AM2/28/2023 7:55 AM  Anesthesiologist: Negro Hernandez MD  Performed: Anesthesiologist   Preanesthetic Checklist  Completed: patient identified, IV checked, site marked, risks and benefits discussed, surgical/procedural consents, equipment checked, pre-op evaluation, timeout performed, anesthesia consent given, oxygen available, monitors applied/VS acknowledged, fire risk safety assessment completed and verbalized and blood product R/B/A discussed and consented

## 2023-02-28 NOTE — PROGRESS NOTES
Pt daughter took pt belongings home with her. Only item left in room is pt's dentures. Dentures in denture cup with pt chart label placed by sink in room.

## 2023-02-28 NOTE — OP NOTE
Operative Note      Patient: Abeba Ham  YOB: 1940  MRN: [id number]    Date of Procedure: 2/28/2023    Pre-Op Diagnosis: Multivessel coronary artery disease    Post-Op Diagnosis: Same       Procedure(s):  CABG STONEY,  coronary artery bypass grafting x3 using LIMA to LAD, vein graft to to's marginal 1, vein graft to posterior descending artery, transesophageal echocardiography, endoscopic vein harvesting    Surgeon(s):  Jace Reyes MD    Assistant:   * No surgical staff found *    Anesthesia: General    Estimated Blood Loss (mL): Minimal    Complications: None    Specimens:   * No specimens in log *    Implants:  Implant Name Type Inv.  Item Serial No.  Lot No. LRB No. Used Action   SHUNT COR [id number] [id number] NONPROGRAMMABLE GUIDANT W/O - [id number]  SHUNT COR L165MM [id number] NONPROGRAMMABLE GUIDANT W/O  [employer][id number] N/A 1 Implanted   SHUNT COR 6FR L1.8CM DIA2MM NONPROGRAMMABLE REG TAPR VLV - [id number]  SHUNT COR 6FR L1.8CM DIA2MM NONPROGRAMMABLE REG TAPR VLV  [employer][id number] N/A 1 Implanted         Drains:   Chest Tube Right;Left Pleural 2 (Active)   $ Chest tube insertion $ Yes 02/28/23 1215   Chest Tube Airleak No 02/28/23 1600   Status Continuous Suction 02/28/23 1600   Suction -20 cm H2O 02/28/23 1600   Y Connector Used Yes 02/28/23 1600   Measurement at Chest Wall  100 02/28/23 1215   Drainage Description Dark red 02/28/23 1600   Dressing Status Clean, dry & intact 02/28/23 1600   Chest Tube Dressing Dry 02/28/23 1600   Site Assessment Not assessed 02/28/23 1600   Surrounding Skin Unable to view 02/28/23 1600   Output (ml) 30 ml 02/28/23 1500       Chest Tube Anterior Mediastinal 2 (Active)   $ Chest tube insertion $ Yes 02/28/23 1215   Chest Tube Airleak No 02/28/23 1600   Status Continuous Suction 02/28/23 1600   Suction -20 cm H2O 02/28/23 1600   Y Connector Used Yes 02/28/23 1600   Measurement at Chest Wall  100 02/28/23 1215   Drainage Description Dark red 02/28/23 1600   Dressing Status Clean, dry & intact 02/28/23 1600   Chest Tube Dressing Dry 02/28/23 1600   Site Assessment Not assessed 02/28/23 1600   Surrounding Skin Unable to view 02/28/23 1600   Output (ml) 40 ml 02/28/23 1500       Urinary Catheter 02/28/23 [place]-Temperature (Active)   $ Urethral catheter insertion $ Not inserted for procedure 02/28/23 1215   Catheter Indications Need for fluid volume management of the critically ill patient in a critical care setting 02/28/23 1600   Site Assessment [facility] 02/28/23 1600   Urine Color Yellow 02/28/23 1600   Urine Appearance Clear 02/28/23 1600   Collection Container Standard 02/28/23 1600   Securement Method Securing device (Describe) 02/28/23 1600   Catheter Care  Soap and water 02/28/23 1215   Catheter Best Practices  Drainage tube clipped to bed;Catheter secured to thigh; Tamper seal intact; Bag below bladder;Bag not on floor; Lack of dependent loop in tubing 02/28/23 1600   Status Draining 02/28/23 1600   Output (mL) 35 mL 02/28/23 1500       [REMOVED] Closed/Suction Drain Mediastinal  (Removed)       [REMOVED] Closed/Suction Drain Left;Right Pleural  (Removed)       [REMOVED] Urinary Catheter (Removed)   Catheter Indications Urinary retention (acute or chronic), continuous bladder irrigation or bladder outlet obstruction 02/23/23 2131   Site Assessment [facility] 02/23/23 2131   Urine Color Yellow 02/23/23 2131   Urine Appearance Clear 02/23/23 2131   Collection Container Standard 02/23/23 2131   Securement Method Securing device (Describe) 02/23/23 2131   Catheter Care  Perineal wipes 02/23/23 2131       Findings: See report    Detailed Description of Procedure:   Patient taken to the OR, placed on the table in supine position, prepped and draped in usual sterile manner, appropriate monitoring lines placed, timeout performed.   Transesophageal echocardiography was performed and revealed preserved ventricular function there was mild mitral regurgitation and mild to moderate tricuspid regurgitation. Median sternotomy was performed while the vein was harvested endoscopically from the right lower extremity. Cardiopulmonary bypass instituted. The procedure was done on the beating heart with pump assist.  Attention first turned towards the posterior descending coronary artery which was a 2 mm vessel; it was grafted end-to-side with a running 7-0 Prolene suture using vein graft. The heart was stabilized with the Acrobat stabilizing device. A shunt was used. The procedure was done on the beating heart with pump assist.  All anastomoses completed in a similar fashion. Next attention turned towards the obtuse marginal 1, this was a smaller vessel 1.5 mm. It was grafted with vein graft. The LAD was a 2 mm vessel. It was grafted in its midportion with the left internal mammary artery as a pedicled graft. A partial-occlusion clamp was applied to the aorta and a punch used to make 2 aortotomies. Both vein grafts were attached directly to the aorta end-to-side with a running 6-0 Prolene suture. Patient weaned from cardiopulmonary bypass on a small dose of pressors. Protamine was administered. Large pleural effusions were found in both chest cavities and these were evacuated. Marcell Ovalles drain was placed in the left chest the right chest, anterior mediastinum and posterior mediastinum. 4 drains in all. The wound was irrigated with antibiotic solution. A final check for hemostasis was found to be excellent. Sternum was approximated with figure-of-eight wires. Patient was taken to the ICU in stable condition tolerated the procedure well.       Electronically signed by Rika Powell MD on 8/39/2768 at 5:30 PM

## 2023-02-28 NOTE — ANESTHESIA PROCEDURE NOTES
Central Venous Line:    A central venous line was placed using ultrasound guidance, in the OR for the following indication(s): central venous access and CVP monitoring. 2/28/2023 8:10 AM2/28/2023 8:20 AM    Sterility preparation included the following: hand hygiene performed prior to procedure, maximum sterile barriers used and sterile technique used to drape from head to toe. The patient was placed in Trendelenburg position. The right internal jugular vein was prepped. The site was prepped with Chloraprep. A 9 Fr (size), 10 (length), introducer single lumen was placed. During the procedure, the following specific steps were taken: target vein identified, needle advanced into vein and blood aspirated and guidewire advanced into vein. Intravenous verification was obtained by venous blood return. Post insertion care included: all ports aspirated, all ports flushed easily, guidewire removed intact, Biopatch applied, line sutured in place and dressing applied. Insertion site scrubbed per usage guidelines?: Yes  Skin prep agent dried for 3 minutes prior to procedure?:yes  Outcomes: uncomplicated and patient tolerated procedure wellno  Anesthesia type: general 7.5 (size) Pulmonary Artery Catheter (PAC) was placed through the Introducer CVL in the right internal jugular vein. The PAC placement was confirmed by pressure tracing changes and secured at 50 cm (depth). The patient experienced the following events during the procedure: patient tolerated procedure well with no complications. PA Cath placed?: Yes  Staffing  Anesthesiologist: Kat Quezada MD  Preanesthetic Checklist  Completed: patient identified, IV checked, site marked, risks and benefits discussed, surgical/procedural consents, equipment checked, pre-op evaluation, timeout performed, anesthesia consent given, oxygen available, monitors applied/VS acknowledged, fire risk safety assessment completed and verbalized and blood product R/B/A discussed and consented

## 2023-02-28 NOTE — ANESTHESIA PROCEDURE NOTES
Procedure Performed: STONEY       Start Time:  2/28/2023 8:30 AM       End Time:   2/28/2023 11:15 AM    Preanesthesia Checklist:  Patient identified, IV assessed, risks and benefits discussed, monitors and equipment assessed, procedure being performed at surgeon's request and anesthesia consent obtained. General Procedure Information  Diagnostic Indications for Echo:  hemodynamic monitoring and assessment of valve function  Physician Requesting Echo: Miley Hammonds MD  CPT Code:  [id number]  Location performed:  OR  Intubated  Bite block placed  Heart visualized  Probe Insertion:  Easy  Probe Type:  3D  Modalities:  2D, color flow mapping, continuous wave Doppler, pulse wave Doppler and M-mode        Anesthesia Information  Performed with CRNAs  Anesthesiologist:  Taylor Barba MD      Echocardiogram Comments:       INTRA OP STONEY  INSERTED WELL LUBRICATED 3 D  STONEY PROBE.  EASY ATRAUMATIC INSERTION. COMPREHENSIVE STUDY ACQUIRED. AORTIC VALVE TRICUSPID NO AV STENOSIS, AV AREA 2.6 CM SQ  NO AI , NO ASCENDING AORTIC ANEURYSM. MITRAL VALVE STRUCTURALLY NORMAL NO MV STENOSIS. TRACE MR.  TRICUSPID VALVE MILD TO MODERATE TR. TN ANULUS DIAMETER 3.7 CM  PULMONARY VALVE TRACE PULMONARY INSUFFICIENCY. LV FUNCTION NO RWMA, EF 57%  NOTED BILATERAL PLURAL EFFUSION. NOTED SMALL PERICARDIAL EFFUSION 0. 5 CM  FINDINGS DISCUSSED WITH DR RUBIN  POST CPB  ASSISTED WEAN OFF CPB. LV FUNCTION NO RWMA, EF 60%  STROKE VOLUME 74 ML , CO = 74 X 70 =5.1 L/MIN  MITRAL VALVE MILD MR  TRICUSPID VALVE MODERATE TR  OTHER FINDINGS UNCHANGED . FINDINGS DISCUSSED WITH DR Milan Shah.

## 2023-02-28 NOTE — ANESTHESIA PRE PROCEDURE
Department of Anesthesiology  Preprocedure Note       Name:  Patricia Najera   Age:  81 y.o.  :  1940                                          MRN:  [id number]         Date:  2023      Surgeon: Renetat Holden):  Rosemarie Alvarez MD    Procedure: Procedure(s):  CABG STONEY    Medications prior to admission:   Prior to Admission medications    Medication Sig Start Date End Date Taking? Authorizing Provider   risperiDONE (RISPERDAL) 0.5 MG tablet Take 0.5 mg by mouth 2 times daily   Yes Historical Provider, MD   escitalopram (LEXAPRO) 20 MG tablet Take 20 mg by mouth daily   Yes Historical Provider, MD   levothyroxine (SYNTHROID) 50 MCG tablet Take 50 mcg by mouth Daily    Historical Provider, MD   acetaminophen 650 MG TABS Take 650 mg by mouth every 6 hours as needed. 3/4/14   Tony Croft MD   carBAMazepine (TEGRETOL XR) 200 MG SR tablet Take 600 mg by mouth 2 times daily    Historical Provider, MD   clopidogrel (PLAVIX) 75 MG tablet Take 75 mg by mouth daily. Historical Provider, MD   atorvastatin (LIPITOR) 40 MG tablet Take 40 mg by mouth daily. Historical Provider, MD   therapeutic multivitamin-minerals (THERAGRAN-M) tablet Take 1 tablet by mouth daily.     Historical Provider, MD       Current medications:    Current Facility-Administered Medications   Medication Dose Route Frequency Provider Last Rate Last Admin    ceFAZolin (ANCEF) 2000 mg in 0.9% sodium chloride 50 mL IVPB  2,000 mg IntraVENous On Call to [address] [place], [place] [address]MERRITT        chlorhexidine (HIBICLENS) 4 % liquid   Topical See Admin Instructions Yolis Jones PA-C        iopamidol (ISOVUE-370) 76 % injection 80 mL  80 mL IntraVENous ONCE PRN Yolis Jones PA-C        sodium chloride flush 0.9 % injection 10 mL  10 mL IntraVENous 2 times per day Yolis Jones PA-C   10 mL at 23    sodium chloride flush 0.9 % injection 10 mL  10 mL IntraVENous PRN Yolis Jones PA-C        0.9 % sodium chloride infusion   IntraVENous PRN Javad Huizar Francesco Parkinson PA-C        amiodarone (CORDARONE) tablet 200 mg  200 mg Oral TID Ange Krause PA-C   200 mg at 02/28/23 9831    metoprolol tartrate (LOPRESSOR) tablet 12.5 mg  12.5 mg Oral BID Ange Krause PA-C   12.5 mg at 02/28/23 3010    sodium chloride flush 0.9 % injection 5-40 mL  5-40 mL IntraVENous 2 times per day Enmanuel Walker MD   10 mL at 02/24/23 0829    sodium chloride flush 0.9 % injection 5-40 mL  5-40 mL IntraVENous PRN Enmanuel Walker MD        0.9 % sodium chloride infusion   IntraVENous PRN Enmanuel Walker MD        0.9 % sodium chloride infusion   IntraVENous Continuous Enmanuel Walker MD 75 mL/hr at 02/28/23 0520 New Bag at 02/28/23 0520    sodium chloride flush 0.9 % injection 5-40 mL  5-40 mL IntraVENous 2 times per day Enmanuel Walker MD   10 mL at 02/27/23 2021    sodium chloride flush 0.9 % injection 5-40 mL  5-40 mL IntraVENous PRN Enmanuel Walker MD   10 mL at 02/26/23 2021    0.9 % sodium chloride infusion   IntraVENous PRN Enmanuel Walker MD        heparin (porcine) injection 4,000 Units  4,000 Units IntraVENous PRN Luzmaria Almaguer MD        heparin (porcine) injection 2,000 Units  2,000 Units IntraVENous PRN Luzmaria Almaguer MD   2,000 Units at 02/25/23 2032    heparin 25,000 units in dextrose 5% 250 mL (premix) infusion  5-30 Units/kg/hr IntraVENous Continuous Luzmaria Almaguer MD   Stopped at 02/28/23 0300    albuterol (PROVENTIL) nebulizer solution 2.5 mg  2.5 mg Nebulization Q4H PRN Sweta Krueger MD        And    ipratropium (ATROVENT) 0.02 % nebulizer solution 0.5 mg  0.5 mg Nebulization Q4H PRN Sweta Krueger MD        sodium chloride flush 0.9 % injection 5-40 mL  5-40 mL IntraVENous 2 times per day ANGELIC Hernandez - CNP   10 mL at 02/22/23 2013    sodium chloride flush 0.9 % injection 5-40 mL  5-40 mL IntraVENous PRN Bob Silk, APRN - CNP        0.9 % sodium chloride infusion   IntraVENous PRN Paulino Mitchell, APRN - CNP        ondansetron (ZOFRAN-ODT) disintegrating tablet 4 mg  4 mg Oral Q8H PRN Clance Minor, APRN - CNP        Or    ondansetron [facility]) injection 4 mg  4 mg IntraVENous Q6H PRN Clance Minor, APRN - CNP        acetaminophen (TYLENOL) tablet 650 mg  650 mg Oral Q6H PRN Clance Minor, APRN - CNP   650 mg at 02/26/23 2019    Or    acetaminophen (TYLENOL) suppository 650 mg  650 mg Rectal Q6H PRN Clance Minor, APRN - CNP        polyethylene glycol (GLYCOLAX) packet 17 g  17 g Oral Daily PRN Clance Minor, APRN - CNP        aspirin chewable tablet 81 mg  81 mg Oral Daily Clance Minor, APRN - CNP   81 mg at 02/27/23 6256    perflutren lipid microspheres (DEFINITY) injection 1.5 mL  1.5 mL IntraVENous ONCE PRN Clance Minor, APRN - CNP        atorvastatin (LIPITOR) tablet 40 mg  40 mg Oral Daily Clance Minor, APRN - CNP   40 mg at 02/27/23 8297    carBAMazepine (TEGRETOL XR) extended release tablet 600 mg  600 mg Oral BID Clance Minor, APRN - CNP   600 mg at 02/27/23 2024    escitalopram (LEXAPRO) tablet 20 mg  20 mg Oral Daily Clance Minor, APRN - CNP   20 mg at 02/27/23 9465    levothyroxine (SYNTHROID) tablet 50 mcg  50 mcg Oral Daily Clance Minor, APRN - CNP   50 mcg at 02/27/23 7679    risperiDONE (RISPERDAL) tablet 0.5 mg  0.5 mg Oral BID Clance Minor, APRN - CNP   0.5 mg at 02/27/23 2024    multivitamin 1 tablet  1 tablet Oral Daily Clance Minor, APRN - CNP   1 tablet at 02/27/23 1207       Allergies:  No Known Allergies    Problem List:    Patient Active Problem List   Diagnosis Code    Orthostatic hypotension I95.1    Seizure ([address] R56.9    Dementia ([facility] F03.90    Depression F32. A    Herpes zoster virus infection of face and ear nerves B02.29    Altered mental state R41.82    Syncope and collapse R55    Elevated troponin level R77.8    Chronic obstructive pulmonary disease (HCC) J44.9    Personal history of tobacco use, presenting hazards to health Z87.891    Pleural effusion, bilateral J90    Mediastinal lymphadenopathy R59.0    NSTEMI (non-ST elevated myocardial infarction) ([facility] [address] I21.4       Past Medical History:        Diagnosis Date    [facility]     Coma ([facility]     Hyperlipidemia     Leukemia ([facility]     Seizures ([facility]        Past Surgical History:        Procedure Laterality Date    BRAIN SURGERY      infection    OTHER SURGICAL HISTORY      Tooth Abscess removed '95       Social History:    Social History     Tobacco Use    Smoking status: Former    Smokeless tobacco: Not on file    Tobacco comments:     doesn't remember when he quit   Substance Use Topics    Alcohol use: No                                Counseling given: Not Answered  Tobacco comments: doesn't remember when he quit      Vital Signs (Current):   Vitals:    02/28/23 0347 02/28/23 0529 02/28/23 0615 02/28/23 0617   BP: (!) 108/55  113/60 (!) 150/70   Pulse: 64  62 63   Resp: 18      Temp: 97.9 °F (36.6 °C)      TempSrc: Oral      SpO2: 92%      Weight:  202 lb 11.2 oz (91.9 kg)     Height:  5' 9\" (1.753 m)                                                BP Readings from Last 3 Encounters:   02/28/23 (!) 150/70   01/18/17 (!) 119/59   01/03/16 123/64       NPO Status:                                                                                 BMI:   Wt Readings from Last 3 Encounters:   02/28/23 202 lb 11.2 oz (91.9 kg)   01/18/17 204 lb 6 oz (92.7 kg)   12/30/15 239 lb (108.4 kg)     Body mass index is 29.93 kg/m².     CBC:   Lab Results   Component Value Date/Time    WBC 4.9 02/28/2023 04:06 AM    RBC 3.31 02/28/2023 04:06 AM    RBC 4.25 11/09/2021 08:28 AM    HGB 11.3 02/28/2023 04:06 AM    HCT 35.1 02/28/2023 04:06 AM    .0 02/28/2023 04:06 AM    RDW 12.4 11/09/2021 08:28 AM     02/28/2023 04:06 AM       CMP:   Lab Results   Component Value Date/Time     02/28/2023 04:06 AM    K 4.3 02/28/2023 04:06 AM     02/28/2023 04:06 AM    CO2 24 02/28/2023 04:06 AM    BUN 10 02/28/2023 04:06 AM    CREATININE 0.9 02/28/2023 04:06 AM    LABGLOM >60 02/28/2023 04:06 AM    GLUCOSE 96 02/28/2023 04:06 AM    GLUCOSE 95 11/09/2021 08:28 AM    PROT 6.4 02/27/2023 03:08 PM    CALCIUM 8.2 02/28/2023 04:06 AM    BILITOT 0.2 02/27/2023 03:08 PM    ALKPHOS 39 02/27/2023 03:08 PM    AST 32 02/27/2023 03:08 PM    ALT 35 02/27/2023 03:08 PM       POC Tests:   Recent Labs     02/28/23  0648   POCGLU 98       Coags:   Lab Results   Component Value Date/Time    INR 1.06 02/27/2023 03:09 PM    APTT 164.5 02/27/2023 03:09 PM       HCG (If Applicable): No results found for: PREGTESTUR, PREGSERUM, HCG, HCGQUANT     ABGs: No results found for: PHART, PO2ART, GJQ2TQV, AAR3HKB, BEART, S6DONBNY     Type & Screen (If Applicable):  Lab Results   Component Value Date    LABRH POS 02/27/2023       Drug/Infectious Status (If Applicable):  No results found for: HIV, HEPCAB    COVID-19 Screening (If Applicable):   Lab Results   Component Value Date/Time    COVID19 NOT DETECTED 02/21/2023 08:54 AM           Anesthesia Evaluation    Airway: Mallampati: II  TM distance: >3 FB   Neck ROM: full  Mouth opening: > = 3 FB   Dental:          Pulmonary:   (+) COPD:  decreased breath sounds                            Cardiovascular:    (+) CAD:,         Rhythm: regular                      Neuro/Psych:   (+) seizures:, psychiatric history:            GI/Hepatic/Renal:             Endo/Other:                     Abdominal:   (+) obese,           Vascular: Other Findings:           Anesthesia Plan      general     ASA 4     (PT.'S DAUGHTER ALONG WITH PT.  COMMUNICATED THROUGH PT.'S DAUGHTER. DENIES UPPER GI SYMPTOMS. INTRA OP STONEY RISKS, BENEFITS AND ALTERNATES DISCUSSED WITH PT.   PT. CONSENTED. INTRA OP STONEY WAS REQUESTED BY DR RUBIN  A LINE CENTRAL LINE , PA CATHETER RISKS BENEFITS AND ALTERNATES DISCUSSED. PT. CONSENTED. PLAN GA , INVASIVE MONITORING INTRA OP STONEY  AND POST OP CARE AND VENT. SUPPORT.)  Induction: intravenous.   arterial line, BIS, central line, CVP, PA catheter and STONEY  MIPS: Postoperative opioids intended and Postoperative ventilation. Anesthetic plan and risks discussed with patient and child/children. Use of blood products discussed with patient and child/children whom consented to blood products. Plan discussed with CRNA.                     Selin Watt MD   2/28/2023

## 2023-02-28 NOTE — PROGRESS NOTES
Patient has been extubated per physician order with SpO2 of 95 on 50% FiO2. Patient placed on 6 liters/min via nasal cannula. Post extubation SpO2 is 85% with HR  88 bpm and RR 24 breaths/min. Patient had mild cough that was non-productive. Patient was placed on CPAP 10 and 60%. Unable to do incentive spirometry at this time. Will continue to monitor.

## 2023-02-28 NOTE — PROGRESS NOTES
1215 Patient arrived to unit from surgery via bed. Patient transferred to ICU bed and placed on continuous ICU bedside monitor. Patient admitted for Elevated troponin level [R77.8]  Elevated troponin [R77.8]  Elevated brain natriuretic peptide (BNP) level [R79.89]  Dyspnea, unspecified type [R06.00]  NSTEMI (non-ST elevated myocardial infarction) ([facility] [address] [I21.4]. Vitals obtained. See flowsheets. Patient's IV access includes swan rij alinelt wrist,int lac, int lt fa. Current infusions and rates of infusion include epi at 4 mcq, insulin at 2 units, krider at 2oo ml/hour. . Assessment completed by raisa. Two nurse skin assessment completed by raisa and danyelle. See flowsheets for assessment details. Policies and procedures of ICU unable to be explained to patient at this time. Family member(s)/representative(s) present at time of admission include none. Patient rights explained to family member(s)/representatives and patient, as able. Patient/patient's family member(s)/representative(s) N/A to have physician notified of their admission. All questions posed by patient's family member(s)/representative(s) and patient answered at this time. Report from or staff, pt being bagged then placed on vent. Assessment completed. 1240 cxr taken.

## 2023-02-28 NOTE — CARE COORDINATION
2/28/23, 2:09 PM EST    DISCHARGE PLANNING EVALUATION    Received a TK consult for home health following [facility]. TK is already following the case, patient and family would like referral made to [facility]. TK will continue to follow. 2:15 PM- TK made a referral to Tash Christianson at [facility] admissions and updated daughter Shanell Husbands.

## 2023-02-28 NOTE — FLOWSHEET NOTE
02/27/23 6370   Family/Significant Other Communication   Reason Update   Name Gustabo Carrier   Relationship Child   Method of Communication Phone   Spoke with pt's daughter, Gustabo Carrier, via telephone at this time. Updated on plan of care and all questions answered.

## 2023-02-28 NOTE — PROGRESS NOTES
[address] THERAPY MISSED TREATMENT NOTE  [facility] ICU 4D  4D-008-A      Date: 2023  Patient Name: Surya Hurtado        CSN: [id number]   : 1940  (81 y.o.)  Gender: male   Referring Practitioner: ANGELIC Kevin CNP  Diagnosis: elevated troponin         REASON FOR MISSED TREATMENT:  Pt transferred to ICU following procedure this date and remains reliant on mechanical ventilation this PM. Will have OTR reasses  as appropriate.

## 2023-02-28 NOTE — CONSULTS
CRITICAL CARE CONSULT NOTE       Patient:  Abeba Ham    Unit/Bed:4D-08/008-A  YOB: 1940  MRN: [id number]   PCP: Julio Cesar Toussaint MD  Date of Admission: 2/21/2023  Chief Complaint:- CHEST PAIN    Assessment and Plan:    NSTEMI. Multivessel symptomatic CAD, s/p CABG, STONEY, LHC : LHC is performed on February 21, found - severe multivessel CAD with heavy calcification of left main stenosis with a preserved LV ejection fractionUnderwent CABG earlier today. On epinephrine drip 5 mcg on an insulin drip. Extubated postop, initially started high flow nasal cannula and nasal cannula but switched to CPAP. Received Ancef, will continue amiodarone 200 mg twice daily. Supportive care, pain control. Monitoring, pulse ox telemetry. Acute hypoxic respiratory failure: Multifactorial.  COPD, patient for postop, status post CABG, fluid treated for community-acquired pneumonia. extubated, requiring BiPAP for now. We will continue BiPAP, wean off as able. COPD, unspecified  Gold stage. No PFTs on file. On albuterol as needed. Will benefit from pulmonology follow-up as outpatient and new PFTs. We will add LABA/LAMA  Advanced atherosclerosis . right ICA stenosis 70%, right subclavian severe stenosis: CTA neck shows right ICA origin stenosis, right subclavian stenosis. Consulted with the vascular surgery, and found no candidate for carotid endarterectomy and CABG simultaneously. Appreciate vascular surgery recommendations. Resolving community-acquired pneumonia with small bilateral pleural effusion. POA: Treated with antibiotics, completed the course.   Hypothyroidism: Resume Synthroid  History of CVA: On EMR, per report residual cognitive deficits which cannot be assessed today since patient drowsy after postop  History of epilepsy:  on Tegretol  History of leukemia: Mention for 30+ years  Anxiety/depression: Resumed Lexapro and risperidone  Deconditioning and debility: due to multiple chronic disease including CAD, hypothyroidism, history of CVA      INITIAL H AND P AND ICU COURSE:  The patient is an 81year old male former smoker with an approximately 20-pack-year history who quit in 1980 with a past medical history of leukemia with remission in 1984, hypothyroidism, \"seizures,\" hyperlipidemia, \"coma,\" and stroke secondary to septic embolus from dental procedure in 1995 who presents the chief complaint of shortness of breath and managing primarily for severe calcification of the left main not amenable to normal stenting and bilateral pleural effusion. Cough and shortness of breath which started on 2/14/2023. Came to the ED on 2/21/2023 because cough came productive of red phlegm. Patient never had chest pain at any point. However, did have shortness of breath. Was appropriately treated with Rocephin, azithromycin, and DuoNebs. Azithromycin and Rocephin started on 2/21/2023 with tentative plan to complete through 2/26/2023. Pulmonology was consulted on 2/21/2023 for management of COPD. Cardiology was consulted for progressively elevating troponins and an EKG that demonstrated old lateral lead ischemic changes and new anterior lead once. Hospitalist group took over care in 2/23/2023 as the family wish to switch from the Dr. Villagran Lauren group. Echocardiogram on 2/22/2023 demonstrated normal left ventricular size and systolic function, normal ejection fraction 50%, normal left ventricular wall thickness, mild mitral regurgitation, and normal right ventricle. Given elevated troponin T and EKG abnormalities, decision was made to proceed with left heart cath. Left heart cath in 2/23/2023 demonstrated three-vessel disease with severe calcification of the left main artery, preserved ejection fraction, and patent aorto iliacs. Cardiothoracic surgery versus high risk PCI suggested. Dr. Nixon Sims, the cardiothoracic surgeon, discussed the case with Dr. Tal Jay.   Also discussed the risks and benefits of CT surgery with the patient. Dr. Maura Cote believes that the patient is an appropriate candidate for CT surgery even if he is somewhat higher risk than the norm. Per his report, Dr. Yani Torres feels that he is not a good candidate for PCI or medical management alone. In preparation for potential upcoming CT surgery, Plavix was held, prophylactic dose Lovenox was stopped, aspirin was maintained, and patient was started on heparin drip. This was done per instructions from CT surgery. CTA of the chest on 2/22/2023 demonstrated no pulmonary embolism, normal heart size, no pericardial effusion, right paratracheal 12 mm short axis node, left side 11 mm paratracheal node, subcarinal adenopathy measuring 2.8 cm, small bilateral hilar lymph nodes, no axilla lymphadenopathy, multifocal scattered hazy groundglass opacities present in both lungs, 4 mm nodule in the anterior right middle lobe, bilateral pleural effusions right greater than left, bibasilar bronchiectasis, compressive atelectasis of the right base, no suspicious bony lesions, and no suspicious findings in the imaged aspect of the upper abdomen; overall, no PE, multifocal groundglass infiltrates, bilateral pleural effusions, and mediastinal and subcarinal adenopathy. \"    Past 24 Hr Progress    Past Medical History: History of CVA history of epilepsy hypertension obesity. Family History: Heart attack. Social History: Per EMR.     ROS   Per HPI    Scheduled Meds:   sodium chloride flush  5-40 mL IntraVENous 2 times per day    [START ON 3/1/2023] enoxaparin  40 mg SubCUTAneous Daily    [START ON 3/1/2023] aspirin  325 mg Oral Daily    amiodarone  200 mg Oral BID    sennosides-docusate sodium  1 tablet Oral BID    famotidine  20 mg Oral BID    Or    famotidine (PEPCID) injection  20 mg IntraVENous BID    ceFAZolin (ANCEF) IVPB  2,000 mg IntraVENous Q8H    metoprolol tartrate  12.5 mg Oral BID    atorvastatin  40 mg Oral Daily    carBAMazepine 600 mg Oral BID    escitalopram  20 mg Oral Daily    levothyroxine  50 mcg Oral Daily    risperiDONE  0.5 mg Oral BID    multivitamin  1 tablet Oral Daily     Continuous Infusions:   sodium chloride 20 mL/hr at 02/28/23 1656    sodium chloride      sodium chloride      insulin 5.2 Units/hr (02/28/23 1656)    dextrose      EPINEPHrine 4 mcg/min (02/28/23 1656)    dexmedetomidine 0.5 mcg/kg/hr (02/28/23 1656)       PHYSICAL EXAMINATION:  T: 98.6. P: 82. RR: 16. B/P: 105/48. FiO2: 60%. O2 Sat: 96%. I/O:    Body mass index is 29.93 kg/m². On CPAP, FiO2 60%, pressure of 10. General:   Postop, drowsy. Looks acutely ill looking. Obese appearance. HEENT:  normocephalic and atraumatic. No scleral icterus. PERR  Neck: supple. No Thyromegaly. Lungs:  distant breath sounds without wheezing/crackles. No retractions  Cardiac: RRR. No JVD. Seen midline. Wound/dressing on the chest wall, dressing has no bleeding/discharges. Chest tube present, draining small amount serosanguineous fluid  Abdomen: soft. Nontender. Extremities:  No clubbing, cyanosis, or edema x 4. Vasculature: capillary refill < 3 seconds. Palpable dorsalis pedis pulses. Skin:  warm and dry. Psych:  Alert and oriented x3. Affect appropriate  Lymph:  No supraclavicular adenopathy. Neurologic:  No focal deficit. No seizures. Data: (All radiographs, tracings, PFTs, and imaging are personally viewed and interpreted unless otherwise noted). Sodium 143, potassium 3.6, chloride 110, CO2 22, BUN 9, creatinine 0.9, anion gap 11, GFR 60, magnesium 2.9, calcium 7.1  WBC 16.9, RBC 2.7, hemoglobin 9.1, hematocrit 28, , platelet 960  ABG, pH 7.32, PCO2 40, PO2 72, bicarb 20  Albumin 4.0, alk phos 39, ALT 35, AST 32, bilirubin 0.2  Telemetry shows   CT neck with without contrast.  Atherosclerosis of right carotid bulb with a 76% of stenosis on the right ICA origin. No significant stenosis on the left internal carotid artery.   Moderate stenosis of the origin of the left vertebral artery. Severe stenosis of right subclavian artery. [facility] on February 21, 2023. Severe multivessel CAD with heavy calcification of left main stenosis with a preserved LV ejection fraction        Seen with multidisciplinary ICU team yes. Meets Continued ICU Level Care Criteria:    [x] Yes   [] No - Transfer Planned to listed location:  [] HOSPITALIST CONTACTED-      Case and plan discussed with Dr. Connie Chairez MD.        Electronically signed by Sofía Calero DO, PGY-2,  IM   Patient seen by me including key components of medical care. Case discussed with resident physician. Case reviewed with Dr. Carballo [place] in detail. Patient extubated without difficulty. Wean pressors. Italicized font, if present,  represents changes to the note made by me. CC time 35 minutes. Time was discontiguous. Time does not include procedure. Time does include my direct assessment of the patient and coordination of care. Time represents more than 50% of the time involved with patient care by the [address] [place] [address] team.  Electronically signed by Luis Salgado.  Demar Adorno MD.

## 2023-03-01 ENCOUNTER — APPOINTMENT (OUTPATIENT)
Dept: GENERAL RADIOLOGY | Age: 83
DRG: 233 | End: 2023-03-01
Payer: MEDICARE

## 2023-03-01 LAB
ANION GAP SERPL CALC-SCNC: 9 MEQ/L (ref 8–16)
BUN SERPL-MCNC: 13 MG/DL (ref 7–22)
CALCIUM SERPL-MCNC: 8.2 MG/DL (ref 8.5–10.5)
CHLORIDE SERPL-SCNC: 112 MEQ/L (ref 98–111)
CO2 SERPL-SCNC: 26 MEQ/L (ref 23–33)
CREAT SERPL-MCNC: 1.1 MG/DL (ref 0.4–1.2)
DEPRECATED RDW RBC AUTO: 48.5 FL (ref 35–45)
EKG ATRIAL RATE: 87 BPM
EKG P AXIS: -20 DEGREES
EKG P-R INTERVAL: 216 MS
EKG Q-T INTERVAL: 364 MS
EKG QRS DURATION: 90 MS
EKG QTC CALCULATION (BAZETT): 438 MS
EKG R AXIS: -13 DEGREES
EKG T AXIS: 138 DEGREES
EKG VENTRICULAR RATE: 87 BPM
ERYTHROCYTE [DISTWIDTH] IN BLOOD BY AUTOMATED COUNT: 12.8 % (ref 11.5–14.5)
GFR SERPL CREATININE-BSD FRML MDRD: > 60 ML/MIN/1.73M2
GLUCOSE BLD STRIP.AUTO-MCNC: 109 MG/DL (ref 70–108)
GLUCOSE BLD STRIP.AUTO-MCNC: 111 MG/DL (ref 70–108)
GLUCOSE BLD STRIP.AUTO-MCNC: 111 MG/DL (ref 70–108)
GLUCOSE BLD STRIP.AUTO-MCNC: 113 MG/DL (ref 70–108)
GLUCOSE BLD STRIP.AUTO-MCNC: 114 MG/DL (ref 70–108)
GLUCOSE BLD STRIP.AUTO-MCNC: 115 MG/DL (ref 70–108)
GLUCOSE BLD STRIP.AUTO-MCNC: 125 MG/DL (ref 70–108)
GLUCOSE BLD STRIP.AUTO-MCNC: 92 MG/DL (ref 70–108)
GLUCOSE SERPL-MCNC: 110 MG/DL (ref 70–108)
HCT VFR BLD AUTO: 26.1 % (ref 42–52)
HGB BLD-MCNC: 8.3 GM/DL (ref 14–18)
INR PPP: 1.13 (ref 0.85–1.13)
MAGNESIUM SERPL-MCNC: 2.5 MG/DL (ref 1.6–2.4)
MCH RBC QN AUTO: 33.6 PG (ref 26–33)
MCHC RBC AUTO-ENTMCNC: 31.8 GM/DL (ref 32.2–35.5)
MCV RBC AUTO: 105.7 FL (ref 80–94)
PLATELET # BLD AUTO: 217 THOU/MM3 (ref 130–400)
PMV BLD AUTO: 9.7 FL (ref 9.4–12.4)
POTASSIUM SERPL-SCNC: 4.4 MEQ/L (ref 3.5–5.2)
POTASSIUM SERPL-SCNC: 4.6 MEQ/L (ref 3.5–5.2)
RBC # BLD AUTO: 2.47 MILL/MM3 (ref 4.7–6.1)
SODIUM SERPL-SCNC: 147 MEQ/L (ref 135–145)
WBC # BLD AUTO: 11.6 THOU/MM3 (ref 4.8–10.8)

## 2023-03-01 PROCEDURE — 80048 BASIC METABOLIC PNL TOTAL CA: CPT

## 2023-03-01 PROCEDURE — 85610 PROTHROMBIN TIME: CPT

## 2023-03-01 PROCEDURE — 6370000000 HC RX 637 (ALT 250 FOR IP): Performed by: PHYSICIAN ASSISTANT

## 2023-03-01 PROCEDURE — 84132 ASSAY OF SERUM POTASSIUM: CPT

## 2023-03-01 PROCEDURE — 93005 ELECTROCARDIOGRAM TRACING: CPT | Performed by: PHYSICIAN ASSISTANT

## 2023-03-01 PROCEDURE — 82948 REAGENT STRIP/BLOOD GLUCOSE: CPT

## 2023-03-01 PROCEDURE — 97530 THERAPEUTIC ACTIVITIES: CPT

## 2023-03-01 PROCEDURE — 92610 EVALUATE SWALLOWING FUNCTION: CPT

## 2023-03-01 PROCEDURE — 2700000000 HC OXYGEN THERAPY PER DAY

## 2023-03-01 PROCEDURE — 99232 SBSQ HOSP IP/OBS MODERATE 35: CPT | Performed by: INTERNAL MEDICINE

## 2023-03-01 PROCEDURE — 97166 OT EVAL MOD COMPLEX 45 MIN: CPT

## 2023-03-01 PROCEDURE — 71045 X-RAY EXAM CHEST 1 VIEW: CPT

## 2023-03-01 PROCEDURE — 37799 UNLISTED PX VASCULAR SURGERY: CPT

## 2023-03-01 PROCEDURE — APPSS30 APP SPLIT SHARED TIME 16-30 MINUTES: Performed by: PHYSICIAN ASSISTANT

## 2023-03-01 PROCEDURE — 2580000003 HC RX 258: Performed by: PHYSICIAN ASSISTANT

## 2023-03-01 PROCEDURE — 94761 N-INVAS EAR/PLS OXIMETRY MLT: CPT

## 2023-03-01 PROCEDURE — 94660 CPAP INITIATION&MGMT: CPT

## 2023-03-01 PROCEDURE — 97164 PT RE-EVAL EST PLAN CARE: CPT

## 2023-03-01 PROCEDURE — 85027 COMPLETE CBC AUTOMATED: CPT

## 2023-03-01 PROCEDURE — 6370000000 HC RX 637 (ALT 250 FOR IP): Performed by: NURSE PRACTITIONER

## 2023-03-01 PROCEDURE — 93010 ELECTROCARDIOGRAM REPORT: CPT | Performed by: INTERNAL MEDICINE

## 2023-03-01 PROCEDURE — 6370000000 HC RX 637 (ALT 250 FOR IP)

## 2023-03-01 PROCEDURE — 36415 COLL VENOUS BLD VENIPUNCTURE: CPT

## 2023-03-01 PROCEDURE — 6360000002 HC RX W HCPCS: Performed by: PHYSICIAN ASSISTANT

## 2023-03-01 PROCEDURE — 83735 ASSAY OF MAGNESIUM: CPT

## 2023-03-01 PROCEDURE — 2000000000 HC ICU R&B

## 2023-03-01 PROCEDURE — 6370000000 HC RX 637 (ALT 250 FOR IP): Performed by: THORACIC SURGERY (CARDIOTHORACIC VASCULAR SURGERY)

## 2023-03-01 RX ORDER — METOLAZONE 2.5 MG/1
2.5 TABLET ORAL DAILY
Status: DISCONTINUED | OUTPATIENT
Start: 2023-03-01 | End: 2023-03-04

## 2023-03-01 RX ORDER — INSULIN LISPRO 100 [IU]/ML
0-4 INJECTION, SOLUTION INTRAVENOUS; SUBCUTANEOUS NIGHTLY
Status: DISCONTINUED | OUTPATIENT
Start: 2023-03-01 | End: 2023-03-16 | Stop reason: HOSPADM

## 2023-03-01 RX ORDER — FUROSEMIDE 10 MG/ML
40 INJECTION INTRAMUSCULAR; INTRAVENOUS 2 TIMES DAILY
Status: DISCONTINUED | OUTPATIENT
Start: 2023-03-01 | End: 2023-03-01

## 2023-03-01 RX ORDER — ASPIRIN 81 MG/1
324 TABLET, CHEWABLE ORAL DAILY
Status: DISCONTINUED | OUTPATIENT
Start: 2023-03-01 | End: 2023-03-16 | Stop reason: HOSPADM

## 2023-03-01 RX ORDER — CARBAMAZEPINE 100 MG/5ML
400 SUSPENSION ORAL 3 TIMES DAILY
Status: DISPENSED | OUTPATIENT
Start: 2023-03-01 | End: 2023-03-13

## 2023-03-01 RX ORDER — INSULIN LISPRO 100 [IU]/ML
0-4 INJECTION, SOLUTION INTRAVENOUS; SUBCUTANEOUS
Status: DISCONTINUED | OUTPATIENT
Start: 2023-03-01 | End: 2023-03-16 | Stop reason: HOSPADM

## 2023-03-01 RX ORDER — FUROSEMIDE 10 MG/ML
40 INJECTION INTRAMUSCULAR; INTRAVENOUS DAILY
Status: DISCONTINUED | OUTPATIENT
Start: 2023-03-02 | End: 2023-03-16 | Stop reason: HOSPADM

## 2023-03-01 RX ADMIN — ACETAMINOPHEN 650 MG: 325 TABLET ORAL at 11:18

## 2023-03-01 RX ADMIN — AMIODARONE HYDROCHLORIDE 200 MG: 200 TABLET ORAL at 08:17

## 2023-03-01 RX ADMIN — RISPERIDONE 0.5 MG: 0.25 TABLET, FILM COATED ORAL at 08:16

## 2023-03-01 RX ADMIN — CEFAZOLIN 2000 MG: 10 INJECTION, POWDER, FOR SOLUTION INTRAVENOUS at 19:47

## 2023-03-01 RX ADMIN — OXYCODONE HYDROCHLORIDE 10 MG: 5 TABLET ORAL at 20:27

## 2023-03-01 RX ADMIN — FUROSEMIDE 40 MG: 10 INJECTION, SOLUTION INTRAMUSCULAR; INTRAVENOUS at 09:17

## 2023-03-01 RX ADMIN — ENOXAPARIN SODIUM 40 MG: 100 INJECTION SUBCUTANEOUS at 08:16

## 2023-03-01 RX ADMIN — SODIUM CHLORIDE, PRESERVATIVE FREE 10 ML: 5 INJECTION INTRAVENOUS at 08:31

## 2023-03-01 RX ADMIN — Medication 400 MG: at 09:16

## 2023-03-01 RX ADMIN — MENTHOL, METHYL SALICYLATE: 10; 15 CREAM TOPICAL at 04:02

## 2023-03-01 RX ADMIN — Medication 400 MG: at 14:54

## 2023-03-01 RX ADMIN — METOLAZONE 2.5 MG: 2.5 TABLET ORAL at 12:27

## 2023-03-01 RX ADMIN — POTASSIUM BICARBONATE 20 MEQ: 782 TABLET, EFFERVESCENT ORAL at 20:26

## 2023-03-01 RX ADMIN — SODIUM CHLORIDE, PRESERVATIVE FREE 10 ML: 5 INJECTION INTRAVENOUS at 20:26

## 2023-03-01 RX ADMIN — POTASSIUM BICARBONATE 20 MEQ: 782 TABLET, EFFERVESCENT ORAL at 09:16

## 2023-03-01 RX ADMIN — OXYCODONE HYDROCHLORIDE 10 MG: 5 TABLET ORAL at 09:16

## 2023-03-01 RX ADMIN — LEVOTHYROXINE SODIUM 50 MCG: 0.05 TABLET ORAL at 05:00

## 2023-03-01 RX ADMIN — OXYCODONE HYDROCHLORIDE 10 MG: 5 TABLET ORAL at 14:54

## 2023-03-01 RX ADMIN — METOPROLOL TARTRATE 12.5 MG: 25 TABLET, FILM COATED ORAL at 20:28

## 2023-03-01 RX ADMIN — SENNOSIDES AND DOCUSATE SODIUM 1 TABLET: 50; 8.6 TABLET ORAL at 08:16

## 2023-03-01 RX ADMIN — RISPERIDONE 0.5 MG: 0.25 TABLET, FILM COATED ORAL at 20:27

## 2023-03-01 RX ADMIN — OXYCODONE HYDROCHLORIDE 10 MG: 5 TABLET ORAL at 04:59

## 2023-03-01 RX ADMIN — METOPROLOL TARTRATE 12.5 MG: 25 TABLET, FILM COATED ORAL at 08:17

## 2023-03-01 RX ADMIN — Medication 400 MG: at 20:26

## 2023-03-01 RX ADMIN — ESCITALOPRAM 20 MG: 20 TABLET, FILM COATED ORAL at 08:17

## 2023-03-01 RX ADMIN — FAMOTIDINE 20 MG: 20 TABLET, FILM COATED ORAL at 20:26

## 2023-03-01 RX ADMIN — Medication 1 TABLET: at 08:16

## 2023-03-01 RX ADMIN — ASPIRIN 81 MG 324 MG: 81 TABLET ORAL at 08:16

## 2023-03-01 RX ADMIN — OXYCODONE HYDROCHLORIDE 10 MG: 5 TABLET ORAL at 01:10

## 2023-03-01 RX ADMIN — SENNOSIDES AND DOCUSATE SODIUM 1 TABLET: 50; 8.6 TABLET ORAL at 20:26

## 2023-03-01 RX ADMIN — ATORVASTATIN CALCIUM 40 MG: 40 TABLET, FILM COATED ORAL at 08:17

## 2023-03-01 RX ADMIN — CEFAZOLIN 2000 MG: 10 INJECTION, POWDER, FOR SOLUTION INTRAVENOUS at 03:06

## 2023-03-01 RX ADMIN — CEFAZOLIN 2000 MG: 10 INJECTION, POWDER, FOR SOLUTION INTRAVENOUS at 12:29

## 2023-03-01 RX ADMIN — MENTHOL, METHYL SALICYLATE: 10; 15 CREAM TOPICAL at 20:26

## 2023-03-01 RX ADMIN — AMIODARONE HYDROCHLORIDE 200 MG: 200 TABLET ORAL at 20:27

## 2023-03-01 RX ADMIN — FAMOTIDINE 20 MG: 20 TABLET, FILM COATED ORAL at 08:17

## 2023-03-01 ASSESSMENT — PAIN DESCRIPTION - ORIENTATION
ORIENTATION: MID
ORIENTATION: MID;LOWER
ORIENTATION: MID
ORIENTATION: MID;LOWER

## 2023-03-01 ASSESSMENT — PAIN DESCRIPTION - LOCATION
LOCATION: CHEST;BACK
LOCATION: CHEST
LOCATION: CHEST
LOCATION: BACK

## 2023-03-01 ASSESSMENT — PAIN DESCRIPTION - PAIN TYPE
TYPE: SURGICAL PAIN;CHRONIC PAIN
TYPE: SURGICAL PAIN

## 2023-03-01 ASSESSMENT — PAIN DESCRIPTION - DESCRIPTORS
DESCRIPTORS: ACHING
DESCRIPTORS: ACHING;SHARP

## 2023-03-01 ASSESSMENT — PAIN SCALES - GENERAL
PAINLEVEL_OUTOF10: 9
PAINLEVEL_OUTOF10: 8
PAINLEVEL_OUTOF10: 9
PAINLEVEL_OUTOF10: 2
PAINLEVEL_OUTOF10: 9

## 2023-03-01 ASSESSMENT — PAIN DESCRIPTION - FREQUENCY: FREQUENCY: CONTINUOUS

## 2023-03-01 ASSESSMENT — PAIN DESCRIPTION - ONSET: ONSET: ON-GOING

## 2023-03-01 NOTE — PROGRESS NOTES
[address] ICU 4D  Clinical Swallow Evaluation      SLP Individual Minutes  Time In: 7651  Time Out: 1453  Minutes: 11  Timed Code Treatment Minutes: 0 Minutes       Date: 3/1/2023  Patient Name: Nathan Yepez      CSN: [id number]   : 1940  (81 y.o.)  Gender: male   Referring Physician:   ANGELIC Mullins CNP  Diagnosis: Elevated troponin    History of Present Illness/Injury: Patient admitted to [facility] with above med dx; please refer to physician H&P for full details. Per chart review, \"81 year old male who presented to [facility] on  with SOB and brown sputum production. The patient was admitted for a COPD exacerbation and started on bronchodilators, azithromcyin, and rocpehin. Cardiology was consulted secondary to EKG changes concerning for anterior ischemic changes. Given a troponin elevation, patient underwent LHC on  which showed evidence of multivessel disase. Patient underwent CABG on 12/15 without complication and was transferred to the ICU postoperatively; two chest tubes in place. Was extubated without complication and tolerated CPAP overnight. \"     Intubation/extubation 2023 with pertinent hx for COPD and CVA. ST consulted to complete clinical swallow evaluation to assist with development of dysphagia management POC. Past Medical History:   Diagnosis Date    Cancer ([facility] [address]     Coma ([facility] [address]     Hyperlipidemia     Leukemia ([facility] [address]     Seizures ([facility] [address]        SUBJECTIVE:  HANS Mccormick with approval to proceed with evaluation. Upon arrival, patient resting in recliner chair, awake and alert; eyes maintained to be closed for entirety of assessment with frequent re-directions required d/t ongoing presence of pain s/p transfer from bed<>recliner chair. Endorsed baseline diet of regular solids, thin liquids.      OBJECTIVE:    Pain:  Pain endorsed consistently near incision site; nursing aware and addressing concerns    Current Diet: NPO; NGT Respiratory Status:  Nasal Canula    Behavioral Observation:  Alert, Confused, Lethargic, and Limited Direction Following    CRANIAL NERVE ASSESSMENT  *limitations for comprehensiveness of cranial nerve assessment s/t difficulty for command execution    CN V (Trigeminal) Closes and Opens Mandible Impaired    Rotary Jaw Movement Impaired      CN VII (Facial) Cheeks Hold Food out of Sulci WFL    Opens, Closes/Seals, Protrudes, Retracts Lips WFL    General Appearance Decreased Tone    Sensation Not Tested      CN X (Vagus - Pharyngeal) Raises Back of Tongue Not Tested      CN XI (Accessory) Lifts Soft Palate Not Tested      CN XII (Hypoglossal) Elevates Tongue Up and Back Not Tested    Protrusion   Impaired    Lateralizes Tongue Impaired    Sensation Not Tested      Other Observations Dentition Full dentures    Vocal Quality WFL    Cough WFL     Patient Evaluated Using: Ice Chips, Thin Liquids, and Puree    Oral Phase:  Impaired:  Impaired AP Movement, Impaired Oral Initiation, and Oral Bolus Holding     Pharyngeal Phase: Impaired:  Delayed Swallow and Suspected Pharyngeal Residue  *not able to fully validate presence of pharyngeal phase deficits without formal instrumentation/supportive imaging     Signs and Symptoms of Laryngeal Penetration/Aspiration: No signs/symptoms of aspiration evident in this evaluation, but cannot rule out silent aspiration. Impressions: Patient presents with severe oral dysphagia with inability to fully discern potential presence of pharyngeal phase deficits without formal instrumentation. Current level of dysphagia severity likely compounded d/t recent acuity of CABG and medical complexity. Oral initiation skills impaired with consistent cues required to engage in mandible excursion and subsequent labial rounding on utensil. Oral bolus holding significant with max cues required for patient to, \"swallow\" in attempts to elicit AP transit.  Pharyngeal trigger consistently produced, however, spontaneous/multiple swallows required (3+), concerning for potential presence of pharyngeal residuals vs laryngeal penetration event. Recommendations to maintain NPO diet level, NGT for nutrition/hydration measures (including medications). Will prioritize dysphagia interventions on subsequent date with considerations for completion of instrumental evaluation (MBS) should patient's mentation and endurance permit optimal engagement in controlled study. *post evaluation, patient without respiratory distress upon leaving room; RN Anny notified re: clinical findings and recommendations from the assessment; verbal receptiveness noted     RECOMMENDATIONS/ASSESSMENT:  Instrumental Evaluation: Instrumental evaluation not indicated at this time. Diet Recommendations:  NPO; NGT  Strategies:   Oral care q2h     Rehabilitation Potential: good  Discharge Recommendations: Continue to Assess Pending Progress    EDUCATION:  Learner: Patient  Education:  Reviewed results and recommendations of this evaluation, Reviewed signs, symptoms and risks of aspiration, Reviewed ST goals and Plan of Care, and Reviewed recommendations for follow-up  Evaluation of Education: Needs further instruction, No evidence of learning, and Family not present    PLAN:  Skilled SLP intervention on acute care 3-5 x per week or until goals met and/or pt plateaus in function. Specific interventions for next session may include: dysphagia management. PATIENT GOAL:    Did not state. Will further assess during treatment. SHORT TERM GOALS:  Short Term Goals  Time Frame for Short Term Goals: 2 weeks  Goal 1: Patient will consume conservative PO trials demonstrating consistency of pharyngeal trigger and adequate endurance measures to determine readiness for instrumental evaluation.   Goal 2: Staff will exhibit return demonstration for completion of comprehensive oral care procedural analysis to maximize oral integrity and to reduce potential bacteria colonization. Goal 3: Monitor mentation (hx dementia, CABG); complete alternate cognitive linguistic assessment should it be clinically indicated.     LONG TERM GOALS:  No LTG established given short CRYSTAL Sauer M.A., [facility]

## 2023-03-01 NOTE — PROGRESS NOTES
Rounds with Daniela Jack and [facility]  Patient out of bed in a chair and somnolent; neurologically intact and orientated x3 but a little confused with some short-term memory loss. No respiratory distress  Off pressors and hemodynamically stable; doing quite well from a cardiac standpoint  Unable to swallow pills and needs nasogastric tube;  Keep [place]  Keep in ICU  Overall a good response in a patient with dementia after heart surgery.   Would expect continued clearing of his mental status and improvement

## 2023-03-01 NOTE — PLAN OF CARE
Problem: Discharge Planning  Goal: Discharge to home or other facility with appropriate resources  Outcome: Progressing  Flowsheets (Taken 2/28/2023 2120)  Discharge to home or other facility with appropriate resources:   Identify barriers to discharge with patient and caregiver   Arrange for needed discharge resources and transportation as appropriate   Identify discharge learning needs (meds, wound care, etc)     Problem: Pain  Goal: Verbalizes/displays adequate comfort level or baseline comfort level  Outcome: Progressing  Flowsheets (Taken 2/28/2023 2120)  Verbalizes/displays adequate comfort level or baseline comfort level:   Encourage patient to monitor pain and request assistance   Assess pain using appropriate pain scale   Administer analgesics based on type and severity of pain and evaluate response   Implement non-pharmacological measures as appropriate and evaluate response   Notify Licensed Independent Practitioner if interventions unsuccessful or patient reports new pain     Problem: Respiratory - Adult  Goal: Achieves optimal ventilation and oxygenation  Outcome: Progressing  Flowsheets (Taken 2/28/2023 2120)  Achieves optimal ventilation and oxygenation:   Assess for changes in respiratory status   Assess for changes in mentation and behavior   Position to facilitate oxygenation and minimize respiratory effort   Oxygen supplementation based on oxygen saturation or arterial blood gases   Encourage broncho-pulmonary hygiene including cough, deep breathe, incentive spirometry   Assess and instruct to report shortness of breath or any respiratory difficulty   Respiratory therapy support as indicated     Problem: Cardiovascular - Adult  Goal: Maintains optimal cardiac output and hemodynamic stability  Outcome: Progressing  Flowsheets  Taken 2/28/2023 2120 by Stevo Rojas RN  Maintains optimal cardiac output and hemodynamic stability:   Monitor blood pressure and heart rate   Monitor urine output and notify Licensed Independent Practitioner for values outside of normal range   Assess for signs of decreased cardiac output   Administer fluid and/or volume expanders as ordered   Administer vasoactive medications as ordered    Goal: Absence of cardiac dysrhythmias or at baseline  Outcome: Progressing  Flowsheets  Taken 2/28/2023 2120 by Mahesh Carrillo RN  Absence of cardiac dysrhythmias or at baseline:   Monitor cardiac rate and rhythm   Assess for signs of decreased cardiac output   Administer antiarrhythmia medication and electrolyte replacement as ordered     Problem: Metabolic/Fluid and Electrolytes - Adult  Goal: Electrolytes maintained within normal limits  Outcome: Progressing  Flowsheets (Taken 2/28/2023 2120)  Electrolytes maintained within normal limits:   Monitor labs and assess patient for signs and symptoms of electrolyte imbalances   Administer electrolyte replacement as ordered   Monitor response to electrolyte replacements, including repeat lab results as appropriate  Goal: Glucose maintained within prescribed range  Outcome: Progressing  Flowsheets (Taken 2/28/2023 2120)  Glucose maintained within prescribed range:   Monitor blood glucose as ordered   Assess for signs and symptoms of hyperglycemia and hypoglycemia   Administer ordered medications to maintain glucose within target range     Problem: Hematologic - Adult  Goal: Maintains hematologic stability  Outcome: Progressing  Flowsheets (Taken 2/28/2023 2120)  Maintains hematologic stability:   Assess for signs and symptoms of bleeding or hemorrhage   Monitor labs for bleeding or clotting disorders   Administer blood products/factors as ordered     Care plan reviewed with patient. Patient verbalizes understanding of the plan of care and contribute to goal setting.

## 2023-03-01 NOTE — PROGRESS NOTES
Comprehensive Nutrition Assessment    Type and Reason for Visit:  Initial, Consult (TF ordering and management)    Nutrition Recommendations/Plan:   Begin Pivot 20ml/hour increase 10ml every 4h as tolerated to goal of 50ml/hour  Additional free H20 per Physician  Await result of MBS with SLP      Malnutrition Assessment:  Malnutrition Status: At risk for malnutrition (Comment) (03/01/23 1059)    Context:  Acute Illness     Findings of the 6 clinical characteristics of malnutrition:  Energy Intake:  50% or less of estimated energy requirements for 5 or more days (variable po since admit with procedures)  Weight Loss:  No significant weight loss     Body Fat Loss:  No significant body fat loss     Muscle Mass Loss:  No significant muscle mass loss    Fluid Accumulation:  Mild     Strength:  Not Performed    Nutrition Assessment:     Pt. nutritionally compromised AEB NPO d/t dysphagia with need for nutrition support. At risk for further nutrition compromise r/t admit with NSTEMI, cardiac cath 2/23 , CABG 2/28 with intubation and extubation 2/28, increased needs with COPD and OHS for healing process, confusion and lethargy noted , LOS day 8 and underlying medical condition advanced age, HLD, leukemia.       Nutrition Related Findings:    Pt. Report/Treatments/Miscellaneous: pt. Seen; NGT in place s/p extubation; discussed on rounds with SLP and Physicians; plan MBS 3/2 but will begin EN today; s/p OHS; appears confused and weak  GI Status: BM x1 2/26  Pertinent Labs: glucose 109  BUN 13  creatinine 1.1  K+ 4.6  Na 147  Mg 2.5  Pertinent Meds: pepcid, humalog, MVI, senokot, epinephrine    Wound Type: Surgical Incision (CABG x3 2/28)       Current Nutrition Intake & Therapies:     Diet NPO Exceptions are: Sips of Water with Meds  ADULT TUBE FEEDING; Nasogastric; Immune Enhancing; Continuous; 20; Yes; 10; Q 4 hours; 50; 30; Q 4 hours  Current Tube Feeding (TF) Orders:  Feeding Route: Nasogastric  Formula: Immune Enhancing (PIvot)  Schedule: Continuous  Feeding Regimen: Pivot 20ml/hour increase 10ml every 4h as tolerated to goal of 50ml/hour  Additives/Modulars: None  Water Flushes: per Physician  Goal TF & Flush Orders Provides: Pivot 50ml/hour provides 1800 kcals, 113gms protein, 207gms cho,9 gms fiber, 900ml free H20 in 1200ml total volume/24h    Anthropometric Measures:  Height: 5' 9\" (175.3 cm)  Ideal Body Weight (IBW): 160 lbs (73 kg)    Admission Body Weight: 202 lb (91.6 kg) (2/28 with scleral edema)  Current Body Weight: 202 lb (91.6 kg) (2/28 with scleral edema),    . Weight Source: Bed Scale  Current BMI (kg/m2): 29.8  Usual Body Weight: 199 lb (90.3 kg) (2/22/23; 204# 1/2017; 239# 12/2015; 207# 3/2014)  % Weight Change (Calculated): 1.5   BMI Categories: Overweight (BMI 25.0-29.9)    Estimated Daily Nutrient Needs:  Energy Requirements Based On: Kcal/kg  Weight Used for Energy Requirements: Current (92kgm)  Energy (kcal/day): 4685-9658 (20-25/kgm)  Weight Used for Protein Requirements: Ideal (73kgm)  Protein (g/day): 88-110gms (1.2-1.5/kgm IBW     Fluid (ml/day): per Physician    Nutrition Diagnosis:   Inadequate oral intake related to swallowing difficulty as evidenced by NPO or clear liquid status due to medical condition, swallow study results    Nutrition Interventions:   Food and/or Nutrient Delivery: Continue NPO, Start Tube Feeding  Nutrition Education/Counseling: Education not appropriate  Coordination of Nutrition Care: Continue to monitor while inpatient, Interdisciplinary Rounds       Goals:     Goals: Tolerate nutrition support at goal rate, by next RD assessment       Nutrition Monitoring and Evaluation:      Food/Nutrient Intake Outcomes: Enteral Nutrition Intake/Tolerance  Physical Signs/Symptoms Outcomes: Biochemical Data, Chewing or Swallowing, GI Status, Fluid Status or Edema, Hemodynamic Status, Nutrition Focused Physical Findings, Skin, Weight    Discharge Planning:    Too soon to determine  [address] [place] [address] [address], [address], LD  Contact: [phone]

## 2023-03-01 NOTE — PROGRESS NOTES
CRITICAL CARE PROGRESS NOTE      Patient:  Shital Johnson    Unit/Bed:4D-08/008-A  YOB: 1940  MRN: [id number]   PCP: Giancarlo Guerrero MD  Date of Admission: 2/21/2023  Chief Complaint:- Chest Pain     Assessment and Plan:    CAD, s/p CABG:  C performed on 02/21, multivessel CAD identified, s/p CABG x3 using LIMA to LAD, vein graft to marginal, vein graft to posterior descending artery; Left pleural and Anterior Mediastinal chest tube in place drained serosanguinous 911 cc over the last 24 hours. Briefly required epinephrine after the procedure now weaned. Tolerated CPAP overnight at 10cm H2O. Will continue overnight CPAP. Continue to monitor drain output. Continue perioperative Ancef started on 02/28, end 03/02. Continue amiodarone, ASA, statin. Continue postoperative PPI. Patient's home plavix discontinued. Acute Hypoxic Respiratory Failure, s/p intubation/extubation, improved :  Patient presented with dyspnea on exertion with brown sputum production. CTA 02/22 showed multifocal groundglass infiltrates. Patient intubated perioperatively and extubated without complication. Tolerated CPAP at [id number] without complication. Maintaining adequate oxygenation on 6L via nasal cannula. Wean as tolerated back to patient's baseline of room air. Change albuterol q6 PRN to Stiolto 2 puffs daily   COPD: Likely secondary to history of tobacco use: No PFT available. Start Stiolto 2 puffs daily; Maintaining adequate oxygentation on 6L via nasal cannula. Continue to wean as tolerated. Incentive spirometer, Acapella.    Hypernatremia:  Likely secondary to diuresis regimen; repeat bmp  Hypochloremia:  Likely secondary to diuresis regimen; repeat bmp  Leukocytosis, Improving:  Likely secondary to previously identified pneumonia  Acute on Chronic Macrocytic Anemia :  Stable, Vitamin B12/Folate WNL  Recurrent Seizure Disorder:  Continue risperidone, carbamazepine   Unspecified Anxiety Disorder: Continue escitalopram  Right ICA Stenosis 70%, Right Subclavian Severe Stenosis:  Identified on CTA. Seen and evaluated by vascular surgery with no plan for intervention  Hypothyroidism:   Continue levothyroxine  Bilateral Pleural Effusions, Present on Admission:  Likely secondary to cardiovascular and respiratory compromise on admission, continue furosemide/metolazone, metoprolol with potassium supplementation; +3933 I/O's last 24 hours; daily weights, strict I/O's  Pneumonia, Present on Admission, Improving:  s/p treatment with Rocephin and Azithromycin, Trend CBC  Hx Unspecified Leukemia:  Noted  Hx CVA:  baseline cognitive deficits; No focal neurologic deficits noted on 03/01. Appropriate day night cues; neuro checks every shift    INITIAL H AND P AND ICU COURSE:  This is an 81year old male who presented to [facility] on 02/21 with SOB and brown sputum production. The patient was admitted for a COPD exacerbation and started on bronchodilators, azithromcyin, and rocpehin. Cardiology was consulted secondary to EKG changes concerning for anterior ischemic changes. Given a troponin elevation, patient underwent LHC on 02/23 which showed evidence of multivessel disase. Patient underwent CABG on 90/00 without complication and was transferred to the ICU postoperatively; two chest tubes in place. Was extubated without complication and tolerated CPAP overnight. 03/01: Patient seen and evaluated at the bedside in no acute distress. Now not requiring any pressor support. Output from chest tubes reviewed (911cc serosanguinous). Denies chest pain, fever, chills, and N/V/D at bedside. No additional acute symptoms or concerns. Tolerated CPAP overnight. Past Medical History:  Leukemia, HLD, Brain Abscess s/p craniotomy, Recurrent Seizures. Family History:  No Pertinent Family history noted in EMR. Social History: Former smoker of tobacco 1ppd for 30 years; can't remember quit date; no recreational drug or alcohol use .     ROS Review of systems:    General: No fevers, chills. Pain controlled. HEENT: No headache, no vision changes, no hearing changes, no trauma. CV: No palpitations, no chest pain, no tachycarida  RESP: No respiratory distress, no cough, no wheeze, SOB. GI: No abdominal pain, no nausea, no vomiting, no diarrhea  : No dysuria, no hematuria, no incontinence  Neuro: No seizures, no focal deficits, no weakness, no confusion   Psych: No psychosis, no SI/HI, Depression, Anxiety. Scheduled Meds:   aspirin  324 mg Oral Daily    carBAMazepine  400 mg Oral TID    furosemide  40 mg IntraVENous BID    metOLazone  2.5 mg Oral Daily    potassium bicarb-citric acid  20 mEq Oral BID    sodium chloride flush  5-40 mL IntraVENous 2 times per day    enoxaparin  40 mg SubCUTAneous Daily    amiodarone  200 mg Oral BID    sennosides-docusate sodium  1 tablet Oral BID    famotidine  20 mg Oral BID    Or    famotidine (PEPCID) injection  20 mg IntraVENous BID    ceFAZolin (ANCEF) IVPB  2,000 mg IntraVENous Q8H    metoprolol tartrate  12.5 mg Oral BID    atorvastatin  40 mg Oral Daily    escitalopram  20 mg Oral Daily    levothyroxine  50 mcg Oral Daily    risperiDONE  0.5 mg Oral BID    multivitamin  1 tablet Oral Daily     Continuous Infusions:   sodium chloride 20 mL/hr at 03/01/23 0716    sodium chloride      sodium chloride      insulin 2.6 Units/hr (03/01/23 0716)    dextrose      EPINEPHrine Stopped (03/01/23 0602)    dexmedetomidine Stopped (03/01/23 0322)       PHYSICAL EXAMINATION:  T:  98.4.  P:  88. RR:  17. B/P:  138/49. FiO2:  50%. O2 Sat:  93.  I/O:  +3933 on 02/28, +850 on 02/27, + 200 on 02/26  Body mass index is 29.93 kg/m². 02/28 202lb, 02/26 210 lb  GCS:   14    General:   This is an elderly gentlemen sitting comfortably in a recliner in no acute distress; CPAP in place. HEENT:  normocephalic and atraumatic. No scleral icterus. PERR  Neck: supple. No Thyromegaly. Lungs: diminished breath sounds in place.  No wheezes, crackles, or rhonchi. CPAP in place. Thorax: Two chest tubes in place with no evidence of irritation, discoloration at insertion site, draining serosanguinous fluid into an atrium. Cardiac: RRR. No JVD. Abdomen: soft. Nontender. Extremities:  No clubbing, cyanosis, or edema x 4. Vasculature: capillary refill < 3 seconds. Palpable dorsalis pedis pulses. Skin:  warm and dry. : morales catheter in place draining muller yellow fluid. Psych:  Alert and oriented x3. Affect appropriate  Lymph:  No supraclavicular adenopathy. Neurologic:  No focal deficit. No seizures. Data: (All radiographs, tracings, PFTs, and imaging are personally viewed and interpreted unless otherwise noted). Sodium 147 Potassium 4.6, Chloride 112, Bicarb 26, BUN 13, Creatinine 1.1, Magnesium 2.5, Glucose 110  WBC11.6, Hb 8.3, Hct 26.1, .7, Platelets 412, INR 1.98  Telemetry shows sinus rhythm with increased rate         Seen with multidisciplinary ICU team Dr. Clay Alcazar and ANDREW Catarina Andres. Meets Continued ICU Level Care Criteria:    [x] Yes   [] No - Transfer Planned to listed location:  [] HOSPITALIST CONTACTED- DR     Case and plan discussed with Dr. Clay Alcazar. Electronically signed by Meliza Lee MD  [address] [place] [address] Way   Patient seen by me including key components of medical care. Case discussed with resident physician. Continue with supportive care. Italicized font, if present,  represents changes to the note made by me. CC time 15 minutes. Time was discontiguous. Time does not include procedure. Time does include my direct assessment of the patient and coordination of care. Time represents more than 50% of the time involved with patient care by the [address] [place] [address] team.  Electronically signed by Catalina Swanson.  Clay Alcazar MD.

## 2023-03-01 NOTE — CARE COORDINATION
3/1/23, 2:55 PM EST    DISCHARGE PLANNING EVALUATION     Tila Gilbert with [facility], states [facility] accepted pt when medically ready.

## 2023-03-01 NOTE — ANESTHESIA POSTPROCEDURE EVALUATION
Department of Anesthesiology  Postprocedure Note    Patient: Lorraine Vines  MRN: [id number]  YOB: 1940  Date of evaluation: 3/1/2023      Procedure Summary     Date: 02/28/23 [place] [address] [place]    Anesthesia Start: 5049 Anesthesia Stop: 2933    Procedure: CABG STONEY (Chest) Diagnosis:       Pleural effusion      (Left atrioventricular valve stenosis [Q24.8])    Surgeons: Ezequiel Matta MD Responsible Provider: Aaron Dai MD    Anesthesia Type: general ASA Status: 4          Anesthesia Type: No value filed. Ashli Phase I:      Ashli Phase II:        Anesthesia Post Evaluation    Patient location during evaluation: ICU  Patient participation: complete - patient participated  Level of consciousness: awake  Airway patency: patent  Nausea & Vomiting: no vomiting and no nausea  Complications: no  Cardiovascular status: hemodynamically stable  Respiratory status: acceptable and nasal cannula  Hydration status: stable  Comments: PT. WAS EXTUBATED  TALKED TO PT. AND FAMILY  PT. SHOOK HAND AND SAID THANK YOU.

## 2023-03-01 NOTE — PROGRESS NOTES
CT/CV Surgery Progress Note    3/1/2023 7:48 AM  Surgeon:  Dr. Concepcion Less:  Mr. Guaman Liner is resting comfortably in bed on PAP, alert, and in no acute distress. Pt denies chest pressure, SOB, fever,chills, N/V/D. Pt is not currently on any inotrope drips. Chest tube output 911 mL post-op. CI-2.4        Vital Signs: BP (!) 139/48   Pulse 85   Temp 99 °F (37.2 °C) (Core)   Resp 16   Ht 5' 9\" (1.753 m)   Wt 202 lb 11.2 oz (91.9 kg)   SpO2 95%   BMI 29.93 kg/m²    Temp (24hrs), Av.5 °F (36.9 °C), Min:96.4 °F (35.8 °C), Max:99.7 °F (37.6 °C)       Labs:   CBC:  Recent Labs     23  1509 23  0406 23  1220 23  1715 23  0500   WBC  --    < > 14.8* 16.9* 11.6*   HGB  --    < > 8.8* 9.1* 8.3*   HCT  --    < > 27.1* 28.2* 26.1*   MCV  --    < > 105.4* 105.6* 105.7*   PLT  --    < > 240 274 217   APTT 164.5*  --   --   --   --    INR 1.06  --   --   --  1.13    < > = values in this interval not displayed. BMP:   Recent Labs     23  0406 23  0648 23  1122 23  1220 23  1613 23  1715 23  1717 23  0014 23  0205 23  0500 23  0604      < > 143 143  --   --   --   --   --  147*  --    K 4.3   < > 3.4* 3.6  --  3.7  --  4.4  --  4.6  --      --   --  110  --   --   --   --   --  112*  --    CO2 24  --   --  22*  --   --   --   --   --  26  --    BUN 10  --   --  9  --   --   --   --   --  13  --    CREATININE 0.9  --   --  0.9  --   --   --   --   --  1.1  --    MG  --   --   --  2.9*  --   --   --   --   --  2.5*  --    POCGLU  --    < >  --   --    < >  --    < >  --    < >  --  111*    < > = values in this interval not displayed. Last HgA1C:   Lab Results   Component Value Date    LABA1C 5.1 2023       Imaging:  CXR: 3/1/2023  Findings:   Endotracheal tube removed. Stable Camden-Rodrigo catheter. Stable postsurgical    changes over the mediastinum. Enteric tube in place, tip not visible. Bilateral chest tubes again noted. Lung hypoinflation. Patchy bibasilar    opacities which is slightly increased from the prior study. Possible trace    pleural fluid. No pneumothorax. Impression   Impression: Extubated patient with worsening bibasilar opacities, possibly    atelectasis. This document has been electronically signed by: Fabián Tomlinson DO    on 03/01/2023 01:19 AM       Intake/Output Summary (Last 24 hours) at 3/1/2023 0748  Last data filed at 3/1/2023 0716  Gross per 24 hour   Intake 6389.61 ml   Output 2346 ml   Net 4043.61 ml       Scheduled Meds:    aspirin  324 mg Oral Daily    sodium chloride flush  5-40 mL IntraVENous 2 times per day    enoxaparin  40 mg SubCUTAneous Daily    amiodarone  200 mg Oral BID    sennosides-docusate sodium  1 tablet Oral BID    famotidine  20 mg Oral BID    Or    famotidine (PEPCID) injection  20 mg IntraVENous BID    ceFAZolin (ANCEF) IVPB  2,000 mg IntraVENous Q8H    metoprolol tartrate  12.5 mg Oral BID    atorvastatin  40 mg Oral Daily    carBAMazepine  600 mg Oral BID    escitalopram  20 mg Oral Daily    levothyroxine  50 mcg Oral Daily    risperiDONE  0.5 mg Oral BID    multivitamin  1 tablet Oral Daily       ROS: All neg unless specifically mentioned in subjective section. Exam:  General Appearance: alert ,conversing, in no acute distress  Cardiovascular: normal rate, regular rhythm, normal S1 and S2, no murmurs, rubs, clicks, or gallops  Pulmonary/Chest: clear to auscultation bilaterally- no wheezes, rales or rhonchi, normal air movement, no respiratory distress  Neurological: alert, oriented, normal speech, no focal findings or movement disorder noted  Sternum: Incision healing appropriately and no wound dehiscence noted.      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     The plan of care was discussed in detail with Dr. Roxanne Mercado PA-C

## 2023-03-01 NOTE — PROGRESS NOTES
[facility]AL[facility]    Time:   Time In: 1001  Time Out: 1026  Timed Code Treatment Minutes: 15 Minutes  Minutes: 25          Date: 3/1/2023  Patient Name: Surya Hurtado,   Gender: male      MRN: [id number]  : 1940  (81 y.o.)  Referring Practitioner: Angela Ramachandran PA-C  Diagnosis: elevated troponin  Additional Pertinent Hx: per chart review; \"The patient is an 81year old male former smoker with an approximately 20-pack-year history who quit in  with a past medical history of leukemia with remission in , hypothyroidism, \"seizures,\" hyperlipidemia, \"coma,\" and 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 \" patient underwent a CABG STONEY,  coronary artery bypass grafting x3 using LIMA to LAD, vein graft to to's marginal 1, vein graft to posterior descending artery, transesophageal echocardiography, endoscopic vein harvesting  on 23. Restrictions/Precautions:  Restrictions/Precautions: Fall Risk, General Precautions, Surgical Protocols  Position Activity Restriction  Sternal Precautions: move in the tube  Other position/activity restrictions: hx of brain injury, move in the tube, 2 chest tubes    Subjective  Chart Reviewed: Yes, Orders, Progress Notes, History and Physical, Operative Notes  Patient assessed for rehabilitation services?: Yes  Family / Caregiver Present: No    Subjective: RN approved session-wanted to get him back to bed and used a lai steady to get him to the recliner. patient seated up in recliner upon OT arrrival and MOD difficulty to rouse adn cues to stay awake t/o. appeared to be painful to talk. patient A & O x 1. Pain:  facial grimacing and vocalizations with movement; did not rate.  Demo rigid movements and posture t/o eval    Vitals:  on continuous vitals monitor demo WFL t/o     Social/Functional History:  Lives With: Daughter  Type of Home: House  Home Layout: Two level, Bed/Bath upstairs  Home Access: Stairs to enter with rails  Entrance Stairs - Number of Steps: 2 LEVY and ~15 steps to second level  Home Equipment: Joan Poncho, rolling, Cane   Bathroom Shower/Tub: Tub/Shower unit  Bathroom Accessibility: Accessible    Receives Help From: Family  ADL Assistance: Independent  Homemaking Assistance: Needs assistance  Homemaking Responsibilities: Yes  Ambulation Assistance: Independent  Transfer Assistance: Independent    Active : No  Occupation: Retired  Additional Comments: Daughter and son in law both work outside of the home. Pt was previously independent with ADLs and functional mobility. Pt was not previously using AD for functional mobility. Pt is a questionable historian. Hx of brain surgery in 1995 and visual deficits. VISION:WFL    HEARING:   hard of hearing? COGNITION: Slow Processing, Decreased Recall, Decreased Insight, Impaired Memory, Decreased Problem Solving, Decreased Safety Awareness, Impaired Attention, Decreased Arousal, and Difficulty Following Commands    RANGE OF MOTION:  Bilateral Upper Extremity:  Impaired - kept arms tightly next to sides of body and [employer] A to use with rigidity t/o    STRENGTH:  Bilateral Upper Extremity:  Impaired - overall de-conditioned    SENSATION:   WFL    ADL:   No ADL's completed this session. Xu Moeller BALANCE:  Sitting Balance: Moderate Assistance. To assist with flexing hips to sit upright to prep for sit to  lai steady  Standing Balance: Maximum Assistance.  In [address] steady with flexed posture at 90 degrees flexed at hips leaning over bar and heavy assist to stand upright to use lai steady properly    BED MOBILITY:  Sit to Supine: Maximum Assistance, X 2 demo rigid posture and cues for sequencing, following instructions and keeping eyes open to participate    TRANSFERS:  Sit to Stand:  Maximum Assistance, X 2, with Alessandra Bach. Cues for sequencing and rigidity t/o and flexed posture and unable to stand upright     FUNCTIONAL MOBILITY:  OTR to assess when appropriate and safe      Activity Tolerance:  Patient tolerance of  treatment: Fair treatment tolerance, Limited by fatigue, and Limited by medical complications      Assessment:  Assessment: patient demo overall de-conditioning, weakness, decreased cognition with recent CABG x 3 impacting his ability to complete ADLs and functional transfers as prior. patient would benefit from continued, skilled OT to prgress toward PLOF, decrease caregiver burden and increase occupational performance.  Performance deficits / Impairments: Decreased functional mobility , Decreased ADL status, Decreased cognition, Decreased safe awareness, Decreased endurance, Decreased strength, Decreased balance, Decreased posture  Prognosis: Fair  REQUIRES OT FOLLOW-UP: Yes  Decision Making: Medium Complexity    Treatment Initiated: Treatment and education initiated within context of evaluation.  Evaluation time included review of current medical information, gathering information related to past medical, social and functional history, completion of standardized testing, formal and informal observation of tasks, assessment of data and development of plan of care and goals.  Treatment time included skilled education and facilitation of tasks to increase safety and independence with ADL's for improved functional independence and quality of life.    Discharge Recommendations:  Continue to assess pending progress, Patient would benefit from continued therapy after discharge (inpatient therapy stay)    Patient Education:     Patient Education  Education Given To: Patient  Education Provided: Plan of Care, Transfer Training, Fall Prevention Strategies, Precautions, Role of Therapy, ADL Adaptive Strategies  Education Provided Comments:  importance of increasing activity  Education Method: Demonstration, Verbal  Barriers to Learning: Cognition  Education Outcome: Verbalized understanding, Demonstrated understanding    Equipment Recommendations:  Equipment Needed: Yes  Mobility Devices: ADL Assistive Devices  Other: Pt reports he has a rolling walker and a shower chair but was only using off and on. Recommendations provided to use both for fall prevention. Plan:  Times Per Week: 6x  Times Per Day: Once a day  Current Treatment Recommendations: Strengthening, Balance training, Functional mobility training, Neuromuscular re-education, Safety education & training, Self-Care / ADL, Endurance training, Patient/Caregiver education & training, Equipment evaluation, education, & procurement, ROM. See long-term goal time frame for expected duration of plan of care. If no long-term goals established, a short length of stay is anticipated. Goals:  Patient goals : return home at [facility]  Short Term Goals  Time Frame for Short Term Goals: by discharge  Short Term Goal 1: patient will tolerate 2 min static standing with sit to stand and maintain balance with MOD A x 1 with O2 >/= 90% to increase activity tolerance for self care routine. Short Term Goal 2: patient will complete UB ADLs with MIN A and 1-2 cues to initiate and sequence task. Short Term Goal 3: OTR to assesd stand pivot transfers and functional mobility and create goal as appropriate. Short Term Goal 4: patient will tolerate CABG exer to increase activity tolerance for self care routine. Following session, patient left in safe position with all fall risk precautions in place.

## 2023-03-01 NOTE — CARE COORDINATION
3/1/23, 2:48 PM EST    DISCHARGE ON GOING EVALUATION    Luis Liner day: 5  Location: -08/008-A Reason for admit: Elevated troponin level [R77.8]  Elevated troponin [R77.8]  Elevated brain natriuretic peptide (BNP) level [R79.89]  Dyspnea, unspecified type [R06.00]  NSTEMI (non-ST elevated myocardial infarction) ([facility] [I21.4]   Procedure:   2/23 Cardiac Cath: Severe MVD  2/24 Bilateral Carotid Dopplers & Vein mapping  2/27 CTA Neck: Atherosclerosis of the right carotid bulb with a 76% stenosis at the origin the right internal carotid artery; No significant stenosis at the origin of the left internal carotid artery; Moderate stenosis at the origin of the left vertebral artery. 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 Extubated yesterday. Lines out today. Failed swallow. NPO with plan for possible MBS tomorrow. Tmax 99.7. NSR. Sats 93% on 6L O2. Confused. Oriented to person and place. Forgetful. Follows commands. Heavy assist with Jodee Berman today with therapy. CT x4 to -20sx with 911 ml out since surgery. SLP/CR/PT/OT. Dietitian consulted. +VRE rectum. Telemetry, I&O, daily weight, IS, sternal precautions, CT care, wound care, SCDs, morales care, ambulate. Amio, asa, lipitor, tegretol, IV ancef, lovenox, lexapro, pepcid, IV lasix 40 mg bid, SSI, synthroid, zaroxolyn, lopressor, prn IV morphine, prn roxicodone, K+, risperdal, senokot, electrolyte replacement protocols. Na+ 147, Mg 2.5, wbc 11.6, hgb 8.3. PCP: Dawit Morales MD  Readmission Risk Score: 17.9%  Patient Goals/Plan/Treatment Preferences: Plan for [facility] for rehab. No precert required. SW on case.

## 2023-03-01 NOTE — PLAN OF CARE
Case reviewed with Dr. Victor Manuel Huffman at bedside. Patient is stable for stepdown from critical care standpoint as he is no longer requiring pressor support.  Case discussed with Cherelle MORALES who discussed case with surgeon Dr. Amelie Cohen who is planning on monitoring the patient overnight in the ICU and considering transfer to stepdown on 03/02/2023.     -Corine Bright MD  Resident

## 2023-03-01 NOTE — PROGRESS NOTES
[address] [address]  INPATIENT PHYSICAL THERAPY  Re-Evaluation   [facility] ICU 4D - [id number]    Time In: 1227  Time Out: 1248  Timed Code Treatment Minutes: 11 Minutes  Minutes: 21          Date: 3/1/2023  Patient Name: Evan Shaffer,  Gender:  male        MRN: [id number]  : 1940  (81 y.o.)     Referring Practitioner: ANGELIC Cleary CNP  Diagnosis: elevated tropnin level  Additional Pertinent Hx: per EMR- Evan Shaffer is a 81 y.o. male with past medical history of anemia, brain abscess, seizures, former smoker approximately 40 pack year who presents to the emergency department for evaluation of fatigue, shortness of breath. Patient brought in by family as family has been reporting he has been more tired, fatigued for the past week however more so in the past 3 days. Today, patient verbalized that he was short of breath and had generalized muscle aches which prompted today's ED visit. Patient lives in a two-story home and states that it has been getting harder to walk around. Has been having dyspnea on exertion. Pt s/p CABG STONEY,  coronary artery bypass grafting x3 using LIMA to LAD, vein graft to to's marginal 1, vein graft to posterior descending artery, transesophageal echocardiography, endoscopic vein harvesting DOS . Prior Level of Function:  Lives With: Daughter  Type of Home: House  Home Layout: Two level, Bed/Bath upstairs  Home Access: Stairs to enter with rails  Entrance Stairs - Number of Steps: 2 LEVY and ~15 steps to second level  Home Equipment: Nicole Landsman, rolling, Cane   Bathroom Shower/Tub: Tub/Shower unit  Bathroom Accessibility: Accessible    Receives Help From: Family  ADL Assistance: Independent  Homemaking Assistance: Needs assistance  Homemaking Responsibilities: Yes  Ambulation Assistance: Independent  Transfer Assistance: Independent  Active : No  Additional Comments: Daughter and son in law both work outside of the home.  Pt was previously independent with ADLs and functional mobility. Pt was not previously using AD for functional mobility. Pt is a questionable historian. Hx of brain surgery in 1995 and visual deficits. Restrictions/Precautions:  Restrictions/Precautions: Fall Risk, General Precautions, Surgical Protocols  Position Activity Restriction  Sternal Precautions: move in the tube  Other position/activity restrictions: hx of brain injury     SUBJECTIVE: RN approved session, present for all of mobility to assist. Pt agrees for PT session, but noted to have some disorientation. PAIN: yes, unrated. Localized to his chest. Pt noted to be moaning out at rest, improved with conversation and redirection. Lines/Tubes: NG and Chest Tube     Vitals: Blood Pressure: 112/80's  Oxygen: maintained WFL entire session, 90's range   Heart Rate: 90's with mobility, recovered to 80's range     Pt on 6L O2    OBJECTIVE:  Bed Mobility:  Supine to Sit: Maximum Assistance, X 1, with head of bed raised, with increased time for completion  Prompted pt to maintain sternal precautions with this as he was unable to maintain move in the tube guidelines with bed mobility     Transfers:  Sit to Stand: Maximum Assistance, X3, with Spero Miss  Stand to [address] [place] [address], X 2, with Spero Miss  To/From Bed and Chair: Dependent, with Spero Miss    Max assist of 3 to stand from EOB with manual assist to grasp Spero Miss bar and another assist for line management. Ambulation:  Not tested    Balance:  Static Sitting Balance:  Maximum Assistance  Dynamic Sitting Balance: Maximum Assistance  Static Standing Balance: Maximum Assistance    Pt sat EOB for ~ 5 minutes to prepare for further mobility and required max A to maintain upright balance as he was noted to learn to the L and posteriorly, not improved with max cues and assist.     Max A for stability in stance and to improve posture to lower Spero Miss paddles.  Pt noted to maintain pelvis posteriorly and with forward flexion    Exercise:none    Functional Outcome Measures: Completed  AM-PAC Inpatient Mobility Raw Score : 8  AM-PAC Inpatient T-Scale Score : 28.52    ASSESSMENT:  Assessment: Patient progressing toward established goals. Activity Tolerance:  Patient tolerance of  treatment: poor. Pt required max A of 1-3 with all mobility this date, along with max cues for improved tolerance and orientation to mobility tasks. Pt was unable to ambulate or transfer to the chair without use of lift equipment. He was noted to have pain behaviors with mobility.         Equipment Recommendations:Equipment Needed: No  Discharge Recommendations: Continue to assess pending progress, 24 hour assistance or supervision, and Inpatient Therapy Stay  Plan: Current Treatment Recommendations: Strengthening, Balance training, Functional mobility training, Transfer training, Safety education & training, Equipment evaluation, education, & procurement, Home exercise program, Therapeutic activities, Patient/Caregiver education & training, Neuromuscular re-education, Endurance training  General Plan:  (6x CABG)    Patient Education  Patient Education: Precautions/Restrictions, Bed Mobility, Transfers, Verbal Exercise Instruction, Activity Pacing, Pursed Lip Breathing    Updated Goals:  Patient Goals : family wants rehab following CABG  Short Term Goals  Time Frame for Short Term Goals: by discharge  Short Term Goal 1: Pt to complete supine <-> sit with CGA for ease getting out of bed with move in the tube guidelines  Short Term Goal 2: Pt to complete sit <-> stand with Kadenelle Juliannam with CGA for ease with mobiltiy from various surfaces  Short Term Goal 3: Pt to complete bed <->chair with Adamae Juliannam with CGA for ease with mobility in his environment  Short Term Goal 4: Pt to sit EOB for >=10 minutes with SBA with good midline posture for ease with sitting EOB to progress further mobility  Short Term Goal 5: PT to assess gait as able  Long Term Goals  Time Frame for Long Term Goals : NA due to short ELOS    Following session, patient left in safe position with all fall risk precautions in place.

## 2023-03-01 NOTE — PROGRESS NOTES
03/01/23 0850   Encounter Summary   Encounter Overview/Reason  Spiritual/Emotional Needs   Service Provided For: Patient   Referral/Consult From: [facility]   Support System Children   Last Encounter  03/01/23  (N/R)   Complexity of Encounter Moderate   Begin Time 0845   End Time  0850   Total Time Calculated 5 min   Encounter    Type Follow up   Spiritual/Emotional needs   Type Spiritual Support   Assessment/Intervention/Outcome   Assessment Unable to assess   Intervention Sustaining Presence/Ministry of presence;Prayer (assurance of)/[place]   In my encounter with the 81 yr old patient, I attempted to see the patient, but they were unresponsive at this time. No family was present in the room. I offered a prayer at the pt's side. A  will attempt to see the patient at a later time as a follow up. The pt was admitted due to elevated troponin level.

## 2023-03-02 ENCOUNTER — APPOINTMENT (OUTPATIENT)
Dept: CT IMAGING | Age: 83
DRG: 233 | End: 2023-03-02
Payer: MEDICARE

## 2023-03-02 ENCOUNTER — APPOINTMENT (OUTPATIENT)
Dept: GENERAL RADIOLOGY | Age: 83
DRG: 233 | End: 2023-03-02
Payer: MEDICARE

## 2023-03-02 LAB
ANION GAP SERPL CALC-SCNC: 11 MEQ/L (ref 8–16)
BACTERIA SPEC AEROBE CULT: NORMAL
BACTERIA UR CULT: NORMAL
BACTERIA: ABNORMAL
BILIRUB UR QL STRIP: NEGATIVE
BUN SERPL-MCNC: 17 MG/DL (ref 7–22)
BUN SERPL-MCNC: 18 MG/DL (ref 7–22)
BUN SERPL-MCNC: 22 MG/DL (ref 7–22)
CA-I BLD ISE-SCNC: 1.03 MMOL/L (ref 1.12–1.32)
CALCIUM SERPL-MCNC: 8 MG/DL (ref 8.5–10.5)
CALCIUM SERPL-MCNC: 8.1 MG/DL (ref 8.5–10.5)
CALCIUM SERPL-MCNC: 8.4 MG/DL (ref 8.5–10.5)
CASTS #/AREA URNS LPF: ABNORMAL /LPF
CASTS #/AREA URNS LPF: ABNORMAL /LPF
CHARACTER UR: CLEAR
CHARCOAL URNS QL MICRO: ABNORMAL
CHLORIDE SERPL-SCNC: 105 MEQ/L (ref 98–111)
CHLORIDE SERPL-SCNC: 105 MEQ/L (ref 98–111)
CHLORIDE SERPL-SCNC: 106 MEQ/L (ref 98–111)
CO2 SERPL-SCNC: 26 MEQ/L (ref 23–33)
CO2 SERPL-SCNC: 27 MEQ/L (ref 23–33)
CO2 SERPL-SCNC: 28 MEQ/L (ref 23–33)
COLOR UR: YELLOW
CREAT SERPL-MCNC: 1.1 MG/DL (ref 0.4–1.2)
CRP SERPL-MCNC: 28.01 MG/DL (ref 0–1)
CRYSTALS URNS QL MICRO: ABNORMAL
DEPRECATED RDW RBC AUTO: 53 FL (ref 35–45)
DEPRECATED RDW RBC AUTO: 54.7 FL (ref 35–45)
EKG ATRIAL RATE: 78 BPM
EKG P AXIS: 14 DEGREES
EKG P-R INTERVAL: 248 MS
EKG Q-T INTERVAL: 386 MS
EKG QRS DURATION: 100 MS
EKG QTC CALCULATION (BAZETT): 440 MS
EKG T AXIS: -10 DEGREES
EKG VENTRICULAR RATE: 78 BPM
EPITHELIAL CELLS, UA: ABNORMAL /HPF
ERYTHROCYTE [DISTWIDTH] IN BLOOD BY AUTOMATED COUNT: 12.9 % (ref 11.5–14.5)
ERYTHROCYTE [DISTWIDTH] IN BLOOD BY AUTOMATED COUNT: 13 % (ref 11.5–14.5)
GFR SERPL CREATININE-BSD FRML MDRD: > 60 ML/MIN/1.73M2
GLUCOSE BLD STRIP.AUTO-MCNC: 136 MG/DL (ref 70–108)
GLUCOSE BLD STRIP.AUTO-MCNC: 142 MG/DL (ref 70–108)
GLUCOSE BLD STRIP.AUTO-MCNC: 142 MG/DL (ref 70–108)
GLUCOSE BLD STRIP.AUTO-MCNC: 180 MG/DL (ref 70–108)
GLUCOSE SERPL-MCNC: 133 MG/DL (ref 70–108)
GLUCOSE SERPL-MCNC: 134 MG/DL (ref 70–108)
GLUCOSE SERPL-MCNC: 164 MG/DL (ref 70–108)
GLUCOSE UR QL STRIP.AUTO: NEGATIVE MG/DL
HCT VFR BLD AUTO: 28.4 % (ref 42–52)
HCT VFR BLD AUTO: 29.3 % (ref 42–52)
HGB BLD-MCNC: 8.6 GM/DL (ref 14–18)
HGB BLD-MCNC: 8.6 GM/DL (ref 14–18)
HGB UR QL STRIP.AUTO: ABNORMAL
KETONES UR QL STRIP.AUTO: NEGATIVE
LEUKOCYTE ESTERASE UR QL STRIP.AUTO: ABNORMAL
MAGNESIUM SERPL-MCNC: 2.3 MG/DL (ref 1.6–2.4)
MAGNESIUM SERPL-MCNC: 2.5 MG/DL (ref 1.6–2.4)
MCH RBC QN AUTO: 34.1 PG (ref 26–33)
MCH RBC QN AUTO: 34.3 PG (ref 26–33)
MCHC RBC AUTO-ENTMCNC: 29.4 GM/DL (ref 32.2–35.5)
MCHC RBC AUTO-ENTMCNC: 30.3 GM/DL (ref 32.2–35.5)
MCV RBC AUTO: 113.1 FL (ref 80–94)
MCV RBC AUTO: 116.3 FL (ref 80–94)
NITRITE UR QL STRIP.AUTO: NEGATIVE
PH UR STRIP.AUTO: 6.5 [PH] (ref 5–9)
PLATELET # BLD AUTO: 157 THOU/MM3 (ref 130–400)
PLATELET # BLD AUTO: 183 THOU/MM3 (ref 130–400)
PMV BLD AUTO: 10.5 FL (ref 9.4–12.4)
PMV BLD AUTO: 10.7 FL (ref 9.4–12.4)
POTASSIUM SERPL-SCNC: 4.3 MEQ/L (ref 3.5–5.2)
POTASSIUM SERPL-SCNC: 4.4 MEQ/L (ref 3.5–5.2)
POTASSIUM SERPL-SCNC: 4.5 MEQ/L (ref 3.5–5.2)
PROCALCITONIN SERPL IA-MCNC: 0.36 NG/ML (ref 0.01–0.09)
PROT UR STRIP.AUTO-MCNC: 30 MG/DL
RBC # BLD AUTO: 2.51 MILL/MM3 (ref 4.7–6.1)
RBC # BLD AUTO: 2.52 MILL/MM3 (ref 4.7–6.1)
RBC #/AREA URNS HPF: ABNORMAL /HPF
RENAL EPI CELLS #/AREA URNS HPF: ABNORMAL /[HPF]
SCAN OF BLOOD SMEAR: NORMAL
SODIUM SERPL-SCNC: 143 MEQ/L (ref 135–145)
SODIUM SERPL-SCNC: 143 MEQ/L (ref 135–145)
SODIUM SERPL-SCNC: 144 MEQ/L (ref 135–145)
SP GR UR REFRACT.AUTO: 1.02 (ref 1–1.03)
UROBILINOGEN UR QL STRIP.AUTO: 0.2 EU/DL (ref 0–1)
WBC # BLD AUTO: 10.4 THOU/MM3 (ref 4.8–10.8)
WBC # BLD AUTO: 8.7 THOU/MM3 (ref 4.8–10.8)
WBC #/AREA URNS HPF: ABNORMAL /HPF
YEAST LIKE FUNGI URNS QL MICRO: ABNORMAL

## 2023-03-02 PROCEDURE — 85027 COMPLETE CBC AUTOMATED: CPT

## 2023-03-02 PROCEDURE — 6360000002 HC RX W HCPCS: Performed by: PHYSICIAN ASSISTANT

## 2023-03-02 PROCEDURE — 97530 THERAPEUTIC ACTIVITIES: CPT

## 2023-03-02 PROCEDURE — 36415 COLL VENOUS BLD VENIPUNCTURE: CPT

## 2023-03-02 PROCEDURE — 87040 BLOOD CULTURE FOR BACTERIA: CPT

## 2023-03-02 PROCEDURE — 6360000002 HC RX W HCPCS: Performed by: NURSE PRACTITIONER

## 2023-03-02 PROCEDURE — 81001 URINALYSIS AUTO W/SCOPE: CPT

## 2023-03-02 PROCEDURE — 84145 PROCALCITONIN (PCT): CPT

## 2023-03-02 PROCEDURE — A4216 STERILE WATER/SALINE, 10 ML: HCPCS | Performed by: PHYSICIAN ASSISTANT

## 2023-03-02 PROCEDURE — 2000000000 HC ICU R&B

## 2023-03-02 PROCEDURE — 83735 ASSAY OF MAGNESIUM: CPT

## 2023-03-02 PROCEDURE — 93010 ELECTROCARDIOGRAM REPORT: CPT | Performed by: INTERNAL MEDICINE

## 2023-03-02 PROCEDURE — 82330 ASSAY OF CALCIUM: CPT

## 2023-03-02 PROCEDURE — 6370000000 HC RX 637 (ALT 250 FOR IP): Performed by: NURSE PRACTITIONER

## 2023-03-02 PROCEDURE — 92526 ORAL FUNCTION THERAPY: CPT

## 2023-03-02 PROCEDURE — 2500000003 HC RX 250 WO HCPCS: Performed by: PHYSICIAN ASSISTANT

## 2023-03-02 PROCEDURE — 93005 ELECTROCARDIOGRAM TRACING: CPT | Performed by: PHYSICIAN ASSISTANT

## 2023-03-02 PROCEDURE — 94761 N-INVAS EAR/PLS OXIMETRY MLT: CPT

## 2023-03-02 PROCEDURE — 80048 BASIC METABOLIC PNL TOTAL CA: CPT

## 2023-03-02 PROCEDURE — 99233 SBSQ HOSP IP/OBS HIGH 50: CPT | Performed by: INTERNAL MEDICINE

## 2023-03-02 PROCEDURE — 94660 CPAP INITIATION&MGMT: CPT

## 2023-03-02 PROCEDURE — 6370000000 HC RX 637 (ALT 250 FOR IP): Performed by: PHYSICIAN ASSISTANT

## 2023-03-02 PROCEDURE — 86140 C-REACTIVE PROTEIN: CPT

## 2023-03-02 PROCEDURE — APPSS30 APP SPLIT SHARED TIME 16-30 MINUTES: Performed by: PHYSICIAN ASSISTANT

## 2023-03-02 PROCEDURE — 70450 CT HEAD/BRAIN W/O DYE: CPT

## 2023-03-02 PROCEDURE — 82948 REAGENT STRIP/BLOOD GLUCOSE: CPT

## 2023-03-02 PROCEDURE — 6370000000 HC RX 637 (ALT 250 FOR IP)

## 2023-03-02 PROCEDURE — 2700000000 HC OXYGEN THERAPY PER DAY

## 2023-03-02 PROCEDURE — 97110 THERAPEUTIC EXERCISES: CPT

## 2023-03-02 PROCEDURE — 71045 X-RAY EXAM CHEST 1 VIEW: CPT

## 2023-03-02 PROCEDURE — 2580000003 HC RX 258: Performed by: PHYSICIAN ASSISTANT

## 2023-03-02 RX ORDER — CALCIUM GLUCONATE 20 MG/ML
2000 INJECTION, SOLUTION INTRAVENOUS ONCE
Status: COMPLETED | OUTPATIENT
Start: 2023-03-02 | End: 2023-03-03

## 2023-03-02 RX ADMIN — CEFAZOLIN 2000 MG: 10 INJECTION, POWDER, FOR SOLUTION INTRAVENOUS at 03:05

## 2023-03-02 RX ADMIN — SODIUM CHLORIDE, PRESERVATIVE FREE 10 ML: 5 INJECTION INTRAVENOUS at 08:46

## 2023-03-02 RX ADMIN — ACETAMINOPHEN 650 MG: 325 TABLET ORAL at 00:17

## 2023-03-02 RX ADMIN — ACETAMINOPHEN 650 MG: 325 TABLET ORAL at 05:26

## 2023-03-02 RX ADMIN — AMIODARONE HYDROCHLORIDE 200 MG: 200 TABLET ORAL at 20:13

## 2023-03-02 RX ADMIN — SENNOSIDES AND DOCUSATE SODIUM 1 TABLET: 50; 8.6 TABLET ORAL at 20:13

## 2023-03-02 RX ADMIN — ESCITALOPRAM 20 MG: 20 TABLET, FILM COATED ORAL at 08:39

## 2023-03-02 RX ADMIN — ACETAMINOPHEN 650 MG: 325 TABLET ORAL at 20:13

## 2023-03-02 RX ADMIN — ENOXAPARIN SODIUM 40 MG: 100 INJECTION SUBCUTANEOUS at 08:51

## 2023-03-02 RX ADMIN — Medication 400 MG: at 08:39

## 2023-03-02 RX ADMIN — SODIUM CHLORIDE, PRESERVATIVE FREE 10 ML: 5 INJECTION INTRAVENOUS at 20:12

## 2023-03-02 RX ADMIN — SODIUM CHLORIDE, PRESERVATIVE FREE 20 MG: 5 INJECTION INTRAVENOUS at 08:39

## 2023-03-02 RX ADMIN — AMIODARONE HYDROCHLORIDE 200 MG: 200 TABLET ORAL at 08:39

## 2023-03-02 RX ADMIN — CALCIUM GLUCONATE 2000 MG: 20 INJECTION, SOLUTION INTRAVENOUS at 23:40

## 2023-03-02 RX ADMIN — Medication 400 MG: at 20:13

## 2023-03-02 RX ADMIN — FAMOTIDINE 20 MG: 20 TABLET, FILM COATED ORAL at 20:13

## 2023-03-02 RX ADMIN — POTASSIUM BICARBONATE 20 MEQ: 782 TABLET, EFFERVESCENT ORAL at 20:13

## 2023-03-02 RX ADMIN — ASPIRIN 81 MG 324 MG: 81 TABLET ORAL at 08:38

## 2023-03-02 RX ADMIN — POTASSIUM BICARBONATE 20 MEQ: 782 TABLET, EFFERVESCENT ORAL at 08:51

## 2023-03-02 RX ADMIN — METOPROLOL TARTRATE 12.5 MG: 25 TABLET, FILM COATED ORAL at 08:40

## 2023-03-02 RX ADMIN — Medication 1 TABLET: at 08:38

## 2023-03-02 RX ADMIN — METOPROLOL TARTRATE 12.5 MG: 25 TABLET, FILM COATED ORAL at 20:13

## 2023-03-02 RX ADMIN — SENNOSIDES 8.6 MG: 8.6 TABLET, FILM COATED ORAL at 14:25

## 2023-03-02 RX ADMIN — OXYCODONE HYDROCHLORIDE 10 MG: 5 TABLET ORAL at 08:39

## 2023-03-02 RX ADMIN — ATORVASTATIN CALCIUM 40 MG: 40 TABLET, FILM COATED ORAL at 08:39

## 2023-03-02 RX ADMIN — RISPERIDONE 0.5 MG: 0.25 TABLET, FILM COATED ORAL at 20:13

## 2023-03-02 RX ADMIN — Medication 400 MG: at 14:25

## 2023-03-02 RX ADMIN — RISPERIDONE 0.5 MG: 0.25 TABLET, FILM COATED ORAL at 08:38

## 2023-03-02 RX ADMIN — LEVOTHYROXINE SODIUM 50 MCG: 0.05 TABLET ORAL at 05:27

## 2023-03-02 RX ADMIN — METOLAZONE 2.5 MG: 2.5 TABLET ORAL at 08:39

## 2023-03-02 RX ADMIN — FUROSEMIDE 40 MG: 10 INJECTION, SOLUTION INTRAMUSCULAR; INTRAVENOUS at 08:40

## 2023-03-02 ASSESSMENT — PAIN SCALES - PAIN ASSESSMENT IN ADVANCED DEMENTIA (PAINAD)
TOTALSCORE: 3
BODYLANGUAGE: 1
CONSOLABILITY: 0
NEGVOCALIZATION: 2
CONSOLABILITY: 0
FACIALEXPRESSION: 1
FACIALEXPRESSION: 1
BREATHING: 0
BREATHING: 0
TOTALSCORE: 3
NEGVOCALIZATION: 1
BODYLANGUAGE: 0

## 2023-03-02 ASSESSMENT — PAIN DESCRIPTION - DESCRIPTORS
DESCRIPTORS: ACHING
DESCRIPTORS: ACHING

## 2023-03-02 ASSESSMENT — PAIN SCALES - GENERAL
PAINLEVEL_OUTOF10: 8
PAINLEVEL_OUTOF10: 10
PAINLEVEL_OUTOF10: 10

## 2023-03-02 ASSESSMENT — PAIN DESCRIPTION - LOCATION
LOCATION: CHEST
LOCATION: CHEST

## 2023-03-02 ASSESSMENT — PAIN DESCRIPTION - PAIN TYPE: TYPE: ACUTE PAIN;SURGICAL PAIN

## 2023-03-02 ASSESSMENT — PAIN DESCRIPTION - FREQUENCY: FREQUENCY: CONTINUOUS

## 2023-03-02 ASSESSMENT — PAIN DESCRIPTION - ORIENTATION
ORIENTATION: MID
ORIENTATION: MID

## 2023-03-02 ASSESSMENT — PAIN - FUNCTIONAL ASSESSMENT: PAIN_FUNCTIONAL_ASSESSMENT: PREVENTS OR INTERFERES WITH MANY ACTIVE NOT PASSIVE ACTIVITIES

## 2023-03-02 NOTE — PLAN OF CARE
Problem: Respiratory - Adult  Goal: Achieves optimal ventilation and oxygenation  3/2/2023 1557 by Bernie Plasencia RCP  Outcome: Progressing   NIV to decrease work of breathing. Unable to get agreement for goals because no family is present and patient cannot respond.

## 2023-03-02 NOTE — PLAN OF CARE
Problem: Respiratory - Adult  Goal: Achieves optimal ventilation and oxygenation  3/2/2023 0559 by Tara Red RCP  Outcome: Progressing   Wore CPAP overnight to support breathing, pt tolerated well. Patient mutually agreed on goals.

## 2023-03-02 NOTE — PLAN OF CARE
Pt lethargic and not holding himself up in chair, several attempts to stand at chairside unsuccessful. Four people and sarastedy to get patient back into bed. Pt not following all commands to get a good neuro assessment along with not answering all questions, extremely slow to respond to questions and takes repeated stimulation to get what I did.  Resident aware, family updated and will continue to monitor    Problem: Discharge Planning  Goal: Discharge to home or other facility with appropriate resources  Outcome: Progressing  Flowsheets (Taken 3/2/2023 0800)  Discharge to home or other facility with appropriate resources: Identify barriers to discharge with patient and caregiver     Problem: Pain  Goal: Verbalizes/displays adequate comfort level or baseline comfort level  Outcome: Progressing     Problem: Respiratory - Adult  Goal: Achieves optimal ventilation and oxygenation  3/2/2023 1135 by Taty Salinas RN  Outcome: Progressing  3/2/2023 0559 by Katie Staley RCP  Outcome: Progressing     Problem: Cardiovascular - Adult  Goal: Maintains optimal cardiac output and hemodynamic stability  Outcome: Progressing  Goal: Absence of cardiac dysrhythmias or at baseline  Outcome: Progressing     Problem: Metabolic/Fluid and Electrolytes - Adult  Goal: Electrolytes maintained within normal limits  Outcome: Progressing  Flowsheets (Taken 3/2/2023 0800)  Electrolytes maintained within normal limits: Monitor labs and assess patient for signs and symptoms of electrolyte imbalances  Goal: Glucose maintained within prescribed range  Outcome: Progressing  Flowsheets (Taken 3/2/2023 0800)  Glucose maintained within prescribed range:   Monitor blood glucose as ordered   Assess for signs and symptoms of hyperglycemia and hypoglycemia     Problem: Hematologic - Adult  Goal: Maintains hematologic stability  Outcome: Progressing  Flowsheets (Taken 3/2/2023 0800)  Maintains hematologic stability: Assess for signs and symptoms of bleeding or hemorrhage     Problem: Nutrition Deficit:  Goal: Optimize nutritional status  Outcome: Progressing     Problem: Safety - Adult  Goal: Free from fall injury  Outcome: Progressing

## 2023-03-02 NOTE — PROGRESS NOTES
CRITICAL CARE PROGRESS NOTE      Patient:  Ayush Rossi    Unit/Bed:4D-08/008-A  YOB: 1940  MRN: [id number]   PCP: Erin Dominguez MD  Date of Admission: 2/21/2023  Chief Complaint:- Chest Pain    Assessment and Plan:    CAD, s/p CABG:  C performed on 02/21, multivessel CAD identified, s/p CABG x3 using LIMA to LAD, vein graft to marginal, vein graft to posterior descending artery; Left pleural and Anterior Mediastinal chest tube in place drained serosanguinous 2140 cc over the last 24 hours. Briefly required epinephrine after the procedure now weaned. Tolerated CPAP overnight at 10cm H2O. Will continue overnight CPAP. Continue to monitor drain output. Finished Ancef regimen 03/02. Continue amiodarone, ASA, statin. Continue postoperative PPI. Patient's home plavix discontinued. Received one time dose of furosemide 03/01. Acute Hypoxic Respiratory Failure, s/p intubation/extubation, improved Patient weaned to 3L oxygen via NC. Tolerated CPAP at 73ysD0W overnight without complication. Wean as tolerated back to patient's baseline of room air. Change albuterol q6 PRN to Stiolto 2 puffs daily  COPD: Likely secondary to history of tobacco use: No PFT available. Start Stiolto 2 puffs daily; Maintaining adequate oxygentation on 3L via nasal cannula. Continue to wean as tolerated. Incentive spirometer, Acapella. Acute on Chronic Macrocytic Anemia, Improved :  Stable, Vitamin B12/Folate WNL; Likely secondary to iron deficiency secondary to poor oral nutrition. Progressive Cognitive Decline, Suspected: Patient has been showing evidence of a lack of orientation in spite of appropriate day/night cues. Recommendation for [facility] when patient is transferred out of ICU. No focal neurologic deficits noted. Will continue with day-night cues and frequent reorientation  At Risk for Malnutrition, Present on Admission: Tube feeding order placed 03/01.   Recurrent Seizure Disorder:  Continue risperidone, carbamazepine   Unspecified Anxiety Disorder: Continue escitalopram  Right ICA Stenosis 70%, Right Subclavian Severe Stenosis:  Identified on CTA. Seen and evaluated by vascular surgery with no plan for intervention  Hypothyroidism:   Continue levothyroxine  Bilateral Pleural Effusions, Present on Admission:  Likely secondary to cardiovascular and respiratory compromise on admission, continue furosemide/metolazone, metoprolol with potassium supplementation; -1447.8 I/O's last 24 hours; daily weights, strict I/O's; Received additional dose of furosemide 03/01  Pneumonia, Present on Admission, Improving:  s/p treatment with Rocephin and Azithromycin  Leukocytosis, Resolved  Hypernatremia, Resolved  Hyperchloremia, Resolved  Hx Unspecified Leukemia:  Noted  Hx CVA:  baseline cognitive deficits; No focal neurologic deficits noted on 03/01. Appropriate day night cues; neuro checks every shift    INITIAL H AND P AND ICU COURSE:  This is an 81year old male who presented to [facility] on 02/21 with SOB and brown sputum production. The patient was admitted for a COPD exacerbation and started on bronchodilators, azithromcyin, and rocpehin. Cardiology was consulted secondary to EKG changes concerning for anterior ischemic changes. Given a troponin elevation, patient underwent LHC on 02/23 which showed evidence of multivessel disase. Patient underwent CABG on 47/09 without complication and was transferred to the ICU postoperatively; two chest tubes in place. Was extubated without complication and tolerated CPAP overnight. 03/01: Patient seen and evaluated at the bedside in no acute distress. Now not requiring any pressor support. Output from chest tubes reviewed (911cc serosanguinous). Denies chest pain, fever, chills, and N/V/D at bedside. No additional acute symptoms or concerns. Tolerated CPAP overnight. 03/02: Patient seen and evaluated at the beside in no acute distress. Patient tolerating weaned oxygen requirement. Patient requiring max assist with occupational therapy. No overnight events. Output from chest tubes reviewed. Patient is oriented only to self. He denies chest pain, fever, chills, and N/V/D. He denies any additional acute symptoms or concerns. He tolerated CPAP overnight. Message sent to CT surgery PA regarding transfer out of the ICU. CRP elevation likely secondary to major surgery. Case discussed extensively with primary RN. Past Medical History:    Leukemia, HLD, Brain Abscess s/p craniotomy, Recurrent Seizures. .  Family History:  No Pertinent Family history noted in EMR. .  Social History: Former smoker of tobacco 1ppd for 30 years; can't remember quit date; no recreational drug or alcohol use . Gela YEAGER   Review of systems:    General: No fevers, chills. Pain controlled. HEENT: No headache, no vision changes, no hearing changes, no trauma. CV: No palpitations, no chest pain, no tachycarida  RESP: No respiratory distress, no cough, no wheeze, SOB. GI: No abdominal pain, no nausea, no vomiting, no diarrhea  : No dysuria, no hematuria, no incontinence  Neuro: No seizures, no focal deficits, no weakness, no confusion   Psych: No psychosis, no SI/HI, Depression, Anxiety.      Scheduled Meds:   aspirin  324 mg Oral Daily    carBAMazepine  400 mg Oral TID    metOLazone  2.5 mg Oral Daily    potassium bicarb-citric acid  20 mEq Oral BID    insulin lispro  0-4 Units SubCUTAneous TID WC    insulin lispro  0-4 Units SubCUTAneous Nightly    furosemide  40 mg IntraVENous Daily    sodium chloride flush  5-40 mL IntraVENous 2 times per day    enoxaparin  40 mg SubCUTAneous Daily    amiodarone  200 mg Oral BID    sennosides-docusate sodium  1 tablet Oral BID    famotidine  20 mg Oral BID    Or    famotidine (PEPCID) injection  20 mg IntraVENous BID    metoprolol tartrate  12.5 mg Oral BID    atorvastatin  40 mg Oral Daily    escitalopram  20 mg Oral Daily    levothyroxine  50 mcg Oral Daily    risperiDONE  0.5 mg Oral BID    multivitamin  1 tablet Oral Daily     Continuous Infusions:   sodium chloride Stopped (03/01/23 1229)    sodium chloride      sodium chloride      dextrose      EPINEPHrine Stopped (03/01/23 0602)       PHYSICAL EXAMINATION:  T:  98.6F.  P:  82. RR:  14. B/P:  129/42. FiO2:  35. O2 Sat:  97% on 6L via NC. I/O:  -1447.8 03/01, +3933.1 02/28, +400 02/27  Body mass index is 29.93 kg/m². 02/28 202lb  GCS:   14    General:   This is an elderly gentleman sitting comfortably in a recliner in no acute distress. HEENT:  normocephalic and atraumatic. No scleral icterus. PERR  Neck: supple. No Thyromegaly. Lungs: Diminished breath sounds bilaterally. Nasal cannula in place. Thorax: Two chest tubes in place draining serosanguinous fluid into an atrium. No irritation, ecchymosis, or discoloration at insertion sites. Cardiac: RRR. No JVD. Abdomen: soft. Nontender. Extremities:  No clubbing, cyanosis, or edema x 4. Vasculature: capillary refill < 3 seconds. Palpable dorsalis pedis pulses. Skin:  warm and dry. Psych:  Patient oriented only to self. Not oriented to situation. Virtual sitter in place. Lymph:  No supraclavicular adenopathy. Neurologic:  No focal deficit. No seizures. Data: (All radiographs, tracings, PFTs, and imaging are personally viewed and interpreted unless otherwise noted). Sodium 143, Potassium 4.4, Chloride 105, Bicarb 27, BUN 18, Cr 1.1, Magnesium 2.5, Procal 0.36, CRP 28.1  WBC 10.4, Hb 8.6, Hct 29.3, .3, Platelet 166  Telemetry shows normal sinus rhythm   CXR showing bibasilar opacities. Chest tubes and mediastinal drain in place. NG tube noted. Seen with multidisciplinary ICU team Dr. Sushila Ruiz. Meets Continued ICU Level Care Criteria:    [] Yes   [x] No - Transfer Planned to listed location: Awaiting response from CT surgery team  [] HOSPITALIST CONTACTED- DR     Case and plan discussed with Dr. Sushila Clarke         Electronically signed by Mayra Galarza MD  CRITICAL CARE SPECIALIST   Patient seen by me including key components of medical care. Case discussed with Dr. Gilbert Zhang.  Case discussed with resident physician. Ok to transition to step down unit. Italicized font, if present,  represents changes to the note made by me. CC time 35 minutes. Time was discontiguous. Time does not include procedure. Time does include my direct assessment of the patient and coordination of care. Time represents more than 50% of the time involved with patient care by the [address] [place] [address] team.  Electronically signed by Catalina Swanson.  Clay Alcazar MD.

## 2023-03-02 NOTE — CARE COORDINATION
3/2/23, 3:40 PM EST    DISCHARGE ON GOING EVALUATION    Rick Miller day: 6  Location: -08/008-A Reason for admit: Elevated troponin level [R77.8]  Elevated troponin [R77.8]  Elevated brain natriuretic peptide (BNP) level [R79.89]  Dyspnea, unspecified type [R06.00]  NSTEMI (non-ST elevated myocardial infarction) ([facility] [address] [I21.4]   Procedure:   2/23 Cardiac Cath: Severe MVD  2/24 Bilateral Carotid Dopplers & Vein mapping  2/27 CTA Neck: Atherosclerosis of the right carotid bulb with a 76% stenosis at the origin the right internal carotid artery; No significant stenosis at the origin of the left internal carotid artery; Moderate stenosis at the origin of the left vertebral artery. 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 This can indicate acute mastoiditis in the appropriate clinical setting; Stable CT appearance the brain. No new abnormalities  3/2 CXR:   Interval removal right Cushing-Rodrigo catheter. Otherwise stable study. Stable    bibasilar airspace disease     Barriers to Discharge: POD #2. No BM yet. Required 4 assist with Renée Garcia this morning with therapy. Not alert enough for MBS yet. Continues to be NPO with NG w/TF. Tmax 101.3. NSR. Sats 95% on 6L O2. Cpap at HS. Lethargic. Confused. Oriented to self. Follows commands with BUEs. CT x4 to -20sx with 130 ml out since surgery. SLP/CR/PT/OT. Dietitian consulted. +VRE rectum. Telemetry, I&O, daily weight, IS, sternal precautions, NG w/TF, CT care, wound care, SCDs, ambulate. Amio, asa, lipitor, tegretol, lovenox, lexapro, pepcid, IV lasix 40 mg daily, SSI, synthroid, zaroxolyn, lopressor, prn IV morphine, prn roxicodone, K+, risperdal, senokot, electrolyte replacement protocols.     PCP: Ahmet Gillette MD  Readmission Risk Score: 18.1%  Patient Goals/Plan/Treatment Preferences: Plan for [facility] for rehab. No precert required. SW on case. Will need dietary ileus prevention protocol once starting to take diet by mouth.

## 2023-03-02 NOTE — CARE COORDINATION
3/2/23, 11:34 AM EST    DISCHARGE PLANNING EVALUATION     SW called Shanell Husbands, pts daughter, to update her that [facility] accepted pt when medically ready. Transportation to be determined closer to discharge.

## 2023-03-02 NOTE — PROGRESS NOTES
[facility] PHYSICAL THERAPY  DAILY NOTE  [facility] ICU 4D - 4D-08/008-A    Time In: 1027  Time Out: 1050  Timed Code Treatment Minutes: 23 Minutes  Minutes: 23          Date: 3/2/2023  Patient Name: Evan Shaffer,  Gender:  male        MRN: [id number]  : 1940  (81 y.o.)     Referring Practitioner: ANGELIC Cleary CNP  Diagnosis: elevated tropnin level  Additional Pertinent Hx: per EMR- Evan Shaffer is a 81 y.o. male with past medical history of anemia, brain abscess, seizures, former smoker approximately 40 pack year who presents to the emergency department for evaluation of fatigue, shortness of breath. Patient brought in by family as family has been reporting he has been more tired, fatigued for the past week however more so in the past 3 days. Today, patient verbalized that he was short of breath and had generalized muscle aches which prompted today's ED visit. Patient lives in a two-story home and states that it has been getting harder to walk around. Has been having dyspnea on exertion. Pt s/p CABG STONEY,  coronary artery bypass grafting x3 using LIMA to LAD, vein graft to to's marginal 1, vein graft to posterior descending artery, transesophageal echocardiography, endoscopic vein harvesting DOS . Prior Level of Function:  Lives With: Daughter  Type of Home: House  Home Layout: Two level, Bed/Bath upstairs  Home Access: Stairs to enter with rails  Entrance Stairs - Number of Steps: 2 LEVY and ~15 steps to second level  Home Equipment: Nicole Landsman, rolling, Cane   Bathroom Shower/Tub: Tub/Shower unit  Bathroom Accessibility: Accessible    Receives Help From: Family  ADL Assistance: Independent  Homemaking Assistance: Needs assistance  Homemaking Responsibilities: Yes  Ambulation Assistance: Independent  Transfer Assistance: Independent  Active : No  Additional Comments: Daughter and son in law both work outside of the home.  Pt was previously independent with ADLs and functional mobility. Pt was not previously using AD for functional mobility. Pt is a questionable historian. Hx of brain surgery in 1995 and visual deficits. Restrictions/Precautions:  Restrictions/Precautions: Fall Risk, General Precautions, Surgical Protocols  Position Activity Restriction  Sternal Precautions: move in the tube  Other position/activity restrictions: hx of brain injury     SUBJECTIVE: RN approved session, in to assist with all mobility back to the bed. Pt agrees for this. He was noted to have increased lethargy this date, noted to begin moaning out in pain and maintaining his eyes closed with majority of mobility. Resident physician in to assess the pt following mobility secondary to much assist required for mobility, increased pain and lethargy. PAIN: yes, unrated. Pain behaviors noted at sternum with mobility. He had pain medication prior to session. Vitals: Blood Pressure: 120/44  Oxygen: 90's range with all mobility  Heart Rate: 78 bpm maintained WFL with mobility   Pt on 6L O2     Lines/Tubes: NG and Chest Tube     OBJECTIVE:  Bed Mobility:  Rolling to Left: Maximum Assistance   Rolling to Right: Maximum Assistance   Sit to Supine: Maximum Assistance, X 2     Transfers:  Sit to Stand: Maximum Assistance x3, with increased time for completion, cues for hand placement  Stand to Sit:Maximum Assistance x3, with increased time for completion, cues for hand placement  To/From Bed and Chair: Dependent, with Vonita Alt    Pt required hand over hand assist to grasp Vonita Alt bar. He required max A for upright posture in Vonita Alt along for increased knee flexion to safely sit on Vonita Alt paddles.      Ambulation:  Not Tested    Balance:  Static Sitting Balance:  Maximum Assistance  Static Standing Balance: Maximum Assistance    Pt required max A to bring trunk upright in chair and for stability with sitting EOB, he was noted to wish to lean to the R     Max A for stability with stance in Darnelle Hakim for ~10 seconds    Exercise:none    Functional Outcome Measures: Completed  AM-PAC Inpatient Mobility Raw Score : 8  AM-PAC Inpatient T-Scale Score : 28.52    ASSESSMENT:  Assessment: Patient progressing toward established goals. Activity Tolerance:  Patient tolerance of  treatment: poor. Pt with increased lethargy, required max A x3 for all mobility and max cues for increased alertness. Pt with increased pain with all mobility and general deconditioning. Equipment Recommendations:Equipment Needed: No  Discharge Recommendations: Subacte/Skilled Nursing Facility and 24 hour assistance or supervision  Plan: Current Treatment Recommendations: Strengthening, Balance training, Functional mobility training, Transfer training, Safety education & training, Equipment evaluation, education, & procurement, Home exercise program, Therapeutic activities, Patient/Caregiver education & training, Neuromuscular re-education, Endurance training  General Plan:  (6x CABG)    Patient Education  Patient Education: Bed Mobility, Transfers, Activity Pacing, Pursed Lip Breathing    Goals:  Patient Goals : family wants rehab following CABG  Short Term Goals  Time Frame for Short Term Goals: by discharge  Short Term Goal 1: Pt to complete supine <-> sit with CGA for ease getting out of bed with move in the tube guidelines  Short Term Goal 2: Pt to complete sit <-> stand with Darnelle Hakim with CGA for ease with mobiltiy from various surfaces  Short Term Goal 3: Pt to complete bed <->chair with Darnelle Hakim with CGA for ease with mobility in his environment  Short Term Goal 4: Pt to sit EOB for >=10 minutes with SBA with good midline posture for ease with sitting EOB to progress further mobility  Short Term Goal 5: PT to assess gait as able  Long Term Goals  Time Frame for Long Term Goals : NA due to short ELOS    Following session, patient left in safe position with all fall risk precautions in place.

## 2023-03-02 NOTE — PROGRESS NOTES
[facility] ICU 4D  Occupational Therapy  Daily Note  Time:   Time In: 0800  Time Out: 826  Timed Code Treatment Minutes: 26 Minutes  Minutes: 26          Date: 3/2/2023  Patient Name: Belinda Fernandez,   Gender: male      Room: [facility]008-A  MRN: [id number]  : 1940  (81 y.o.)  Referring Practitioner: Jose Carlos Monroy PA-C  Diagnosis: elevated troponin  Additional Pertinent Hx: per chart review; \"The patient is an 81year old male former smoker with an approximately 20-pack-year history who quit in  with a past medical history of leukemia with remission in , hypothyroidism, \"seizures,\" hyperlipidemia, \"coma,\" and 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 \" patient underwent a CABG STONEY,  coronary artery bypass grafting x3 using LIMA to LAD, vein graft to to's marginal 1, vein graft to posterior descending artery, transesophageal echocardiography, endoscopic vein harvesting  on 23. Restrictions/Precautions:  Restrictions/Precautions: Fall Risk, General Precautions, Surgical Protocols  Position Activity Restriction  Sternal Precautions: move in the tube  Other position/activity restrictions: hx of brain injury     SUBJECTIVE: Nurse approved OT treatment. Pt in recliner upon OT arrival, lethargic, on bipap at start of session. Nurse transitioned pt to 6L O2 NC during session. Pt requires moderate cues to maintain arousal and open eyes. Pt does answer questions appropriately but doses off intermittently. PAIN: 10/10; incision site    Vitals: On continuous tele monitor. Vitals remain WNL throughout session.      COGNITION: Slow Processing, Impaired Memory, Decreased Problem Solving, Decreased Safety Awareness, Impaired Attention, and Difficulty Following Commands    ADL:   No ADL's completed this session. Paul Naqvi BALANCE:  Sitting Balance:  Contact Guard Assistance. While sitting on edge of recliner chair, pt demo's anterior lean requiring constant CGA. Standing Balance: Maximum Assistance, X 2. X3 trials. Pt requires maximum verbal and tactile cues for upright positioning secondary to flexed trunk in standing. TRANSFERS:  Sit to Stand:  Maximum Assistance, X 2, with increased time for completion. X3 trials. First attempt with no device, attempts 2-3 with lai stedy. Pt requires max verbal and tactile cues for proper technique and hand positioning. Stand to Sit: Moderate Assistance, X 2. For slow descend to chair    FUNCTIONAL MOBILITY:  Not assessed secondary to safety concerns    ADDITIONAL ACTIVITIES:  Pt completed CABG Step II exercises x10 reps x1 set this date in order to increase strength and improve activity tolerance for ADLs and homemaking tasks. Pt exhibited max fatigue during task, requring maximum verbal and tactile assist L>R for proper technique. ASSESSMENT:     Activity Tolerance:  Patient tolerance of  treatment: Poor treatment tolerance, Limited by fatigue, and Limited by medical complications      Discharge Recommendations: Continue to assess pending progress and Subacute/skilled nursing facility  Equipment Recommendations: Equipment Needed: Yes  Mobility Devices: ADL Assistive Devices  Other: Pt reports he has a rolling walker and a shower chair but was only using off and on. Recommendations provided to use both for fall prevention. Plan: Times Per Week: 6x  Times Per Day:  Once a day  Current Treatment Recommendations: Strengthening, Balance training, Functional mobility training, Neuromuscular re-education, Safety education & training, Self-Care / ADL, Endurance training, Patient/Caregiver education & training, Equipment evaluation, education, & procurement, ROM    Patient Education  Patient Education: Role of OT, ADL's, Precautions, and Orientation    Goals  Short Term Goals  Time Frame for Short Term Goals: by discharge  Short Term Goal 1: patient will tolerate 2 min static standing with sit to stand and maintain balance with MOD A x 1 with O2 >/= 90% to increase activity tolerance for self care routine. Short Term Goal 2: patient will complete UB ADLs with MIN A and 1-2 cues to initiate and sequence task. Short Term Goal 3: OTR to assesd stand pivot transfers and functional mobility and create goal as appropriate. Short Term Goal 4: patient will tolerate CABG exer to increase activity tolerance for self care routine. Following session, patient left in safe position with all fall risk precautions in place.

## 2023-03-02 NOTE — PROGRESS NOTES
[address] [facility]AP[facility]  [facility] ICU 4D  DAILY NOTE    TIME   SLP Individual Minutes  Time In: 6140  Time Out: 0519  Minutes: 11  Timed Code Treatment Minutes: 0 Minutes       Date: 3/2/2023  Patient Name: Belinda Fernandez      CSN: [id number]   : 1940  (81 y.o.)  Gender: male   Referring Physician:   ANGELIC Felder CNP  Diagnosis: Elevated troponin  Precautions: Fall risk, aspiration precautions   Current Diet: NPO; NGT  Swallowing Strategies: Not Applicable  Date of Last MBS/FEES: Not Applicable    Pain:  No pain reported. Subjective:  Spoke with HANS Olson for approval of treatment session. Upon arrival, patient was sitting in recliner. He was lethargic and cooperative throughout the session. Short-Term Goals:  SHORT TERM GOAL #1:  Goal 1: Patient will consume conservative PO trials demonstrating consistency of pharyngeal trigger and adequate endurance measures to determine readiness for instrumental evaluation. INTERVENTIONS: Patient consumed PO trials of ice chips x5. Patient unable to masticate ice chips due to edentulous state. Oral bolus holding significant with max cues required for patient to, \"swallow\" in attempts to elicit AP transit. Patient with needing cues of verbal stimulation to maintain alertness throughout the trials. Pharyngeal trigger was consistently produced. Patient does not have adequate endurance for an instrumental evaluation at this time. Recommendations to maintain NPO diet level, NGT for nutrition/hydration measures (including medications). Will prioritize dysphagia interventions on subsequent date with considerations for completion of instrumental evaluation (MBS) should patient's mentation and endurance permit optimal engagement in controlled study.    *post therapy session, patient without respiratory distress upon leaving room; HANS Stokes notified re: recommendations from the session; verbal receptiveness noted     SHORT TERM GOAL #2:  Goal 2: Staff will exhibit return demonstration for completion of comprehensive oral care procedural analysis to maximize oral integrity and to reduce potential bacteria colonization. INTERVENTIONS:DNT due to focus on other STG     SHORT TERM GOAL #3:  Goal 3: Monitor mentation (hx dementia, CABG); complete alternate cognitive linguistic assessment should it be clinically indicated. INTERVENTIONS: Patient oriented x 3/4 with limited alertness. Will continue to monitor. Long-Term Goals:     No LTG established due to short ELOS. EDUCATION:  Learner: Patient  Education:  Reviewed ST goals and Plan of Care  Evaluation of Education: Verbalizes understanding and Needs further instruction    ASSESSMENT/PLAN:  Activity Tolerance:  Patient tolerance of  treatment: good. Assessment/Plan: Patient progressing toward established goals. Continues to require skilled care of licensed speech pathologist to progress toward achievement of established goals and plan of care. .     Plan for Next Session: Dysphagia management   Discharge Recommendations: Continue to Assess Pending Progress     [facility] Student Speech Intern   STELLA Ramos 3/2/2023

## 2023-03-02 NOTE — SIGNIFICANT EVENT
Patient seen and evaluated at the bedside with primary RN and additional support staff at bedside. Patient noted to have decreased participation with therapy by physical therapy staff. Resident physician called to bedside to reassess the patient after physical therapy encounter. Compared to morning interview, patient is notably more lethargic. He is able to follow commands, but with difficulty. Pupils are equal and reactive to light. Orientation unchanged from prior interview. MAR Reviewed:  Carbamazepine TID started on 03/01: Last administered 0839 03/02  Escitalopram 20mg daily: Last administered 0839 03/02  Risperidone 0.5mg BID: Last administered 0838 on 03/02    Pain Regimen:  No morphine administered since 02/28  Oxycodone PRN 10mg last administered 0839 on 03/02    Pharmacokinetics of oxycodone reviewed. Patient's home medication regimen reviewed. Patient's CTA from 02/27 reviewed    Assessment:  Decreased participation in ADL's  Decreased cognitive function, present on admission  Acute Metabolic Encephalopathy, Rule Out     Plan:   CT Head without contrast to assess for interval changes  CBC  BMP    Resident physician spoke to patient's POA and son at bedside. They endorse that the patient has had waxing and waning orientation ever since his craniotomy. They state that prior to surgery the patient was ambulating independently and not using a walker or cane. Patient's son has been managing patient's home medications and states that he has been compliant with taking medications. He also states that patient's carbamazepine dose had been adjusted prior to admission. Hospital course and patient's events of the day reviewed with the family at bedside. Educated the family on PAP therapy and updated them on labs, vitals, and PAP settings. Reviewed the CT surgery plan of care with patient's family. Opportunity was offered to ask additional questions.  Patient's POA and son verbalized understanding of the plan of care and agreeable to the current treatment plan. Primary RN updated. She verbalized understanding is agreeable to the plan of care. -Ivana Runner MD,  Resident.

## 2023-03-02 NOTE — PLAN OF CARE
Problem: Discharge Planning  Goal: Discharge to home or other facility with appropriate resources  Outcome: Progressing  Flowsheets  Taken 3/1/2023 2056 by Jose Ma RN  Discharge to home or other facility with appropriate resources:   Identify barriers to discharge with patient and caregiver   Arrange for needed discharge resources and transportation as appropriate   Identify discharge learning needs (meds, wound care, etc)   Problem: Pain  Goal: Verbalizes/displays adequate comfort level or baseline comfort level  Outcome: Progressing  Flowsheets  Taken 3/1/2023 2056 by Jose Ma RN  Verbalizes/displays adequate comfort level or baseline comfort level:   Encourage patient to monitor pain and request assistance   Assess pain using appropriate pain scale   Administer analgesics based on type and severity of pain and evaluate response   Implement non-pharmacological measures as appropriate and evaluate response   Notify Licensed Independent Practitioner if interventions unsuccessful or patient reports new pain     Problem: Respiratory - Adult  Goal: Achieves optimal ventilation and oxygenation  Outcome: Progressing  Flowsheets  Taken 3/1/2023 2056 by Jose Ma RN  Achieves optimal ventilation and oxygenation:   Assess for changes in respiratory status   Assess for changes in mentation and behavior   Position to facilitate oxygenation and minimize respiratory effort   Oxygen supplementation based on oxygen saturation or arterial blood gases   Encourage broncho-pulmonary hygiene including cough, deep breathe, incentive spirometry   Respiratory therapy support as indicated   Assess and instruct to report shortness of breath or any respiratory difficulty  Note: Patient instructed on using incentive spirometer, does not follow direction to do so      Problem: Cardiovascular - Adult  Goal: Maintains optimal cardiac output and hemodynamic stability  Outcome: Progressing  Flowsheets  Taken 3/1/2023 2056 by Mena Springer RN  Maintains optimal cardiac output and hemodynamic stability:   Monitor blood pressure and heart rate   Monitor urine output and notify Licensed Independent Practitioner for values outside of normal range   Assess for signs of decreased cardiac output   Administer fluid and/or volume expanders as ordered   Administer vasoactive medications as ordered  Goal: Absence of cardiac dysrhythmias or at baseline  Outcome: Progressing  Flowsheets  Taken 3/1/2023 2056 by Mena Springer RN  Absence of cardiac dysrhythmias or at baseline:   Monitor cardiac rate and rhythm   Assess for signs of decreased cardiac output   Administer antiarrhythmia medication and electrolyte replacement as ordered     Problem: Metabolic/Fluid and Electrolytes - Adult  Goal: Electrolytes maintained within normal limits  Outcome: Progressing  Flowsheets  Taken 3/1/2023 2056 by Mena Springer RN  Electrolytes maintained within normal limits:   Monitor labs and assess patient for signs and symptoms of electrolyte imbalances   Administer electrolyte replacement as ordered  Goal: Glucose maintained within prescribed range  Outcome: Progressing  Flowsheets  Taken 3/1/2023 2056 by Mena Springer RN  Glucose maintained within prescribed range:   Monitor blood glucose as ordered   Administer ordered medications to maintain glucose within target range     Problem: Nutrition Deficit:  Goal: Optimize nutritional status  3/1/2023 2056 by Mena Springer RN  Outcome: Progressing  Flowsheets (Taken 3/1/2023 2056)  Nutrient intake appropriate for improving, restoring, or maintaining nutritional needs:   Assess nutritional status and recommend course of action   Recommend, monitor, and adjust tube feedings and TPN/PPN based on assessed needs    Care plan reviewed with patient. Patient verbalizes understanding of the plan of care and contribute to goal setting.

## 2023-03-02 NOTE — PROGRESS NOTES
CT/CV Surgery Progress Note    3/2/2023 9:18 AM  Surgeon:  Dr. Preciado Mems:  Mr. Evy Neal is resting comfortably in chair with NG in place with TF 50cc/hr and in no acute distress. Pt denies chest pressure, SOB, fever,chills, N/V/D. Vital Signs: BP (!) 129/42   Pulse 85   Temp 98.6 °F (37 °C) (Oral)   Resp 14   Ht 5' 9\" (1.753 m)   Wt 202 lb 11.2 oz (91.9 kg)   SpO2 97%   BMI 29.93 kg/m²    Temp (24hrs), Av.8 °F (37.7 °C), Min:98.4 °F (36.9 °C), Max:101.3 °F (38.5 °C)    Labs:   CBC:  Recent Labs     23  1509 23  0406 23  1715 23  0500 23  0425   WBC  --    < > 16.9* 11.6* 10.4   HGB  --    < > 9.1* 8.3* 8.6*   HCT  --    < > 28.2* 26.1* 29.3*   MCV  --    < > 105.6* 105.7* 116.3*   PLT  --    < > 274 217 183   APTT 164.5*  --   --   --   --    INR 1.06  --   --  1.13  --     < > = values in this interval not displayed. BMP:   Recent Labs     23  1220 23  1613 23  0500 23  0604 23  0045 23  0425 23  0829     --  147*  --  143 143  --    K 3.6   < > 4.6  --  4.5 4.4  --      --  112*  --  106 105  --    CO2 22*  --  26  --  26 27  --    BUN 9  --  13  --  17 18  --    CREATININE 0.9  --  1.1  --  1.1 1.1  --    MG 2.9*  --  2.5*  --   --  2.5*  --    POCGLU  --    < >  --    < >  --   --  180*    < > = values in this interval not displayed. Last HgA1C:   Lab Results   Component Value Date    LABA1C 5.1 2023     Imaging:  CXR: 3/2/2023  Interval removal right [place]-Rodrigo catheter. Otherwise stable study. Stable    bibasilar airspace disease.          Intake/Output Summary (Last 24 hours) at 3/2/2023 0918  Last data filed at 3/2/2023 0847  Gross per 24 hour   Intake 581.68 ml   Output 2315 ml   Net -1733.32 ml     Scheduled Meds:    aspirin  324 mg Oral Daily    carBAMazepine  400 mg Oral TID    metOLazone  2.5 mg Oral Daily    potassium bicarb-citric acid  20 mEq Oral BID    insulin lispro  0-4 Units SubCUTAneous TID WC    insulin lispro  0-4 Units SubCUTAneous Nightly    furosemide  40 mg IntraVENous Daily    sodium chloride flush  5-40 mL IntraVENous 2 times per day    enoxaparin  40 mg SubCUTAneous Daily    amiodarone  200 mg Oral BID    sennosides-docusate sodium  1 tablet Oral BID    famotidine  20 mg Oral BID    Or    famotidine (PEPCID) injection  20 mg IntraVENous BID    metoprolol tartrate  12.5 mg Oral BID    atorvastatin  40 mg Oral Daily    escitalopram  20 mg Oral Daily    levothyroxine  50 mcg Oral Daily    risperiDONE  0.5 mg Oral BID    multivitamin  1 tablet Oral Daily     ROS: All neg unless specifically mentioned in subjective section. Exam:  General Appearance: alert to person, place and time in no acute distress  Cardiovascular: normal rate, regular rhythm, normal S1 and S2, no murmurs, rubs, clicks, or gallops  Pulmonary/Chest: clear to auscultation bilaterally- no wheezes, rales or rhonchi, normal air movement, no respiratory distress  Neurological: alert, oriented, normal speech, no focal findings or movement disorder noted  Sternum: Incision healing appropriately and no wound dehiscence noted.      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 Saundra Miles PA-C      Patient with depressed mental status and a bit confused and somnolent; not combatant  Using nasogastric tube for feeding and tolerating well  Cardiac stable and hemodynamically stable; may be transferred to telemetry  Needs to work on rehab but not cooperative or strong enough yet  Looking more like he is going to need inpatient skilled nursing and/or rehab after discharge

## 2023-03-03 ENCOUNTER — APPOINTMENT (OUTPATIENT)
Dept: GENERAL RADIOLOGY | Age: 83
DRG: 233 | End: 2023-03-03
Payer: MEDICARE

## 2023-03-03 LAB
ALBUMIN SERPL BCG-MCNC: 3.1 G/DL (ref 3.5–5.1)
ALP SERPL-CCNC: 29 U/L (ref 38–126)
ALT SERPL W/O P-5'-P-CCNC: 13 U/L (ref 11–66)
ANION GAP SERPL CALC-SCNC: 11 MEQ/L (ref 8–16)
AST SERPL-CCNC: 29 U/L (ref 5–40)
BASOPHILS ABSOLUTE: 0 THOU/MM3 (ref 0–0.1)
BASOPHILS NFR BLD AUTO: 0.1 %
BILIRUB SERPL-MCNC: 0.4 MG/DL (ref 0.3–1.2)
BUN SERPL-MCNC: 23 MG/DL (ref 7–22)
CA-I BLD ISE-SCNC: 0.95 MMOL/L (ref 1.12–1.32)
CA-I BLD ISE-SCNC: 0.96 MMOL/L (ref 1.12–1.32)
CALCIUM SERPL-MCNC: 8.5 MG/DL (ref 8.5–10.5)
CHLORIDE SERPL-SCNC: 101 MEQ/L (ref 98–111)
CO2 SERPL-SCNC: 29 MEQ/L (ref 23–33)
CREAT SERPL-MCNC: 0.9 MG/DL (ref 0.4–1.2)
DEPRECATED RDW RBC AUTO: 53.1 FL (ref 35–45)
EKG ATRIAL RATE: 77 BPM
EKG P AXIS: -15 DEGREES
EKG P-R INTERVAL: 254 MS
EKG Q-T INTERVAL: 376 MS
EKG QRS DURATION: 96 MS
EKG QTC CALCULATION (BAZETT): 425 MS
EKG R AXIS: -3 DEGREES
EKG T AXIS: -57 DEGREES
EKG VENTRICULAR RATE: 77 BPM
EOSINOPHIL NFR BLD AUTO: 2.3 %
EOSINOPHILS ABSOLUTE: 0.2 THOU/MM3 (ref 0–0.4)
ERYTHROCYTE [DISTWIDTH] IN BLOOD BY AUTOMATED COUNT: 12.6 % (ref 11.5–14.5)
GFR SERPL CREATININE-BSD FRML MDRD: > 60 ML/MIN/1.73M2
GLUCOSE BLD STRIP.AUTO-MCNC: 103 MG/DL (ref 70–108)
GLUCOSE BLD STRIP.AUTO-MCNC: 130 MG/DL (ref 70–108)
GLUCOSE BLD STRIP.AUTO-MCNC: 133 MG/DL (ref 70–108)
GLUCOSE SERPL-MCNC: 143 MG/DL (ref 70–108)
HCT VFR BLD AUTO: 27.2 % (ref 42–52)
HGB BLD-MCNC: 8.2 GM/DL (ref 14–18)
IMM GRANULOCYTES # BLD AUTO: 0.04 THOU/MM3 (ref 0–0.07)
IMM GRANULOCYTES NFR BLD AUTO: 0.5 %
LYMPHOCYTES ABSOLUTE: 0.5 THOU/MM3 (ref 1–4.8)
LYMPHOCYTES NFR BLD AUTO: 6.4 %
MACROCYTES BLD QL SMEAR: PRESENT
MCH RBC QN AUTO: 34.3 PG (ref 26–33)
MCHC RBC AUTO-ENTMCNC: 30.1 GM/DL (ref 32.2–35.5)
MCV RBC AUTO: 113.8 FL (ref 80–94)
MONOCYTES ABSOLUTE: 0.7 THOU/MM3 (ref 0.4–1.3)
MONOCYTES NFR BLD AUTO: 9 %
NEUTROPHILS NFR BLD AUTO: 81.7 %
NRBC BLD AUTO-RTO: 0 /100 WBC
PLATELET # BLD AUTO: 179 THOU/MM3 (ref 130–400)
PMV BLD AUTO: 10.7 FL (ref 9.4–12.4)
POTASSIUM SERPL-SCNC: 3.8 MEQ/L (ref 3.5–5.2)
PROT SERPL-MCNC: 5.2 G/DL (ref 6.1–8)
RBC # BLD AUTO: 2.39 MILL/MM3 (ref 4.7–6.1)
SEGMENTED NEUTROPHILS ABSOLUTE COUNT: 6.7 THOU/MM3 (ref 1.8–7.7)
SODIUM SERPL-SCNC: 141 MEQ/L (ref 135–145)
WBC # BLD AUTO: 8.2 THOU/MM3 (ref 4.8–10.8)

## 2023-03-03 PROCEDURE — 74018 RADEX ABDOMEN 1 VIEW: CPT

## 2023-03-03 PROCEDURE — 82948 REAGENT STRIP/BLOOD GLUCOSE: CPT

## 2023-03-03 PROCEDURE — 80053 COMPREHEN METABOLIC PANEL: CPT

## 2023-03-03 PROCEDURE — 6360000002 HC RX W HCPCS: Performed by: NURSE PRACTITIONER

## 2023-03-03 PROCEDURE — 2500000003 HC RX 250 WO HCPCS: Performed by: PHYSICIAN ASSISTANT

## 2023-03-03 PROCEDURE — 94660 CPAP INITIATION&MGMT: CPT

## 2023-03-03 PROCEDURE — 97530 THERAPEUTIC ACTIVITIES: CPT

## 2023-03-03 PROCEDURE — 2580000003 HC RX 258

## 2023-03-03 PROCEDURE — 6370000000 HC RX 637 (ALT 250 FOR IP): Performed by: PHYSICIAN ASSISTANT

## 2023-03-03 PROCEDURE — A4216 STERILE WATER/SALINE, 10 ML: HCPCS | Performed by: PHYSICIAN ASSISTANT

## 2023-03-03 PROCEDURE — 94761 N-INVAS EAR/PLS OXIMETRY MLT: CPT

## 2023-03-03 PROCEDURE — 93010 ELECTROCARDIOGRAM REPORT: CPT | Performed by: INTERNAL MEDICINE

## 2023-03-03 PROCEDURE — 2700000000 HC OXYGEN THERAPY PER DAY

## 2023-03-03 PROCEDURE — 6370000000 HC RX 637 (ALT 250 FOR IP)

## 2023-03-03 PROCEDURE — 71045 X-RAY EXAM CHEST 1 VIEW: CPT

## 2023-03-03 PROCEDURE — 82330 ASSAY OF CALCIUM: CPT

## 2023-03-03 PROCEDURE — 2500000003 HC RX 250 WO HCPCS

## 2023-03-03 PROCEDURE — 92526 ORAL FUNCTION THERAPY: CPT

## 2023-03-03 PROCEDURE — 99232 SBSQ HOSP IP/OBS MODERATE 35: CPT | Performed by: INTERNAL MEDICINE

## 2023-03-03 PROCEDURE — 36415 COLL VENOUS BLD VENIPUNCTURE: CPT

## 2023-03-03 PROCEDURE — 97535 SELF CARE MNGMENT TRAINING: CPT

## 2023-03-03 PROCEDURE — 2580000003 HC RX 258: Performed by: PHYSICIAN ASSISTANT

## 2023-03-03 PROCEDURE — 6360000002 HC RX W HCPCS: Performed by: PHYSICIAN ASSISTANT

## 2023-03-03 PROCEDURE — 2000000000 HC ICU R&B

## 2023-03-03 PROCEDURE — 85025 COMPLETE CBC W/AUTO DIFF WBC: CPT

## 2023-03-03 PROCEDURE — 93005 ELECTROCARDIOGRAM TRACING: CPT | Performed by: INTERNAL MEDICINE

## 2023-03-03 PROCEDURE — 6370000000 HC RX 637 (ALT 250 FOR IP): Performed by: NURSE PRACTITIONER

## 2023-03-03 RX ORDER — BISACODYL 10 MG
10 SUPPOSITORY, RECTAL RECTAL DAILY
Status: DISCONTINUED | OUTPATIENT
Start: 2023-03-03 | End: 2023-03-03

## 2023-03-03 RX ORDER — BISACODYL 10 MG
10 SUPPOSITORY, RECTAL RECTAL DAILY
Status: DISCONTINUED | OUTPATIENT
Start: 2023-03-03 | End: 2023-03-16 | Stop reason: HOSPADM

## 2023-03-03 RX ORDER — CALCIUM GLUCONATE 20 MG/ML
2000 INJECTION, SOLUTION INTRAVENOUS ONCE
Status: COMPLETED | OUTPATIENT
Start: 2023-03-03 | End: 2023-03-03

## 2023-03-03 RX ADMIN — METOPROLOL TARTRATE 12.5 MG: 25 TABLET, FILM COATED ORAL at 09:00

## 2023-03-03 RX ADMIN — Medication 1 TABLET: at 09:00

## 2023-03-03 RX ADMIN — OXYCODONE HYDROCHLORIDE 10 MG: 5 TABLET ORAL at 20:16

## 2023-03-03 RX ADMIN — SODIUM CHLORIDE, PRESERVATIVE FREE 10 ML: 5 INJECTION INTRAVENOUS at 08:52

## 2023-03-03 RX ADMIN — ESCITALOPRAM 20 MG: 20 TABLET, FILM COATED ORAL at 09:00

## 2023-03-03 RX ADMIN — SODIUM CHLORIDE, PRESERVATIVE FREE 10 ML: 5 INJECTION INTRAVENOUS at 20:19

## 2023-03-03 RX ADMIN — METOPROLOL TARTRATE 12.5 MG: 25 TABLET, FILM COATED ORAL at 20:17

## 2023-03-03 RX ADMIN — Medication 400 MG: at 09:22

## 2023-03-03 RX ADMIN — ACETAMINOPHEN 650 MG: 325 TABLET ORAL at 13:13

## 2023-03-03 RX ADMIN — ASPIRIN 81 MG 324 MG: 81 TABLET ORAL at 08:44

## 2023-03-03 RX ADMIN — ENOXAPARIN SODIUM 40 MG: 100 INJECTION SUBCUTANEOUS at 08:51

## 2023-03-03 RX ADMIN — RISPERIDONE 0.5 MG: 0.25 TABLET, FILM COATED ORAL at 09:00

## 2023-03-03 RX ADMIN — FUROSEMIDE 40 MG: 10 INJECTION, SOLUTION INTRAMUSCULAR; INTRAVENOUS at 08:44

## 2023-03-03 RX ADMIN — SENNOSIDES AND DOCUSATE SODIUM 1 TABLET: 50; 8.6 TABLET ORAL at 20:16

## 2023-03-03 RX ADMIN — MAGNESIUM HYDROXIDE 30 ML: 400 SUSPENSION ORAL at 04:32

## 2023-03-03 RX ADMIN — Medication 400 MG: at 13:13

## 2023-03-03 RX ADMIN — SENNOSIDES AND DOCUSATE SODIUM 1 TABLET: 50; 8.6 TABLET ORAL at 08:44

## 2023-03-03 RX ADMIN — SODIUM CHLORIDE, PRESERVATIVE FREE 20 MG: 5 INJECTION INTRAVENOUS at 20:16

## 2023-03-03 RX ADMIN — POTASSIUM BICARBONATE 20 MEQ: 782 TABLET, EFFERVESCENT ORAL at 20:18

## 2023-03-03 RX ADMIN — ACETAMINOPHEN 650 MG: 325 TABLET ORAL at 04:32

## 2023-03-03 RX ADMIN — SENNOSIDES 8.6 MG: 8.6 TABLET, FILM COATED ORAL at 08:44

## 2023-03-03 RX ADMIN — LEVOTHYROXINE SODIUM 50 MCG: 0.05 TABLET ORAL at 06:44

## 2023-03-03 RX ADMIN — POTASSIUM BICARBONATE 20 MEQ: 782 TABLET, EFFERVESCENT ORAL at 08:44

## 2023-03-03 RX ADMIN — AMIODARONE HYDROCHLORIDE 200 MG: 200 TABLET ORAL at 09:00

## 2023-03-03 RX ADMIN — ATORVASTATIN CALCIUM 40 MG: 40 TABLET, FILM COATED ORAL at 09:00

## 2023-03-03 RX ADMIN — RISPERIDONE 0.5 MG: 0.25 TABLET, FILM COATED ORAL at 20:17

## 2023-03-03 RX ADMIN — CALCIUM GLUCONATE 2000 MG: 20 INJECTION, SOLUTION INTRAVENOUS at 08:57

## 2023-03-03 RX ADMIN — Medication 400 MG: at 20:16

## 2023-03-03 RX ADMIN — CALCIUM GLUCONATE 2000 MG: 20 INJECTION, SOLUTION INTRAVENOUS at 11:12

## 2023-03-03 RX ADMIN — AMIODARONE HYDROCHLORIDE 200 MG: 200 TABLET ORAL at 20:16

## 2023-03-03 RX ADMIN — WATER 2.5 MG: 1 INJECTION INTRAMUSCULAR; INTRAVENOUS; SUBCUTANEOUS at 15:18

## 2023-03-03 RX ADMIN — BISACODYL 10 MG: 10 SUPPOSITORY RECTAL at 20:17

## 2023-03-03 RX ADMIN — METOLAZONE 2.5 MG: 2.5 TABLET ORAL at 09:00

## 2023-03-03 RX ADMIN — SODIUM CHLORIDE, PRESERVATIVE FREE 20 MG: 5 INJECTION INTRAVENOUS at 08:44

## 2023-03-03 ASSESSMENT — PAIN SCALES - PAIN ASSESSMENT IN ADVANCED DEMENTIA (PAINAD)
BREATHING: 0
FACIALEXPRESSION: 1
BODYLANGUAGE: 2
BODYLANGUAGE: 2
CONSOLABILITY: 1
BODYLANGUAGE: 2
NEGVOCALIZATION: 1
TOTALSCORE: 5
CONSOLABILITY: 1
NEGVOCALIZATION: 1
TOTALSCORE: 5
FACIALEXPRESSION: 1
TOTALSCORE: 5
CONSOLABILITY: 1
TOTALSCORE: 5
BREATHING: 0
BODYLANGUAGE: 2
BODYLANGUAGE: 2
BREATHING: 0
CONSOLABILITY: 1
TOTALSCORE: 5
CONSOLABILITY: 1
FACIALEXPRESSION: 1
NEGVOCALIZATION: 1
BREATHING: 0
NEGVOCALIZATION: 1
BREATHING: 0
NEGVOCALIZATION: 1

## 2023-03-03 NOTE — CARE COORDINATION
3/3/23, 3:02 PM EST    DISCHARGE ON GOING EVALUATION    Tricia Herbert day: 7  Location: -08/008-A Reason for admit: Elevated troponin level [R77.8]  Elevated troponin [R77.8]  Elevated brain natriuretic peptide (BNP) level [R79.89]  Dyspnea, unspecified type [R06.00]  NSTEMI (non-ST elevated myocardial infarction) ([facility] [address] [I21.4]   Procedure:   2/23 Cardiac Cath: Severe MVD  2/24 Bilateral Carotid Dopplers & Vein mapping  2/27 CTA Neck: Atherosclerosis of the right carotid bulb with a 76% stenosis at the origin the right internal carotid artery; No significant stenosis at the origin of the left internal carotid artery; Moderate stenosis at the origin of the left vertebral artery. 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 This can indicate acute mastoiditis in the appropriate clinical setting; Stable CT appearance the brain. No new abnormalities  3/2 CXR: Interval removal right [place]-Rodrigo catheter. Otherwise stable study. Stable bibasilar airspace disease    Barriers to Discharge: POD #3. Tmax 100.1F. External morales. No BM documented. Bilateral pleural tubes. Anterior chest tubes removed. PT/OT/SLP. Hgb 8.2. Lovenox. Pepcid iv bid. Lasix iv daily. O2 on at 6L/nc. Sats 94-99%. Dietitian following. PCP: Richard Samano MD  Readmission Risk Score: 19.2%  Patient Goals/Plan/Treatment Preferences: Plan for [facility] for rehab. No precert required. SW following.

## 2023-03-03 NOTE — PROGRESS NOTES
[address] ICU 4D  Occupational Therapy  Daily Note  Time:   Time In: 9945  Time Out: 0781  Timed Code Treatment Minutes: 23 Minutes  Minutes: 23          Date: 3/3/2023  Patient Name: Zaina Doherty,   Gender: male      Room: [facility]-A  MRN: [id number]  : 1940  (81 y.o.)  Referring Practitioner: Joslyn Lyon PA-C  Diagnosis: elevated troponin  Additional Pertinent Hx: per chart review; \"The patient is an 81year old male former smoker with an approximately 20-pack-year history who quit in  with a past medical history of leukemia with remission in , hypothyroidism, \"seizures,\" hyperlipidemia, \"coma,\" and 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 \" patient underwent a CABG STONEY,  coronary artery bypass grafting x3 using LIMA to LAD, vein graft to to's marginal 1, vein graft to posterior descending artery, transesophageal echocardiography, endoscopic vein harvesting  on 23. Restrictions/Precautions:  Restrictions/Precautions: Fall Risk, General Precautions, Surgical Protocols  Position Activity Restriction  Sternal Precautions: move in the tube  Other position/activity restrictions: hx of brain injury     SUBJECTIVE: Pt sitting in recliner upon arrival, pt agreeable to OT session, RN gave verbal approval for session. Pt fixated on urinating this date, with pt having external catheter in. PAIN: 0/10:     Vitals: Blood Pressure: 160/43  Oxygen: 92% on 2L  Heart Rate: 81    COGNITION: Decreased Recall, Decreased Insight, Impaired Memory, Inattention, Decreased Problem Solving, Decreased Safety Awareness, and Difficulty Following Commands    ADL:   No ADL's completed this session. Jordi Lima BALANCE:  Standing Balance:  Moderate Assistance, X 2, with cues for safety, with verbal cues , with increased time for completion. Inside of Spero Miss with pt standing for 20-30 seconds     BED MOBILITY:  Sit to Supine: Maximum Assistance, X 2, with head of bed flat, with verbal cues , with increased time for completion in order to maintain precautions, with assist of 3rd person for line management  Rolling to the Right: Maximum Assistance in order to maintain precautions    TRANSFERS:  Sit to Stand: Moderate Assistance, X 2, with Spero Miss, with increased time for completion, cues for hand placement, with verbal cues. In order to maintain precautions  Stand to Sit: Moderate Assistance, X 2, with Spero Miss, with increased time for completion, cues for hand placement. ASSESSMENT:     Activity Tolerance:  Patient tolerance of  treatment: Good treatment tolerance      Discharge Recommendations: Continue to assess pending progress  Equipment Recommendations: Equipment Needed: Yes  Mobility Devices: ADL Assistive Devices  Other: Pt reports he has a rolling walker and a shower chair but was only using off and on. Recommendations provided to use both for fall prevention. Plan: Times Per Week: 6x  Times Per Day: Once a day  Current Treatment Recommendations: Strengthening, Balance training, Functional mobility training, Neuromuscular re-education, Safety education & training, Self-Care / ADL, Endurance training, Patient/Caregiver education & training, Equipment evaluation, education, & procurement, ROM    Patient Education  Patient Education: ADL's and Precautions    Goals  Short Term Goals  Time Frame for Short Term Goals: by discharge  Short Term Goal 1: patient will tolerate 2 min static standing with sit to stand and maintain balance with MOD A x 1 with O2 >/= 90% to increase activity tolerance for self care routine. Short Term Goal 2: patient will complete UB ADLs with MIN A and 1-2 cues to initiate and sequence task.   Short Term Goal 3: OTR to assesd stand pivot transfers and functional mobility and create goal as appropriate. Short Term Goal 4: patient will tolerate CABG exer to increase activity tolerance for self care routine. Following session, patient left in safe position with all fall risk precautions in place.

## 2023-03-03 NOTE — PROGRESS NOTES
CRITICAL CARE PROGRESS NOTE      Patient:  [facility]    Unit/Bed:4D-08/008-A  YOB: 1940  MRN: [id number]   PCP: Ed Calero MD  Date of Admission: 2/21/2023  Chief Complaint:- Chest Pain    Assessment and Plan:    CAD, s/p CABG:  C performed on 02/21, multivessel CAD identified, s/p CABG x3 using LIMA to LAD, vein graft to marginal, vein graft to posterior descending artery; Left pleural and Anterior Mediastinal chest tube in place drained serosanguinous 80 cc over the last 24 hours. Total I/O -430cc over the last 24 hours. Briefly required epinephrine after the procedure now weaned. Tolerated CPAP overnight at 10cm H2O. Will continue overnight CPAP. Continue to monitor drain output. Continue amiodarone, ASA, statin. Continue postoperative PPI. Patient's home plavix discontinued. Received one time dose of furosemide 03/01. Progressive Cognitive Decline; Suspected Dementia. Patient's family at bedside that patient has had waxing and waning mentation ever since his craniotomy. Patient has shown evidence of increased agitation overnight on 03/02. Recommendation for MOCA. No focal neurologic deficits noted on 03/03. Continue with appropriate day-night cues and frequent reorientation. Recommendation for judicious use of analgesia. Patient is tolerating his current risperidone, carbamazepine, escitalopram regimen. CT Head without contrast showing no hemorrhage. There was evidence of stable appearing volume loss and encephalomalacia in the left occipital and left parietal lobes. There is also evidence of ex vacuo dilation of the atria and occipital horn of the left lateral ventricle. There is also evidence of chronic small vessel ischemic changes. Acute Hypoxic Respiratory Failure, s/p intubation/extubation, improved Patient weaned to 3L oxygen via NC. Tolerated CPAP at 31kgG6N overnight without complication. Wean as tolerated back to patient's baseline of room air.  Tolerating albuterol q6 PRN; Recommend switching to Stiolto 2 puffs daily   COPD: Likely secondary to history of tobacco use: No PFT available. Recommendation to start Stiolto 2 puffs daily; Maintaining adequate oxygentation on 3L via nasal cannula. Continue to wean as tolerated. Incentive spirometer, Acapella. Acute on Chronic Macrocytic Anemia, Stable :  Vitamin B12/Folate WNL; Likely secondary to iron deficiency secondary to poor oral nutrition. At Risk for Malnutrition, Present on Admission: Tube feeding order placed 03/01. Hypocalcemia- likely secondary to malnutrition; tube feeding in place, replacement protocol in place  Hypoalbuminemia- likely secondary to poor oral nutrition, tube feeding in place  Left Mastoiditis, Identified on CT- S/p Rocephin therapy at admission. Consider Zosyn/Vancomycin initiation if patient's condition is not improving. Recommendation for ENT evaluation. Patient is at increased risk for complication secondary to previous craniotomy  Severe Oral Dysphagia: Likely secondary to previous craniotomy with concern for worsening dementia; SLP following; patient receiving tube feeds without complication   Recurrent Seizure Disorder:  Continue risperidone, carbamazepine   Unspecified Anxiety Disorder: Continue escitalopram  Right ICA Stenosis 70%, Right Subclavian Severe Stenosis:  Identified on CTA. Seen and evaluated by vascular surgery with no plan for intervention  Hypothyroidism:   Continue levothyroxine  Bilateral Pleural Effusions, Present on Admission:  Likely secondary to cardiovascular and respiratory compromise on admission, continue furosemide/metolazone, metoprolol with potassium supplementation; -430 I/O's last 24 hours; daily weights, strict I/O's  Pneumonia, Present on Admission, Improving:  s/p treatment with Rocephin and Azithromycin  Leukocytosis, Resolved  Hypernatremia, Resolved  Hyperchloremia, Resolved  Hx Unspecified Leukemia:  Noted  Hx CVA:  baseline cognitive deficits per family.  CT Head without Contrast showing stable old infarct in the right cerebellum and stable old infarct in the right basal ganglia. INITIAL H AND P AND ICU COURSE:  This is an 81year old male who presented to [facility] on 02/21 with SOB and brown sputum production. The patient was admitted for a COPD exacerbation and started on bronchodilators, azithromcyin, and rocpehin. Cardiology was consulted secondary to EKG changes concerning for anterior ischemic changes. Given a troponin elevation, patient underwent LHC on 02/23 which showed evidence of multivessel disase. Patient underwent CABG on 33/89 without complication and was transferred to the ICU postoperatively; two chest tubes in place. Was extubated without complication and tolerated CPAP overnight. 03/01: Patient seen and evaluated at the bedside in no acute distress. Now not requiring any pressor support. Output from chest tubes reviewed (911cc serosanguinous). Denies chest pain, fever, chills, and N/V/D at bedside. No additional acute symptoms or concerns. Tolerated CPAP overnight. 03/02: Patient seen and evaluated at the beside in no acute distress. Patient tolerating weaned oxygen requirement. Patient requiring max assist with occupational therapy. No overnight events. Output from chest tubes reviewed. Patient is oriented only to self. He denies chest pain, fever, chills, and N/V/D. He denies any additional acute symptoms or concerns. He tolerated CPAP overnight. Message sent to CT surgery PA regarding transfer out of the ICU. CRP elevation likely secondary to major surgery. Case discussed extensively with primary RN.    03/03: Patient seen and evaluated at the bedside in moderate distress. He states that he wants to go home. He is  only oriented to self which is baseline. No new focal neurologic deficits. Case reviewed with primary RN, patient was comfortable throughout the night, tolerated BiPAP without complication.  Reviewed hospital course and CT findings with overnight ICU team.    Past Medical History:    Leukemia, HLD, Brain Abscess s/p craniotomy, Recurrent Seizures. .  Family History:  No Pertinent Family history noted in EMR. .  Social History: Former smoker of tobacco 1ppd for 30 years; can't remember quit date; no recreational drug or alcohol use . Kaylan YEAGER   Review of systems:    General: No fevers, chills. Pain controlled. HEENT: No headache, no vision changes, no hearing changes, no trauma. CV: No palpitations, no chest pain, no tachycarida  RESP: No respiratory distress, no cough, no wheeze, SOB. GI: No abdominal pain, no nausea, no vomiting, no diarrhea  : No dysuria, no hematuria, no incontinence  Neuro: No seizures, no focal deficits, no weakness, no confusion   Psych: No psychosis, no SI/HI, Depression, Anxiety.      Scheduled Meds:   calcium gluconate  2,000 mg IntraVENous Once    Followed by    calcium gluconate  2,000 mg IntraVENous Once    calcium replacement protocol   Other RX Placeholder    aspirin  324 mg Oral Daily    carBAMazepine  400 mg Oral TID    metOLazone  2.5 mg Oral Daily    potassium bicarb-citric acid  20 mEq Oral BID    insulin lispro  0-4 Units SubCUTAneous TID WC    insulin lispro  0-4 Units SubCUTAneous Nightly    furosemide  40 mg IntraVENous Daily    sodium chloride flush  5-40 mL IntraVENous 2 times per day    enoxaparin  40 mg SubCUTAneous Daily    amiodarone  200 mg Oral BID    sennosides-docusate sodium  1 tablet Oral BID    famotidine  20 mg Oral BID    Or    famotidine (PEPCID) injection  20 mg IntraVENous BID    metoprolol tartrate  12.5 mg Oral BID    atorvastatin  40 mg Oral Daily    escitalopram  20 mg Oral Daily    levothyroxine  50 mcg Oral Daily    risperiDONE  0.5 mg Oral BID    multivitamin  1 tablet Oral Daily     Continuous Infusions:   sodium chloride Stopped (03/01/23 1229)    sodium chloride      sodium chloride      dextrose      EPINEPHrine Stopped (03/01/23 0602)       PHYSICAL EXAMINATION:  T:  98.3. P:  77. RR:  11. B/P:  116/40. SpO2 100% via NC 3L  I/O's: Chest Tube 80 last 24 hours. Total: -430 03/02, -1447.8 03/01, -3933.1 02/28  Body mass index is 30.77 kg/m². GCS:   13    General:   This is an elderly man sitting uncomfortably in a recliner, agitated  HEENT:  normocephalic and atraumatic. No scleral icterus. PERR; Left ear diminished tympanic light reflex. Right ear mildly erythematous. Bilateral cerumen  Neck: supple. No Thyromegaly. Lungs: Diminished breath sounds bilaterally  Thorax: CABG incision clean and well approximated with no discharge or ecchymosis. Two chest tubes in place draining serosanguinous fluid. No evidence of discoloration at insertion sites. Cardiac: RRR. No JVD. Abdomen: soft. Nontender. Extremities:  No clubbing, cyanosis, or edema x 4. Vasculature: capillary refill < 3 seconds. Palpable dorsalis pedis pulses. Skin:  warm and dry. Psych:  Oriented only to self. Responds appropriately to commands and questions but is agitated, stating he wants to go home  Lymph:  No supraclavicular adenopathy. Neurologic:  No focal deficit. No seizures. Data: (All radiographs, tracings, PFTs, and imaging are personally viewed and interpreted unless otherwise noted). Sodium 141, Potassium 3.8, Chloride 101, BUN 23, Cr 0.9, iCAL 0.96. Albumin 3.1, Alk Phos 29  WBC 8.2, Hb 8.2, Hct 27.2, .8, Platelets 510  Telemetry shows normal sinus rhythm        Seen with multidisciplinary ICU team Dr. Clay Alcazar. Meets Continued ICU Level Care Criteria:    [] Yes   [x] No - Transfer Planned to listed location: Transfer plans discussed with Dr. Gilbert Zhang  [] HOSPITALIST CONTACTED-      Case and plan discussed with Dr. Clay Alcazar. Electronically signed by Meliza Lee MD  [address]   Patient seen by me including key components of medical care. Case discussed with resident physician.   Case discussed with Dr. Gilbert Zhang.  Transfer to medical floor when ok with CTS.  Italicized font, if present,  represents changes to the note made by me. CC time 15 minutes. Time was discontiguous. Time does not include procedure. Time does include my direct assessment of the patient and coordination of care. Time represents more than 50% of the time involved with patient care by the [address] [place] [address] team.  Electronically signed by Celso Hatchet.  Bee Quarles MD.

## 2023-03-03 NOTE — PLAN OF CARE
Problem: Pain  Goal: Verbalizes/displays adequate comfort level or baseline comfort level  Outcome: Not Progressing  Flowsheets (Taken 3/3/2023 0255)  Verbalizes/displays adequate comfort level or baseline comfort level:   Encourage patient to monitor pain and request assistance   Assess pain using appropriate pain scale   Administer analgesics based on type and severity of pain and evaluate response   Implement non-pharmacological measures as appropriate and evaluate response     Problem: Respiratory - Adult  Goal: Achieves optimal ventilation and oxygenation  3/3/2023 0255 by Chantel Long RN  Outcome: Not Progressing  Flowsheets (Taken 3/3/2023 0255)  Achieves optimal ventilation and oxygenation:   Assess for changes in respiratory status   Position to facilitate oxygenation and minimize respiratory effort   Oxygen supplementation based on oxygen saturation or arterial blood gases   Encourage broncho-pulmonary hygiene including cough, deep breathe, incentive spirometry   Assess the need for suctioning and aspirate as needed  3/2/2023 1557 by Narcisa Reddy RCP  Outcome: Progressing     Problem: Cardiovascular - Adult  Goal: Maintains optimal cardiac output and hemodynamic stability  Outcome: Not Progressing  Flowsheets (Taken 3/3/2023 0255)  Maintains optimal cardiac output and hemodynamic stability:   Monitor blood pressure and heart rate   Assess for signs of decreased cardiac output   Administer fluid and/or volume expanders as ordered  Goal: Absence of cardiac dysrhythmias or at baseline  Outcome: Not Progressing  Flowsheets (Taken 3/3/2023 0255)  Absence of cardiac dysrhythmias or at baseline:   Monitor cardiac rate and rhythm   Assess for signs of decreased cardiac output     Problem: Metabolic/Fluid and Electrolytes - Adult  Goal: Electrolytes maintained within normal limits  Outcome: Not Progressing  Flowsheets (Taken 3/3/2023 0255)  Electrolytes maintained within normal limits:   Monitor labs and  assess patient for signs and symptoms of electrolyte imbalances   Administer electrolyte replacement as ordered  Goal: Glucose maintained within prescribed range  Outcome: Not Progressing  Flowsheets (Taken 3/3/2023 0255)  Glucose maintained within prescribed range:   Monitor blood glucose as ordered   Assess for signs and symptoms of hyperglycemia and hypoglycemia   Administer ordered medications to maintain glucose within target range     Problem: Hematologic - Adult  Goal: Maintains hematologic stability  Outcome: Not Progressing  Flowsheets (Taken 3/3/2023 0255)  Maintains hematologic stability:   Assess for signs and symptoms of bleeding or hemorrhage   Monitor labs for bleeding or clotting disorders     Problem: Nutrition Deficit:  Goal: Optimize nutritional status  Outcome: Not Progressing  Flowsheets (Taken 3/3/2023 0255)  Nutrient intake appropriate for improving, restoring, or maintaining nutritional needs: Monitor oral intake, labs, and treatment plans     Problem: Safety - Adult  Goal: Free from fall injury  Outcome: Not Progressing  Flowsheets (Taken 3/3/2023 0255)  Free From Fall Injury:   Instruct family/caregiver on patient safety   Based on caregiver fall risk screen, instruct family/caregiver to ask for assistance with transferring infant if caregiver noted to have fall risk factors     Problem: Neurosensory - Adult  Goal: Achieves stable or improved neurological status  Outcome: Not Progressing  Flowsheets (Taken 3/3/2023 0255)  Achieves stable or improved neurological status: Assess for and report changes in neurological status  Goal: Achieves maximal functionality and self care  Outcome: Not Progressing  Flowsheets (Taken 3/3/2023 0255)  Achieves maximal functionality and self care:   Monitor swallowing and airway patency with patient fatigue and changes in neurological status   Encourage and assist patient to increase activity and self care with guidance from physical therapy/occupational therapy     Problem: Skin/Tissue Integrity - Adult  Goal: Incisions, wounds, or drain sites healing without S/S of infection  Outcome: Not Progressing  Flowsheets (Taken 3/3/2023 0255)  Incisions, Wounds, or Drain Sites Healing Without Sign and Symptoms of Infection:   ADMISSION and DAILY: Assess and document risk factors for pressure ulcer development   TWICE DAILY: Assess and document dressing/incision, wound bed, drain sites and surrounding tissue   Implement wound care per orders   Initiate isolation precautions as appropriate  Goal: Oral mucous membranes remain intact  Outcome: Not Progressing  Flowsheets (Taken 3/3/2023 0255)  Oral Mucous Membranes Remain Intact:   Assess oral mucosa and hygiene practices   Implement preventative oral hygiene regimen     Problem: Musculoskeletal - Adult  Goal: Return mobility to safest level of function  Outcome: Not Progressing  Flowsheets (Taken 3/3/2023 0255)  Return Mobility to Safest Level of Function:   Assess patient stability and activity tolerance for standing, transferring and ambulating with or without assistive devices   Obtain physical therapy/occupational therapy consults as needed  Goal: Return ADL status to a safe level of function  Outcome: Not Progressing  Flowsheets (Taken 3/3/2023 0255)  Return ADL Status to a Safe Level of Function:   Assess activities of daily living deficits and provide assistive devices as needed   Obtain physical therapy/occupational therapy consults as needed     Problem: Gastrointestinal - Adult  Goal: Maintains or returns to baseline bowel function  Outcome: Not Progressing  Flowsheets (Taken 3/3/2023 0255)  Maintains or returns to baseline bowel function:   Assess bowel function   Encourage mobilization and activity     Problem: Genitourinary - Adult  Goal: Absence of urinary retention  Outcome: Not Progressing  Flowsheets (Taken 3/3/2023 0255)  Absence of urinary retention: Assess patients ability to void and empty bladder Problem: Infection - Adult  Goal: Absence of infection at discharge  Outcome: Not Progressing  Flowsheets (Taken 3/3/2023 0255)  Absence of infection at discharge:   Assess and monitor for signs and symptoms of infection   Monitor lab/diagnostic results   Monitor all insertion sites i.e., indwelling lines, tubes and drains   [place] appropriate cooling/warming therapies per order   Instruct and encourage patient and family to use good hand hygiene technique   Identify and instruct in appropriate isolation precautions for identified infection/condition     Problem: Anxiety  Goal: Will report anxiety at manageable levels  Description: INTERVENTIONS:  1. Administer medication as ordered  2. Teach and rehearse alternative coping skills  3. Provide emotional support with 1:1 interaction with staff  Outcome: Not Progressing  Flowsheets (Taken 3/3/2023 0255)  Will report anxiety at manageable levels:   Administer medication as ordered   Teach and rehearse alternative coping skills     Problem: Change in Body Image  Goal: Pt/Family communicate acceptance of loss or change in body image and feel psychological comfort and peace  Description: INTERVENTIONS:  1. Assess patient/family anxiety and grief process related to change in body image, loss of functional status, loss of sense of self, and forgiveness  2. Provide emotional and spiritual support  3. Provide information about the patient's health status with consideration of family and cultural values  4. Communicate willingness to discuss loss and facilitate grief process with patient/family as appropriate  5. Emphasize sustaining relationships within family system and community, or roxi/spiritual traditions  6.  Initiate Spiritual Care, Psychosocial Clinical Specialist consult as needed  Outcome: Not Progressing  Flowsheets (Taken 3/3/2023 0255)  Patient/family communicate acceptance of loss or change in body image and feel psychological comfort and peace:   Assess patient/family anxiety and grief process related to change in body image, loss of functional status, loss of sense of self, and forgiveness   Provide emotional and spiritual support   Provide information about the patients health status with consideration of family and cultural values   Communicate willingness to discuss loss and facilitate grief process with patient/family as appropriate   Initiate Spiritual Care, Psychosocial Clinical Specialist consult as needed   Emphasize sustaining relationships within family system and community, or roxi/spiritual traditions     Problem: Confusion  Goal: Confusion, delirium, dementia, or psychosis is improved or at baseline  Description: INTERVENTIONS:  1. Assess for possible contributors to thought disturbance, including medications, impaired vision or hearing, underlying metabolic abnormalities, dehydration, psychiatric diagnoses, and notify attending LIP  2. [place] high risk fall precautions, as indicated  3. Provide frequent short contacts to provide reality reorientation, refocusing and direction  4. Decrease environmental stimuli, including noise as appropriate  5. Monitor and intervene to maintain adequate nutrition, hydration, elimination, sleep and activity  6. If unable to ensure safety without constant attention obtain sitter and review sitter guidelines with assigned personnel  7.  Initiate Psychosocial CNS and Spiritual Care consult, as indicated  Outcome: Not Progressing  Flowsheets (Taken 3/3/2023 0255)  Effect of thought disturbance (confusion, delirium, dementia, or psychosis) are managed with adequate functional status:   Assess for contributors to thought disturbance, including medications, impaired vision or hearing, underlying metabolic abnormalities, dehydration, psychiatric diagnoses, notify Jerad Mccoy high risk fall precautions, as indicated   Provide frequent short contacts to provide reality reorientation, refocusing and direction   Decrease environmental stimuli, including noise as appropriate   Monitor and intervene to maintain adequate nutrition, hydration, elimination, sleep and activity   If unable to ensure safety without constant attention obtain sitter and review sitter guidelines with assigned personnel   Initiate Psychosocial Clinical Nurse Specialist and Spiritual Care consult, as indicated     Problem: Skin/Tissue Integrity  Goal: Absence of new skin breakdown  Description: 1. Monitor for areas of redness and/or skin breakdown  2. Assess vascular access sites hourly  3. Every 4-6 hours minimum:  Change oxygen saturation probe site  4. Every 4-6 hours:  If on nasal continuous positive airway pressure, respiratory therapy assess nares and determine need for appliance change or resting period. Outcome: Not Progressing     Care plan reviewed with patient and family. Patient verbalize understanding of the plan of care and contribute to goal setting.

## 2023-03-03 NOTE — PROGRESS NOTES
03/03/23 0820   Encounter Summary   Encounter Overview/Reason  Spiritual/Emotional Needs   Service Provided For: Patient   Referral/Consult From: Candace   Last Encounter  03/03/23   Complexity of Encounter Low   Begin Time 0815   End Time  0820   Total Time Calculated 5 min   Encounter    Type Follow up   Spiritual/Emotional needs   Type Spiritual Support   Assessment/Intervention/Outcome   Assessment Coping   Intervention Sustaining Presence/Ministry of presence;Prayer (assurance of)/[place];Nurtured Hope   Assessment: In my encounter with the 81 yr old patient, while rounding the ICU unit was very tired and nodding off in her chair. I provided spiritual care to patient through conversation, I also came to assess the patient's spiritual needs present. The pt wasn't talkative and was admitted due to elevated troponin. Interventions:  I provided prayer, emotional support and words of comfort.  provided a listening presence and encouraged pt to share their beliefs and how they support him during their hospitalization. Outcomes: The patient was encouraged and didn't share any further spiritual needs at this time. The pt remains optimistic and hopeful. The pt shared that they were appreciative for the support. Plan:  Chaplains will follow-up at a later time for assessment of any spiritual care needs present.   The Chaplains will be available to provide further emotional support per request.

## 2023-03-03 NOTE — PROGRESS NOTES
[facility]  INPATIENT PHYSICAL THERAPY  DAILY NOTE  [facility] ICU 4D - 4D-08/008-A    Time In: 1430  Time Out: 1500  Timed Code Treatment Minutes: 23 Minutes  Minutes: 30          Date: 3/3/2023  Patient Name: Juan Khalil,  Gender:  male        MRN: [id number]  : 1940  (81 y.o.)     Referring Practitioner: ANGELIC Ernst CNP  Diagnosis: elevated tropnin level  Additional Pertinent Hx: per EMR- Juan Khalil is a 81 y.o. male with past medical history of anemia, brain abscess, seizures, former smoker approximately 40 pack year who presents to the emergency department for evaluation of fatigue, shortness of breath. Patient brought in by family as family has been reporting he has been more tired, fatigued for the past week however more so in the past 3 days. Today, patient verbalized that he was short of breath and had generalized muscle aches which prompted today's ED visit. Patient lives in a two-story home and states that it has been getting harder to walk around. Has been having dyspnea on exertion. Pt s/p CABG STONEY,  coronary artery bypass grafting x3 using LIMA to LAD, vein graft to to's marginal 1, vein graft to posterior descending artery, transesophageal echocardiography, endoscopic vein harvesting DOS . Prior Level of Function:  Lives With: Daughter  Type of Home: House  Home Layout: Two level, Bed/Bath upstairs  Home Access: Stairs to enter with rails  Entrance Stairs - Number of Steps: 2 LEVY and ~15 steps to second level  Home Equipment: Anthony Art, rolling, Cane   Bathroom Shower/Tub: Tub/Shower unit  Bathroom Accessibility: Accessible    Receives Help From: Family  ADL Assistance: Independent  Homemaking Assistance: Needs assistance  Homemaking Responsibilities: Yes  Ambulation Assistance: Independent  Transfer Assistance: Independent  Active : No  Additional Comments: Daughter and son in law both work outside of the home.  Pt was previously independent with ADLs and functional mobility. Pt was not previously using AD for functional mobility. Pt is a questionable historian. Hx of brain surgery in 1995 and visual deficits. Restrictions/Precautions:  Restrictions/Precautions: Fall Risk, General Precautions, Surgical Protocols  Position Activity Restriction  Sternal Precautions: move in the tube  Other position/activity restrictions: hx of brain injury     SUBJECTIVE: RN approved session. Pt in recliner upon arrival and agrees to therapy. Pt confused, unable to follow directions, kept eys closed throughout session, lethargic. Pt would verbalize at times but not purposeful conversation    PAIN: no c/o pain, unable to assess    Vitals:  on cont monitor  WFL throughout    OBJECTIVE:  Bed Mobility:  Sit to Supine: Maximum Assistance, X 2   Scooting: Maximum Assistance, X 2    Transfers:  Sit to Stand: Maximum Assistance, Dependent, with Norrine Rom, x3  Stand to [address] [place] [address], Dependent, with Norrine Rom, x3  To/From Bed and Chair: Dependent, with Norrine Rom, X3  Pt unable to follow commands, pt not keeping hands on ZEINAB stedy grab bar without assist. Pt leaning forward on zeinab steady and requires heavy assist to sit upright throughout tfer    Exercise:  Patient was guided in 1 set(s) 10 reps of exercise to both lower extremities. Attempted ankle pumps and LAQ- pt not following directions . Exercises were completed for increased independence with functional mobility. Functional Outcome Measures: Completed  AM-PAC Inpatient Mobility without Stair Climbing Raw Score : 5  AM-PAC Inpatient without Stair Climbing T-Scale Score : 23.59    ASSESSMENT:  Assessment: Patient progressing toward established goals. Activity Tolerance:  Patient tolerance of  treatment: poor. Limited by cognition and lethargy. Pt demos decreased strength, endurance, and independence with mobility. Pt unsteady on feet and requires hands on assist for safety with mobility.  Pt will benefit from cont PT at this time to ensure safety, to decrease the risk for falls and to return to PLOF. Equipment Recommendations:Equipment Needed: No  Discharge Recommendations: Subacte/Skilled Nursing Facility and ECF with PT  Plan: Current Treatment Recommendations: Strengthening, Balance training, Functional mobility training, Transfer training, Safety education & training, Equipment evaluation, education, & procurement, Home exercise program, Therapeutic activities, Patient/Caregiver education & training, Neuromuscular re-education, Endurance training  General Plan:  (6x CABG)    Patient Education  Patient Education: Plan of Care, Precautions/Restrictions, Bed Mobility, Transfers, Verbal Exercise Instruction    Goals:  Patient Goals : family wants rehab following CABG  Short Term Goals  Time Frame for Short Term Goals: by discharge  Short Term Goal 1: Pt to complete supine <-> sit with CGA for ease getting out of bed with move in the tube guidelines  Short Term Goal 2: Pt to complete sit <-> stand with Pily Griffin with CGA for ease with mobiltiy from various surfaces  Short Term Goal 3: Pt to complete bed <->chair with Pily Griffin with CGA for ease with mobility in his environment  Short Term Goal 4: Pt to sit EOB for >=10 minutes with SBA with good midline posture for ease with sitting EOB to progress further mobility  Short Term Goal 5: PT to assess gait as able  Long Term Goals  Time Frame for Long Term Goals : NA due to short ELOS    Following session, patient left in safe position with all fall risk precautions in place.

## 2023-03-03 NOTE — SIGNIFICANT EVENT
Patient seen and evaluated at bedside with family present. Updated on plan of care. Anterior chest tubes removed without complication by CT surgeon. Bilateral pleural tubes in place draining to MANUEL drains. Patient's hospital course reviewed with patient's son and daughter at bedside. Opportunity to ask questions was provided. They verbalized understanding and are agreeable to the plan of care.      Case discussed with primary RN  Case discussed with Dr. Vedia Lanes, MD IM Resident

## 2023-03-03 NOTE — PROGRESS NOTES
[address] THERAPY  [facility] ICU 4D  DAILY NOTE    TIME   SLP Individual Minutes  Time In: 3376  Time Out: 4348  Minutes: 11          Date: 3/3/2023  Patient Name: Jamie Cadet      CSN: [id number]   : 1940  (81 y.o.)  Gender: male   Referring Physician:   Odalis Randolph APRN - CNP  Diagnosis: Elevated troponin  Precautions: Fall risk, aspiration precautions   Current Diet: NPO; NGT  Swallowing Strategies: Not Applicable  Date of Last MBS/FEES: Not Applicable    Pain:  No pain reported. Subjective:  RN approved this session. Daughter and grandson present at bedside. Patient with waxing and waning level of alertness. ST repositioned patient to upright positioning. Patient with tangential speech on this date stating, \"I'm [place]\". Agreeable to participate in ST session. Short-Term Goals:  SHORT TERM GOAL #1:  Goal 1: Patient will consume conservative PO trials demonstrating consistency of pharyngeal trigger and adequate endurance measures to determine readiness for instrumental evaluation. INTERVENTIONS:   Completed x1 PO trial of thin liquid via tsp. POOR oral acceptance with ST needing to tip spoon into oral cavity. Suspect very LIMITED bolus control with cough present before, and after swallow. After swallow patient with visible distress evident with highly suspected airway invasion event. Recovery achieved quickly with adequate homeostasis; however, further trials deferred. At this time, patient is not appropriate for completion of skilled instrumental assessment with recommendations to remain NPO with alternative means of nutrition via NG tube. Skilled education provided to family present following PO trial. Educated family on anatomy and physiology of swallow function and advised that at this time patient is to remain NPO. Family at bedside inquiring if patient can consume a \"milkshake\".  ST provided education and rationale of NPO status; however, suspect further education required. SHORT TERM GOAL #2:  Goal 2: Staff will exhibit return demonstration for completion of comprehensive oral care procedural analysis to maximize oral integrity and to reduce potential bacteria colonization. INTERVENTIONS:DNT due to focus on other STG     SHORT TERM GOAL #3:  Goal 3: Monitor mentation (hx dementia, CABG); complete alternate cognitive linguistic assessment should it be clinically indicated. INTERVENTIONS: DNT d/t focus on STG1; however, family reports patient is near basline mentation. Long-Term Goals:     No LTG established due to short ELOS. EDUCATION:  Learner: Patient  Education:  Reviewed ST goals and Plan of Care  Evaluation of Education: Verbalizes understanding and Needs further instruction    ASSESSMENT/PLAN:  Activity Tolerance:  Patient tolerance of  treatment: good. Assessment/Plan: Patient progressing toward established goals. Continues to require skilled care of licensed speech pathologist to progress toward achievement of established goals and plan of care. .     Plan for Next Session: Dysphagia management   Discharge Recommendations: Continue to Assess Pending Progress     Thesavana Myrtle, [facility], [address] [place] [address]

## 2023-03-03 NOTE — PROGRESS NOTES
ALIYA Wright at bedside. Pt's mediastinal chest tubes removed. Pleural chest tubes both hooked to MANUEL drains.

## 2023-03-03 NOTE — SIGNIFICANT EVENT
Resident physician called to bedside. Patient pulled out NG tube. Replaced by Primary RN and Jackson Sofia RN with resident physician present. Patient attempted to pull out NG tube again. Repeat KUB ordered to confirm position. Decision made to order soft restraints for the patient's protection as he is interfering with medical therapy. Case discussed with Brendon Florian PharmD regarding patient's agitation. Decision made for PRN Zyprexa once daily for agitation with judicious use of analgesia. Continuing to recommend appropriate day-night cues and frequent reorientation. All parties verbalized understanding and are agreeable to the plan of care. Patient's daughter Yuni Blevins called and updated of the need for restraints and medication adjustment. Opportunity provided to ask additional questions. She verbalized understanding and is agreeable to the plan of care. She requests calls for any additional significant changes to patient's plan of care.      -Opal Paige MD IM Resident

## 2023-03-03 NOTE — PROGRESS NOTES
Comprehensive Nutrition Assessment    Type and Reason for Visit:  Reassess (TF management)    Nutrition Recommendations/Plan:   Continue PIvot at goal of 50ml/hour  Additional free H20 per Physician  Continue NPO as per SLP recommendations for now  Continue bowel meds per Physician     Malnutrition Assessment:  Malnutrition Status: At risk for malnutrition (Comment) (03/01/23 4322)    Context:  Acute Illness     Findings of the 6 clinical characteristics of malnutrition:  Energy Intake:   (variable po since admit with procedures)  Weight Loss:  No significant weight loss     Body Fat Loss:  No significant body fat loss     Muscle Mass Loss:  No significant muscle mass loss    Fluid Accumulation:  Mild     Strength:  Not Performed    Nutrition Assessment:      Pt. Improving nutritionally AEB tolerating EN at goal, NPO d/t dysphagia. At risk for further nutrition compromise r/t admit with NSTEMI, cardiac cath 2/23 , CABG 2/28 with intubation and extubation 2/28, increased needs with COPD and OHS for healing process, confusion noted and underlying medical condition advanced age, HLD, leukemia.       Nutrition Related Findings:    Pt. Report/Treatments/Miscellaneous: pt. Seen this am; mumbling; not as lethargic as 3/2 per staff; was somewhat combative and calling out earlier; NGT in place s/p extubation; spoke with SLP - NPO recommended; UO 2150ml  GI Status: BM x1 2/26 (5d) senokot BID and milk of magnesia given this am; TF tolerated at 50ml/hour  Pertinent Labs: glucose 143  BUN 23  creatinine 0.9  K+ 3.8  Na 141    Pertinent Meds: senokot, lasix, MVI, milk of magnesia    Wound Type: Surgical Incision (CABG x3 2/28)       Current Nutrition Intake & Therapies:     Diet NPO Exceptions are: Sips of Water with Meds  ADULT TUBE FEEDING; Nasogastric; Immune Enhancing; Continuous; 20; Yes; 10; Q 4 hours; 50; 30; Q 4 hours  Current Tube Feeding (TF) Orders:  Feeding Route: Nasogastric  Formula: Immune Enhancing (PIvot)  Schedule: Continuous  Feeding Regimen: Pivot goal of 50ml/hour  Additives/Modulars: None  Water Flushes: per Physician  Goal TF & Flush Orders Provides: Pivot 50ml/hour provides 1800 kcals, 113gms protein, 207gms cho,9 gms fiber, 900ml free H20 in 1200ml total volume/24h    Anthropometric Measures:  Height: 5' 9\" (175.3 cm)  Ideal Body Weight (IBW): 160 lbs (73 kg)    Admission Body Weight: 202 lb (91.6 kg) (2/28 with scleral edema)  Current Body Weight: 208 lb (94.3 kg) (3/3 with +2 edema),     Weight Source: Bed Scale  Current BMI (kg/m2): 30.7  Usual Body Weight: 199 lb (90.3 kg) (2/22/23; 204# 1/2017; 239# 12/2015; 207# 3/2014)  % Weight Change (Calculated): 1.5   BMI Categories: Overweight (BMI 25.0-29. 9)    Estimated Daily Nutrient Needs:  Energy Requirements Based On: Kcal/kg  Weight Used for Energy Requirements: Admission (92kgm)  Energy (kcal/day): 0095-7441 (20-25/kgm)  Weight Used for Protein Requirements: Ideal (73kgm)  Protein (g/day): 88-110gms (1.2-1.5/kgm IBW  Fluid (ml/day): per Physician    Nutrition Diagnosis:   Inadequate oral intake related to swallowing difficulty as evidenced by NPO or clear liquid status due to medical condition, nutrition support - enteral nutrition, swallow study results    Nutrition Interventions:   Food and/or Nutrient Delivery: Continue NPO, Continue Current Tube Feeding  Nutrition Education/Counseling: Education not appropriate  Coordination of Nutrition Care: Continue to monitor while inpatient, Interdisciplinary Rounds       Goals:     Goals: Tolerate nutrition support at goal rate, by next RD assessment       Nutrition Monitoring and Evaluation:      Food/Nutrient Intake Outcomes: Enteral Nutrition Intake/Tolerance  Physical Signs/Symptoms Outcomes: Biochemical Data, Chewing or Swallowing, GI Status, Fluid Status or Edema, Hemodynamic Status, Nutrition Focused Physical Findings, Skin, Weight    Discharge Planning:     Too soon to determine     Dillon Pencil Jose, [address] [place] [address],   Contact: [phone]

## 2023-03-03 NOTE — PROGRESS NOTES
Rounds with Reggie Day and Dr. Lieutenant Rueda:  Patient alert and orientated x3 but still calling out and a little more combative. All around I think this is an improvement since his lethargy from yesterday. Neurologically he is grossly intact. Tolerating his tube feeds but no bowel movement yet  Chest tube drainage minimal and will consider removal of tubes later today  Continue beta-blocker even though diastolic pressures low.

## 2023-03-04 ENCOUNTER — APPOINTMENT (OUTPATIENT)
Dept: GENERAL RADIOLOGY | Age: 83
DRG: 233 | End: 2023-03-04
Payer: MEDICARE

## 2023-03-04 LAB
ANION GAP SERPL CALC-SCNC: 11 MEQ/L (ref 8–16)
ARTERIAL PATENCY WRIST A: POSITIVE
BASE EXCESS BLDA CALC-SCNC: 8.9 MMOL/L (ref -2.5–2.5)
BDY SITE: ABNORMAL
BUN SERPL-MCNC: 24 MG/DL (ref 7–22)
CALCIUM SERPL-MCNC: 8.9 MG/DL (ref 8.5–10.5)
CHLORIDE SERPL-SCNC: 96 MEQ/L (ref 98–111)
CO2 SERPL-SCNC: 34 MEQ/L (ref 23–33)
COLLECTED BY:: ABNORMAL
CREAT SERPL-MCNC: 0.9 MG/DL (ref 0.4–1.2)
DEPRECATED RDW RBC AUTO: 47.8 FL (ref 35–45)
DEVICE: ABNORMAL
ERYTHROCYTE [DISTWIDTH] IN BLOOD BY AUTOMATED COUNT: 12.4 % (ref 11.5–14.5)
FIO2 ON VENT O2 ANALYZER: 3 %
GFR SERPL CREATININE-BSD FRML MDRD: > 60 ML/MIN/1.73M2
GLUCOSE BLD STRIP.AUTO-MCNC: 116 MG/DL (ref 70–108)
GLUCOSE BLD STRIP.AUTO-MCNC: 126 MG/DL (ref 70–108)
GLUCOSE BLD STRIP.AUTO-MCNC: 197 MG/DL (ref 70–108)
GLUCOSE SERPL-MCNC: 128 MG/DL (ref 70–108)
HCO3 BLDA-SCNC: 33 MMOL/L (ref 23–28)
HCT VFR BLD AUTO: 28.2 % (ref 42–52)
HGB BLD-MCNC: 8.8 GM/DL (ref 14–18)
MCH RBC QN AUTO: 33.2 PG (ref 26–33)
MCHC RBC AUTO-ENTMCNC: 31.2 GM/DL (ref 32.2–35.5)
MCV RBC AUTO: 106.4 FL (ref 80–94)
PCO2 BLDA: 41 MMHG (ref 35–45)
PH BLDA: 7.51 [PH] (ref 7.35–7.45)
PLATELET # BLD AUTO: 233 THOU/MM3 (ref 130–400)
PMV BLD AUTO: 10.5 FL (ref 9.4–12.4)
PO2 BLDA: 75 MMHG (ref 71–104)
POTASSIUM SERPL-SCNC: 3.9 MEQ/L (ref 3.5–5.2)
RBC # BLD AUTO: 2.65 MILL/MM3 (ref 4.7–6.1)
SAO2 % BLDA: 96 %
SODIUM SERPL-SCNC: 141 MEQ/L (ref 135–145)
WBC # BLD AUTO: 7 THOU/MM3 (ref 4.8–10.8)

## 2023-03-04 PROCEDURE — 82948 REAGENT STRIP/BLOOD GLUCOSE: CPT

## 2023-03-04 PROCEDURE — 6370000000 HC RX 637 (ALT 250 FOR IP): Performed by: PHYSICIAN ASSISTANT

## 2023-03-04 PROCEDURE — 85027 COMPLETE CBC AUTOMATED: CPT

## 2023-03-04 PROCEDURE — 36600 WITHDRAWAL OF ARTERIAL BLOOD: CPT

## 2023-03-04 PROCEDURE — 2580000003 HC RX 258: Performed by: PHYSICIAN ASSISTANT

## 2023-03-04 PROCEDURE — 82803 BLOOD GASES ANY COMBINATION: CPT

## 2023-03-04 PROCEDURE — 6370000000 HC RX 637 (ALT 250 FOR IP): Performed by: NURSE PRACTITIONER

## 2023-03-04 PROCEDURE — 6360000002 HC RX W HCPCS: Performed by: PHYSICIAN ASSISTANT

## 2023-03-04 PROCEDURE — 2500000003 HC RX 250 WO HCPCS: Performed by: PHYSICIAN ASSISTANT

## 2023-03-04 PROCEDURE — 99233 SBSQ HOSP IP/OBS HIGH 50: CPT | Performed by: INTERNAL MEDICINE

## 2023-03-04 PROCEDURE — 80048 BASIC METABOLIC PNL TOTAL CA: CPT

## 2023-03-04 PROCEDURE — A4216 STERILE WATER/SALINE, 10 ML: HCPCS | Performed by: PHYSICIAN ASSISTANT

## 2023-03-04 PROCEDURE — 36415 COLL VENOUS BLD VENIPUNCTURE: CPT

## 2023-03-04 PROCEDURE — 2000000000 HC ICU R&B

## 2023-03-04 PROCEDURE — 6370000000 HC RX 637 (ALT 250 FOR IP)

## 2023-03-04 PROCEDURE — 6370000000 HC RX 637 (ALT 250 FOR IP): Performed by: THORACIC SURGERY (CARDIOTHORACIC VASCULAR SURGERY)

## 2023-03-04 PROCEDURE — 71045 X-RAY EXAM CHEST 1 VIEW: CPT

## 2023-03-04 RX ORDER — CARVEDILOL 3.12 MG/1
3.12 TABLET ORAL 2 TIMES DAILY WITH MEALS
Status: DISCONTINUED | OUTPATIENT
Start: 2023-03-04 | End: 2023-03-05

## 2023-03-04 RX ORDER — AMIODARONE HYDROCHLORIDE 200 MG/1
200 TABLET ORAL DAILY
Status: DISCONTINUED | OUTPATIENT
Start: 2023-03-05 | End: 2023-03-16 | Stop reason: HOSPADM

## 2023-03-04 RX ORDER — CARVEDILOL 6.25 MG/1
6.25 TABLET ORAL 2 TIMES DAILY WITH MEALS
Status: DISCONTINUED | OUTPATIENT
Start: 2023-03-04 | End: 2023-03-04

## 2023-03-04 RX ADMIN — RISPERIDONE 0.5 MG: 0.25 TABLET, FILM COATED ORAL at 08:00

## 2023-03-04 RX ADMIN — FUROSEMIDE 40 MG: 10 INJECTION, SOLUTION INTRAMUSCULAR; INTRAVENOUS at 08:17

## 2023-03-04 RX ADMIN — ESCITALOPRAM 20 MG: 20 TABLET, FILM COATED ORAL at 08:01

## 2023-03-04 RX ADMIN — ASPIRIN 81 MG 324 MG: 81 TABLET ORAL at 08:13

## 2023-03-04 RX ADMIN — METOLAZONE 2.5 MG: 2.5 TABLET ORAL at 08:00

## 2023-03-04 RX ADMIN — METOPROLOL TARTRATE 12.5 MG: 25 TABLET, FILM COATED ORAL at 08:00

## 2023-03-04 RX ADMIN — RISPERIDONE 0.5 MG: 0.25 TABLET, FILM COATED ORAL at 20:39

## 2023-03-04 RX ADMIN — FAMOTIDINE 20 MG: 20 TABLET, FILM COATED ORAL at 20:39

## 2023-03-04 RX ADMIN — Medication 400 MG: at 11:38

## 2023-03-04 RX ADMIN — POTASSIUM BICARBONATE 20 MEQ: 782 TABLET, EFFERVESCENT ORAL at 08:13

## 2023-03-04 RX ADMIN — ENOXAPARIN SODIUM 40 MG: 100 INJECTION SUBCUTANEOUS at 08:13

## 2023-03-04 RX ADMIN — ATORVASTATIN CALCIUM 40 MG: 40 TABLET, FILM COATED ORAL at 08:00

## 2023-03-04 RX ADMIN — SENNOSIDES AND DOCUSATE SODIUM 1 TABLET: 50; 8.6 TABLET ORAL at 20:40

## 2023-03-04 RX ADMIN — CARVEDILOL 3.12 MG: 3.12 TABLET, FILM COATED ORAL at 19:06

## 2023-03-04 RX ADMIN — Medication 400 MG: at 14:01

## 2023-03-04 RX ADMIN — SODIUM CHLORIDE, PRESERVATIVE FREE 20 MG: 5 INJECTION INTRAVENOUS at 08:13

## 2023-03-04 RX ADMIN — SODIUM CHLORIDE, PRESERVATIVE FREE 10 ML: 5 INJECTION INTRAVENOUS at 20:40

## 2023-03-04 RX ADMIN — Medication 1 TABLET: at 08:00

## 2023-03-04 RX ADMIN — LEVOTHYROXINE SODIUM 50 MCG: 0.05 TABLET ORAL at 08:00

## 2023-03-04 RX ADMIN — Medication 400 MG: at 20:39

## 2023-03-04 RX ADMIN — AMIODARONE HYDROCHLORIDE 200 MG: 200 TABLET ORAL at 08:00

## 2023-03-04 RX ADMIN — OXYCODONE HYDROCHLORIDE 10 MG: 5 TABLET ORAL at 03:29

## 2023-03-04 RX ADMIN — POTASSIUM BICARBONATE 20 MEQ: 782 TABLET, EFFERVESCENT ORAL at 20:40

## 2023-03-04 RX ADMIN — SODIUM CHLORIDE, PRESERVATIVE FREE 10 ML: 5 INJECTION INTRAVENOUS at 08:14

## 2023-03-04 ASSESSMENT — PAIN SCALES - PAIN ASSESSMENT IN ADVANCED DEMENTIA (PAINAD)
BREATHING: 0
CONSOLABILITY: 1
FACIALEXPRESSION: 1
TOTALSCORE: 5
FACIALEXPRESSION: 1
NEGVOCALIZATION: 1
BREATHING: 0
TOTALSCORE: 5
BODYLANGUAGE: 2
TOTALSCORE: 5
NEGVOCALIZATION: 1
FACIALEXPRESSION: 1
BREATHING: 0
BODYLANGUAGE: 2
TOTALSCORE: 5
BODYLANGUAGE: 2
TOTALSCORE: 5
CONSOLABILITY: 1
BODYLANGUAGE: 2
FACIALEXPRESSION: 1
CONSOLABILITY: 1
BREATHING: 0
BODYLANGUAGE: 2
NEGVOCALIZATION: 1
CONSOLABILITY: 1
NEGVOCALIZATION: 1
BODYLANGUAGE: 2
BODYLANGUAGE: 2
FACIALEXPRESSION: 1
FACIALEXPRESSION: 1
CONSOLABILITY: 1
FACIALEXPRESSION: 1
NEGVOCALIZATION: 1
TOTALSCORE: 5
NEGVOCALIZATION: 1
FACIALEXPRESSION: 1
CONSOLABILITY: 1
BREATHING: 0
CONSOLABILITY: 1
BODYLANGUAGE: 2
FACIALEXPRESSION: 1
NEGVOCALIZATION: 1
NEGVOCALIZATION: 1
TOTALSCORE: 5
NEGVOCALIZATION: 1
NEGVOCALIZATION: 1
BODYLANGUAGE: 2
TOTALSCORE: 5
CONSOLABILITY: 1
TOTALSCORE: 5
NEGVOCALIZATION: 1
BODYLANGUAGE: 2
BODYLANGUAGE: 2
FACIALEXPRESSION: 1
FACIALEXPRESSION: 1
BREATHING: 0
FACIALEXPRESSION: 1
BREATHING: 0
BODYLANGUAGE: 2
BREATHING: 0
TOTALSCORE: 5
BREATHING: 0
BREATHING: 0
CONSOLABILITY: 1
FACIALEXPRESSION: 1
NEGVOCALIZATION: 1
FACIALEXPRESSION: 1
CONSOLABILITY: 1
BREATHING: 0
NEGVOCALIZATION: 1
TOTALSCORE: 5
FACIALEXPRESSION: 1
TOTALSCORE: 3
BODYLANGUAGE: 2
BREATHING: 0
BREATHING: 0
CONSOLABILITY: 1
FACIALEXPRESSION: 1
BODYLANGUAGE: 2
CONSOLABILITY: 1
BODYLANGUAGE: 2
BODYLANGUAGE: 2
NEGVOCALIZATION: 1
TOTALSCORE: 5
NEGVOCALIZATION: 1
BREATHING: 0
BREATHING: 0
NEGVOCALIZATION: 1
FACIALEXPRESSION: 1
TOTALSCORE: 5
NEGVOCALIZATION: 1
NEGVOCALIZATION: 1
CONSOLABILITY: 1
FACIALEXPRESSION: 1
CONSOLABILITY: 1
TOTALSCORE: 5
NEGVOCALIZATION: 1
CONSOLABILITY: 1
BREATHING: 0
BREATHING: 0
NEGVOCALIZATION: 1
BREATHING: 0
BREATHING: 0
NEGVOCALIZATION: 1
NEGVOCALIZATION: 1
TOTALSCORE: 5
BODYLANGUAGE: 2
CONSOLABILITY: 1
BODYLANGUAGE: 0
FACIALEXPRESSION: 1
BODYLANGUAGE: 2
FACIALEXPRESSION: 1
CONSOLABILITY: 1
BREATHING: 0
CONSOLABILITY: 1
FACIALEXPRESSION: 1
NEGVOCALIZATION: 1
BODYLANGUAGE: 2
CONSOLABILITY: 1
BREATHING: 0
BREATHING: 0
BODYLANGUAGE: 2
BREATHING: 0

## 2023-03-04 ASSESSMENT — PAIN SCALES - GENERAL: PAINLEVEL_OUTOF10: 0

## 2023-03-04 NOTE — PROGRESS NOTES
CRITICAL CARE PROGRESS NOTE      Patient:  Abeba Ham    Unit/Bed:4D-08/008-A  YOB: 1940  MRN: [id number]   PCP: Julio Cesar Toussaint MD  Date of Admission: 2/21/2023  Chief Complaint:-Chest pain    Assessment and Plan:    CAD, s/p CABG:  C performed on 02/21, multivessel CAD identified, s/p CABG x3 using LIMA to LAD, vein graft to marginal, vein graft to posterior descending artery; Left pleural and Anterior Mediastinal chest tube in place drained serosanguinous 30 cc over the last 24 hours. Total I/O -119cc over the last 24 hours. Briefly required epinephrine after the procedure now weaned. Tolerated CPAP overnight at 10cm H2O. Will continue overnight CPAP. Continue to monitor drain output. Continue amiodarone, ASA, statin. Continue postoperative PPI. Received one time dose of furosemide 03/01. Progressive Cognitive Decline; Suspected Dementia. Patient's family at bedside that patient has had waxing and waning mentation ever since his craniotomy. Patient has shown evidence of increased agitation overnight on 03/02. Recommendation for MOCA when stable. Continue with appropriate day-night cues and frequent reorientation. Recommendation for judicious use of analgesia. Patient is tolerating his current risperidone, carbamazepine, escitalopram regimen. CT Head without contrast showing no hemorrhage. There was evidence of stable appearing volume loss and encephalomalacia in the left occipital and left parietal lobes. There is also evidence of ex vacuo dilation of the atria and occipital horn of the left lateral ventricle. There is also evidence of chronic small vessel ischemic changes. Acute Hypoxic Respiratory Failure, s/p intubation/extubation, improved Patient continued on 3L oxygen via NC. Wean as tolerated back to patient's baseline of room air. Continue albuterol q6 PRN;   COPD: Likely secondary to history of tobacco use: No PFT available.  Maintaining adequate oxygentation on 3L via nasal cannula. Continue to wean as tolerated. Incentive spirometer, Acapella. Acute on Chronic Macrocytic Anemia, Stable :  Vitamin B12/Folate WNL; iron 64, iron saturation 23%, TIBC 278. Likely secondary to iron deficiency secondary to poor oral nutrition. Plan on iron every other day and vitamin C when patient is more stable. At Risk for Malnutrition, Present on Admission: Tube feeding order placed 03/01. Hypocalcemia, resolved- likely secondary to malnutrition; tube feeding in place, replacement protocol in place  Hypoalbuminemia- likely secondary to poor oral nutrition, tube feeding in place  Left Mastoiditis, Identified on CT- S/p Rocephin therapy at admission. Consider Zosyn/Vancomycin initiation if patient's condition is not improving. Recommendation for ENT evaluation. Patient is at increased risk for complication secondary to previous craniotomy  Severe Oral Dysphagia: Likely secondary to previous craniotomy with concern for worsening dementia; SLP following; patient receiving tube feeds without complication   Recurrent Seizure Disorder:  Continue risperidone, carbamazepine   Unspecified Anxiety Disorder: Continue escitalopram  Right ICA Stenosis 70%, Right Subclavian Severe Stenosis:  Identified on CTA. Seen and evaluated by vascular surgery with no plan for intervention  Hypothyroidism: 2/21/2023 TSH 4.130, free T40.92. Continue levothyroxine  Bilateral Pleural Effusions, Present on Admission:  Likely secondary to cardiovascular and respiratory compromise on admission, continue furosemide/metolazone, metoprolol with potassium supplementation; daily weights, strict I/O's  Pneumonia, Present on Admission, Improving:  s/p treatment with Rocephin and Azithromycin  Leukocytosis, Resolved  Hypernatremia, Resolved  Hyperchloremia, Resolved  Hx Unspecified Leukemia:  Noted  Hx CVA:  baseline cognitive deficits per family.  CT Head without Contrast showing stable old infarct in the right cerebellum and stable old infarct in the right basal ganglia. INITIAL H AND P AND ICU COURSE:  Per HPI \"This is an 81year old male who presented to [address] [place] [address] on 02/21 with SOB and brown sputum production. The patient was admitted for a COPD exacerbation and started on bronchodilators, azithromcyin, and rocpehin. Cardiology was consulted secondary to EKG changes concerning for anterior ischemic changes. Given a troponin elevation, patient underwent LHC on 02/23 which showed evidence of multivessel disase. Patient underwent CABG on 65/58 without complication and was transferred to the ICU postoperatively; two chest tubes in place. Was extubated without complication and tolerated CPAP overnight. 03/01: Patient seen and evaluated at the bedside in no acute distress. Now not requiring any pressor support. Output from chest tubes reviewed (911cc serosanguinous). Denies chest pain, fever, chills, and N/V/D at bedside. No additional acute symptoms or concerns. Tolerated CPAP overnight. 03/02: Patient seen and evaluated at the beside in no acute distress. Patient tolerating weaned oxygen requirement. Patient requiring max assist with occupational therapy. No overnight events. Output from chest tubes reviewed. Patient is oriented only to self. He denies chest pain, fever, chills, and N/V/D. He denies any additional acute symptoms or concerns. He tolerated CPAP overnight. Message sent to CT surgery PA regarding transfer out of the ICU. CRP elevation likely secondary to major surgery. Case discussed extensively with primary RN.     03/03: Patient seen and evaluated at the bedside in moderate distress. He states that he wants to go home. He is  only oriented to self which is baseline. No new focal neurologic deficits. Case reviewed with primary RN, patient was comfortable throughout the night, tolerated BiPAP without complication.  Reviewed hospital course and CT findings with overnight ICU team.\"    3/4: Overnight, patient pulled NG tube, was replaced by Araseli Arevalo RN, was started on Zyprexa as needed daily for agitation. Chest x-ray displayed improved aeration on both lungs with small residual left basilar atelectasis. Past Medical History:    Past Medical History:   Diagnosis Date    Cancer ([facility]     Coma ([facility]     Hyperlipidemia     Leukemia ([facility]     Seizures ([facility]      . Family History:  History reviewed. No pertinent family history. .  Social History:    Social History     Socioeconomic History    Marital status:      Spouse name: Not on file    Number of children: 2    Years of education: Not on file    Highest education level: Not on file   Occupational History    Not on file   Tobacco Use    Smoking status: Former    Smokeless tobacco: Not on file    Tobacco comments:     doesn't remember when he quit   Substance and Sexual Activity    Alcohol use: No    Drug use: No    Sexual activity: Never   Other Topics Concern    Not on file   Social History Narrative    Not on file     Social Determinants of Health     Financial Resource Strain: Not on file   Food Insecurity: Not on file   Transportation Needs: Not on file   Physical Activity: Not on file   Stress: Not on file   Social Connections: Not on file   Intimate Partner Violence: Not on file   Housing Stability: Not on file     .     Scheduled Meds:   [START ON 3/5/2023] amiodarone  200 mg Oral Daily    carvedilol  3.125 mg Oral BID WC    bisacodyl  10 mg Rectal Daily    calcium replacement protocol   Other RX Placeholder    aspirin  324 mg Oral Daily    carBAMazepine  400 mg Oral TID    potassium bicarb-citric acid  20 mEq Oral BID    insulin lispro  0-4 Units SubCUTAneous TID WC    insulin lispro  0-4 Units SubCUTAneous Nightly    furosemide  40 mg IntraVENous Daily    sodium chloride flush  5-40 mL IntraVENous 2 times per day    enoxaparin  40 mg SubCUTAneous Daily    sennosides-docusate sodium  1 tablet Oral BID    famotidine  20 mg Oral BID    Or    famotidine (PEPCID) injection  20 mg IntraVENous BID    atorvastatin  40 mg Oral Daily    escitalopram  20 mg Oral Daily    levothyroxine  50 mcg Oral Daily    risperiDONE  0.5 mg Oral BID    multivitamin  1 tablet Oral Daily     Continuous Infusions:   sodium chloride      sodium chloride      dextrose      EPINEPHrine Stopped (03/01/23 0602)       PHYSICAL EXAMINATION:  T: 97.9. P: 75. RR: 21. B/P: 129/57. O2 Sat: 100% on 3 L O2. I/O: -119  Body mass index is 30.21 kg/m². GCS:   14  General:   Ill-appearing, lethargic. Easily arousable  HEENT:  normocephalic and atraumatic. No scleral icterus. PERR  Neck: supple. No Thyromegaly. Lungs: clear to auscultation. Diminished breath sounds bilaterally no retractions  Cardiac: RRR. No JVD. Scar noted on chest.  Abdomen: soft. Nontender. Extremities:  No clubbing, cyanosis, or edema x 4. Vasculature: capillary refill < 3 seconds. Palpable dorsalis pedis pulses. Skin:  warm and dry. Ecchymosis noted on left inner thigh. Psych:  Alert and oriented x1 to self. Affect appropriate  Lymph:  No supraclavicular adenopathy. Neurologic:   No seizures. Data: (All radiographs, tracings, PFTs, and imaging are personally viewed and interpreted unless otherwise noted). Sodium 141 potassium 3.9 chloride 96 CO2 34 creatinine 0.9 BUN 24 glucose 128  WBC 7.0 RBC 2.65 hemoglobin 8.8 hematocrit 28.2% .4  Chest x-ray displayed improved aeration of both lungs, with small residual left basilar atelectasis  Telemetry shows NSR    Seen with multidisciplinary ICU team .  Meets Continued ICU Level Care Criteria:    [x] Yes   [] No - Transfer Planned to listed location:  [] HOSPITALIST CONTACTED-      Case and plan discussed with Dr. Sharan Moreno MD.  Electronically signed by Brant Holguin DO  CRITICAL CARE SPECIALIST     Addendum by Dr. Sharan Moreno MD:  Patient seen by me independently including key components of medical care.  Face to face evaluation and examination was performed. Case discussed with Dr. Lesli Wallace, [address] [place] [address]. Italicized font, if present,  represents changes to the note made by me. More than 50% of the encounter time involved with patient care by the Pulmonary & Critical care service team spent by me. Please see my modifications mentioned below:  Patient is in no acute distress. He is on 3 L of oxygen via nasal cannula  Not in distress  Denies any chest pain    Lab Results   Component Value Date/Time    PH 7.51 03/04/2023 06:23 AM    PCO2 41 03/04/2023 06:23 AM    PO2 75 03/04/2023 06:23 AM    HCO3 33 03/04/2023 06:23 AM    O2SAT 96 03/04/2023 06:23 AM     Lab Results   Component Value Date/Time    IFIO2 3 03/04/2023 06:23 AM    MODE NIV 02/28/2023 05:50 PM    SETPEEP 10.0 02/28/2023 05:50 PM       CBC:   Recent Labs     03/02/23  1243 03/03/23  0642 03/04/23  0711   WBC 8.7 8.2 7.0   HGB 8.6* 8.2* 8.8*   HCT 28.4* 27.2* 28.2*    179 233     BMP:  Recent Labs     03/02/23  0425 03/02/23  1243 03/03/23  0642 03/04/23  0711    144 141 141   K 4.4 4.3 3.8 3.9    105 101 96*   CO2 27 28 29 34*   BUN 18 22 23* 24*   CREATININE 1.1 1.1 0.9 0.9   GLUCOSE 134* 133* 143* 128*   MG 2.5* 2.3  --   --    CALCIUM 8.1* 8.0* 8.5 8.9     Hepatic:   Recent Labs     03/03/23  0642   AST 29   ALT 13   BILITOT 0.4   ALKPHOS 29*     Cardiac Enzymes: No results for input(s): CKTOTAL, CKMB, TROPONINI in the last 72 hours. BNP: No results for input(s): BNP in the last 72 hours. INR: No results for input(s): INR, PROTIME in the last 72 hours. POC   Recent Labs     03/03/23  1644 03/03/23 2015 03/04/23  1148   POCGLU 103 130* 197*     No results for input(s): LACTA in the last 72 hours.      sodium chloride      sodium chloride      dextrose      EPINEPHrine Stopped (03/01/23 0602)        [START ON 3/5/2023] amiodarone  200 mg Oral Daily    carvedilol  3.125 mg Oral BID WC    bisacodyl  10 mg Rectal Daily    calcium replacement protocol Other RX Placeholder    aspirin  324 mg Oral Daily    carBAMazepine  400 mg Oral TID    potassium bicarb-citric acid  20 mEq Oral BID    insulin lispro  0-4 Units SubCUTAneous TID WC    insulin lispro  0-4 Units SubCUTAneous Nightly    furosemide  40 mg IntraVENous Daily    sodium chloride flush  5-40 mL IntraVENous 2 times per day    enoxaparin  40 mg SubCUTAneous Daily    sennosides-docusate sodium  1 tablet Oral BID    famotidine  20 mg Oral BID    Or    famotidine (PEPCID) injection  20 mg IntraVENous BID    atorvastatin  40 mg Oral Daily    escitalopram  20 mg Oral Daily    levothyroxine  50 mcg Oral Daily    risperiDONE  0.5 mg Oral BID    multivitamin  1 tablet Oral Daily       24HR INTAKE/OUTPUT:    Intake/Output Summary (Last 24 hours) at 3/4/2023 1430  Last data filed at 3/4/2023 1200  Gross per 24 hour   Intake 1691 ml   Output 2050 ml   Net -359 ml     Chest x-ray performed on: 3/4/23  Impression: Improved aeration of both lungs, with small residual left basilar atelectasis. CT scan of head without IV contrast performed on 2 March 2023:  1. New opacification of the left mastoid air cells. This can indicate acute mastoiditis in the appropriate clinical setting. 2. Stable CT appearance the brain. No new abnormalities. PUD and DVT prophylaxis reviewed.   Pepcid 20 mg IV twice daily  Lovenox 40 mg subcu daily    Electronically signed by   Latoya Alston MD on 3/4/2023 at 2:30 PM

## 2023-03-04 NOTE — PROGRESS NOTES
0600- pt currently in restraints, d/t attempting to removed NG. Determined to be unsafe to get out of bed and into the chair this AM. Pt in bed with bed in chair position. Will notify daysjulio RN. Pt did have large bowel movement overnight around 0345.

## 2023-03-04 NOTE — PLAN OF CARE
Problem: Discharge Planning  Goal: Discharge to home or other facility with appropriate resources  Outcome: Progressing  Flowsheets (Taken 3/3/2023 2000)  Discharge to home or other facility with appropriate resources: Identify barriers to discharge with patient and caregiver     Problem: Pain  Goal: Verbalizes/displays adequate comfort level or baseline comfort level  Outcome: Not Progressing  Flowsheets (Taken 3/3/2023 2000)  Verbalizes/displays adequate comfort level or baseline comfort level:   Encourage patient to monitor pain and request assistance   Assess pain using appropriate pain scale   Administer analgesics based on type and severity of pain and evaluate response   Implement non-pharmacological measures as appropriate and evaluate response     Problem: Respiratory - Adult  Goal: Achieves optimal ventilation and oxygenation  Outcome: Not Progressing  Flowsheets (Taken 3/3/2023 2000)  Achieves optimal ventilation and oxygenation:   Assess for changes in respiratory status   Assess for changes in mentation and behavior   Position to facilitate oxygenation and minimize respiratory effort   Oxygen supplementation based on oxygen saturation or arterial blood gases     Problem: Cardiovascular - Adult  Goal: Maintains optimal cardiac output and hemodynamic stability  Outcome: Not Progressing  Flowsheets (Taken 3/3/2023 2000)  Maintains optimal cardiac output and hemodynamic stability:   Assess for signs of decreased cardiac output   Monitor blood pressure and heart rate  Goal: Absence of cardiac dysrhythmias or at baseline  Outcome: Not Progressing  Flowsheets (Taken 3/3/2023 2000)  Absence of cardiac dysrhythmias or at baseline:   Monitor cardiac rate and rhythm   Assess for signs of decreased cardiac output     Problem: Metabolic/Fluid and Electrolytes - Adult  Goal: Electrolytes maintained within normal limits  Outcome: Not Progressing  Flowsheets (Taken 3/3/2023 2000)  Electrolytes maintained within normal limits: Monitor labs and assess patient for signs and symptoms of electrolyte imbalances  Goal: Glucose maintained within prescribed range  Outcome: Not Progressing  Flowsheets (Taken 3/3/2023 2000)  Glucose maintained within prescribed range: Monitor blood glucose as ordered     Problem: Hematologic - Adult  Goal: Maintains hematologic stability  Outcome: Not Progressing  Flowsheets (Taken 3/3/2023 2000)  Maintains hematologic stability: Assess for signs and symptoms of bleeding or hemorrhage     Problem: Nutrition Deficit:  Goal: Optimize nutritional status  Outcome: Not Progressing  Flowsheets (Taken 3/3/2023 1322 by Tami Alvarado, RD, LD)  Nutrient intake appropriate for improving, restoring, or maintaining nutritional needs: Recommend, monitor, and adjust tube feedings and TPN/PPN based on assessed needs     Problem: Safety - Adult  Goal: Free from fall injury  Outcome: Not Progressing  Flowsheets (Taken 3/3/2023 2000)  Free From Fall Injury:   Instruct family/caregiver on patient safety   Based on caregiver fall risk screen, instruct family/caregiver to ask for assistance with transferring infant if caregiver noted to have fall risk factors     Problem: Neurosensory - Adult  Goal: Achieves stable or improved neurological status  Outcome: Not Progressing  Flowsheets (Taken 3/3/2023 2000)  Achieves stable or improved neurological status: Assess for and report changes in neurological status  Goal: Achieves maximal functionality and self care  Outcome: Not Progressing  Flowsheets (Taken 3/3/2023 2000)  Achieves maximal functionality and self care:   Monitor swallowing and airway patency with patient fatigue and changes in neurological status   Encourage and assist patient to increase activity and self care with guidance from physical therapy/occupational therapy     Problem: Skin/Tissue Integrity - Adult  Goal: Incisions, wounds, or drain sites healing without S/S of infection  Outcome: Not Progressing  Flowsheets (Taken 3/3/2023 2000)  Incisions, Wounds, or Drain Sites Healing Without Sign and Symptoms of Infection: ADMISSION and DAILY: Assess and document risk factors for pressure ulcer development  Goal: Oral mucous membranes remain intact  Outcome: Not Progressing  Flowsheets (Taken 3/3/2023 2000)  Oral Mucous Membranes Remain Intact: Assess oral mucosa and hygiene practices     Problem: Musculoskeletal - Adult  Goal: Return mobility to safest level of function  Outcome: Not Progressing  Flowsheets (Taken 3/3/2023 2000)  Return Mobility to Safest Level of Function: Assess patient stability and activity tolerance for standing, transferring and ambulating with or without assistive devices  Goal: Return ADL status to a safe level of function  Outcome: Not Progressing  Flowsheets (Taken 3/3/2023 2000)  Return ADL Status to a Safe Level of Function: Administer medication as ordered     Problem: Gastrointestinal - Adult  Goal: Maintains or returns to baseline bowel function  Outcome: Not Progressing  Flowsheets (Taken 3/3/2023 2000)  Maintains or returns to baseline bowel function:   Assess bowel function   Encourage mobilization and activity     Problem: Genitourinary - Adult  Goal: Absence of urinary retention  Outcome: Not Progressing  Flowsheets (Taken 3/3/2023 2000)  Absence of urinary retention: Assess patients ability to void and empty bladder     Problem: Infection - Adult  Goal: Absence of infection at discharge  Outcome: Not Progressing  Flowsheets (Taken 3/3/2023 2000)  Absence of infection at discharge:   Assess and monitor for signs and symptoms of infection   Monitor all insertion sites i.e., indwelling lines, tubes and drains   Monitor lab/diagnostic results     Problem: Anxiety  Goal: Will report anxiety at manageable levels  Description: INTERVENTIONS:  1. Administer medication as ordered  2. Teach and rehearse alternative coping skills  3.  Provide emotional support with 1:1 interaction with staff  Outcome: Not Progressing  Flowsheets (Taken 3/3/2023 2000)  Will report anxiety at manageable levels:   Administer medication as ordered   Teach and rehearse alternative coping skills     Problem: Change in Body Image  Goal: Pt/Family communicate acceptance of loss or change in body image and feel psychological comfort and peace  Description: INTERVENTIONS:  1. Assess patient/family anxiety and grief process related to change in body image, loss of functional status, loss of sense of self, and forgiveness  2. Provide emotional and spiritual support  3. Provide information about the patient's health status with consideration of family and cultural values  4. Communicate willingness to discuss loss and facilitate grief process with patient/family as appropriate  5. Emphasize sustaining relationships within family system and community, or roxi/spiritual traditions  6. Initiate Spiritual Care, Psychosocial Clinical Specialist consult as needed  Outcome: Not Progressing  Flowsheets (Taken 3/3/2023 2000)  Patient/family communicate acceptance of loss or change in body image and feel psychological comfort and peace:   Assess patient/family anxiety and grief process related to change in body image, loss of functional status, loss of sense of self, and forgiveness   Provide emotional and spiritual support   Provide information about the patients health status with consideration of family and cultural values   Communicate willingness to discuss loss and facilitate grief process with patient/family as appropriate     Problem: Confusion  Goal: Confusion, delirium, dementia, or psychosis is improved or at baseline  Description: INTERVENTIONS:  1. Assess for possible contributors to thought disturbance, including medications, impaired vision or hearing, underlying metabolic abnormalities, dehydration, psychiatric diagnoses, and notify attending LIP  2. [place] high risk fall precautions, as indicated  3.  Provide frequent short contacts to provide reality reorientation, refocusing and direction  4. Decrease environmental stimuli, including noise as appropriate  5. Monitor and intervene to maintain adequate nutrition, hydration, elimination, sleep and activity  6. If unable to ensure safety without constant attention obtain sitter and review sitter guidelines with assigned personnel  7. Initiate Psychosocial CNS and Spiritual Care consult, as indicated  Outcome: Not Progressing  Flowsheets (Taken 3/3/2023 2000)  Effect of thought disturbance (confusion, delirium, dementia, or psychosis) are managed with adequate functional status:   Assess for contributors to thought disturbance, including medications, impaired vision or hearing, underlying metabolic abnormalities, dehydration, psychiatric diagnoses, notify [place] high risk fall precautions, as indicated   Provide frequent short contacts to provide reality reorientation, refocusing and direction     Problem: Skin/Tissue Integrity  Goal: Absence of new skin breakdown  Description: 1. Monitor for areas of redness and/or skin breakdown  2. Assess vascular access sites hourly  3. Every 4-6 hours minimum:  Change oxygen saturation probe site  4. Every 4-6 hours:  If on nasal continuous positive airway pressure, respiratory therapy assess nares and determine need for appliance change or resting period. Outcome: Not Progressing     Problem: Safety - Medical Restraint  Goal: Remains free of injury from restraints (Restraint for Interference with Medical Device)  Description: INTERVENTIONS:  1. Determine that other, less restrictive measures have been tried or would not be effective before applying the restraint  2. Evaluate the patient's condition at the time of restraint application  3. Inform patient/family regarding the reason for restraint  4.  Q2H: Monitor safety, psychosocial status, comfort, nutrition and hydration  Outcome: Not Progressing  Flowsheets  Taken 3/3/2023 1800 by Yenifer Galeas RN  Remains free of injury from restraints (restraint for interference with medical device): Determine that other, less restrictive measures have been tried or would not be effective before applying the restraint  Taken 3/3/2023 1600 by Yenifer Galeas RN  Remains free of injury from restraints (restraint for interference with medical device): Determine that other, less restrictive measures have been tried or would not be effective before applying the restraint  Taken 3/3/2023 1506 by Yenifer Galeas RN  Remains free of injury from restraints (restraint for interference with medical device): Determine that other, less restrictive measures have been tried or would not be effective before applying the restraint

## 2023-03-04 NOTE — PROGRESS NOTES
Aliyah Carrasco 61  PHYSICAL THERAPY MISSED TREATMENT NOTE  [facility] ICU 4D    Date: 3/4/2023  Patient Name: Dinorah Lambert        MRN: [id number]   : 1940  (81 y.o.)  Gender: male   Referring Practitioner: ANGELIC Capone CNP  Diagnosis: elevated tropnin level         REASON FOR MISSED TREATMENT:  Hold treatment per nursing request.  Pt sound asleep and not able to participate with therapy today. Lizbeth Denver.  Jaydon Dinh, [place] [address]

## 2023-03-04 NOTE — PROGRESS NOTES
Cardiovascular Surgery Progress Note    Comfortable on 3L NC, answers appropriately  Poor pulmonary toilet, requires frequent NT suctioning  SR, somewhat hypertensive  Mild peripheral edema  Labs/CXR ok  -D/c metolazone  -Replace metoprolol with coreg  -Keep in ICU for pulmonary toilet

## 2023-03-05 ENCOUNTER — APPOINTMENT (OUTPATIENT)
Dept: GENERAL RADIOLOGY | Age: 83
DRG: 233 | End: 2023-03-05
Payer: MEDICARE

## 2023-03-05 LAB
ANION GAP SERPL CALC-SCNC: 13 MEQ/L (ref 8–16)
BUN SERPL-MCNC: 30 MG/DL (ref 7–22)
CALCIUM SERPL-MCNC: 8.2 MG/DL (ref 8.5–10.5)
CHLORIDE SERPL-SCNC: 93 MEQ/L (ref 98–111)
CO2 SERPL-SCNC: 31 MEQ/L (ref 23–33)
CREAT SERPL-MCNC: 0.9 MG/DL (ref 0.4–1.2)
DEPRECATED RDW RBC AUTO: 48 FL (ref 35–45)
ERYTHROCYTE [DISTWIDTH] IN BLOOD BY AUTOMATED COUNT: 12.3 % (ref 11.5–14.5)
GFR SERPL CREATININE-BSD FRML MDRD: > 60 ML/MIN/1.73M2
GLUCOSE BLD STRIP.AUTO-MCNC: 123 MG/DL (ref 70–108)
GLUCOSE BLD STRIP.AUTO-MCNC: 134 MG/DL (ref 70–108)
GLUCOSE BLD STRIP.AUTO-MCNC: 157 MG/DL (ref 70–108)
GLUCOSE BLD STRIP.AUTO-MCNC: 160 MG/DL (ref 70–108)
GLUCOSE SERPL-MCNC: 151 MG/DL (ref 70–108)
HCT VFR BLD AUTO: 24.3 % (ref 42–52)
HGB BLD-MCNC: 7.5 GM/DL (ref 14–18)
MCH RBC QN AUTO: 33.5 PG (ref 26–33)
MCHC RBC AUTO-ENTMCNC: 30.9 GM/DL (ref 32.2–35.5)
MCV RBC AUTO: 108.5 FL (ref 80–94)
PLATELET # BLD AUTO: 272 THOU/MM3 (ref 130–400)
PMV BLD AUTO: 11.2 FL (ref 9.4–12.4)
POTASSIUM SERPL-SCNC: 3.9 MEQ/L (ref 3.5–5.2)
RBC # BLD AUTO: 2.24 MILL/MM3 (ref 4.7–6.1)
SODIUM SERPL-SCNC: 137 MEQ/L (ref 135–145)
WBC # BLD AUTO: 7.5 THOU/MM3 (ref 4.8–10.8)

## 2023-03-05 PROCEDURE — 82948 REAGENT STRIP/BLOOD GLUCOSE: CPT

## 2023-03-05 PROCEDURE — 97110 THERAPEUTIC EXERCISES: CPT

## 2023-03-05 PROCEDURE — 85027 COMPLETE CBC AUTOMATED: CPT

## 2023-03-05 PROCEDURE — 2000000000 HC ICU R&B

## 2023-03-05 PROCEDURE — 6360000002 HC RX W HCPCS: Performed by: PHYSICIAN ASSISTANT

## 2023-03-05 PROCEDURE — 2580000003 HC RX 258: Performed by: PHYSICIAN ASSISTANT

## 2023-03-05 PROCEDURE — A4216 STERILE WATER/SALINE, 10 ML: HCPCS | Performed by: PHYSICIAN ASSISTANT

## 2023-03-05 PROCEDURE — 97530 THERAPEUTIC ACTIVITIES: CPT

## 2023-03-05 PROCEDURE — 80048 BASIC METABOLIC PNL TOTAL CA: CPT

## 2023-03-05 PROCEDURE — 94660 CPAP INITIATION&MGMT: CPT

## 2023-03-05 PROCEDURE — 94761 N-INVAS EAR/PLS OXIMETRY MLT: CPT

## 2023-03-05 PROCEDURE — 6370000000 HC RX 637 (ALT 250 FOR IP): Performed by: THORACIC SURGERY (CARDIOTHORACIC VASCULAR SURGERY)

## 2023-03-05 PROCEDURE — 6370000000 HC RX 637 (ALT 250 FOR IP)

## 2023-03-05 PROCEDURE — 6370000000 HC RX 637 (ALT 250 FOR IP): Performed by: NURSE PRACTITIONER

## 2023-03-05 PROCEDURE — 2500000003 HC RX 250 WO HCPCS: Performed by: PHYSICIAN ASSISTANT

## 2023-03-05 PROCEDURE — 71045 X-RAY EXAM CHEST 1 VIEW: CPT

## 2023-03-05 PROCEDURE — 99233 SBSQ HOSP IP/OBS HIGH 50: CPT | Performed by: INTERNAL MEDICINE

## 2023-03-05 PROCEDURE — 2700000000 HC OXYGEN THERAPY PER DAY

## 2023-03-05 PROCEDURE — 36415 COLL VENOUS BLD VENIPUNCTURE: CPT

## 2023-03-05 PROCEDURE — 6370000000 HC RX 637 (ALT 250 FOR IP): Performed by: PHYSICIAN ASSISTANT

## 2023-03-05 RX ORDER — CARVEDILOL 6.25 MG/1
6.25 TABLET ORAL 2 TIMES DAILY WITH MEALS
Status: DISCONTINUED | OUTPATIENT
Start: 2023-03-05 | End: 2023-03-09

## 2023-03-05 RX ADMIN — SENNOSIDES 8.6 MG: 8.6 TABLET, FILM COATED ORAL at 20:52

## 2023-03-05 RX ADMIN — ESCITALOPRAM 20 MG: 20 TABLET, FILM COATED ORAL at 08:22

## 2023-03-05 RX ADMIN — SODIUM CHLORIDE, PRESERVATIVE FREE 10 ML: 5 INJECTION INTRAVENOUS at 20:52

## 2023-03-05 RX ADMIN — CARVEDILOL 6.25 MG: 6.25 TABLET, FILM COATED ORAL at 17:14

## 2023-03-05 RX ADMIN — OXYCODONE HYDROCHLORIDE 10 MG: 5 TABLET ORAL at 06:12

## 2023-03-05 RX ADMIN — POTASSIUM BICARBONATE 20 MEQ: 782 TABLET, EFFERVESCENT ORAL at 20:52

## 2023-03-05 RX ADMIN — CARVEDILOL 3.12 MG: 6.25 TABLET, FILM COATED ORAL at 08:51

## 2023-03-05 RX ADMIN — Medication 400 MG: at 20:52

## 2023-03-05 RX ADMIN — RISPERIDONE 0.5 MG: 0.25 TABLET, FILM COATED ORAL at 20:52

## 2023-03-05 RX ADMIN — SENNOSIDES AND DOCUSATE SODIUM 1 TABLET: 50; 8.6 TABLET ORAL at 08:22

## 2023-03-05 RX ADMIN — ENOXAPARIN SODIUM 40 MG: 100 INJECTION SUBCUTANEOUS at 08:22

## 2023-03-05 RX ADMIN — BISACODYL 10 MG: 10 SUPPOSITORY RECTAL at 15:03

## 2023-03-05 RX ADMIN — RISPERIDONE 0.5 MG: 0.25 TABLET, FILM COATED ORAL at 08:22

## 2023-03-05 RX ADMIN — Medication 400 MG: at 13:46

## 2023-03-05 RX ADMIN — SODIUM CHLORIDE, PRESERVATIVE FREE 20 MG: 5 INJECTION INTRAVENOUS at 08:22

## 2023-03-05 RX ADMIN — Medication 400 MG: at 08:21

## 2023-03-05 RX ADMIN — CARVEDILOL 3.12 MG: 3.12 TABLET, FILM COATED ORAL at 07:37

## 2023-03-05 RX ADMIN — LEVOTHYROXINE SODIUM 50 MCG: 0.05 TABLET ORAL at 07:37

## 2023-03-05 RX ADMIN — Medication 1 TABLET: at 08:22

## 2023-03-05 RX ADMIN — SODIUM CHLORIDE, PRESERVATIVE FREE 20 MG: 5 INJECTION INTRAVENOUS at 20:52

## 2023-03-05 RX ADMIN — SODIUM CHLORIDE, PRESERVATIVE FREE 10 ML: 5 INJECTION INTRAVENOUS at 07:38

## 2023-03-05 RX ADMIN — POTASSIUM BICARBONATE 20 MEQ: 782 TABLET, EFFERVESCENT ORAL at 08:36

## 2023-03-05 RX ADMIN — OXYCODONE HYDROCHLORIDE 5 MG: 5 TABLET ORAL at 00:51

## 2023-03-05 RX ADMIN — AMIODARONE HYDROCHLORIDE 200 MG: 200 TABLET ORAL at 08:22

## 2023-03-05 RX ADMIN — ATORVASTATIN CALCIUM 40 MG: 40 TABLET, FILM COATED ORAL at 08:22

## 2023-03-05 RX ADMIN — ASPIRIN 81 MG 324 MG: 81 TABLET ORAL at 08:22

## 2023-03-05 RX ADMIN — FUROSEMIDE 40 MG: 10 INJECTION, SOLUTION INTRAMUSCULAR; INTRAVENOUS at 08:23

## 2023-03-05 ASSESSMENT — PAIN SCALES - PAIN ASSESSMENT IN ADVANCED DEMENTIA (PAINAD)
BODYLANGUAGE: 2
CONSOLABILITY: 1
FACIALEXPRESSION: 2
TOTALSCORE: 6
CONSOLABILITY: 1
TOTALSCORE: 6
NEGVOCALIZATION: 1
FACIALEXPRESSION: 2
BREATHING: 0
TOTALSCORE: 6
FACIALEXPRESSION: 2
NEGVOCALIZATION: 1
TOTALSCORE: 6
CONSOLABILITY: 1
BREATHING: 0
BODYLANGUAGE: 2
FACIALEXPRESSION: 2
FACIALEXPRESSION: 2
BODYLANGUAGE: 2
TOTALSCORE: 6
NEGVOCALIZATION: 1
CONSOLABILITY: 1
BODYLANGUAGE: 2
TOTALSCORE: 6
BREATHING: 0
FACIALEXPRESSION: 2
BODYLANGUAGE: 2
FACIALEXPRESSION: 2
BODYLANGUAGE: 2
NEGVOCALIZATION: 1
BODYLANGUAGE: 2
BREATHING: 0
BREATHING: 0
NEGVOCALIZATION: 1
CONSOLABILITY: 1
NEGVOCALIZATION: 1
NEGVOCALIZATION: 1
CONSOLABILITY: 1
TOTALSCORE: 6
CONSOLABILITY: 1

## 2023-03-05 ASSESSMENT — PAIN SCALES - WONG BAKER
WONGBAKER_NUMERICALRESPONSE: 0
WONGBAKER_NUMERICALRESPONSE: 4

## 2023-03-05 ASSESSMENT — PAIN SCALES - GENERAL
PAINLEVEL_OUTOF10: 0

## 2023-03-05 NOTE — SIGNIFICANT EVENT
Case discussed with primary RN and RT. Witnessed apneas noted. Patient's O2 requirements decreased back to room air. Decision made to switch from CPAP to BIPAP 12/6 with BUR of 14. Sitter placed in room as patient is currently in restraints secondary to agitation and concerns with hospital acquired delirium. 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 Case discussed with overnight ICU providers to ensure safe usage of PAP therapy.      -Claudell Ferrara, MD IM Resident

## 2023-03-05 NOTE — PROGRESS NOTES
Cardiovascular Surgery Progress Note    More alert, comfortable on NC  Still requiring frequent nasotracheal suctioning by staff, though cough pulmonary clearance improving  Stable, somewhat hypertensive  Labs/CXR noted  -Continue tube feeds until swallow eval  -Coreg to 6.25  -Chest tubes removed  -Keep in ICU for pulmonary toilet

## 2023-03-05 NOTE — PLAN OF CARE
Patient's family updated at bedside with primary RN. Opportunity to ask additional questions was offered. Patient's daughter and grandson verbalized understanding and are agreeable to the plan of care.      -Johnathon Devries MD IM Resident

## 2023-03-05 NOTE — PLAN OF CARE
CT surgery removed chest-MANUEL drains and would like to continue to monitor patient in ICU for NT suctioning and concerns of deterioration on step down.  Pt dangled at bedside today with OT and this nurse, three assist. Will continue to monitor and report changes  Problem: Discharge Planning  Goal: Discharge to home or other facility with appropriate resources  Outcome: Progressing  Flowsheets (Taken 3/5/2023 0800)  Discharge to home or other facility with appropriate resources: Identify barriers to discharge with patient and caregiver     Problem: Pain  Goal: Verbalizes/displays adequate comfort level or baseline comfort level  Outcome: Progressing  Flowsheets  Taken 3/5/2023 0400 by Jessi Robert RN  Verbalizes/displays adequate comfort level or baseline comfort level: Encourage patient to monitor pain and request assistance  Taken 3/5/2023 0000 by Jessi Robert RN  Verbalizes/displays adequate comfort level or baseline comfort level: Encourage patient to monitor pain and request assistance     Problem: Respiratory - Adult  Goal: Achieves optimal ventilation and oxygenation  Outcome: Progressing     Problem: Cardiovascular - Adult  Goal: Maintains optimal cardiac output and hemodynamic stability  Outcome: Progressing  Flowsheets (Taken 3/5/2023 0800)  Maintains optimal cardiac output and hemodynamic stability: Monitor blood pressure and heart rate  Goal: Absence of cardiac dysrhythmias or at baseline  Outcome: Progressing  Flowsheets (Taken 3/5/2023 0800)  Absence of cardiac dysrhythmias or at baseline: Monitor cardiac rate and rhythm     Problem: Metabolic/Fluid and Electrolytes - Adult  Goal: Electrolytes maintained within normal limits  Outcome: Progressing  Flowsheets (Taken 3/5/2023 0800)  Electrolytes maintained within normal limits:   Monitor labs and assess patient for signs and symptoms of electrolyte imbalances   Administer electrolyte replacement as ordered  Goal: Glucose maintained within prescribed range  Outcome: Progressing  Flowsheets (Taken 3/5/2023 0800)  Glucose maintained within prescribed range:   Monitor blood glucose as ordered   Assess for signs and symptoms of hyperglycemia and hypoglycemia     Problem: Hematologic - Adult  Goal: Maintains hematologic stability  Outcome: Progressing  Flowsheets (Taken 3/5/2023 0800)  Maintains hematologic stability: Assess for signs and symptoms of bleeding or hemorrhage     Problem: Neurosensory - Adult  Goal: Achieves stable or improved neurological status  Outcome: Progressing  Flowsheets (Taken 3/5/2023 0800)  Achieves stable or improved neurological status: Assess for and report changes in neurological status  Goal: Achieves maximal functionality and self care  Outcome: Progressing  Flowsheets (Taken 3/5/2023 0800)  Achieves maximal functionality and self care: Monitor swallowing and airway patency with patient fatigue and changes in neurological status     Problem: Skin/Tissue Integrity - Adult  Goal: Incisions, wounds, or drain sites healing without S/S of infection  Outcome: Progressing  Flowsheets (Taken 3/5/2023 0800)  Incisions, Wounds, or Drain Sites Healing Without Sign and Symptoms of Infection: TWICE DAILY: Assess and document skin integrity  Goal: Oral mucous membranes remain intact  Outcome: Progressing  Flowsheets (Taken 3/5/2023 0800)  Oral Mucous Membranes Remain Intact:   Implement preventative oral hygiene regimen   Assess oral mucosa and hygiene practices     Problem: Musculoskeletal - Adult  Goal: Return mobility to safest level of function  Outcome: Progressing  Goal: Return ADL status to a safe level of function  Outcome: Progressing     Problem: Gastrointestinal - Adult  Goal: Maintains or returns to baseline bowel function  Outcome: Progressing     Problem: Genitourinary - Adult  Goal: Absence of urinary retention  Outcome: Progressing  Flowsheets (Taken 3/5/2023 0800)  Absence of urinary retention: Assess patients ability to void and empty bladder     Problem: Infection - Adult  Goal: Absence of infection at discharge  Outcome: Progressing  Flowsheets (Taken 3/5/2023 0800)  Absence of infection at discharge: Assess and monitor for signs and symptoms of infection     Problem: Nutrition Deficit:  Goal: Optimize nutritional status  Outcome: Progressing     Problem: Safety - Adult  Goal: Free from fall injury  Outcome: Progressing     Problem: Anxiety  Goal: Will report anxiety at manageable levels  Description: INTERVENTIONS:  1. Administer medication as ordered  2. Teach and rehearse alternative coping skills  3. Provide emotional support with 1:1 interaction with staff  Outcome: Progressing  Flowsheets (Taken 3/5/2023 0800)  Will report anxiety at manageable levels: Administer medication as ordered     Problem: Change in Body Image  Goal: Pt/Family communicate acceptance of loss or change in body image and feel psychological comfort and peace  Description: INTERVENTIONS:  1. Assess patient/family anxiety and grief process related to change in body image, loss of functional status, loss of sense of self, and forgiveness  2. Provide emotional and spiritual support  3. Provide information about the patient's health status with consideration of family and cultural values  4. Communicate willingness to discuss loss and facilitate grief process with patient/family as appropriate  5. Emphasize sustaining relationships within family system and community, or roxi/spiritual traditions  6.  Initiate Spiritual Care, Psychosocial Clinical Specialist consult as needed  Outcome: Progressing  Flowsheets (Taken 3/5/2023 0800)  Patient/family communicate acceptance of loss or change in body image and feel psychological comfort and peace: Assess patient/family anxiety and grief process related to change in body image, loss of functional status, loss of sense of self, and forgiveness     Problem: Confusion  Goal: Confusion, delirium, dementia, or psychosis is improved or at baseline  Description: INTERVENTIONS:  1. Assess for possible contributors to thought disturbance, including medications, impaired vision or hearing, underlying metabolic abnormalities, dehydration, psychiatric diagnoses, and notify attending LIP  2. [place] high risk fall precautions, as indicated  3. Provide frequent short contacts to provide reality reorientation, refocusing and direction  4. Decrease environmental stimuli, including noise as appropriate  5. Monitor and intervene to maintain adequate nutrition, hydration, elimination, sleep and activity  6. If unable to ensure safety without constant attention obtain sitter and review sitter guidelines with assigned personnel  7. Initiate Psychosocial CNS and Spiritual Care consult, as indicated  Outcome: Progressing  Flowsheets (Taken 3/5/2023 0800)  Effect of thought disturbance (confusion, delirium, dementia, or psychosis) are managed with adequate functional status:   [place] high risk fall precautions, as indicated   Assess for contributors to thought disturbance, including medications, impaired vision or hearing, underlying metabolic abnormalities, dehydration, psychiatric diagnoses, notify LIP     Problem: Skin/Tissue Integrity  Goal: Absence of new skin breakdown  Description: 1. Monitor for areas of redness and/or skin breakdown  2. Assess vascular access sites hourly  3. Every 4-6 hours minimum:  Change oxygen saturation probe site  4. Every 4-6 hours:  If on nasal continuous positive airway pressure, respiratory therapy assess nares and determine need for appliance change or resting period. Outcome: Progressing     Problem: Safety - Medical Restraint  Goal: Remains free of injury from restraints (Restraint for Interference with Medical Device)  Description: INTERVENTIONS:  1. Determine that other, less restrictive measures have been tried or would not be effective before applying the restraint  2.  Evaluate the patient's condition at the time of restraint application  3. Inform patient/family regarding the reason for restraint  4.  Q2H: Monitor safety, psychosocial status, comfort, nutrition and hydration  Outcome: Progressing  Flowsheets  Taken 3/5/2023 0800 by Rosalinda Keene RN  Remains free of injury from restraints (restraint for interference with medical device): Every 2 hours: Monitor safety, psychosocial status, comfort, nutrition and hydration  Taken 3/5/2023 0600 by Jordana Dhaliwal RN  Remains free of injury from restraints (restraint for interference with medical device): Every 2 hours: Monitor safety, psychosocial status, comfort, nutrition and hydration  Taken 3/5/2023 0400 by Jordana Dhaliwal RN  Remains free of injury from restraints (restraint for interference with medical device): Every 2 hours: Monitor safety, psychosocial status, comfort, nutrition and hydration  Taken 3/5/2023 0200 by Jordana Dhaliwal RN  Remains free of injury from restraints (restraint for interference with medical device): Every 2 hours: Monitor safety, psychosocial status, comfort, nutrition and hydration  Taken 3/5/2023 0000 by Jordana Dhaliwal RN  Remains free of injury from restraints (restraint for interference with medical device): Every 2 hours: Monitor safety, psychosocial status, comfort, nutrition and hydration

## 2023-03-05 NOTE — PLAN OF CARE
Problem: Discharge Planning  Goal: Discharge to home or other facility with appropriate resources  Outcome: Not Progressing  Flowsheets  Taken 3/4/2023 2000 by Herlinda Keene RN  Discharge to home or other facility with appropriate resources: Identify barriers to discharge with patient and caregiver  Taken 3/4/2023 0800 by Melania Damon RN  Discharge to home or other facility with appropriate resources:   Identify barriers to discharge with patient and caregiver   Arrange for needed discharge resources and transportation as appropriate     Problem: Pain  Goal: Verbalizes/displays adequate comfort level or baseline comfort level  Outcome: Not Progressing  Flowsheets (Taken 3/4/2023 2000)  Verbalizes/displays adequate comfort level or baseline comfort level:   Encourage patient to monitor pain and request assistance   Assess pain using appropriate pain scale     Problem: Respiratory - Adult  Goal: Achieves optimal ventilation and oxygenation  Outcome: Not Progressing  Flowsheets (Taken 3/4/2023 2000)  Achieves optimal ventilation and oxygenation:   Assess for changes in respiratory status   Assess for changes in mentation and behavior   Position to facilitate oxygenation and minimize respiratory effort   Oxygen supplementation based on oxygen saturation or arterial blood gases     Problem: Cardiovascular - Adult  Goal: Maintains optimal cardiac output and hemodynamic stability  Outcome: Not Progressing  Flowsheets (Taken 3/4/2023 2000)  Maintains optimal cardiac output and hemodynamic stability:   Monitor blood pressure and heart rate   Monitor urine output and notify Licensed Independent Practitioner for values outside of normal range  Goal: Absence of cardiac dysrhythmias or at baseline  Outcome: Not Progressing  Flowsheets (Taken 3/4/2023 2000)  Absence of cardiac dysrhythmias or at baseline:   Monitor cardiac rate and rhythm   Assess for signs of decreased cardiac output     Problem: Metabolic/Fluid and  Electrolytes - Adult  Goal: Electrolytes maintained within normal limits  Outcome: Not Progressing  Flowsheets  Taken 3/4/2023 2000 by Natan Peterson RN  Electrolytes maintained within normal limits: Monitor labs and assess patient for signs and symptoms of electrolyte imbalances  Taken 3/4/2023 0800 by Elly Adams RN  Electrolytes maintained within normal limits:   Monitor labs and assess patient for signs and symptoms of electrolyte imbalances   Administer electrolyte replacement as ordered  Goal: Glucose maintained within prescribed range  Outcome: Not Progressing  Flowsheets  Taken 3/4/2023 2000 by Natan Peterson RN  Glucose maintained within prescribed range: Monitor blood glucose as ordered  Taken 3/4/2023 0800 by Elly Adams RN  Glucose maintained within prescribed range:   Monitor blood glucose as ordered   Assess for signs and symptoms of hyperglycemia and hypoglycemia     Problem: Hematologic - Adult  Goal: Maintains hematologic stability  Outcome: Not Progressing  Flowsheets  Taken 3/4/2023 2000 by Natan Peterson RN  Maintains hematologic stability: Assess for signs and symptoms of bleeding or hemorrhage  Taken 3/4/2023 0800 by Elly Adams RN  Maintains hematologic stability:   Assess for signs and symptoms of bleeding or hemorrhage   Monitor labs for bleeding or clotting disorders     Problem: Nutrition Deficit:  Goal: Optimize nutritional status  Outcome: Not Progressing     Problem: Safety - Adult  Goal: Free from fall injury  Outcome: Not Progressing  Flowsheets (Taken 3/4/2023 2000)  Free From Fall Injury:   Instruct family/caregiver on patient safety   Based on caregiver fall risk screen, instruct family/caregiver to ask for assistance with transferring infant if caregiver noted to have fall risk factors     Problem: Neurosensory - Adult  Goal: Achieves stable or improved neurological status  Outcome: Not Progressing  Flowsheets  Taken 3/4/2023 2000 by Natan Peterson RN  Achieves stable or improved neurological status:   Assess for and report changes in neurological status   Initiate measures to prevent increased intracranial pressure  Taken 3/4/2023 0800 by Erika Perez RN  Achieves stable or improved neurological status:   Assess for and report changes in neurological status   Initiate measures to prevent increased intracranial pressure  Goal: Achieves maximal functionality and self care  Outcome: Not Progressing  Flowsheets  Taken 3/4/2023 2000 by Walda Osgood, RN  Achieves maximal functionality and self care:   Monitor swallowing and airway patency with patient fatigue and changes in neurological status   Encourage and assist patient to increase activity and self care with guidance from physical therapy/occupational therapy  Taken 3/4/2023 0800 by Erika Perez RN  Achieves maximal functionality and self care:   Monitor swallowing and airway patency with patient fatigue and changes in neurological status   Encourage and assist patient to increase activity and self care with guidance from physical therapy/occupational therapy     Problem: Skin/Tissue Integrity - Adult  Goal: Incisions, wounds, or drain sites healing without S/S of infection  Outcome: Not Progressing  Flowsheets  Taken 3/4/2023 2000 by Walda Osgood, RN  Incisions, Wounds, or Drain Sites Healing Without Sign and Symptoms of Infection: ADMISSION and DAILY: Assess and document risk factors for pressure ulcer development  Taken 3/4/2023 0800 by Erika Perez RN  Incisions, Wounds, or Drain Sites Healing Without Sign and Symptoms of Infection:   ADMISSION and DAILY: Assess and document risk factors for pressure ulcer development   TWICE DAILY: Assess and document skin integrity  Goal: Oral mucous membranes remain intact  Outcome: Not Progressing  Flowsheets  Taken 3/4/2023 2000 by Walda Osgood, RN  Oral Mucous Membranes Remain Intact: Assess oral mucosa and hygiene practices  Taken 3/4/2023 0800 by Tita Alonzo RN  Oral Mucous Membranes Remain Intact:   Assess oral mucosa and hygiene practices   Implement preventative oral hygiene regimen     Problem: Musculoskeletal - Adult  Goal: Return mobility to safest level of function  Outcome: Not Progressing  Flowsheets  Taken 3/4/2023 2000 by Chase Collazo RN  Return Mobility to Safest Level of Function:   Assess patient stability and activity tolerance for standing, transferring and ambulating with or without assistive devices   Assist with transfers and ambulation using safe patient handling equipment as needed  Taken 3/4/2023 0800 by Tita Alonzo RN  Return Mobility to [place] Level of Function: Assist with transfers and ambulation using safe patient handling equipment as needed  Goal: Return ADL status to a safe level of function  Outcome: Not Progressing  Flowsheets  Taken 3/4/2023 2000 by Chase Collazo RN  Return ADL Status to a Safe Level of Function:   Administer medication as ordered   Assess activities of daily living deficits and provide assistive devices as needed  Taken 3/4/2023 0800 by Tita Alonzo RN  Return ADL Status to a Safe Level of Function: Administer medication as ordered     Problem: Gastrointestinal - Adult  Goal: Maintains or returns to baseline bowel function  Outcome: Not Progressing  Flowsheets (Taken 3/4/2023 2000)  Maintains or returns to baseline bowel function:   Assess bowel function   Encourage oral fluids to ensure adequate hydration     Problem: Genitourinary - Adult  Goal: Absence of urinary retention  Outcome: Not Progressing  Flowsheets  Taken 3/4/2023 2000 by Chase Collazo RN  Absence of urinary retention:   Assess patients ability to void and empty bladder   Monitor intake/output and perform bladder scan as needed  Taken 3/4/2023 0800 by Tita Alonzo RN  Absence of urinary retention:   Assess patients ability to void and empty bladder   Monitor intake/output and perform bladder scan as needed     Problem: Infection - Adult  Goal: Absence of infection at discharge  Outcome: Not Progressing  Flowsheets  Taken 3/4/2023 2000 by Jackelyn Peraza RN  Absence of infection at discharge: Assess and monitor for signs and symptoms of infection  Taken 3/4/2023 0800 by Zena Ascencio RN  Absence of infection at discharge:   Assess and monitor for signs and symptoms of infection   Monitor lab/diagnostic results     Problem: Anxiety  Goal: Will report anxiety at manageable levels  Description: INTERVENTIONS:  1. Administer medication as ordered  2. Teach and rehearse alternative coping skills  3. Provide emotional support with 1:1 interaction with staff  Outcome: Not Progressing  Flowsheets  Taken 3/4/2023 2000 by Jackelyn Peraza RN  Will report anxiety at manageable levels: Administer medication as ordered  Taken 3/4/2023 0800 by Zena Ascencio RN  Will report anxiety at manageable levels: Administer medication as ordered     Problem: Change in Body Image  Goal: Pt/Family communicate acceptance of loss or change in body image and feel psychological comfort and peace  Description: INTERVENTIONS:  1. Assess patient/family anxiety and grief process related to change in body image, loss of functional status, loss of sense of self, and forgiveness  2. Provide emotional and spiritual support  3. Provide information about the patient's health status with consideration of family and cultural values  4. Communicate willingness to discuss loss and facilitate grief process with patient/family as appropriate  5. Emphasize sustaining relationships within family system and community, or roxi/spiritual traditions  6.  Initiate Spiritual Care, Psychosocial Clinical Specialist consult as needed  Outcome: Not Progressing  Flowsheets  Taken 3/4/2023 2000 by Jackelyn Peraza RN  Patient/family communicate acceptance of loss or change in body image and feel psychological comfort and peace: Assess patient/family anxiety and grief process related to change in body image, loss of functional status, loss of sense of self, and forgiveness  Taken 3/4/2023 0800 by Olga Tong RN  Patient/family communicate acceptance of loss or change in body image and feel psychological comfort and peace:   Assess patient/family anxiety and grief process related to change in body image, loss of functional status, loss of sense of self, and forgiveness   Provide emotional and spiritual support     Problem: Confusion  Goal: Confusion, delirium, dementia, or psychosis is improved or at baseline  Description: INTERVENTIONS:  1. Assess for possible contributors to thought disturbance, including medications, impaired vision or hearing, underlying metabolic abnormalities, dehydration, psychiatric diagnoses, and notify attending LIP  2. [place] high risk fall precautions, as indicated  3. Provide frequent short contacts to provide reality reorientation, refocusing and direction  4. Decrease environmental stimuli, including noise as appropriate  5. Monitor and intervene to maintain adequate nutrition, hydration, elimination, sleep and activity  6. If unable to ensure safety without constant attention obtain sitter and review sitter guidelines with assigned personnel  7.  Initiate Psychosocial CNS and Spiritual Care consult, as indicated  Outcome: Not Progressing  Flowsheets  Taken 3/4/2023 2000 by Sabina Aguilar RN  Effect of thought disturbance (confusion, delirium, dementia, or psychosis) are managed with adequate functional status:   Assess for contributors to thought disturbance, including medications, impaired vision or hearing, underlying metabolic abnormalities, dehydration, psychiatric diagnoses, notify [place] high risk fall precautions, as indicated  Taken 3/4/2023 0800 by Olga Tong RN  Effect of thought disturbance (confusion, delirium, dementia, or psychosis) are managed with adequate functional status:   Assess for contributors to thought disturbance, including medications, impaired vision or hearing, underlying metabolic abnormalities, dehydration, psychiatric diagnoses, notify [place] high risk fall precautions, as indicated     Problem: Skin/Tissue Integrity  Goal: Absence of new skin breakdown  Description: 1. Monitor for areas of redness and/or skin breakdown  2. Assess vascular access sites hourly  3. Every 4-6 hours minimum:  Change oxygen saturation probe site  4. Every 4-6 hours:  If on nasal continuous positive airway pressure, respiratory therapy assess nares and determine need for appliance change or resting period. Outcome: Not Progressing     Problem: Safety - Medical Restraint  Goal: Remains free of injury from restraints (Restraint for Interference with Medical Device)  Description: INTERVENTIONS:  1. Determine that other, less restrictive measures have been tried or would not be effective before applying the restraint  2. Evaluate the patient's condition at the time of restraint application  3. Inform patient/family regarding the reason for restraint  4.  Q2H: Monitor safety, psychosocial status, comfort, nutrition and hydration  Outcome: Not Progressing  Flowsheets  Taken 3/4/2023 2000 by Destinee Rodriguez RN  Remains free of injury from restraints (restraint for interference with medical device): Every 2 hours: Monitor safety, psychosocial status, comfort, nutrition and hydration  Taken 3/4/2023 1600 by Kendell Meneses RN  Remains free of injury from restraints (restraint for interference with medical device): Every 2 hours: Monitor safety, psychosocial status, comfort, nutrition and hydration  Taken 3/4/2023 1400 by Kendell Meneses RN  Remains free of injury from restraints (restraint for interference with medical device): Every 2 hours: Monitor safety, psychosocial status, comfort, nutrition and hydration  Taken 3/4/2023 1156 by Kendell Meneses RN  Remains free of injury from restraints (restraint for interference with medical device): Every 2 hours: Monitor safety, psychosocial status, comfort, nutrition and hydration  Taken 3/4/2023 1000 by Destiny Miguel RN  Remains free of injury from restraints (restraint for interference with medical device): Every 2 hours: Monitor safety, psychosocial status, comfort, nutrition and hydration  Taken 3/4/2023 0800 by Destiny Miguel RN  Remains free of injury from restraints (restraint for interference with medical device): Every 2 hours: Monitor safety, psychosocial status, comfort, nutrition and hydration     Problem: Discharge Planning  Goal: Discharge to home or other facility with appropriate resources  Outcome: Not Progressing  Flowsheets  Taken 3/4/2023 2000 by Edy Jesus RN  Discharge to home or other facility with appropriate resources: Identify barriers to discharge with patient and caregiver  Taken 3/4/2023 0800 by Destiny Miguel RN  Discharge to home or other facility with appropriate resources:   Identify barriers to discharge with patient and caregiver   Arrange for needed discharge resources and transportation as appropriate     Problem: Pain  Goal: Verbalizes/displays adequate comfort level or baseline comfort level  Outcome: Not Progressing  Flowsheets (Taken 3/4/2023 2000)  Verbalizes/displays adequate comfort level or baseline comfort level:   Encourage patient to monitor pain and request assistance   Assess pain using appropriate pain scale     Problem: Respiratory - Adult  Goal: Achieves optimal ventilation and oxygenation  Outcome: Not Progressing  Flowsheets (Taken 3/4/2023 2000)  Achieves optimal ventilation and oxygenation:   Assess for changes in respiratory status   Assess for changes in mentation and behavior   Position to facilitate oxygenation and minimize respiratory effort   Oxygen supplementation based on oxygen saturation or arterial blood gases     Problem: Cardiovascular - Adult  Goal: Maintains optimal cardiac output and hemodynamic stability  Outcome: Not Progressing  Flowsheets (Taken 3/4/2023 2000)  Maintains optimal cardiac output and hemodynamic stability:   Monitor blood pressure and heart rate   Monitor urine output and notify Licensed Independent Practitioner for values outside of normal range  Goal: Absence of cardiac dysrhythmias or at baseline  Outcome: Not Progressing  Flowsheets (Taken 3/4/2023 2000)  Absence of cardiac dysrhythmias or at baseline:   Monitor cardiac rate and rhythm   Assess for signs of decreased cardiac output     Problem: Metabolic/Fluid and Electrolytes - Adult  Goal: Electrolytes maintained within normal limits  Outcome: Not Progressing  Flowsheets  Taken 3/4/2023 2000 by Herlinda Keene RN  Electrolytes maintained within normal limits: Monitor labs and assess patient for signs and symptoms of electrolyte imbalances  Taken 3/4/2023 0800 by Melania Damon RN  Electrolytes maintained within normal limits:   Monitor labs and assess patient for signs and symptoms of electrolyte imbalances   Administer electrolyte replacement as ordered  Goal: Glucose maintained within prescribed range  Outcome: Not Progressing  Flowsheets  Taken 3/4/2023 2000 by Herlinda Keene RN  Glucose maintained within prescribed range: Monitor blood glucose as ordered  Taken 3/4/2023 0800 by Melania Damon RN  Glucose maintained within prescribed range:   Monitor blood glucose as ordered   Assess for signs and symptoms of hyperglycemia and hypoglycemia     Problem: Hematologic - Adult  Goal: Maintains hematologic stability  Outcome: Not Progressing  Flowsheets  Taken 3/4/2023 2000 by Herlinda Keene RN  Maintains hematologic stability: Assess for signs and symptoms of bleeding or hemorrhage  Taken 3/4/2023 0800 by Melania Damon RN  Maintains hematologic stability:   Assess for signs and symptoms of bleeding or hemorrhage   Monitor labs for  bleeding or clotting disorders     Problem: Nutrition Deficit:  Goal: Optimize nutritional status  Outcome: Not Progressing     Problem: Safety - Adult  Goal: Free from fall injury  Outcome: Not Progressing  Flowsheets (Taken 3/4/2023 2000)  Free From Fall Injury:   Instruct family/caregiver on patient safety   Based on caregiver fall risk screen, instruct family/caregiver to ask for assistance with transferring infant if caregiver noted to have fall risk factors     Problem: Neurosensory - Adult  Goal: Achieves stable or improved neurological status  Outcome: Not Progressing  Flowsheets  Taken 3/4/2023 2000 by Iglesia Mares RN  Achieves stable or improved neurological status:   Assess for and report changes in neurological status   Initiate measures to prevent increased intracranial pressure  Taken 3/4/2023 0800 by Sheri Rodriguez RN  Achieves stable or improved neurological status:   Assess for and report changes in neurological status   Initiate measures to prevent increased intracranial pressure  Goal: Achieves maximal functionality and self care  Outcome: Not Progressing  Flowsheets  Taken 3/4/2023 2000 by Iglesia Mares RN  Achieves maximal functionality and self care:   Monitor swallowing and airway patency with patient fatigue and changes in neurological status   Encourage and assist patient to increase activity and self care with guidance from physical therapy/occupational therapy  Taken 3/4/2023 0800 by Sheri Rodriguez RN  Achieves maximal functionality and self care:   Monitor swallowing and airway patency with patient fatigue and changes in neurological status   Encourage and assist patient to increase activity and self care with guidance from physical therapy/occupational therapy     Problem: Skin/Tissue Integrity - Adult  Goal: Incisions, wounds, or drain sites healing without S/S of infection  Outcome: Not Progressing  Flowsheets  Taken 3/4/2023 2000 by Iglesia Mares RN  Incisions, Wounds, or Drain Sites Healing Without Sign and Symptoms of Infection: ADMISSION and DAILY: Assess and document risk factors for pressure ulcer development  Taken 3/4/2023 0800 by Courtney Jones RN  Incisions, Wounds, or Drain Sites Healing Without Sign and Symptoms of Infection:   ADMISSION and DAILY: Assess and document risk factors for pressure ulcer development   TWICE DAILY: Assess and document skin integrity  Goal: Oral mucous membranes remain intact  Outcome: Not Progressing  Flowsheets  Taken 3/4/2023 2000 by Ward Maradiaga RN  Oral Mucous Membranes Remain Intact: Assess oral mucosa and hygiene practices  Taken 3/4/2023 0800 by Courtney Jones RN  Oral Mucous Membranes Remain Intact:   Assess oral mucosa and hygiene practices   Implement preventative oral hygiene regimen     Problem: Musculoskeletal - Adult  Goal: Return mobility to safest level of function  Outcome: Not Progressing  Flowsheets  Taken 3/4/2023 2000 by Ward Maradiaga RN  Return Mobility to Safest Level of Function:   Assess patient stability and activity tolerance for standing, transferring and ambulating with or without assistive devices   Assist with transfers and ambulation using safe patient handling equipment as needed  Taken 3/4/2023 0800 by Courtney Jones RN  Return Mobility to [place] Level of Function: Assist with transfers and ambulation using safe patient handling equipment as needed  Goal: Return ADL status to a safe level of function  Outcome: Not Progressing  Flowsheets  Taken 3/4/2023 2000 by Ward Maradiaga RN  Return ADL Status to a Safe Level of Function:   Administer medication as ordered   Assess activities of daily living deficits and provide assistive devices as needed  Taken 3/4/2023 0800 by Courtney Jones RN  Return ADL Status to a Safe Level of Function: Administer medication as ordered     Problem: Gastrointestinal - Adult  Goal: Maintains or returns to baseline bowel function  Outcome: Not Progressing  Flowsheets (Taken 3/4/2023 2000)  Maintains or returns to baseline bowel function:   Assess bowel function   Encourage oral fluids to ensure adequate hydration     Problem: Genitourinary - Adult  Goal: Absence of urinary retention  Outcome: Not Progressing  Flowsheets  Taken 3/4/2023 2000 by Jessi Robert RN  Absence of urinary retention:   Assess patients ability to void and empty bladder   Monitor intake/output and perform bladder scan as needed  Taken 3/4/2023 0800 by Preeti Chacon RN  Absence of urinary retention:   Assess patients ability to void and empty bladder   Monitor intake/output and perform bladder scan as needed     Problem: Infection - Adult  Goal: Absence of infection at discharge  Outcome: Not Progressing  Flowsheets  Taken 3/4/2023 2000 by Jessi Robert RN  Absence of infection at discharge: Assess and monitor for signs and symptoms of infection  Taken 3/4/2023 0800 by Preeti Chacon RN  Absence of infection at discharge:   Assess and monitor for signs and symptoms of infection   Monitor lab/diagnostic results     Problem: Anxiety  Goal: Will report anxiety at manageable levels  Description: INTERVENTIONS:  1. Administer medication as ordered  2. Teach and rehearse alternative coping skills  3. Provide emotional support with 1:1 interaction with staff  Outcome: Not Progressing  Flowsheets  Taken 3/4/2023 2000 by Jessi Robert RN  Will report anxiety at manageable levels: Administer medication as ordered  Taken 3/4/2023 0800 by Preeti Chacon RN  Will report anxiety at manageable levels: Administer medication as ordered     Problem: Change in Body Image  Goal: Pt/Family communicate acceptance of loss or change in body image and feel psychological comfort and peace  Description: INTERVENTIONS:  1. Assess patient/family anxiety and grief process related to change in body image, loss of functional status, loss of sense of self, and forgiveness  2.  Provide emotional and spiritual support  3. Provide information about the patient's health status with consideration of family and cultural values  4. Communicate willingness to discuss loss and facilitate grief process with patient/family as appropriate  5. Emphasize sustaining relationships within family system and community, or roxi/spiritual traditions  6. Initiate Spiritual Care, Psychosocial Clinical Specialist consult as needed  Outcome: Not Progressing  Flowsheets  Taken 3/4/2023 2000 by Cait Clinton RN  Patient/family communicate acceptance of loss or change in body image and feel psychological comfort and peace: Assess patient/family anxiety and grief process related to change in body image, loss of functional status, loss of sense of self, and forgiveness  Taken 3/4/2023 0800 by Lien Chand RN  Patient/family communicate acceptance of loss or change in body image and feel psychological comfort and peace:   Assess patient/family anxiety and grief process related to change in body image, loss of functional status, loss of sense of self, and forgiveness   Provide emotional and spiritual support     Problem: Confusion  Goal: Confusion, delirium, dementia, or psychosis is improved or at baseline  Description: INTERVENTIONS:  1. Assess for possible contributors to thought disturbance, including medications, impaired vision or hearing, underlying metabolic abnormalities, dehydration, psychiatric diagnoses, and notify attending LIP  2. [place] high risk fall precautions, as indicated  3. Provide frequent short contacts to provide reality reorientation, refocusing and direction  4. Decrease environmental stimuli, including noise as appropriate  5. Monitor and intervene to maintain adequate nutrition, hydration, elimination, sleep and activity  6. If unable to ensure safety without constant attention obtain sitter and review sitter guidelines with assigned personnel  7.  Initiate Psychosocial CNS and Spiritual Care consult, as indicated  Outcome: Not Progressing  Flowsheets  Taken 3/4/2023 2000 by Herlinda Keene RN  Effect of thought disturbance (confusion, delirium, dementia, or psychosis) are managed with adequate functional status:   Assess for contributors to thought disturbance, including medications, impaired vision or hearing, underlying metabolic abnormalities, dehydration, psychiatric diagnoses, notify [facility] high risk fall precautions, as indicated  Taken 3/4/2023 0800 by Melania Damon RN  Effect of thought disturbance (confusion, delirium, dementia, or psychosis) are managed with adequate functional status:   Assess for contributors to thought disturbance, including medications, impaired vision or hearing, underlying metabolic abnormalities, dehydration, psychiatric diagnoses, notify [facility] high risk fall precautions, as indicated     Problem: Skin/Tissue Integrity  Goal: Absence of new skin breakdown  Description: 1.  Monitor for areas of redness and/or skin breakdown  2.  Assess vascular access sites hourly  3.  Every 4-6 hours minimum:  Change oxygen saturation probe site  4.  Every 4-6 hours:  If on nasal continuous positive airway pressure, respiratory therapy assess nares and determine need for appliance change or resting period.  Outcome: Not Progressing     Problem: Safety - Medical Restraint  Goal: Remains free of injury from restraints (Restraint for Interference with Medical Device)  Description: INTERVENTIONS:  1. Determine that other, less restrictive measures have been tried or would not be effective before applying the restraint  2. Evaluate the patient's condition at the time of restraint application  3. Inform patient/family regarding the reason for restraint  4. Q2H: Monitor safety, psychosocial status, comfort, nutrition and hydration  Outcome: Not Progressing  Flowsheets  Taken 3/4/2023 2000 by Herlinda Keene RN  Remains free of injury from restraints  (restraint for interference with medical device): Every 2 hours: Monitor safety, psychosocial status, comfort, nutrition and hydration  Taken 3/4/2023 1600 by Julio Cesar Hernandez RN  Remains free of injury from restraints (restraint for interference with medical device): Every 2 hours: Monitor safety, psychosocial status, comfort, nutrition and hydration  Taken 3/4/2023 1400 by Julio Cesar Hernandez RN  Remains free of injury from restraints (restraint for interference with medical device): Every 2 hours: Monitor safety, psychosocial status, comfort, nutrition and hydration  Taken 3/4/2023 1156 by Julio Cesar Hernandez RN  Remains free of injury from restraints (restraint for interference with medical device): Every 2 hours: Monitor safety, psychosocial status, comfort, nutrition and hydration  Taken 3/4/2023 1000 by Julio Cesar Hernandez RN  Remains free of injury from restraints (restraint for interference with medical device): Every 2 hours: Monitor safety, psychosocial status, comfort, nutrition and hydration  Taken 3/4/2023 0800 by Julio Cesar Hernandez RN  Remains free of injury from restraints (restraint for interference with medical device): Every 2 hours: Monitor safety, psychosocial status, comfort, nutrition and hydration

## 2023-03-05 NOTE — PROGRESS NOTES
[facility] ICU 4D  Occupational Therapy  Daily Note  Time:    Time In: 1130  Time Out: 1209  Timed Code Treatment Minutes: 39 Minutes  Minutes: 39          Date: 3/5/2023  Patient Name: Juan Khalil,   Gender: male      Room: [facility]008-A  MRN: [id number]  : 1940  (81 y.o.)  Referring Practitioner: Teo Pastor PA-C  Diagnosis: elevated troponin  Additional Pertinent Hx: per chart review; \"The patient is an 81year old male former smoker with an approximately 20-pack-year history who quit in  with a past medical history of leukemia with remission in , hypothyroidism, \"seizures,\" hyperlipidemia, \"coma,\" and 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 \" patient underwent a CABG STONEY,  coronary artery bypass grafting x3 using LIMA to LAD, vein graft to to's marginal 1, vein graft to posterior descending artery, transesophageal echocardiography, endoscopic vein harvesting  on 23. Restrictions/Precautions:  Restrictions/Precautions: Fall Risk, General Precautions, Surgical Protocols  Position Activity Restriction  Sternal Precautions: move in the tube  Other position/activity restrictions: hx of brain injury      SUBJECTIVE: Pt was resting in bed upon arrival, decreased arousal at times, requiring max VC throughout for engagement in activity. PAIN: Pt with tendencies to pull at NG tube and grimmace with activity    Vitals: Blood Pressure: 116/51 upon arrival, decreasing to 102/77 with EOB activity. Oxygen: fluctuating between 90-96% on RA  Heart Rate: fluctuating between mid 60s to 89.      COGNITION: Slow Processing, Decreased Recall, Decreased Insight, Impaired Memory, Inattention, Decreased Problem Solving, Decreased Safety Awareness, Impaired Attention, Decreased Arousal, Difficulty Following Commands, and Impulsive    ADL:   Grooming: Dependent. Washing face . BALANCE:  Sitting Balance:  Minimal Assistance, Moderate Assistance. Seated EOB. Pt able to tolerate sitting with CGA for short periods of time with VC for upright posture; however, tendencies to demo posterior lean and occasional L lateral lean into therapist for increased support. Pt sat on EOB x15min throughout session. BED MOBILITY:  Rolling to Left: Maximum Assistance with increased time  Rolling to Right: Maximum Assistance    Supine to Sit: Maximum Assistance, X 2-3 with VC for technique and safety cues  Sit to Supine: Maximum Assistance X3  Scooting: Maximum Assistance, X 2      ADDITIONAL ACTIVITIES:  Attempted to guide pt in CABG exercises while seated EOB. Pt required Max VC for arousal and attn to task as well as AA at times. Pt required Min A for sitting balance of one while another provided AA. Pt completed shoulder rolls x4reps with max VC and AA, Pt completed marches x8reps with max VC and AA. Pt also completed ankle pumps x6reps with max VC and AA. Increased difficulty noted with following commands, limiting overall engagement and activity tolerance. Pt completed to increase ADLs and IADLs. ASSESSMENT:     Activity Tolerance:  Patient tolerance of  treatment: Poor treatment tolerance - limited by decreased arousal and ability to follow commands. Discharge Recommendations: Subacute/skilled nursing facility  Equipment Recommendations: Equipment Needed: Yes  Mobility Devices: ADL Assistive Devices  Other: Pt reports he has a rolling walker and a shower chair but was only using off and on. Recommendations provided to use both for fall prevention. Plan: Times Per Week: 6x  Times Per Day:  Once a day  Current Treatment Recommendations: Strengthening, Balance training, Functional mobility training, Neuromuscular re-education, Safety education & training, Self-Care / ADL, Endurance training, Patient/Caregiver education & training, Equipment evaluation, education, & procurement, ROM    Patient Education  Patient Education: Plan of Care, Reviewed Prior Education, and Importance of Increasing Activity    Goals  Short Term Goals  Time Frame for Short Term Goals: by discharge  Short Term Goal 1: patient will tolerate 2 min static standing with sit to stand and maintain balance with MOD A x 1 with O2 >/= 90% to increase activity tolerance for self care routine. Short Term Goal 2: patient will complete UB ADLs with MIN A and 1-2 cues to initiate and sequence task. Short Term Goal 3: OTR to assesd stand pivot transfers and functional mobility and create goal as appropriate. Short Term Goal 4: patient will tolerate CABG exer to increase activity tolerance for self care routine. Following session, patient left in safe position with all fall risk precautions in place.

## 2023-03-05 NOTE — PROGRESS NOTES
CRITICAL CARE PROGRESS NOTE      Patient:  Coral Oleary    Unit/Bed:4D-08/008-A  YOB: 1940  MRN: [id number]   PCP: Richard Samano MD  Date of Admission: 2/21/2023  Chief Complaint:- Chest Pain    Assessment and Plan:    CAD, s/p CABG:  [facility] on 02/21, 02/28 CABG x3 using LIMA to LAD, vein graft to marginal, vein graft to posterior descending artery. 4 drains in place after procedure. Anterior mediastinal drains removed on 03/03. 2 Pleural catheters with MANUEL drains in place. -20cc from drains last 24 hours. -2190cc total I/O last 24 hours. Metolazone discontinued. Metoprolol switched to carvedilol on 03/04. Tolerating CPAP therapy with naps and overnight. Continue to monitor I/O's. Pleural catheter removal per CT primary. Continue amiodarone, ASA, statin. Continue postop PPI  Progressive Cognitive Decline; Suspected Dementia:  Chronic small vessel disease seen on CT Head on 03/02. Recommendation for MOCA when stable. Baseline prior to admission oriented only to self. Has had episodes of agitation where he pulled out NG tube on 03/03, requiring soft restraints which family consented to. PRN Zyprexa ordered for agitation. Continue with patient's home risperidone, carbamazepine, escitalopram. Continue with restraints as needed. Acute Hypoxic Respiratory Failure, s/p intubation/extubation, improved Patient found on 6L via NC; maintaining adequate oxygenation; weaned and will continue to wean as tolerated back to patient's baseline of room air. Continue albuterol q6 PRN  COPD: Likely secondary to history of tobacco use: No PFT available. Maintaining adequate oxygentation on 3L via nasal cannula. Continue to wean as tolerated. Incentive spirometer, Acapella. Acute on Chronic Macrocytic Anemia, Stable :  Vitamin B12/Folate WNL; iron 64, iron saturation 23%, TIBC 278. Likely secondary to iron deficiency secondary to poor oral nutrition.   Plan on iron every other day and vitamin C when patient is more stable. At Risk for Malnutrition, Present on Admission: Tube feeding order placed 03/01. Continue tube feeding regimen  Hypocalcemia- likely secondary to malnutrition; tube feeding in place, replacement protocol in place  Hypoalbuminemia- likely secondary to poor oral nutrition, tube feeding in place  Left Mastoiditis, Identified on CT- S/p Rocephin therapy at admission. Recommendation for ENT evaluation when more stable. Patient is at increased risk for complication secondary to previous craniotomy  Severe Oral Dysphagia: Likely secondary to previous craniotomy with concern for worsening dementia; SLP following; patient receiving tube feeds without complication   Recurrent Seizure Disorder:  Continue risperidone, carbamazepine   Unspecified Anxiety Disorder: Continue escitalopram  Right ICA Stenosis 70%, Right Subclavian Severe Stenosis:  Identified on CTA. Seen and evaluated by vascular surgery with no plan for intervention  Hypothyroidism: 2/21/2023 TSH 4.130, free T40.92. Continue levothyroxine  Bilateral Pleural Effusions, Present on Admission:  Likely secondary to cardiovascular and respiratory compromise on admission, continue furosemide/metolazone, metoprolol with potassium supplementation; daily weights, strict I/O's  Pneumonia, Present on Admission, Improving:  s/p treatment with Rocephin and Azithromycin  Leukocytosis, Resolved  Hypernatremia, Resolved  Hyperchloremia, Resolved  Hx Unspecified Leukemia:  Noted  Hx CVA:  baseline cognitive deficits per family; oriented only to self at baseline. CT Head without Contrast showing stable old infarct in the right cerebellum and stable old infarct in the right basal ganglia. INITIAL H AND P AND ICU COURSE:  This is an 81year old male who presented to [facility] on 02/21 with SOB and brown sputum production. The patient was admitted for a COPD exacerbation and started on bronchodilators, azithromcyin, and rocpehin.  Cardiology was consulted secondary to EKG changes concerning for anterior ischemic changes. Given a troponin elevation, patient underwent LHC on 02/23 which showed evidence of multivessel disase. Patient underwent CABG on 52/99 without complication and was transferred to the ICU postoperatively; two chest tubes in place. Was extubated without complication and tolerated CPAP overnight. 03/01: Patient seen and evaluated at the bedside in no acute distress. Now not requiring any pressor support. Output from chest tubes reviewed (911cc serosanguinous). Denies chest pain, fever, chills, and N/V/D at bedside. No additional acute symptoms or concerns. Tolerated CPAP overnight. 03/02: Patient seen and evaluated at the beside in no acute distress. Patient tolerating weaned oxygen requirement. Patient requiring max assist with occupational therapy. No overnight events. Output from chest tubes reviewed. Patient is oriented only to self. He denies chest pain, fever, chills, and N/V/D. He denies any additional acute symptoms or concerns. He tolerated CPAP overnight. Message sent to CT surgery PA regarding transfer out of the ICU. CRP elevation likely secondary to major surgery. Case discussed extensively with primary RN.     03/03: Patient seen and evaluated at the bedside in moderate distress. He states that he wants to go home. He is  only oriented to self which is baseline. No new focal neurologic deficits. Case reviewed with primary RN, patient was comfortable throughout the night, tolerated BiPAP without complication. Reviewed hospital course and CT findings with overnight ICU team.     03/04: Patient pulled NG tube on 03/03 during the day, was replaced by Sandra Bennett RN, was started on Zyprexa as needed daily for agitation. Patient placed in soft restraints and family made aware. Chest x-ray displayed improved aeration on both lungs with small residual left basilar atelectasis. 03/05: Patient seen and evaluated at the bedside.  Case discussed with primary RN. Patient tolerated CPAP therapy with sitter in room for short period overnight. No additional Zyprexa administered. Patient's pain well controlled with reduced dose of oxicodone. Patient oriented only to self at bedside. No new focal neurologic deficits identified. Will continue to wean supplemental oxygen as tolerated. Past Medical History:    Leukemia, HLD, Brain Abscess s/p craniotomy, Recurrent Seizures. .  Family History:  No Pertinent Family history noted in EMR. .  Social History: Former smoker of tobacco 1ppd for 30 years; can't remember quit date; no recreational drug or alcohol use. ROS   Review of systems:    General: No fevers, chills. Pain controlled. HEENT: No headache, no vision changes, no hearing changes, no trauma. CV: No palpitations, no chest pain, no tachycarida  RESP: No respiratory distress, no cough, no wheeze, SOB. GI: No abdominal pain, no nausea, no vomiting, no diarrhea  : No dysuria, no hematuria, no incontinence  Neuro: No seizures, no focal deficits, no weakness, no confusion   Psych: No psychosis, no SI/HI, Depression, Anxiety.      Scheduled Meds:   amiodarone  200 mg Oral Daily    carvedilol  3.125 mg Oral BID WC    bisacodyl  10 mg Rectal Daily    calcium replacement protocol   Other RX Placeholder    aspirin  324 mg Oral Daily    carBAMazepine  400 mg Oral TID    potassium bicarb-citric acid  20 mEq Oral BID    insulin lispro  0-4 Units SubCUTAneous TID WC    insulin lispro  0-4 Units SubCUTAneous Nightly    furosemide  40 mg IntraVENous Daily    sodium chloride flush  5-40 mL IntraVENous 2 times per day    enoxaparin  40 mg SubCUTAneous Daily    sennosides-docusate sodium  1 tablet Oral BID    famotidine  20 mg Oral BID    Or    famotidine (PEPCID) injection  20 mg IntraVENous BID    atorvastatin  40 mg Oral Daily    escitalopram  20 mg Oral Daily    levothyroxine  50 mcg Oral Daily    risperiDONE  0.5 mg Oral BID    multivitamin  1 tablet Oral Daily Continuous Infusions:   sodium chloride      sodium chloride      dextrose      EPINEPHrine Stopped (23 0602)       PHYSICAL EXAMINATION:  T:  98.7F. P:  76. RR:  16. B/P:  163/61. O2 Sat:  100% on 6L. I/O:  -2190cc on , -119 on , -430 on   WEIGHTS: 194lb ; 197 lb ; 208 lb   Body mass index is 30.21 kg/m². GCS:   14    General:   This is an elderly gentleman lying in bed in soft restraints. NG tube, supplemental oxygen in place  HEENT:  normocephalic and atraumatic. No scleral icterus. PERR. NG tube in place. Neck: supple. No Thyromegaly. Lungs: Diminished breath sounds bilaterally. Supplemental oxygen via nasal cannula in place. Thorax: bilateral pleural MANUEL drains draining serosanguinous fluid. Incision sites clean and well approximated  Cardiac: RRR. No JVD. Abdomen: soft. Nontender. Extremities:  No clubbing, cyanosis, or edema x 4. Vasculature: capillary refill < 3 seconds. Palpable dorsalis pedis pulses. Skin:  warm and dry. CABG incision site clean and well approximated  Psych:  Oriented to self only; in minimal distress when conducting physical examination. Answers questions appropriately. Following commands  Lymph:  No supraclavicular adenopathy. Neurologic:  No focal deficit. No seizures. Data: (All radiographs, tracings, PFTs, and imaging are personally viewed and interpreted unless otherwise noted). BMP: Sodium 137, potassium 3.9, chloride 93, bicarb 31, BUN 30, creatinine 0.9, glucose 151, calcium 8.2  CBC: WBC 7.5, RBC 2.24, Hb 7.5, HCT 24.3, .5, platelet 176  Blood Gas:  pH 7.51, PCO2 41, PO2 75, bicarb 33 on 3 L  Imagin/05 CXR showing mild left basilar atelectasis. Enteric tube and bilateral chest tube positioning confirmed  Micro: Blood culture showing no growth to date  Telemetry shows: Normal sinus rhythm    Seen with multidisciplinary ICU team  Dr. Boubacar Hart.   Meets Continued ICU Level Care Criteria:    [] Yes [x] No - Transfer Orders per CT Surgery attending  [] HOSPITALIST CONTACTED-      Case and plan discussed with Dr. Leander Bobby  Electronically signed by Claudell Ferrara, MD IM Resident     Addendum by Dr. Leander Bobby MD:  Patient seen by me independently including key components of medical care. Face to face evaluation and examination was performed. Case discussed with Jose Alberto Howard MD-resident physician. Italicized font, if present,  represents changes to the note made by me. More than 50% of the encounter time involved with patient care by the Pulmonary & Critical care service team spent by me. Please see my modifications mentioned below:  Patient is chronically ill looking. He is awake and irritable. NG tube in place. Lab Results   Component Value Date/Time    PH 7.51 03/04/2023 06:23 AM    PCO2 41 03/04/2023 06:23 AM    PO2 75 03/04/2023 06:23 AM    HCO3 33 03/04/2023 06:23 AM    O2SAT 96 03/04/2023 06:23 AM     Lab Results   Component Value Date/Time    IFIO2 3 03/04/2023 06:23 AM    MODE NIV 02/28/2023 05:50 PM    SETPEEP 10.0 02/28/2023 05:50 PM       CBC:   Recent Labs     03/03/23  0642 03/04/23  0711 03/05/23  0428   WBC 8.2 7.0 7.5   HGB 8.2* 8.8* 7.5*   HCT 27.2* 28.2* 24.3*    233 272     BMP:  Recent Labs     03/02/23  1243 03/03/23  0642 03/04/23  0711 03/05/23  0428    141 141 137   K 4.3 3.8 3.9 3.9    101 96* 93*   CO2 28 29 34* 31   BUN 22 23* 24* 30*   CREATININE 1.1 0.9 0.9 0.9   GLUCOSE 133* 143* 128* 151*   MG 2.3  --   --   --    CALCIUM 8.0* 8.5 8.9 8.2*     Hepatic:   Recent Labs     03/03/23  0642   AST 29   ALT 13   BILITOT 0.4   ALKPHOS 29*     Cardiac Enzymes: No results for input(s): CKTOTAL, CKMB, TROPONINI in the last 72 hours. BNP: No results for input(s): BNP in the last 72 hours. INR: No results for input(s): INR, PROTIME in the last 72 hours.   POC   Recent Labs     03/04/23  1904 03/04/23  2030 03/05/23  0735   POCGLU 126* 116* 134*     No results for input(s): LACTA in the last 72 hours. sodium chloride      sodium chloride      dextrose      EPINEPHrine Stopped (03/01/23 0602)        carvedilol  6.25 mg Oral BID WC    amiodarone  200 mg Oral Daily    bisacodyl  10 mg Rectal Daily    calcium replacement protocol   Other RX Placeholder    aspirin  324 mg Oral Daily    carBAMazepine  400 mg Oral TID    potassium bicarb-citric acid  20 mEq Oral BID    insulin lispro  0-4 Units SubCUTAneous TID WC    insulin lispro  0-4 Units SubCUTAneous Nightly    furosemide  40 mg IntraVENous Daily    sodium chloride flush  5-40 mL IntraVENous 2 times per day    enoxaparin  40 mg SubCUTAneous Daily    sennosides-docusate sodium  1 tablet Oral BID    famotidine  20 mg Oral BID    Or    famotidine (PEPCID) injection  20 mg IntraVENous BID    atorvastatin  40 mg Oral Daily    escitalopram  20 mg Oral Daily    levothyroxine  50 mcg Oral Daily    risperiDONE  0.5 mg Oral BID    multivitamin  1 tablet Oral Daily       24HR INTAKE/OUTPUT:    Intake/Output Summary (Last 24 hours) at 3/5/2023 0836  Last data filed at 3/5/2023 0600  Gross per 24 hour   Intake 80 ml   Output 2390 ml   Net -2310 ml     Chest x-ray performed on: 3/5/23  Impression: Mild left basilar atelectasis, similar to previous study. PUD and DVT prophylaxis reviewed.   Pepcid 20 mg IV twice daily  Lovenox 40 mg subcu daily       Electronically signed by   Linda Chery MD on 3/5/2023 at 8:36 AM

## 2023-03-06 ENCOUNTER — APPOINTMENT (OUTPATIENT)
Dept: GENERAL RADIOLOGY | Age: 83
DRG: 233 | End: 2023-03-06
Payer: MEDICARE

## 2023-03-06 PROBLEM — R06.81 WITNESSED EPISODE OF APNEA: Status: ACTIVE | Noted: 2023-03-06

## 2023-03-06 PROBLEM — G47.19 EXCESSIVE DAYTIME SLEEPINESS: Status: ACTIVE | Noted: 2023-03-06

## 2023-03-06 LAB
ANION GAP SERPL CALC-SCNC: 14 MEQ/L (ref 8–16)
BUN SERPL-MCNC: 38 MG/DL (ref 7–22)
CA-I BLD ISE-SCNC: 0.92 MMOL/L (ref 1.12–1.32)
CALCIUM SERPL-MCNC: 8.1 MG/DL (ref 8.5–10.5)
CHLORIDE SERPL-SCNC: 92 MEQ/L (ref 98–111)
CO2 SERPL-SCNC: 30 MEQ/L (ref 23–33)
CREAT SERPL-MCNC: 1 MG/DL (ref 0.4–1.2)
DEPRECATED RDW RBC AUTO: 46.5 FL (ref 35–45)
ERYTHROCYTE [DISTWIDTH] IN BLOOD BY AUTOMATED COUNT: 12.3 % (ref 11.5–14.5)
GFR SERPL CREATININE-BSD FRML MDRD: > 60 ML/MIN/1.73M2
GLUCOSE BLD STRIP.AUTO-MCNC: 122 MG/DL (ref 70–108)
GLUCOSE BLD STRIP.AUTO-MCNC: 134 MG/DL (ref 70–108)
GLUCOSE BLD STRIP.AUTO-MCNC: 142 MG/DL (ref 70–108)
GLUCOSE BLD STRIP.AUTO-MCNC: 169 MG/DL (ref 70–108)
GLUCOSE SERPL-MCNC: 129 MG/DL (ref 70–108)
HCT VFR BLD AUTO: 30.3 % (ref 42–52)
HGB BLD-MCNC: 9.7 GM/DL (ref 14–18)
L PNEUMO1 AG UR QL IA.RAPID: NEGATIVE
MAGNESIUM SERPL-MCNC: 2.9 MG/DL (ref 1.6–2.4)
MCH RBC QN AUTO: 33.4 PG (ref 26–33)
MCHC RBC AUTO-ENTMCNC: 32 GM/DL (ref 32.2–35.5)
MCV RBC AUTO: 104.5 FL (ref 80–94)
PHOSPHATE SERPL-MCNC: 4.5 MG/DL (ref 2.4–4.7)
PLATELET # BLD AUTO: 258 THOU/MM3 (ref 130–400)
PMV BLD AUTO: 10.6 FL (ref 9.4–12.4)
POTASSIUM SERPL-SCNC: 3.7 MEQ/L (ref 3.5–5.2)
RBC # BLD AUTO: 2.9 MILL/MM3 (ref 4.7–6.1)
SODIUM SERPL-SCNC: 136 MEQ/L (ref 135–145)
WBC # BLD AUTO: 7.5 THOU/MM3 (ref 4.8–10.8)

## 2023-03-06 PROCEDURE — 2000000000 HC ICU R&B

## 2023-03-06 PROCEDURE — 6370000000 HC RX 637 (ALT 250 FOR IP)

## 2023-03-06 PROCEDURE — A4216 STERILE WATER/SALINE, 10 ML: HCPCS | Performed by: PHYSICIAN ASSISTANT

## 2023-03-06 PROCEDURE — 71045 X-RAY EXAM CHEST 1 VIEW: CPT

## 2023-03-06 PROCEDURE — 2580000003 HC RX 258: Performed by: PHYSICIAN ASSISTANT

## 2023-03-06 PROCEDURE — 36415 COLL VENOUS BLD VENIPUNCTURE: CPT

## 2023-03-06 PROCEDURE — 80048 BASIC METABOLIC PNL TOTAL CA: CPT

## 2023-03-06 PROCEDURE — 94640 AIRWAY INHALATION TREATMENT: CPT

## 2023-03-06 PROCEDURE — 85027 COMPLETE CBC AUTOMATED: CPT

## 2023-03-06 PROCEDURE — 99233 SBSQ HOSP IP/OBS HIGH 50: CPT | Performed by: INTERNAL MEDICINE

## 2023-03-06 PROCEDURE — 6370000000 HC RX 637 (ALT 250 FOR IP): Performed by: PHYSICIAN ASSISTANT

## 2023-03-06 PROCEDURE — 92526 ORAL FUNCTION THERAPY: CPT

## 2023-03-06 PROCEDURE — 82948 REAGENT STRIP/BLOOD GLUCOSE: CPT

## 2023-03-06 PROCEDURE — 6360000002 HC RX W HCPCS: Performed by: NURSE PRACTITIONER

## 2023-03-06 PROCEDURE — 6370000000 HC RX 637 (ALT 250 FOR IP): Performed by: THORACIC SURGERY (CARDIOTHORACIC VASCULAR SURGERY)

## 2023-03-06 PROCEDURE — 6370000000 HC RX 637 (ALT 250 FOR IP): Performed by: NURSE PRACTITIONER

## 2023-03-06 PROCEDURE — 2700000000 HC OXYGEN THERAPY PER DAY

## 2023-03-06 PROCEDURE — 83735 ASSAY OF MAGNESIUM: CPT

## 2023-03-06 PROCEDURE — 97530 THERAPEUTIC ACTIVITIES: CPT

## 2023-03-06 PROCEDURE — 6360000002 HC RX W HCPCS: Performed by: PHYSICIAN ASSISTANT

## 2023-03-06 PROCEDURE — 94761 N-INVAS EAR/PLS OXIMETRY MLT: CPT

## 2023-03-06 PROCEDURE — 31720 CLEARANCE OF AIRWAYS: CPT

## 2023-03-06 PROCEDURE — 94660 CPAP INITIATION&MGMT: CPT

## 2023-03-06 PROCEDURE — 82330 ASSAY OF CALCIUM: CPT

## 2023-03-06 PROCEDURE — 84100 ASSAY OF PHOSPHORUS: CPT

## 2023-03-06 PROCEDURE — 2500000003 HC RX 250 WO HCPCS: Performed by: PHYSICIAN ASSISTANT

## 2023-03-06 RX ORDER — CALCIUM GLUCONATE 20 MG/ML
2000 INJECTION, SOLUTION INTRAVENOUS ONCE
Status: COMPLETED | OUTPATIENT
Start: 2023-03-06 | End: 2023-03-06

## 2023-03-06 RX ORDER — MORPHINE SULFATE 2 MG/ML
0.25 INJECTION, SOLUTION INTRAMUSCULAR; INTRAVENOUS EVERY 6 HOURS PRN
Status: DISCONTINUED | OUTPATIENT
Start: 2023-03-06 | End: 2023-03-13

## 2023-03-06 RX ADMIN — CARVEDILOL 6.25 MG: 6.25 TABLET, FILM COATED ORAL at 06:29

## 2023-03-06 RX ADMIN — ASPIRIN 81 MG 324 MG: 81 TABLET ORAL at 09:05

## 2023-03-06 RX ADMIN — SENNOSIDES AND DOCUSATE SODIUM 1 TABLET: 50; 8.6 TABLET ORAL at 22:06

## 2023-03-06 RX ADMIN — ENOXAPARIN SODIUM 40 MG: 100 INJECTION SUBCUTANEOUS at 09:05

## 2023-03-06 RX ADMIN — SENNOSIDES AND DOCUSATE SODIUM 1 TABLET: 50; 8.6 TABLET ORAL at 09:06

## 2023-03-06 RX ADMIN — POTASSIUM BICARBONATE 20 MEQ: 782 TABLET, EFFERVESCENT ORAL at 09:05

## 2023-03-06 RX ADMIN — Medication 400 MG: at 13:32

## 2023-03-06 RX ADMIN — FAMOTIDINE 20 MG: 20 TABLET, FILM COATED ORAL at 09:05

## 2023-03-06 RX ADMIN — FUROSEMIDE 40 MG: 10 INJECTION, SOLUTION INTRAMUSCULAR; INTRAVENOUS at 09:06

## 2023-03-06 RX ADMIN — SODIUM CHLORIDE, PRESERVATIVE FREE 10 ML: 5 INJECTION INTRAVENOUS at 09:32

## 2023-03-06 RX ADMIN — AMIODARONE HYDROCHLORIDE 200 MG: 200 TABLET ORAL at 09:05

## 2023-03-06 RX ADMIN — SODIUM CHLORIDE, PRESERVATIVE FREE 20 MG: 5 INJECTION INTRAVENOUS at 22:06

## 2023-03-06 RX ADMIN — RISPERIDONE 0.5 MG: 0.25 TABLET, FILM COATED ORAL at 22:06

## 2023-03-06 RX ADMIN — LEVOTHYROXINE SODIUM 50 MCG: 0.05 TABLET ORAL at 06:29

## 2023-03-06 RX ADMIN — CARVEDILOL 6.25 MG: 6.25 TABLET, FILM COATED ORAL at 16:13

## 2023-03-06 RX ADMIN — Medication 1 TABLET: at 09:05

## 2023-03-06 RX ADMIN — ESCITALOPRAM 20 MG: 20 TABLET, FILM COATED ORAL at 09:05

## 2023-03-06 RX ADMIN — CALCIUM GLUCONATE 2000 MG: 20 INJECTION, SOLUTION INTRAVENOUS at 13:32

## 2023-03-06 RX ADMIN — POTASSIUM BICARBONATE 20 MEQ: 782 TABLET, EFFERVESCENT ORAL at 22:06

## 2023-03-06 RX ADMIN — SODIUM CHLORIDE, PRESERVATIVE FREE 10 ML: 5 INJECTION INTRAVENOUS at 22:06

## 2023-03-06 RX ADMIN — BISACODYL 10 MG: 10 SUPPOSITORY RECTAL at 10:37

## 2023-03-06 RX ADMIN — Medication 400 MG: at 09:06

## 2023-03-06 RX ADMIN — RISPERIDONE 0.5 MG: 0.25 TABLET, FILM COATED ORAL at 09:05

## 2023-03-06 RX ADMIN — CALCIUM GLUCONATE 2000 MG: 20 INJECTION, SOLUTION INTRAVENOUS at 11:15

## 2023-03-06 RX ADMIN — Medication 400 MG: at 22:05

## 2023-03-06 RX ADMIN — ATORVASTATIN CALCIUM 40 MG: 40 TABLET, FILM COATED ORAL at 09:06

## 2023-03-06 RX ADMIN — ALBUTEROL SULFATE 2 PUFF: 90 AEROSOL, METERED RESPIRATORY (INHALATION) at 03:24

## 2023-03-06 RX ADMIN — SODIUM CHLORIDE: 9 INJECTION, SOLUTION INTRAVENOUS at 11:13

## 2023-03-06 ASSESSMENT — PAIN SCALES - GENERAL
PAINLEVEL_OUTOF10: 0
PAINLEVEL_OUTOF10: 0

## 2023-03-06 NOTE — PLAN OF CARE
Problem: Respiratory - Adult  Goal: Achieves optimal ventilation and oxygenation  3/6/2023 0826 by Roger Muhammad CONSTANCE  Outcome: Progressing   NIV to decrease work of breathing. Unable to get agreement for goals because no family is present and patient cannot respond.

## 2023-03-06 NOTE — PLAN OF CARE
Problem: Discharge Planning  Goal: Discharge to home or other facility with appropriate resources  Outcome: Progressing  Flowsheets (Taken 3/5/2023 2000)  Discharge to home or other facility with appropriate resources:   Identify barriers to discharge with patient and caregiver   Arrange for needed discharge resources and transportation as appropriate   Identify discharge learning needs (meds, wound care, etc)     Problem: Pain  Goal: Verbalizes/displays adequate comfort level or baseline comfort level  Outcome: Progressing  Flowsheets (Taken 3/5/2023 2000)  Verbalizes/displays adequate comfort level or baseline comfort level:   Encourage patient to monitor pain and request assistance   Assess pain using appropriate pain scale   Administer analgesics based on type and severity of pain and evaluate response   Implement non-pharmacological measures as appropriate and evaluate response   Consider cultural and social influences on pain and pain management   Notify Licensed Independent Practitioner if interventions unsuccessful or patient reports new pain     Problem: Respiratory - Adult  Goal: Achieves optimal ventilation and oxygenation  Outcome: Progressing  Flowsheets (Taken 3/5/2023 2000)  Achieves optimal ventilation and oxygenation:   Assess for changes in respiratory status   Assess for changes in mentation and behavior   Position to facilitate oxygenation and minimize respiratory effort   Oxygen supplementation based on oxygen saturation or arterial blood gases   Initiate smoking cessation protocol as indicated   Encourage broncho-pulmonary hygiene including cough, deep breathe, incentive spirometry   Assess the need for suctioning and aspirate as needed   Assess and instruct to report shortness of breath or any respiratory difficulty   Respiratory therapy support as indicated     Problem: Cardiovascular - Adult  Goal: Maintains optimal cardiac output and hemodynamic stability  Outcome: Progressing  Flowsheets (Taken 3/5/2023 2000)  Maintains optimal cardiac output and hemodynamic stability:   Monitor blood pressure and heart rate   Monitor urine output and notify Licensed Independent Practitioner for values outside of normal range   Assess for signs of decreased cardiac output   Administer fluid and/or volume expanders as ordered  Goal: Absence of cardiac dysrhythmias or at baseline  Outcome: Progressing  Flowsheets (Taken 3/5/2023 2000)  Absence of cardiac dysrhythmias or at baseline:   Monitor cardiac rate and rhythm   Assess for signs of decreased cardiac output   Administer antiarrhythmia medication and electrolyte replacement as ordered     Problem: Metabolic/Fluid and Electrolytes - Adult  Goal: Electrolytes maintained within normal limits  Outcome: Progressing  Flowsheets (Taken 3/5/2023 2000)  Electrolytes maintained within normal limits:   Monitor labs and assess patient for signs and symptoms of electrolyte imbalances   Administer electrolyte replacement as ordered   Monitor response to electrolyte replacements, including repeat lab results as appropriate   Fluid restriction as ordered   Instruct patient on fluid and nutrition restrictions as appropriate  Goal: Glucose maintained within prescribed range  Outcome: Progressing  Flowsheets (Taken 3/5/2023 2000)  Glucose maintained within prescribed range:   Monitor blood glucose as ordered   Assess for signs and symptoms of hyperglycemia and hypoglycemia   Administer ordered medications to maintain glucose within target range   Assess barriers to adequate nutritional intake and initiate nutrition consult as needed   Instruct patient on self management of diabetes and initiate consult as needed     Problem: Hematologic - Adult  Goal: Maintains hematologic stability  Outcome: Progressing  Flowsheets (Taken 3/5/2023 2000)  Maintains hematologic stability:   Assess for signs and symptoms of bleeding or hemorrhage   Monitor labs for bleeding or clotting disorders Administer blood products/factors as ordered     Problem: Nutrition Deficit:  Goal: Optimize nutritional status  Outcome: Progressing     Problem: Safety - Adult  Goal: Free from fall injury  Outcome: Progressing     Problem: Neurosensory - Adult  Goal: Achieves stable or improved neurological status  Outcome: Progressing  Flowsheets (Taken 3/5/2023 2000)  Achieves stable or improved neurological status:   Assess for and report changes in neurological status   Initiate measures to prevent increased intracranial pressure   Maintain blood pressure and fluid volume within ordered parameters to optimize cerebral perfusion and minimize risk of hemorrhage   Monitor temperature, glucose, and sodium.  Initiate appropriate interventions as ordered  Goal: Achieves maximal functionality and self care  Outcome: Progressing  Flowsheets (Taken 3/5/2023 2000)  Achieves maximal functionality and self care:   Monitor swallowing and airway patency with patient fatigue and changes in neurological status   Encourage and assist patient to increase activity and self care with guidance from physical therapy/occupational therapy   Encourage visually impaired, hearing impaired and aphasic patients to use assistive/communication devices     Problem: Skin/Tissue Integrity - Adult  Goal: Incisions, wounds, or drain sites healing without S/S of infection  Outcome: Progressing  Flowsheets (Taken 3/5/2023 2000)  Incisions, Wounds, or Drain Sites Healing Without Sign and Symptoms of Infection:   ADMISSION and DAILY: Assess and document risk factors for pressure ulcer development   TWICE DAILY: Assess and document skin integrity   TWICE DAILY: Assess and document dressing/incision, wound bed, drain sites and surrounding tissue   Implement wound care per orders   Initiate isolation precautions as appropriate   Initiate pressure ulcer prevention bundle as indicated  Goal: Oral mucous membranes remain intact  Outcome: Progressing  Flowsheets (Taken 3/5/2023 2000)  Oral Mucous Membranes Remain Intact:   Assess oral mucosa and hygiene practices   Implement preventative oral hygiene regimen   Implement oral medicated treatments as ordered     Problem: Musculoskeletal - Adult  Goal: Return mobility to safest level of function  Outcome: Progressing  Flowsheets (Taken 3/5/2023 2000)  Return Mobility to Safest Level of Function:   Assess patient stability and activity tolerance for standing, transferring and ambulating with or without assistive devices   Assist with transfers and ambulation using safe patient handling equipment as needed   Ensure adequate protection for wounds/incisions during mobilization   Obtain physical therapy/occupational therapy consults as needed   Instruct patient/family in ordered activity level  Goal: Return ADL status to a safe level of function  Outcome: Progressing  Flowsheets (Taken 3/5/2023 2000)  Return ADL Status to a Safe Level of Function:   Administer medication as ordered   Assess activities of daily living deficits and provide assistive devices as needed   Obtain physical therapy/occupational therapy consults as needed   Assist and instruct patient to increase activity and self care as tolerated     Problem: Gastrointestinal - Adult  Goal: Maintains or returns to baseline bowel function  Outcome: Progressing  Flowsheets (Taken 3/5/2023 2000)  Maintains or returns to baseline bowel function:   Assess bowel function   Administer ordered medications as needed   Encourage mobilization and activity   Nutrition consult to assist patient with appropriate food choices     Problem: Genitourinary - Adult  Goal: Absence of urinary retention  Outcome: Progressing  Flowsheets (Taken 3/5/2023 2000)  Absence of urinary retention:   Assess patients ability to void and empty bladder   Monitor intake/output and perform bladder scan as needed     Problem: Infection - Adult  Goal: Absence of infection at discharge  Outcome: Progressing  Flowsheets (Taken 3/5/2023 2000)  Absence of infection at discharge:   Assess and monitor for signs and symptoms of infection   Monitor lab/diagnostic results   Monitor all insertion sites i.e., indwelling lines, tubes and drains     Problem: Anxiety  Goal: Will report anxiety at manageable levels  Description: INTERVENTIONS:  1. Administer medication as ordered  2. Teach and rehearse alternative coping skills  3. Provide emotional support with 1:1 interaction with staff  Outcome: Progressing  Flowsheets (Taken 3/5/2023 2000)  Will report anxiety at manageable levels:   Administer medication as ordered   Teach and rehearse alternative coping skills   Provide emotional support with 1:1 interaction with staff     Problem: Change in Body Image  Goal: Pt/Family communicate acceptance of loss or change in body image and feel psychological comfort and peace  Description: INTERVENTIONS:  1. Assess patient/family anxiety and grief process related to change in body image, loss of functional status, loss of sense of self, and forgiveness  2. Provide emotional and spiritual support  3. Provide information about the patient's health status with consideration of family and cultural values  4. Communicate willingness to discuss loss and facilitate grief process with patient/family as appropriate  5. Emphasize sustaining relationships within family system and community, or roxi/spiritual traditions  6.  Initiate Spiritual Care, Psychosocial Clinical Specialist consult as needed  Outcome: Progressing  Flowsheets (Taken 3/5/2023 2000)  Patient/family communicate acceptance of loss or change in body image and feel psychological comfort and peace:   Assess patient/family anxiety and grief process related to change in body image, loss of functional status, loss of sense of self, and forgiveness   Provide emotional and spiritual support   Provide information about the patients health status with consideration of family and cultural values   Emphasize sustaining relationships within family system and community, or roxi/spiritual traditions     Problem: Confusion  Goal: Confusion, delirium, dementia, or psychosis is improved or at baseline  Description: INTERVENTIONS:  1. Assess for possible contributors to thought disturbance, including medications, impaired vision or hearing, underlying metabolic abnormalities, dehydration, psychiatric diagnoses, and notify attending LIP  2. [place] high risk fall precautions, as indicated  3. Provide frequent short contacts to provide reality reorientation, refocusing and direction  4. Decrease environmental stimuli, including noise as appropriate  5. Monitor and intervene to maintain adequate nutrition, hydration, elimination, sleep and activity  6. If unable to ensure safety without constant attention obtain sitter and review sitter guidelines with assigned personnel  7. Initiate Psychosocial CNS and Spiritual Care consult, as indicated  Outcome: Progressing  Flowsheets (Taken 3/5/2023 2000)  Effect of thought disturbance (confusion, delirium, dementia, or psychosis) are managed with adequate functional status:   Assess for contributors to thought disturbance, including medications, impaired vision or hearing, underlying metabolic abnormalities, dehydration, psychiatric diagnoses, notify LIP   [place] high risk fall precautions, as indicated   Provide frequent short contacts to provide reality reorientation, refocusing and direction   Decrease environmental stimuli, including noise as appropriate   Monitor and intervene to maintain adequate nutrition, hydration, elimination, sleep and activity   If unable to ensure safety without constant attention obtain sitter and review sitter guidelines with assigned personnel     Problem: Skin/Tissue Integrity  Goal: Absence of new skin breakdown  Description: 1.  Monitor for areas of redness and/or skin breakdown  2.  Assess vascular access sites  hourly  3. Every 4-6 hours minimum:  Change oxygen saturation probe site  4. Every 4-6 hours:  If on nasal continuous positive airway pressure, respiratory therapy assess nares and determine need for appliance change or resting period. Outcome: Progressing     Problem: Safety - Medical Restraint  Goal: Remains free of injury from restraints (Restraint for Interference with Medical Device)  Description: INTERVENTIONS:  1. Determine that other, less restrictive measures have been tried or would not be effective before applying the restraint  2. Evaluate the patient's condition at the time of restraint application  3. Inform patient/family regarding the reason for restraint  4.  Q2H: Monitor safety, psychosocial status, comfort, nutrition and hydration  Outcome: Progressing  Flowsheets (Taken 3/5/2023 2000)  Remains free of injury from restraints (restraint for interference with medical device):   Determine that other, less restrictive measures have been tried or would not be effective before applying the restraint   Evaluate the patient's condition at the time of restraint application   Inform patient/family regarding the reason for restraint   Every 2 hours: Monitor safety, psychosocial status, comfort, nutrition and hydration

## 2023-03-06 NOTE — PROGRESS NOTES
[facility]  [facility] ICU 4D  DAILY NOTE    TIME   SLP Individual Minutes  Time In: 3707  Time Out: 9316  Minutes: 13  Timed Code Treatment Minutes: 0 Minutes       Date: 3/6/2023  Patient Name: Kate Adam      CSN: [id number]   : 1940  (81 y.o.)  Gender: male   Referring Physician:   ANGELIC Adkins CNP  Diagnosis: Elevated troponin  Precautions: Fall risk, aspiration precautions   Current Diet: NPO; NGT  Swallowing Strategies: Not Applicable  Date of Last MBS/FEES: Not Applicable    Pain:  No pain reported. Subjective:  RN in patient room gathering vitals at time of ST session. Patient with improved alertness; however, increased agitation. Patient in soft cuff restraints to keep from \"pulling wires\". Prior to ST completing PO trials patietn stated \"I'm choking\" and \"I need more air\". RN present in room throughout entire ST evaluation and increased O2 from 2L to 4L during for comfort - stats remained stable at 92 for duration of ST eval; however, patient continued to state he was unable to breathe. Patient placed on BiPap at end of ST evaluation for patient comfort. Short-Term Goals:  SHORT TERM GOAL #1:  Goal 1: Patient will consume conservative PO trials demonstrating consistency of pharyngeal trigger and adequate endurance measures to determine readiness for instrumental evaluation. INTERVENTIONS:   Completed x3 po trials of ice chips with adequate oral acceptance, functional bolus control and suspected adequate manipulation. X1 tsp of thin liquid demonstrated decreased bolus control with weak, non-productive cough present following swallow. STRONG suspicion of airway invasion. Recovery maintained with adequate homeostasis achieved. Further po trials deferred. At this time patient is NOT appropriate for completion of skilled instrumental assessment. Recommend patient remain NPO with alternative means of nutrition via NG tube.    *post therapy session, patient without respiratory distress upon leaving room; RN notified re: recommendations from the session; verbal receptiveness noted     SHORT TERM GOAL #2:  Goal 2: Staff will exhibit return demonstration for completion of comprehensive oral care procedural analysis to maximize oral integrity and to reduce potential bacteria colonization. INTERVENTIONS:DNT due to focus on other STG     SHORT TERM GOAL #3:  Goal 3: Monitor mentation (hx dementia, CABG); complete alternate cognitive linguistic assessment should it be clinically indicated. INTERVENTIONS: DNT d/t focus on STG1; however, family reports patient is near basline mentation. Long-Term Goals:     No LTG established due to short ELOS. EDUCATION:  Learner: Patient  Education:  Reviewed ST goals and Plan of Care  Evaluation of Education: Verbalizes understanding and Needs further instruction    ASSESSMENT/PLAN:  Activity Tolerance:  Patient tolerance of  treatment: good. Assessment/Plan: Patient progressing toward established goals. Continues to require skilled care of licensed speech pathologist to progress toward achievement of established goals and plan of care. .     Plan for Next Session: Dysphagia management   Discharge Recommendations: Continue to Assess Pending Progress     Rodrick Meyer, [address] [facility], [address] [place] [address]

## 2023-03-06 NOTE — PLAN OF CARE
Case discussed with patient's family during rounds. They verbalized understanding, provided collaborative history with regards to the patient's sleep hygiene and sleeping habits, and asked questions regarding the plan of care. All questions were answered fully and the patient's family verbalized understanding. They are agreeable to the plan of care from the critical care team's perspective.     -Verena Nissen, MD  Resident

## 2023-03-06 NOTE — PLAN OF CARE
Problem: Discharge Planning  Goal: Discharge to home or other facility with appropriate resources  3/6/2023 1600 by Arpan Hightower RN  Outcome: Progressing  Flowsheets (Taken 3/6/2023 0800)  Discharge to home or other facility with appropriate resources:   Identify barriers to discharge with patient and caregiver   Arrange for needed discharge resources and transportation as appropriate   Identify discharge learning needs (meds, wound care, etc)   Refer to discharge planning if patient needs post-hospital services based on physician order or complex needs related to functional status, cognitive ability or social support system  3/6/2023 0618 by Khari Han RN  Outcome: Progressing  Flowsheets (Taken 3/5/2023 2000)  Discharge to home or other facility with appropriate resources:   Identify barriers to discharge with patient and caregiver   Arrange for needed discharge resources and transportation as appropriate   Identify discharge learning needs (meds, wound care, etc)     Problem: Pain  Goal: Verbalizes/displays adequate comfort level or baseline comfort level  3/6/2023 1600 by Arpan Hightower RN  Outcome: Progressing  Flowsheets (Taken 3/6/2023 0900)  Verbalizes/displays adequate comfort level or baseline comfort level:   Encourage patient to monitor pain and request assistance   Assess pain using appropriate pain scale   Implement non-pharmacological measures as appropriate and evaluate response   Administer analgesics based on type and severity of pain and evaluate response   Notify Licensed Independent Practitioner if interventions unsuccessful or patient reports new pain  3/6/2023 0618 by Khari Han RN  Outcome: Progressing  Flowsheets (Taken 3/5/2023 2000)  Verbalizes/displays adequate comfort level or baseline comfort level:   Encourage patient to monitor pain and request assistance   Assess pain using appropriate pain scale   Administer analgesics based on type and severity of pain and evaluate response   Implement non-pharmacological measures as appropriate and evaluate response   Consider cultural and social influences on pain and pain management   Notify Licensed Independent Practitioner if interventions unsuccessful or patient reports new pain     Problem: Respiratory - Adult  Goal: Achieves optimal ventilation and oxygenation  3/6/2023 1600 by Richelle Flowers RN  Outcome: Progressing  Flowsheets (Taken 3/6/2023 0800)  Achieves optimal ventilation and oxygenation:   Assess for changes in respiratory status   Assess for changes in mentation and behavior   Position to facilitate oxygenation and minimize respiratory effort   Oxygen supplementation based on oxygen saturation or arterial blood gases   Initiate smoking cessation protocol as indicated   Encourage broncho-pulmonary hygiene including cough, deep breathe, incentive spirometry   Assess and instruct to report shortness of breath or any respiratory difficulty   Respiratory therapy support as indicated  3/6/2023 0826 by Black Hernandez RCP  Outcome: Progressing  Flowsheets (Taken 3/6/2023 0800 by Richelle Flowers RN)  Achieves optimal ventilation and oxygenation:   Assess for changes in respiratory status   Assess for changes in mentation and behavior   Position to facilitate oxygenation and minimize respiratory effort   Oxygen supplementation based on oxygen saturation or arterial blood gases   Initiate smoking cessation protocol as indicated   Encourage broncho-pulmonary hygiene including cough, deep breathe, incentive spirometry   Assess and instruct to report shortness of breath or any respiratory difficulty   Respiratory therapy support as indicated  3/6/2023 0618 by Gregorio Ingram RN  Outcome: Progressing  Flowsheets (Taken 3/5/2023 2000)  Achieves optimal ventilation and oxygenation:   Assess for changes in respiratory status   Assess for changes in mentation and behavior   Position to facilitate oxygenation and minimize respiratory effort   Oxygen supplementation based on oxygen saturation or arterial blood gases   Initiate smoking cessation protocol as indicated   Encourage broncho-pulmonary hygiene including cough, deep breathe, incentive spirometry   Assess the need for suctioning and aspirate as needed   Assess and instruct to report shortness of breath or any respiratory difficulty   Respiratory therapy support as indicated     Problem: Cardiovascular - Adult  Goal: Maintains optimal cardiac output and hemodynamic stability  3/6/2023 1600 by Sheridan Farrell RN  Outcome: Progressing  Flowsheets (Taken 3/6/2023 0800)  Maintains optimal cardiac output and hemodynamic stability:   Monitor blood pressure and heart rate   Monitor urine output and notify Licensed Independent Practitioner for values outside of normal range   Assess for signs of decreased cardiac output   Administer fluid and/or volume expanders as ordered   Administer vasoactive medications as ordered  3/6/2023 0618 by Kiki Ramirez RN  Outcome: Progressing  Flowsheets (Taken 3/5/2023 2000)  Maintains optimal cardiac output and hemodynamic stability:   Monitor blood pressure and heart rate   Monitor urine output and notify Licensed Independent Practitioner for values outside of normal range   Assess for signs of decreased cardiac output   Administer fluid and/or volume expanders as ordered  Goal: Absence of cardiac dysrhythmias or at baseline  3/6/2023 1600 by Sheridan Farrell RN  Outcome: Progressing  Flowsheets (Taken 3/6/2023 0800)  Absence of cardiac dysrhythmias or at baseline:   Monitor cardiac rate and rhythm   Assess for signs of decreased cardiac output   Administer antiarrhythmia medication and electrolyte replacement as ordered  3/6/2023 0618 by Kiki Ramirez RN  Outcome: Progressing  Flowsheets (Taken 3/5/2023 2000)  Absence of cardiac dysrhythmias or at baseline:   Monitor cardiac rate and rhythm   Assess for signs of decreased cardiac output   Administer antiarrhythmia medication and electrolyte replacement as ordered     Problem: Metabolic/Fluid and Electrolytes - Adult  Goal: Electrolytes maintained within normal limits  3/6/2023 1600 by Shaq Piedra RN  Outcome: Progressing  Flowsheets (Taken 3/6/2023 0800)  Electrolytes maintained within normal limits:   Monitor labs and assess patient for signs and symptoms of electrolyte imbalances   Administer electrolyte replacement as ordered   Monitor response to electrolyte replacements, including repeat lab results as appropriate   Fluid restriction as ordered   Instruct patient on fluid and nutrition restrictions as appropriate  3/6/2023 0618 by Riley Robles RN  Outcome: Progressing  Flowsheets (Taken 3/5/2023 2000)  Electrolytes maintained within normal limits:   Monitor labs and assess patient for signs and symptoms of electrolyte imbalances   Administer electrolyte replacement as ordered   Monitor response to electrolyte replacements, including repeat lab results as appropriate   Fluid restriction as ordered   Instruct patient on fluid and nutrition restrictions as appropriate  Goal: Glucose maintained within prescribed range  3/6/2023 1600 by Shaq Piedra RN  Outcome: Progressing  Flowsheets (Taken 3/6/2023 0800)  Glucose maintained within prescribed range:   Monitor blood glucose as ordered   Assess for signs and symptoms of hyperglycemia and hypoglycemia   Administer ordered medications to maintain glucose within target range   Assess barriers to adequate nutritional intake and initiate nutrition consult as needed  3/6/2023 0618 by Riley Robles RN  Outcome: Progressing  Flowsheets (Taken 3/5/2023 2000)  Glucose maintained within prescribed range:   Monitor blood glucose as ordered   Assess for signs and symptoms of hyperglycemia and hypoglycemia   Administer ordered medications to maintain glucose within target range   Assess barriers to adequate nutritional intake and initiate nutrition consult as needed   Instruct patient on self management of diabetes and initiate consult as needed     Problem: Hematologic - Adult  Goal: Maintains hematologic stability  3/6/2023 1600 by Renetta Sullivan RN  Outcome: Progressing  Flowsheets (Taken 3/6/2023 0800)  Maintains hematologic stability:   Assess for signs and symptoms of bleeding or hemorrhage   Monitor labs for bleeding or clotting disorders   Administer blood products/factors as ordered  3/6/2023 0618 by [place] RN  Outcome: Progressing  Flowsheets (Taken 3/5/2023 2000)  Maintains hematologic stability:   Assess for signs and symptoms of bleeding or hemorrhage   Monitor labs for bleeding or clotting disorders   Administer blood products/factors as ordered     Problem: Nutrition Deficit:  Goal: Optimize nutritional status  3/6/2023 1600 by Renetta Sullivan RN  Outcome: Progressing  Flowsheets (Taken 3/3/2023 1322 by Gustavo ONTIVEROS [address], [place], [place])  Nutrient intake appropriate for improving, restoring, or maintaining nutritional needs: Recommend, monitor, and adjust tube feedings and TPN/PPN based on assessed needs  3/6/2023 0618 by [place] RN  Outcome: Progressing     Problem: Safety - Adult  Goal: Free from fall injury  3/6/2023 1600 by Renetta Sullivan RN  Outcome: Progressing  Flowsheets (Taken 3/4/2023 2000 by Dunia Sanchez RN)  Free From Fall Injury:   Instruct family/caregiver on patient safety   Based on caregiver fall risk screen, instruct family/caregiver to ask for assistance with transferring infant if caregiver noted to have fall risk factors  3/6/2023 0618 by [place] RN  Outcome: Progressing     Problem: Neurosensory - Adult  Goal: Achieves stable or improved neurological status  3/6/2023 1600 by Renetta Sullivan RN  Outcome: Progressing  Flowsheets (Taken 3/6/2023 0800)  Achieves stable or improved neurological status:   Assess for and report changes in neurological status   Initiate measures to prevent increased intracranial pressure   Maintain blood pressure and fluid volume within ordered parameters to optimize cerebral perfusion and minimize risk of hemorrhage   Monitor temperature, glucose, and sodium. Initiate appropriate interventions as ordered  3/6/2023 0618 by Kev Rodney RN  Outcome: Progressing  Flowsheets (Taken 3/5/2023 2000)  Achieves stable or improved neurological status:   Assess for and report changes in neurological status   Initiate measures to prevent increased intracranial pressure   Maintain blood pressure and fluid volume within ordered parameters to optimize cerebral perfusion and minimize risk of hemorrhage   Monitor temperature, glucose, and sodium.  Initiate appropriate interventions as ordered  Goal: Achieves maximal functionality and self care  3/6/2023 1600 by Sukh Berg RN  Outcome: Progressing  Flowsheets (Taken 3/6/2023 0800)  Achieves maximal functionality and self care:   Monitor swallowing and airway patency with patient fatigue and changes in neurological status   Encourage and assist patient to increase activity and self care with guidance from physical therapy/occupational therapy   Encourage visually impaired, hearing impaired and aphasic patients to use assistive/communication devices  3/6/2023 0618 by Kev Rodney RN  Outcome: Progressing  Flowsheets (Taken 3/5/2023 2000)  Achieves maximal functionality and self care:   Monitor swallowing and airway patency with patient fatigue and changes in neurological status   Encourage and assist patient to increase activity and self care with guidance from physical therapy/occupational therapy   Encourage visually impaired, hearing impaired and aphasic patients to use assistive/communication devices     Problem: Skin/Tissue Integrity - Adult  Goal: Incisions, wounds, or drain sites healing without S/S of infection  3/6/2023 1600 by Sukh Berg RN  Flowsheets (Taken 3/6/2023 0800)  Incisions, Wounds, or Drain Sites Healing Without Sign and Symptoms of Infection:   ADMISSION and DAILY: Assess and document risk factors for pressure ulcer development   Implement wound care per orders   Initiate isolation precautions as appropriate   Initiate pressure ulcer prevention bundle as indicated  3/6/2023 0618 by Lubna Bradshaw RN  Outcome: Progressing  Flowsheets (Taken 3/5/2023 2000)  Incisions, Wounds, or Drain Sites Healing Without Sign and Symptoms of Infection:   ADMISSION and DAILY: Assess and document risk factors for pressure ulcer development   TWICE DAILY: Assess and document skin integrity   TWICE DAILY: Assess and document dressing/incision, wound bed, drain sites and surrounding tissue   Implement wound care per orders   Initiate isolation precautions as appropriate   Initiate pressure ulcer prevention bundle as indicated  Goal: Oral mucous membranes remain intact  3/6/2023 1600 by Alivia Jung RN  Outcome: Progressing  Flowsheets (Taken 3/6/2023 0800)  Oral Mucous Membranes Remain Intact:   Assess oral mucosa and hygiene practices   Implement preventative oral hygiene regimen   Implement oral medicated treatments as ordered  3/6/2023 0618 by Lubna Bradshaw RN  Outcome: Progressing  Flowsheets (Taken 3/5/2023 2000)  Oral Mucous Membranes Remain Intact:   Assess oral mucosa and hygiene practices   Implement preventative oral hygiene regimen   Implement oral medicated treatments as ordered     Problem: Musculoskeletal - Adult  Goal: Return mobility to safest level of function  3/6/2023 1600 by Alivia Jung RN  Outcome: Progressing  Flowsheets (Taken 3/6/2023 0800)  Return Mobility to Safest Level of Function:   Assess patient stability and activity tolerance for standing, transferring and ambulating with or without assistive devices   Assist with transfers and ambulation using safe patient handling equipment as needed   Ensure adequate protection for wounds/incisions during mobilization   Obtain physical therapy/occupational therapy consults as needed  3/6/2023 0618 by Roe Michelle RN  Outcome: Progressing  Flowsheets (Taken 3/5/2023 2000)  Return Mobility to Safest Level of Function:   Assess patient stability and activity tolerance for standing, transferring and ambulating with or without assistive devices   Assist with transfers and ambulation using safe patient handling equipment as needed   Ensure adequate protection for wounds/incisions during mobilization   Obtain physical therapy/occupational therapy consults as needed   Instruct patient/family in ordered activity level  Goal: Return ADL status to a safe level of function  3/6/2023 1600 by Heri Yang RN  Outcome: Progressing  Flowsheets (Taken 3/6/2023 0800)  Return ADL Status to a Safe Level of Function:   Administer medication as ordered   Assess activities of daily living deficits and provide assistive devices as needed   Assist and instruct patient to increase activity and self care as tolerated  3/6/2023 0618 by Roe Michelle RN  Outcome: Progressing  Flowsheets (Taken 3/5/2023 2000)  Return ADL Status to a Safe Level of Function:   Administer medication as ordered   Assess activities of daily living deficits and provide assistive devices as needed   Obtain physical therapy/occupational therapy consults as needed   Assist and instruct patient to increase activity and self care as tolerated     Problem: Gastrointestinal - Adult  Goal: Maintains or returns to baseline bowel function  3/6/2023 1600 by Heri Yang RN  Outcome: Progressing  Flowsheets (Taken 3/6/2023 0800)  Maintains or returns to baseline bowel function:   Assess bowel function   Encourage oral fluids to ensure adequate hydration   Administer IV fluids as ordered to ensure adequate hydration   Administer ordered medications as needed   Encourage mobilization and activity   Nutrition consult to assist patient with appropriate food choices  3/6/2023 0618 by Roe Michelle RN  Outcome: Progressing  Flowsheets (Taken 3/5/2023 2000)  Maintains or returns to baseline bowel function:   Assess bowel function   Administer ordered medications as needed   Encourage mobilization and activity   Nutrition consult to assist patient with appropriate food choices     Problem: Genitourinary - Adult  Goal: Absence of urinary retention  3/6/2023 1600 by Sukh Berg RN  Outcome: Progressing  Flowsheets (Taken 3/6/2023 0800)  Absence of urinary retention:   Assess patients ability to void and empty bladder   Monitor intake/output and perform bladder scan as needed   Place urinary catheter per Licensed Independent Practitioner order if needed   Discuss with Licensed Independent Practitioner  medications to alleviate retention as needed   Discuss catheterization for long term situations as appropriate  3/6/2023 0618 by Kev Rodney RN  Outcome: Progressing  [address] (Taken 3/5/2023 2000)  Absence of urinary retention:   Assess patients ability to void and empty bladder   Monitor intake/output and perform bladder scan as needed     Problem: Infection - Adult  Goal: Absence of infection at discharge  3/6/2023 1600 by Sukh Berg RN  Flowsheets (Taken 3/6/2023 0800)  Absence of infection at discharge:   Assess and monitor for signs and symptoms of infection   Monitor lab/diagnostic results   Monitor all insertion sites i.e., indwelling lines, tubes and drains   Administer medications as ordered   Instruct and encourage patient and family to use good hand hygiene technique   Identify and instruct in appropriate isolation precautions for identified infection/condition  3/6/2023 0618 by Kev Rodney RN  Outcome: Progressing  Flowsheets (Taken 3/5/2023 2000)  Absence of infection at discharge:   Assess and monitor for signs and symptoms of infection   Monitor lab/diagnostic results   Monitor all insertion sites i.e., indwelling lines, tubes and drains     Problem: Anxiety  Goal: Will report anxiety at manageable levels  Description: INTERVENTIONS:  1. Administer medication as ordered  2. Teach and rehearse alternative coping skills  3. Provide emotional support with 1:1 interaction with staff  3/6/2023 1600 by Ruth Yancey RN  Outcome: Progressing  Flowsheets (Taken 3/6/2023 0800)  Will report anxiety at manageable levels:   Administer medication as ordered   Teach and rehearse alternative coping skills   Provide emotional support with 1:1 interaction with staff  3/6/2023 0618 by Donna aHrper RN  Outcome: Progressing  Flowsheets (Taken 3/5/2023 2000)  Will report anxiety at manageable levels:   Administer medication as ordered   Teach and rehearse alternative coping skills   Provide emotional support with 1:1 interaction with staff     Problem: Change in Body Image  Goal: Pt/Family communicate acceptance of loss or change in body image and feel psychological comfort and peace  Description: INTERVENTIONS:  1. Assess patient/family anxiety and grief process related to change in body image, loss of functional status, loss of sense of self, and forgiveness  2. Provide emotional and spiritual support  3. Provide information about the patient's health status with consideration of family and cultural values  4. Communicate willingness to discuss loss and facilitate grief process with patient/family as appropriate  5. Emphasize sustaining relationships within family system and community, or roxi/spiritual traditions  6.  Initiate Spiritual Care, Psychosocial Clinical Specialist consult as needed  3/6/2023 1600 by Ruth Yancey RN  Outcome: Progressing  Flowsheets (Taken 3/6/2023 0800)  Patient/family communicate acceptance of loss or change in body image and feel psychological comfort and peace:   Assess patient/family anxiety and grief process related to change in body image, loss of functional status, loss of sense of self, and forgiveness   Provide emotional and spiritual support   Provide information about the patients health status with consideration of family and cultural values   Communicate willingness to discuss loss and facilitate grief process with patient/family as appropriate  3/6/2023 0618 by Khari Han RN  Outcome: Progressing  Flowsheets (Taken 3/5/2023 2000)  Patient/family communicate acceptance of loss or change in body image and feel psychological comfort and peace:   Assess patient/family anxiety and grief process related to change in body image, loss of functional status, loss of sense of self, and forgiveness   Provide emotional and spiritual support   Provide information about the patients health status with consideration of family and cultural values   Emphasize sustaining relationships within family system and community, or roxi/spiritual traditions     Problem: Confusion  Goal: Confusion, delirium, dementia, or psychosis is improved or at baseline  Description: INTERVENTIONS:  1. Assess for possible contributors to thought disturbance, including medications, impaired vision or hearing, underlying metabolic abnormalities, dehydration, psychiatric diagnoses, and notify attending LIP  2. [place] high risk fall precautions, as indicated  3. Provide frequent short contacts to provide reality reorientation, refocusing and direction  4. Decrease environmental stimuli, including noise as appropriate  5. Monitor and intervene to maintain adequate nutrition, hydration, elimination, sleep and activity  6. If unable to ensure safety without constant attention obtain sitter and review sitter guidelines with assigned personnel  7.  Initiate Psychosocial CNS and Spiritual Care consult, as indicated  3/6/2023 1600 by Arpan Hightower RN  Outcome: Progressing  Flowsheets (Taken 3/6/2023 0800)  Effect of thought disturbance (confusion, delirium, dementia, or psychosis) are managed with adequate functional status:   Assess for contributors to thought disturbance, including medications, impaired vision or hearing, underlying metabolic abnormalities, dehydration, psychiatric diagnoses, notify [place] high risk fall precautions, as indicated   Provide frequent short contacts to provide reality reorientation, refocusing and direction   Decrease environmental stimuli, including noise as appropriate   Monitor and intervene to maintain adequate nutrition, hydration, elimination, sleep and activity  3/6/2023 0618 by Marshall Buitrago RN  Outcome: Progressing  Flowsheets (Taken 3/5/2023 2000)  Effect of thought disturbance (confusion, delirium, dementia, or psychosis) are managed with adequate functional status:   Assess for contributors to thought disturbance, including medications, impaired vision or hearing, underlying metabolic abnormalities, dehydration, psychiatric diagnoses, notify [place] high risk fall precautions, as indicated   Provide frequent short contacts to provide reality reorientation, refocusing and direction   Decrease environmental stimuli, including noise as appropriate   Monitor and intervene to maintain adequate nutrition, hydration, elimination, sleep and activity   If unable to ensure safety without constant attention obtain sitter and review sitter guidelines with assigned personnel     Problem: Skin/Tissue Integrity  Goal: Absence of new skin breakdown  Description: 1. Monitor for areas of redness and/or skin breakdown  2. Assess vascular access sites hourly  3. Every 4-6 hours minimum:  Change oxygen saturation probe site  4. Every 4-6 hours:  If on nasal continuous positive airway pressure, respiratory therapy assess nares and determine need for appliance change or resting period. 3/6/2023 1600 by Marc Amin RN  Outcome: Progressing  Note: No new s/s of skin breakdown or injury.  Patient being turned Q2 and PRN  3/6/2023 0618 by Marshall Buitrago RN  Outcome: Progressing     Problem: Safety - Medical Restraint  Goal: Remains free of injury from restraints (Restraint for Interference with Medical Device)  Description: INTERVENTIONS:  1. Determine that other, less restrictive measures have been tried or would not be effective before applying the restraint  2. Evaluate the patient's condition at the time of restraint application  3. Inform patient/family regarding the reason for restraint  4.  Q2H: Monitor safety, psychosocial status, comfort, nutrition and hydration  3/6/2023 1600 by Kaye Rubio RN  Outcome: Progressing  Flowsheets (Taken 3/6/2023 0800)  Remains free of injury from restraints (restraint for interference with medical device):   Determine that other, less restrictive measures have been tried or would not be effective before applying the restraint   Evaluate the patient's condition at the time of restraint application   Inform patient/family regarding the reason for restraint   Every 2 hours: Monitor safety, psychosocial status, comfort, nutrition and hydration  3/6/2023 0618 by Nani Beard RN  Outcome: Progressing  Flowsheets (Taken 3/5/2023 2000)  Remains free of injury from restraints (restraint for interference with medical device):   Determine that other, less restrictive measures have been tried or would not be effective before applying the restraint   Evaluate the patient's condition at the time of restraint application   Inform patient/family regarding the reason for restraint   Every 2 hours: Monitor safety, psychosocial status, comfort, nutrition and hydration

## 2023-03-06 NOTE — PROGRESS NOTES
[address] [facility] PHYSICAL THERAPY  DAILY NOTE  [facility] ICU 4D - 4D-08/008-A    Time In: 1416  Time Out: 1439  Timed Code Treatment Minutes: 23 Minutes  Minutes: 23          Date: 3/6/2023  Patient Name: Yesenia Bliss,  Gender:  male        MRN: [id number]  : 1940  (81 y.o.)     Referring Practitioner: ANGELIC Grimes CNP  Diagnosis: elevated tropnin level  Additional Pertinent Hx: per EMR- Yesenia Bliss is a 81 y.o. male with past medical history of anemia, brain abscess, seizures, former smoker approximately 40 pack year who presents to the emergency department for evaluation of fatigue, shortness of breath. Patient brought in by family as family has been reporting he has been more tired, fatigued for the past week however more so in the past 3 days. Today, patient verbalized that he was short of breath and had generalized muscle aches which prompted today's ED visit. Patient lives in a two-story home and states that it has been getting harder to walk around. Has been having dyspnea on exertion. Pt s/p CABG STONEY,  coronary artery bypass grafting x3 using LIMA to LAD, vein graft to to's marginal 1, vein graft to posterior descending artery, transesophageal echocardiography, endoscopic vein harvesting DOS . Prior Level of Function:  Lives With: Daughter  Type of Home: House  Home Layout: Two level, Bed/Bath upstairs  Home Access: Stairs to enter with rails  Entrance Stairs - Number of Steps: 2 LEVY and ~15 steps to second level  Home Equipment: Rose Best, rolling, Cane   Bathroom Shower/Tub: Tub/Shower unit  Bathroom Accessibility: Accessible    Receives Help From: Family  ADL Assistance: Independent  Homemaking Assistance: Needs assistance  Homemaking Responsibilities: Yes  Ambulation Assistance: Independent  Transfer Assistance: Independent  Active : No  Additional Comments: Daughter and son in law both work outside of the home.  Pt was previously independent with ADLs and functional mobility. Pt was not previously using AD for functional mobility. Pt is a questionable historian. Hx of brain surgery in 1995 and visual deficits. Restrictions/Precautions:  Restrictions/Precautions: Fall Risk, General Precautions, Surgical Protocols  Position Activity Restriction  Sternal Precautions: move in the tube  Other position/activity restrictions: hx of brain injury     SUBJECTIVE: RN approved session, pt is supine in bed, on continuous bipap. Sitter present, states she just removed the soft restraints. Pt lethargic, however, able to open eyes, follow commands. Pt agreeable to EOB tasks. Pt oriented to person and place, did not recall having CABG. PAIN: denies    Vitals: Blood Pressure: 194/104 after 6 minutes seated EOB, 168/61 once back in bed  Oxygen: >92% on continuous bipap    OBJECTIVE:  Bed Mobility:  Rolling to Left: Maximum Assistance, X 1, with head of bed flat, with rail, with verbal cues , with increased time for completion   Rolling to Right: Maximum Assistance, X 1, with head of bed flat, with rail, with verbal cues , with increased time for completion   Supine to Sit: Maximum Assistance, X 2, with head of bed raised, with rail, with verbal cues , with increased time for completion  Sit to Supine: Maximum Assistance, X 1, with head of bed flat, with verbal cues    Scooting: Maximum Assistance, X 1    Balance:  Static Sitting Balance:  Minimal Assistance-Moderate Assistance, X 1  **Pt sits EOB x 6 minutes, limited by high BP; L sided lean with mostly mod A for balance, pt able to perform ~5 reps LAQ, cues for posture and balance    Functional Outcome Measures: Not completed       ASSESSMENT:  Assessment: Patient progressing toward established goals. Activity Tolerance:  Patient tolerance of  treatment: good.       Equipment Recommendations:Equipment Needed: No  Discharge Recommendations: Subacte/Skilled Nursing Facility  Plan: Current Treatment Recommendations: Strengthening, Balance training, Functional mobility training, Transfer training, Safety education & training, Equipment evaluation, education, & procurement, Home exercise program, Therapeutic activities, Patient/Caregiver education & training, Neuromuscular re-education, Endurance training  General Plan:  (6x CABG)    Patient Education  Patient Education: Bed Mobility    Goals:  Patient Goals : family wants rehab following CABG  Short Term Goals  Time Frame for Short Term Goals: by discharge  Short Term Goal 1: Pt to complete supine <-> sit with CGA for ease getting out of bed with move in the tube guidelines  Short Term Goal 2: Pt to complete sit <-> stand with Rubbie Collar with CGA for ease with mobiltiy from various surfaces  Short Term Goal 3: Pt to complete bed <->chair with Rubbie Collar with CGA for ease with mobility in his environment  Short Term Goal 4: Pt to sit EOB for >=10 minutes with SBA with good midline posture for ease with sitting EOB to progress further mobility  Short Term Goal 5: PT to assess gait as able  Long Term Goals  Time Frame for Long Term Goals : NA due to short ELOS    Following session, patient left in safe position with all fall risk precautions in place.

## 2023-03-06 NOTE — PROGRESS NOTES
[address] THERAPY MISSED TREATMENT NOTE  [facility] ICU 4D  4D-008-A      Date: 3/6/2023  Patient Name: Sandre Nissen        CSN: [id number]   : 1940  (81 y.o.)  Gender: male   Referring Practitioner: Judy Patel PA-C  Diagnosis: elevated troponin         REASON FOR MISSED TREATMENT:  RN approved session, however hesitant. RN reports patient has been anxious without continuous bi-pap, and was placed back on it, in soft restraints and has not bee following commands well; asked to have patient participate in in-bed activity. Live sitter and telesitter in room. Patient seated up in bed upon OT arrival and difficult to rouse. Not following commands to open eyes after prolonged attempt. OT to check back another date.    O2: continuous bi-pap PEEP:6, FiO2 21% with sats at 96%  HR: 71 bpm.

## 2023-03-06 NOTE — PLAN OF CARE
Patient is noted to have witnessed apneas, excessive daytime sleepiness, snoring, and sleep inertia. Given the patient's past medical history of cardiovascular disease and cerebrovascular accident, he is at high risk for central sleep apnea. Recommend a baseline polysomnography to be performed at [address] Case discussed with the patient's daughter and grandson. They verbalize understanding and are agreeable to the plan of care. Patient noted to already have baseline PSG as ordered by ANDREW Brady. Recommend follow up with Dr. Kaye Taveras with the results as patient will require a device that can accommodate a back up rate given his increased likelihood of having a central component to his sleep apnea.      Case discussed with Dr. Mendy Felder MD IM Resident

## 2023-03-06 NOTE — PROGRESS NOTES
CRITICAL CARE PROGRESS NOTE      Patient:  Surya Hurtado    Unit/Bed:4D-08/008-A  YOB: 1940  MRN: [id number]   PCP: Merry Elam MD  Date of Admission: 2/21/2023  Chief Complaint:- Chest Pain    Assessment and Plan:    CAD, s/p CABG:  [facility] on 02/21, 02/28 CABG x3 using LIMA to LAD, vein graft to marginal, vein graft to posterior descending artery. 4 drains in place after procedure. Anterior mediastinal drains removed on 03/03. Pleural drains removed on 03/0. +1337cc total I/O last 24 hours. Metolazone discontinued. Metoprolol switched to carvedilol on 03/04. Tolerating CPAP therapy with naps and overnight. Continue to monitor I/O's. Continue amiodarone, ASA, statin. Continue postop PPI  Progressive Cognitive Decline; Suspected Dementia:  Chronic small vessel disease seen on CT Head on 03/02. Recommendation for MOCA when stable. Baseline prior to admission oriented only to self. Has had episodes of agitation where he pulled out NG tube on 03/03, requiring soft restraints which family consented to. PRN Zyprexa ordered for agitation. Continue with patient's home risperidone, carbamazepine, escitalopram. Continue with restraints as needed. No Zyprexa administered in the last 24 hours. Mentation improved. Family confirmed consent again on 03/05 for restraints as they have had to be employed in the past secondary to his waxing/waning mentation  Acute Hypoxic Respiratory Failure, s/p intubation/extubation, improved Weaned down to room air on 03/05; maintaining adequate oxygenation. Continue PRN albuterol. No interval changes noted on repeat CXR on 03/06  COPD: Likely secondary to history of tobacco use: No PFT available. Maintaining adequate oxygentation on 3L via nasal cannula. Continue to wean as tolerated. Incentive spirometer, Acapella. Acute on Chronic Macrocytic Anemia, Stable :  Vitamin B12/Folate WNL; iron 64, iron saturation 23%, TIBC 278.   Likely secondary to iron deficiency secondary to poor oral nutrition. Plan on iron every other day and vitamin C when patient is more stable. At Risk for Malnutrition, Present on Admission: Tube feeding order placed 03/01. Continue tube feeding regimen  Hypocalcemia- likely secondary to malnutrition; tube feeding in place, replacement protocol in place  Repeat iCal ordered   Hypoalbuminemia- likely secondary to poor oral nutrition, tube feeding in place  Left Mastoiditis, Identified on CT- S/p Rocephin therapy at admission. Recommendation for ENT evaluation when more stable. Patient is at increased risk for complication secondary to previous craniotomy. No indication for antibiotic therapy at this time  Severe Oral Dysphagia: Likely secondary to previous craniotomy with concern for worsening dementia; SLP following; patient receiving tube feeds without complication   Recurrent Seizure Disorder:  Continue risperidone, carbamazepine   Unspecified Anxiety Disorder: Continue escitalopram  Right ICA Stenosis 70%, Right Subclavian Severe Stenosis:  Identified on CTA. Seen and evaluated by vascular surgery with no plan for intervention  Hypothyroidism: 2/21/2023 TSH 4.130, free T40.92. Continue levothyroxine  Bilateral Pleural Effusions, Present on Admission, Resolved:  Likely secondary to cardiovascular and respiratory compromise on admission, continue furosemide/metolazone, metoprolol with potassium supplementation; daily weights, strict I/O's  Pneumonia, Present on Admission, Resolved:  s/p treatment with Rocephin and Azithromycin  Hypochloremia: Stable; Likely secondary to GI losses; continue to monitor  Leukocytosis, Resolved  Hypernatremia, Resolved  Hx Unspecified Leukemia:  Noted  Hx CVA:  baseline cognitive deficits per family; oriented only to self at baseline. CT Head without Contrast showing stable old infarct in the right cerebellum and stable old infarct in the right basal ganglia.      INITIAL H AND P AND ICU COURSE:  This is an 81year old male who presented to [facility] on 02/21 with SOB and brown sputum production. The patient was admitted for a COPD exacerbation and started on bronchodilators, azithromcyin, and rocpehin. Cardiology was consulted secondary to EKG changes concerning for anterior ischemic changes. Given a troponin elevation, patient underwent LHC on 02/23 which showed evidence of multivessel disase. Patient underwent CABG on 80/94 without complication and was transferred to the ICU postoperatively; two chest tubes in place. Was extubated without complication and tolerated CPAP overnight. 03/01: Patient seen and evaluated at the bedside in no acute distress. Now not requiring any pressor support. Output from chest tubes reviewed (911cc serosanguinous). Denies chest pain, fever, chills, and N/V/D at bedside. No additional acute symptoms or concerns. Tolerated CPAP overnight. 03/02: Patient seen and evaluated at the beside in no acute distress. Patient tolerating weaned oxygen requirement. Patient requiring max assist with occupational therapy. No overnight events. Output from chest tubes reviewed. Patient is oriented only to self. He denies chest pain, fever, chills, and N/V/D. He denies any additional acute symptoms or concerns. He tolerated CPAP overnight. Message sent to CT surgery PA regarding transfer out of the ICU. CRP elevation likely secondary to major surgery. Case discussed extensively with primary RN.     03/03: Patient seen and evaluated at the bedside in moderate distress. He states that he wants to go home. He is  only oriented to self which is baseline. No new focal neurologic deficits. Case reviewed with primary RN, patient was comfortable throughout the night, tolerated BiPAP without complication. Reviewed hospital course and CT findings with overnight ICU team.     03/04: Patient pulled NG tube on 03/03 during the day, was replaced by Blaze Huizar RN, was started on Zyprexa as needed daily for agitation.  Patient placed in soft restraints and family made aware. Chest x-ray displayed improved aeration on both lungs with small residual left basilar atelectasis. 03/05: Patient seen and evaluated at the bedside. Case discussed with primary RN. Patient tolerated CPAP therapy with sitter in room for short period overnight. No additional Zyprexa administered. Patient's pain well controlled with reduced dose of oxicodone. Patient oriented only to self at bedside. No new focal neurologic deficits identified. Will continue to wean supplemental oxygen as tolerated. 03/06: Patient seen and evaluated at the bedside with sitter in room. Patient tolerated PAP therapy for most of the night, removed at 0400 on 03/06. No Zyprexa or additional analgesia administered overnight. Past Medical History:    Leukemia, HLD, Brain Abscess s/p craniotomy, Recurrent Seizures. Family History:  No Pertinent Family history noted in EMR. .  Social History: Former smoker of tobacco 1ppd for 30 years; can't remember quit date; no recreational drug or alcohol use. ROS   Review of systems:    General: No fevers, chills. Pain controlled. HEENT: No headache, no vision changes, no hearing changes, no trauma. CV: No palpitations, no chest pain, no tachycarida  RESP: No respiratory distress, no cough, no wheeze, SOB. GI: No abdominal pain, no nausea, no vomiting, no diarrhea  : No dysuria, no hematuria, no incontinence  Neuro: No seizures, no focal deficits, no weakness, no confusion   Psych: No psychosis, no SI/HI, Depression, Anxiety.      Scheduled Meds:   carvedilol  6.25 mg Oral BID WC    amiodarone  200 mg Oral Daily    bisacodyl  10 mg Rectal Daily    calcium replacement protocol   Other RX Placeholder    aspirin  324 mg Oral Daily    carBAMazepine  400 mg Oral TID    potassium bicarb-citric acid  20 mEq Oral BID    insulin lispro  0-4 Units SubCUTAneous TID WC    insulin lispro  0-4 Units SubCUTAneous Nightly    furosemide  40 mg IntraVENous Daily    sodium chloride flush  5-40 mL IntraVENous 2 times per day    enoxaparin  40 mg SubCUTAneous Daily    sennosides-docusate sodium  1 tablet Oral BID    famotidine  20 mg Oral BID    Or    famotidine (PEPCID) injection  20 mg IntraVENous BID    atorvastatin  40 mg Oral Daily    escitalopram  20 mg Oral Daily    levothyroxine  50 mcg Oral Daily    risperiDONE  0.5 mg Oral BID    multivitamin  1 tablet Oral Daily     Continuous Infusions:   sodium chloride      sodium chloride      dextrose      EPINEPHrine Stopped (03/01/23 0602)       PHYSICAL EXAMINATION:  T:  98.8. P:  73. RR:  16. B/P:  106/61. O2 Sat:  100% on 3L NC. I/O:  -2210cc on 03/04; -119 on 03/03; -430 on 03/02  WEIGHTS: 194lb on 03/05; 197lb on 03/04; 208lb on 03/03  Body mass index is 28.71 kg/m². GCS:   14    General:   This is an elderly male is lying in bed with NG tube and nasal cannula in place   HEENT:  normocephalic and atraumatic. No scleral icterus. PERR  Neck: supple. No Thyromegaly. Lungs: clear to auscultation. No retractions  Cardiac: RRR. No JVD. Abdomen: soft. Nontender. Extremities:  No clubbing, cyanosis, or edema x 4. Vasculature: capillary refill < 3 seconds. Palpable dorsalis pedis pulses. Skin:  warm and dry. Postoperative incisions clean and well approximated  Psych:  Oriented only to self; patient's baseline. Affect appropriate  Lymph:  No supraclavicular adenopathy. Neurologic:  No focal deficit. No seizures. Data: (All radiographs, tracings, PFTs, and imaging are personally viewed and interpreted unless otherwise noted). BMP Sodium 136, Potassium 3.7, Chloride 92, Bicarb 30, BUN 38, Cr 1.0  CBC WBC 7.5, RBC 2.90, Hb 9.7, Hct 30.3, .5, Platelets 872  Imaging CXR showing left basilar atelectasis, no interval PTX since chest tube removals  Micro Blood cultures NGTD  Telemetry shows normal sinus rhythm        Seen with multidisciplinary ICU team Dr. Milka Renteria.   Meets Continued ICU Level Care Criteria:    [] Yes   [x] No - Transfer Planning Per CT Surgery  [] HOSPITALIST CONTACTED-      Case and plan discussed with Dr. Eliezer Maddox. Electronically signed by Opal Paige MD IM Resident   Patient seen by me including key components of medical care. Case discussed with Dr. Manuel Alvarez.  Case discussed with resident physician. Will need outpatient polysomnogram.  Will need discharge with BiPAP with back up rate. Italicized font, if present,  represents changes to the note made by me. CC time 35 minutes. Time was discontiguous. Time does not include procedure. Time does include my direct assessment of the patient and coordination of care. Time represents more than 50% of the time involved with patient care by the [address] [place] [address] team.  Electronically signed by Amber Kauffman.  Eliezer Maddox MD.

## 2023-03-06 NOTE — PROGRESS NOTES
Inpatient Cardiac Rehabilitation Consult    Received consult for Phase II Cardiac Rehabilitation. Patient needs cardiac rehab due to CABG on 2/28/23. Pt is planning  on going to [facility] at discharge and therefore would not be able to participate in cardiac rehab.

## 2023-03-07 ENCOUNTER — APPOINTMENT (OUTPATIENT)
Dept: GENERAL RADIOLOGY | Age: 83
DRG: 233 | End: 2023-03-07
Payer: MEDICARE

## 2023-03-07 LAB
ALBUMIN SERPL BCG-MCNC: 3.2 G/DL (ref 3.5–5.1)
ALP SERPL-CCNC: 40 U/L (ref 38–126)
ALT SERPL W/O P-5'-P-CCNC: 49 U/L (ref 11–66)
ANION GAP SERPL CALC-SCNC: 15 MEQ/L (ref 8–16)
AST SERPL-CCNC: 67 U/L (ref 5–40)
BACTERIA BLD AEROBE CULT: NORMAL
BACTERIA BLD AEROBE CULT: NORMAL
BASOPHILS ABSOLUTE: 0 THOU/MM3 (ref 0–0.1)
BASOPHILS NFR BLD AUTO: 0.2 %
BILIRUB CONJ SERPL-MCNC: < 0.2 MG/DL (ref 0–0.3)
BILIRUB SERPL-MCNC: 0.4 MG/DL (ref 0.3–1.2)
BUN SERPL-MCNC: 34 MG/DL (ref 7–22)
CA-I BLD ISE-SCNC: 1.01 MMOL/L (ref 1.12–1.32)
CALCIUM SERPL-MCNC: 8.6 MG/DL (ref 8.5–10.5)
CHLORIDE SERPL-SCNC: 99 MEQ/L (ref 98–111)
CO2 SERPL-SCNC: 29 MEQ/L (ref 23–33)
CREAT SERPL-MCNC: 0.9 MG/DL (ref 0.4–1.2)
DEPRECATED RDW RBC AUTO: 46.9 FL (ref 35–45)
EOSINOPHIL NFR BLD AUTO: 3.3 %
EOSINOPHILS ABSOLUTE: 0.3 THOU/MM3 (ref 0–0.4)
ERYTHROCYTE [DISTWIDTH] IN BLOOD BY AUTOMATED COUNT: 12.6 % (ref 11.5–14.5)
GFR SERPL CREATININE-BSD FRML MDRD: > 60 ML/MIN/1.73M2
GLUCOSE BLD STRIP.AUTO-MCNC: 118 MG/DL (ref 70–108)
GLUCOSE BLD STRIP.AUTO-MCNC: 131 MG/DL (ref 70–108)
GLUCOSE BLD STRIP.AUTO-MCNC: 139 MG/DL (ref 70–108)
GLUCOSE BLD STRIP.AUTO-MCNC: 144 MG/DL (ref 70–108)
GLUCOSE SERPL-MCNC: 126 MG/DL (ref 70–108)
HCT VFR BLD AUTO: 29.9 % (ref 42–52)
HGB BLD-MCNC: 9.5 GM/DL (ref 14–18)
IMM GRANULOCYTES # BLD AUTO: 0.08 THOU/MM3 (ref 0–0.07)
IMM GRANULOCYTES NFR BLD AUTO: 0.8 %
LYMPHOCYTES ABSOLUTE: 0.9 THOU/MM3 (ref 1–4.8)
LYMPHOCYTES NFR BLD AUTO: 9.2 %
MAGNESIUM SERPL-MCNC: 2.5 MG/DL (ref 1.6–2.4)
MCH RBC QN AUTO: 33 PG (ref 26–33)
MCHC RBC AUTO-ENTMCNC: 31.8 GM/DL (ref 32.2–35.5)
MCV RBC AUTO: 103.8 FL (ref 80–94)
MONOCYTES ABSOLUTE: 1.1 THOU/MM3 (ref 0.4–1.3)
MONOCYTES NFR BLD AUTO: 11.2 %
NEUTROPHILS NFR BLD AUTO: 75.3 %
NRBC BLD AUTO-RTO: 0 /100 WBC
PHOSPHATE SERPL-MCNC: 4.5 MG/DL (ref 2.4–4.7)
PLATELET # BLD AUTO: 294 THOU/MM3 (ref 130–400)
PMV BLD AUTO: 10.4 FL (ref 9.4–12.4)
POTASSIUM SERPL-SCNC: 3.9 MEQ/L (ref 3.5–5.2)
PROT SERPL-MCNC: 6 G/DL (ref 6.1–8)
RBC # BLD AUTO: 2.88 MILL/MM3 (ref 4.7–6.1)
SEGMENTED NEUTROPHILS ABSOLUTE COUNT: 7.2 THOU/MM3 (ref 1.8–7.7)
SODIUM SERPL-SCNC: 143 MEQ/L (ref 135–145)
WBC # BLD AUTO: 9.5 THOU/MM3 (ref 4.8–10.8)

## 2023-03-07 PROCEDURE — A4216 STERILE WATER/SALINE, 10 ML: HCPCS | Performed by: PHYSICIAN ASSISTANT

## 2023-03-07 PROCEDURE — 6360000002 HC RX W HCPCS: Performed by: PHYSICIAN ASSISTANT

## 2023-03-07 PROCEDURE — 6370000000 HC RX 637 (ALT 250 FOR IP): Performed by: PHYSICIAN ASSISTANT

## 2023-03-07 PROCEDURE — 92611 MOTION FLUOROSCOPY/SWALLOW: CPT

## 2023-03-07 PROCEDURE — 2580000003 HC RX 258: Performed by: PHYSICIAN ASSISTANT

## 2023-03-07 PROCEDURE — 80053 COMPREHEN METABOLIC PANEL: CPT

## 2023-03-07 PROCEDURE — 92526 ORAL FUNCTION THERAPY: CPT

## 2023-03-07 PROCEDURE — 36415 COLL VENOUS BLD VENIPUNCTURE: CPT

## 2023-03-07 PROCEDURE — 99233 SBSQ HOSP IP/OBS HIGH 50: CPT | Performed by: INTERNAL MEDICINE

## 2023-03-07 PROCEDURE — 94660 CPAP INITIATION&MGMT: CPT

## 2023-03-07 PROCEDURE — 2700000000 HC OXYGEN THERAPY PER DAY

## 2023-03-07 PROCEDURE — 6370000000 HC RX 637 (ALT 250 FOR IP)

## 2023-03-07 PROCEDURE — 84100 ASSAY OF PHOSPHORUS: CPT

## 2023-03-07 PROCEDURE — 83735 ASSAY OF MAGNESIUM: CPT

## 2023-03-07 PROCEDURE — 82248 BILIRUBIN DIRECT: CPT

## 2023-03-07 PROCEDURE — 2500000003 HC RX 250 WO HCPCS: Performed by: INTERNAL MEDICINE

## 2023-03-07 PROCEDURE — 97530 THERAPEUTIC ACTIVITIES: CPT

## 2023-03-07 PROCEDURE — 85025 COMPLETE CBC W/AUTO DIFF WBC: CPT

## 2023-03-07 PROCEDURE — 2500000003 HC RX 250 WO HCPCS: Performed by: PHYSICIAN ASSISTANT

## 2023-03-07 PROCEDURE — 82948 REAGENT STRIP/BLOOD GLUCOSE: CPT

## 2023-03-07 PROCEDURE — 2000000000 HC ICU R&B

## 2023-03-07 PROCEDURE — 97535 SELF CARE MNGMENT TRAINING: CPT

## 2023-03-07 PROCEDURE — 6370000000 HC RX 637 (ALT 250 FOR IP): Performed by: NURSE PRACTITIONER

## 2023-03-07 PROCEDURE — 6370000000 HC RX 637 (ALT 250 FOR IP): Performed by: THORACIC SURGERY (CARDIOTHORACIC VASCULAR SURGERY)

## 2023-03-07 PROCEDURE — 6360000002 HC RX W HCPCS: Performed by: NURSE PRACTITIONER

## 2023-03-07 PROCEDURE — 74230 X-RAY XM SWLNG FUNCJ C+: CPT

## 2023-03-07 PROCEDURE — 82330 ASSAY OF CALCIUM: CPT

## 2023-03-07 PROCEDURE — 94761 N-INVAS EAR/PLS OXIMETRY MLT: CPT

## 2023-03-07 RX ORDER — CALCIUM GLUCONATE 20 MG/ML
2000 INJECTION, SOLUTION INTRAVENOUS ONCE
Status: COMPLETED | OUTPATIENT
Start: 2023-03-07 | End: 2023-03-07

## 2023-03-07 RX ADMIN — ENOXAPARIN SODIUM 40 MG: 100 INJECTION SUBCUTANEOUS at 09:24

## 2023-03-07 RX ADMIN — Medication 400 MG: at 16:20

## 2023-03-07 RX ADMIN — ESCITALOPRAM 20 MG: 20 TABLET, FILM COATED ORAL at 09:25

## 2023-03-07 RX ADMIN — SODIUM CHLORIDE, PRESERVATIVE FREE 10 ML: 5 INJECTION INTRAVENOUS at 20:02

## 2023-03-07 RX ADMIN — LEVOTHYROXINE SODIUM 50 MCG: 0.05 TABLET ORAL at 08:47

## 2023-03-07 RX ADMIN — POTASSIUM BICARBONATE 20 MEQ: 782 TABLET, EFFERVESCENT ORAL at 09:34

## 2023-03-07 RX ADMIN — CARVEDILOL 6.25 MG: 6.25 TABLET, FILM COATED ORAL at 16:20

## 2023-03-07 RX ADMIN — BARIUM SULFATE 60 ML: 0.81 POWDER, FOR SUSPENSION ORAL at 10:19

## 2023-03-07 RX ADMIN — BISACODYL 10 MG: 10 SUPPOSITORY RECTAL at 09:34

## 2023-03-07 RX ADMIN — POTASSIUM BICARBONATE 20 MEQ: 782 TABLET, EFFERVESCENT ORAL at 20:01

## 2023-03-07 RX ADMIN — SODIUM CHLORIDE, PRESERVATIVE FREE 20 MG: 5 INJECTION INTRAVENOUS at 09:22

## 2023-03-07 RX ADMIN — Medication 1 TABLET: at 09:25

## 2023-03-07 RX ADMIN — FUROSEMIDE 40 MG: 10 INJECTION, SOLUTION INTRAMUSCULAR; INTRAVENOUS at 09:22

## 2023-03-07 RX ADMIN — SENNOSIDES 8.6 MG: 8.6 TABLET, FILM COATED ORAL at 19:59

## 2023-03-07 RX ADMIN — SENNOSIDES AND DOCUSATE SODIUM 1 TABLET: 50; 8.6 TABLET ORAL at 09:25

## 2023-03-07 RX ADMIN — ATORVASTATIN CALCIUM 40 MG: 40 TABLET, FILM COATED ORAL at 09:25

## 2023-03-07 RX ADMIN — RISPERIDONE 0.5 MG: 0.25 TABLET, FILM COATED ORAL at 20:02

## 2023-03-07 RX ADMIN — CALCIUM GLUCONATE 2000 MG: 20 INJECTION, SOLUTION INTRAVENOUS at 09:29

## 2023-03-07 RX ADMIN — RISPERIDONE 0.5 MG: 0.25 TABLET, FILM COATED ORAL at 09:25

## 2023-03-07 RX ADMIN — Medication 400 MG: at 09:23

## 2023-03-07 RX ADMIN — AMIODARONE HYDROCHLORIDE 200 MG: 200 TABLET ORAL at 09:25

## 2023-03-07 RX ADMIN — CARVEDILOL 6.25 MG: 6.25 TABLET, FILM COATED ORAL at 09:25

## 2023-03-07 RX ADMIN — BARIUM SULFATE 10 ML: 400 PASTE ORAL at 10:19

## 2023-03-07 RX ADMIN — SODIUM CHLORIDE, PRESERVATIVE FREE 10 ML: 5 INJECTION INTRAVENOUS at 11:39

## 2023-03-07 RX ADMIN — FAMOTIDINE 20 MG: 20 TABLET, FILM COATED ORAL at 19:59

## 2023-03-07 RX ADMIN — Medication 400 MG: at 20:00

## 2023-03-07 RX ADMIN — ASPIRIN 81 MG 324 MG: 81 TABLET ORAL at 09:24

## 2023-03-07 ASSESSMENT — PAIN SCALES - GENERAL: PAINLEVEL_OUTOF10: 0

## 2023-03-07 NOTE — PROGRESS NOTES
[facility] ICU 4D  Modified Barium Swallow    SLP Individual Minutes  Time In: 1010  Time Out: 1020  Minutes: 10  Timed Code Treatment Minutes: 0 Minutes       Date: 3/7/2023  Patient Name: Marleen Ly      CSN: [id number]   : 1940  (81 y.o.)  Gender: male   Referring Physician:  Dr. Yoly Pan   Diagnosis: Elevated Troponin Level   Precautions: Aspiration, Fall Risk, CABG  History of Present Illness/Injury: Patient admitted to [facility] with above med dx; please refer to physician H&P for full details. Per chart review, \"81 year old male who [facility] on  with SOB and brown sputum production. The patient was admitted for a COPD exacerbation and started on bronchodilators, azithromcyin, and rocpehin. Cardiology was consulted secondary to EKG changes concerning for anterior ischemic changes. Given a troponin elevation, patient underwent LHC on  which showed evidence of multivessel disase. Patient underwent CABG on  without complication and was transferred to the ICU postoperatively; two chest tubes in place. Was extubated without complication and tolerated CPAP overnight. \"      Intubation/extubation 2023 with pertinent hx for COPD and CVA. ST currently following patient POC targeting dysphagia; ST recommending instrumental evaluation to further evaluate pharyngeal function of the swallow and determine safety of PO intake/assist with establishing dysphagia POC. Please see medical history questionnaire for information related to prior medical history, allergies and medications  Current Diet: NPO; NG tube     Pain: No pain reported. SUBJECTIVE:  Patient seen at bedside within fluoroscopy suite; alert and pleasantly confused. RT Gaby Burk assisting patient with transport. No family present.      OBJECTIVE:    Respiratory Status:  Room Air    Behavioral Observation:  Alert and Pleasantly confused     PATIENT WAS EVALUATED USING: Barium: Thin Liquids, Mildly Thick Liquids, Moderately Thick Liquids, and Puree    ORAL PHASE DENNIS SCORE: (Dysphagia outcome and severity scale)  1 = Severe Dysphagia - NPO - Unable to tolerate PO safely - Severe oral loss of bolus or oral residue - Non-oral nutrition    PHARYNGEAL PHASE DENNIS SCORE: (Dysphagia outcome and severity scale)  1 = Severe Dysphagia - NPO - Unable to tolerate any PO safely- Severe residue unable to clear - Silent aspiration with two or more consistencies, non-functional cough OR Unable to achieve a swallow    EVIDENCE FOR LARYNGEAL PENETRATION AND/OR ASPIRATION:  Laryngeal penetration evident with thin liquids, mildly thick liquids   Audible aspiration evident with thin liquids, mildly thick liquids     PENETRATION-ASPIRATION SCALE (PAS): Thin Liquids: 7 = Material enters the airway, passes below the vocal folds, and is not ejected from the trachea despite effort  Mildly Thick Liquids:  7 = Material enters the airway, passes below the vocal folds, and is not ejected from the trachea despite effort  Moderately Thick Liquids: 1 = Material does not enter the airway  Puree:  1 = Material does not enter the airway    ESOPHAGEAL PHASE:   No significant findings and See Radiology Report for details    ATTEMPTED TECHNIQUES:  Small Bolus Size Effective    Straw Ineffective    Cup Not Attempted    Large Drinks Not Attempted    Consecutive Drinks Ineffective    Chin Tuck Not Attempted    Head Turn Not Attempted    Spoon Presentations Effective    Volitional Cough Ineffective    Spontaneous Cough Ineffective           DIAGNOSTIC IMPRESSIONS:  Patient presents with severe oropharyngeal dysphagia as evidence by the findings outlined above. Patient consumed PO trials demonstrating with severe oropharyngeal dysphagia as evidence by the findings outlined above.  Patient with edentulous dentition, at baseline; patient consumed PO trials demonstrating with poor bolus control, premature spillage over BOT, with laryngeal penetration and aspiration before, during and after the swallow. Patient with evidence of silent and audible aspiration events. PO trials of moderately thick liquids via spoon completed + puree via spoon; ongoing premature spillage noted with poor bolus cohesion and diffuse pharyngeal residue spreading difficulty throughout the pharyngeal vestibule. Unable to clear pharyngeal residue despite multiple cues to clear. ST recommending strict NPO with ongoing consideration of long term alternate means of nutrition. Education provided to patient; would benefit from ongoing education d/t baseline cognitive deficits secondary to hx of dementia dx and hx of CVA. RN Giuliana Cao updated     Diet Recommendations:  NPO; alternate means of nutrition   Strategies:  Recommend NPO   Rehabilitation Potential: guarded     Discharge Recommendations: SNF    EDUCATION:  Learner: Patient  Education:  Reviewed results and recommendations of this evaluation, Reviewed diet and strategies, Reviewed signs, symptoms and risks of aspiration, Reviewed ST goals and Plan of Care, Reviewed recommendations for follow-up, and Education Related to Potential Risks and Complications Due to Impairment/Illness/Injury  Evaluation of Education: Needs further instruction, No evidence of learning, and Family not present    PLAN:  Skilled SLP intervention on acute care 3-5 x per week or until goals met and/or pt plateaus in function. Specific interventions for next session may include: dysphagia tx, monitor cognitive baseline . PATIENT GOAL:    Did not state. Will further assess during treatment. SHORT TERM GOALS:  Short Term Goals  Time Frame for Short Term Goals: 2 weeks  Goal 1: Patient will consume conservative PO trials of ICE CHIPS (x5 only) to improve pharyngeal pharyngeal function/constriction, airway protection as well as to elicit pharyngeal exercises and determine readiness for repeat instrumental evaluation.   Goal 2: Patient will complete pharyngeal exericses x15 in order to improve overall pharyngeal function and airway protection  Goal 3: Staff will exhibit return demonstration for completion of comprehensive oral care procedural analysis to maximize oral integrity and to reduce potential bacteria colonization. Goal 4: Monitor mentation (hx dementia, CABG); complete alternate cognitive linguistic assessment should it be clinically indicated. INTERVENTIONS: DNT d/t focus on STG1; however, family reports patient is near basline mentation.      LONG TERM GOALS:  No LTGs due to short [address] [place] [address].[place] [id number]

## 2023-03-07 NOTE — CARE COORDINATION
3/7/23, 3:42 PM EST    DISCHARGE ON GOING EVALUATION    Jackie Carter day: 11  Location: -08/008-A Reason for admit: Elevated troponin level [R77.8]  Elevated troponin [R77.8]  Elevated brain natriuretic peptide (BNP) level [R79.89]  Dyspnea, unspecified type [R06.00]  NSTEMI (non-ST elevated myocardial infarction) ([facility] [address] [I21.4]   Procedure:   2/23 Cardiac Cath: Severe MVD  2/24 Bilateral Carotid Dopplers & Vein mapping  2/27 CTA Neck: Atherosclerosis of the right carotid bulb with a 76% stenosis at the origin the right internal carotid artery; No significant stenosis at the origin of the left internal carotid artery; Moderate stenosis at the origin of the left vertebral artery. 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 This can indicate acute mastoiditis in the appropriate clinical setting; Stable CT appearance the brain. No new abnormalities  3/5 Chest tubes removed  3/7 MBS: Laryngeal penetration and aspiration with thin and mildly thick barium    Barriers to Discharge: POD #7. Chest tubes removed on 3/5. Failed MBS today; remains NPO with NG w/TF. Stay in ICU another day per Dr Alethea Renteria.     Afebrile. NSR. On room air. Bipap at HS. Oriented to self. Follows commands. Impulsive. Sitter. SLP/CR/PT/OT. Dietitian consulted. +VRE rectum. Telemetry, I&O, daily weight, IS, sternal precautions, NG w/TF, wound care, external urinary catheter, SCDs, ambulate. Amio, asa, lipitor, dulcolax, tegretol, coreg, lovenox, lexapro, pepcid, IV lasix 40 mg daily, SSI, synthroid, prn roxicodone, K+, risperdal, senokot, electrolyte replacement protocols. Mg 2.5, alb 3.2, ast 67, total pro 6, hgb 9.5. PCP: Dimitrios Navarro MD  Readmission Risk Score: 19%  Patient Goals/Plan/Treatment Preferences: Plan for [facility] for rehab.  No precert required. SW on case. Will need dietary ileus prevention protocol once starting to take diet by mouth.

## 2023-03-07 NOTE — PROGRESS NOTES
Patient alert and orientated but still calling out a bit. Not physically combative but needs an observer.   Hemodynamically stable and doing well from a cardiac standpoint  Appreciate the care of Dr. Brenda Lim and the excellent ICU team

## 2023-03-07 NOTE — PLAN OF CARE
Problem: Discharge Planning  Goal: Discharge to home or other facility with appropriate resources  3/7/2023 1846 by Ankit Orteag RN  Outcome: Progressing  Flowsheets (Taken 3/7/2023 0805)  Discharge to home or other facility with appropriate resources: Identify barriers to discharge with patient and caregiver  3/7/2023 0846 by Aretha Reyes RN  Outcome: Progressing  Flowsheets  Taken 3/7/2023 0805 by Ankit Ortega RN  Discharge to home or other facility with appropriate resources: Identify barriers to discharge with patient and caregiver  Taken 3/6/2023 0800 by Bobby Christian RN  Discharge to home or other facility with appropriate resources:   Identify barriers to discharge with patient and caregiver   Arrange for needed discharge resources and transportation as appropriate   Identify discharge learning needs (meds, wound care, etc)   Refer to discharge planning if patient needs post-hospital services based on physician order or complex needs related to functional status, cognitive ability or social support system     Problem: Pain  Goal: Verbalizes/displays adequate comfort level or baseline comfort level  3/7/2023 1846 by Ankit Ortega RN  Outcome: Progressing  Flowsheets (Taken 3/7/2023 0846 by Aretha Reyes RN)  Verbalizes/displays adequate comfort level or baseline comfort level:   Encourage patient to monitor pain and request assistance   Assess pain using appropriate pain scale   Implement non-pharmacological measures as appropriate and evaluate response   Administer analgesics based on type and severity of pain and evaluate response  3/7/2023 0846 by Aretha Reyes RN  Outcome: Progressing  Flowsheets (Taken 3/7/2023 0095)  Verbalizes/displays adequate comfort level or baseline comfort level:   Encourage patient to monitor pain and request assistance   Assess pain using appropriate pain scale   Implement non-pharmacological measures as appropriate and evaluate response Administer analgesics based on type and severity of pain and evaluate response     Problem: Respiratory - Adult  Goal: Achieves optimal ventilation and oxygenation  3/7/2023 1846 by Luana Chairez RN  Outcome: Progressing  Flowsheets (Taken 3/6/2023 0800 by Vandana Clay, RN)  Achieves optimal ventilation and oxygenation:   Assess for changes in respiratory status   Assess for changes in mentation and behavior   Position to facilitate oxygenation and minimize respiratory effort   Oxygen supplementation based on oxygen saturation or arterial blood gases   Initiate smoking cessation protocol as indicated   Encourage broncho-pulmonary hygiene including cough, deep breathe, incentive spirometry   Assess and instruct to report shortness of breath or any respiratory difficulty   Respiratory therapy support as indicated  Note: Room air     3/7/2023 0846 by Karen Lopez RN  Outcome: Progressing  Flowsheets (Taken 3/6/2023 0800 by Vandana Clay RN)  Achieves optimal ventilation and oxygenation:   Assess for changes in respiratory status   Assess for changes in mentation and behavior   Position to facilitate oxygenation and minimize respiratory effort   Oxygen supplementation based on oxygen saturation or arterial blood gases   Initiate smoking cessation protocol as indicated   Encourage broncho-pulmonary hygiene including cough, deep breathe, incentive spirometry   Assess and instruct to report shortness of breath or any respiratory difficulty   Respiratory therapy support as indicated  3/7/2023 0453 by Kevin Mcgee RCP  Outcome: Progressing     Problem: Cardiovascular - Adult  Goal: Maintains optimal cardiac output and hemodynamic stability  Outcome: Progressing  Flowsheets (Taken 3/7/2023 0805)  Maintains optimal cardiac output and hemodynamic stability: Monitor blood pressure and heart rate  Goal: Absence of cardiac dysrhythmias or at baseline  Outcome: Progressing  Flowsheets (Taken 3/7/2023  1560)  Absence of cardiac dysrhythmias or at baseline: Monitor cardiac rate and rhythm     Problem: Metabolic/Fluid and Electrolytes - Adult  Goal: Electrolytes maintained within normal limits  Outcome: Progressing  Flowsheets (Taken 3/7/2023 0805)  Electrolytes maintained within normal limits: Monitor labs and assess patient for signs and symptoms of electrolyte imbalances  Goal: Glucose maintained within prescribed range  Outcome: Progressing  Flowsheets (Taken 3/7/2023 0805)  Glucose maintained within prescribed range: Monitor blood glucose as ordered     Problem: Hematologic - Adult  Goal: Maintains hematologic stability  3/7/2023 1846 by Aziza Norton RN  Outcome: Progressing  Flowsheets (Taken 3/7/2023 0805)  Maintains hematologic stability: Assess for signs and symptoms of bleeding or hemorrhage  3/7/2023 0846 by Trey Norton RN  Outcome: Progressing  Flowsheets  Taken 3/7/2023 0805 by Aziza Norton RN  Maintains hematologic stability: Assess for signs and symptoms of bleeding or hemorrhage  Taken 3/6/2023 0800 by Alivia Jung RN  Maintains hematologic stability:   Assess for signs and symptoms of bleeding or hemorrhage   Monitor labs for bleeding or clotting disorders   Administer blood products/factors as ordered     Problem: Nutrition Deficit:  Goal: Optimize nutritional status  Outcome: Progressing  Flowsheets (Taken 3/7/2023 1846)  Nutrient intake appropriate for improving, restoring, or maintaining nutritional needs: Assess nutritional status and recommend course of action     Problem: Safety - Adult  Goal: Free from fall injury  Outcome: Progressing  Flowsheets (Taken 3/4/2023 2000 by Johanny Guerrier RN)  Free From Fall Injury:   Instruct family/caregiver on patient safety   Based on caregiver fall risk screen, instruct family/caregiver to ask for assistance with transferring infant if caregiver noted to have fall risk factors     Problem: Neurosensory - Adult  Goal: Achieves stable or improved neurological status  Outcome: Progressing  Flowsheets (Taken 3/6/2023 0800 by Mehnaz Christianson RN)  Achieves stable or improved neurological status:   Assess for and report changes in neurological status   Initiate measures to prevent increased intracranial pressure   Maintain blood pressure and fluid volume within ordered parameters to optimize cerebral perfusion and minimize risk of hemorrhage   Monitor temperature, glucose, and sodium.  Initiate appropriate interventions as ordered     Problem: Skin/Tissue Integrity - Adult  Goal: Incisions, wounds, or drain sites healing without S/S of infection  3/7/2023 1846 by Jerry Siddiqi RN  Outcome: Progressing  Flowsheets (Taken 3/7/2023 1844)  Incisions, Wounds, or Drain Sites Healing Without Sign and Symptoms of Infection: TWICE DAILY: Assess and document skin integrity  3/7/2023 0846 by Ab Hernadnez RN  Outcome: Progressing  Flowsheets  Taken 3/7/2023 0846 by Ab Hernandez RN  Incisions, Wounds, or Drain Sites Healing Without Sign and Symptoms of Infection: TWICE DAILY: Assess and document dressing/incision, wound bed, drain sites and surrounding tissue  Taken 3/7/2023 0805 by Jerry Siddiqi RN  Incisions, Wounds, or Drain Sites Healing Without Sign and Symptoms of Infection: ADMISSION and DAILY: Assess and document risk factors for pressure ulcer development  Goal: Oral mucous membranes remain intact  Outcome: Progressing  Flowsheets (Taken 3/7/2023 0805)  Oral Mucous Membranes Remain Intact: Assess oral mucosa and hygiene practices     Problem: Musculoskeletal - Adult  Goal: Return mobility to safest level of function  Outcome: Progressing  Flowsheets (Taken 3/7/2023 0805)  Return Mobility to Safest Level of Function: Assess patient stability and activity tolerance for standing, transferring and ambulating with or without assistive devices  Goal: Return ADL status to a safe level of function  Outcome: Progressing  Flowsheets (Taken 3/7/2023 0805)  Return ADL Status to a Safe Level of Function: Administer medication as ordered     Problem: Gastrointestinal - Adult  Goal: Maintains or returns to baseline bowel function  Outcome: Progressing  Flowsheets (Taken 3/7/2023 1846)  Maintains or returns to baseline bowel function: Assess bowel function     Problem: Genitourinary - Adult  Goal: Absence of urinary retention  Outcome: Progressing  Flowsheets  Taken 3/7/2023 1846  Absence of urinary retention: Assess patients ability to void and empty bladder  Taken 3/7/2023 0805  Absence of urinary retention: Assess patients ability to void and empty bladder     Problem: Infection - Adult  Goal: Absence of infection at discharge  Outcome: Progressing  Flowsheets  Taken 3/7/2023 1846  Absence of infection at discharge: Assess and monitor for signs and symptoms of infection  Taken 3/7/2023 0805  Absence of infection at discharge: Assess and monitor for signs and symptoms of infection     Problem: Anxiety  Goal: Will report anxiety at manageable levels  Description: INTERVENTIONS:  1. Administer medication as ordered  2. Teach and rehearse alternative coping skills  3. Provide emotional support with 1:1 interaction with staff  Outcome: Progressing  Flowsheets (Taken 3/7/2023 0805)  Will report anxiety at manageable levels: Provide emotional support with 1:1 interaction with staff     Problem: Change in Body Image  Goal: Pt/Family communicate acceptance of loss or change in body image and feel psychological comfort and peace  Description: INTERVENTIONS:  1. Assess patient/family anxiety and grief process related to change in body image, loss of functional status, loss of sense of self, and forgiveness  2. Provide emotional and spiritual support  3. Provide information about the patient's health status with consideration of family and cultural values  4.  Communicate willingness to discuss loss and facilitate grief process with patient/family as appropriate  5. Emphasize sustaining relationships within family system and community, or roxi/spiritual traditions  6. Initiate Spiritual Care, Psychosocial Clinical Specialist consult as needed  Outcome: Progressing  Flowsheets (Taken 3/7/2023 0805)  Patient/family communicate acceptance of loss or change in body image and feel psychological comfort and peace: Assess patient/family anxiety and grief process related to change in body image, loss of functional status, loss of sense of self, and forgiveness     Problem: Confusion  Goal: Confusion, delirium, dementia, or psychosis is improved or at baseline  Description: INTERVENTIONS:  1. Assess for possible contributors to thought disturbance, including medications, impaired vision or hearing, underlying metabolic abnormalities, dehydration, psychiatric diagnoses, and notify attending LIP  2. [place] high risk fall precautions, as indicated  3. Provide frequent short contacts to provide reality reorientation, refocusing and direction  4. Decrease environmental stimuli, including noise as appropriate  5. Monitor and intervene to maintain adequate nutrition, hydration, elimination, sleep and activity  6. If unable to ensure safety without constant attention obtain sitter and review sitter guidelines with assigned personnel  7. Initiate Psychosocial CNS and Spiritual Care consult, as indicated  Outcome: Progressing  Flowsheets (Taken 3/7/2023 0805)  Effect of thought disturbance (confusion, delirium, dementia, or psychosis) are managed with adequate functional status: Assess for contributors to thought disturbance, including medications, impaired vision or hearing, underlying metabolic abnormalities, dehydration, psychiatric diagnoses, notify LIP     Problem: Skin/Tissue Integrity  Goal: Absence of new skin breakdown  Description: 1. Monitor for areas of redness and/or skin breakdown  2. Assess vascular access sites hourly  3.   Every 4-6 hours minimum:  Change oxygen saturation probe site  4. Every 4-6 hours:  If on nasal continuous positive airway pressure, respiratory therapy assess nares and determine need for appliance change or resting period. Outcome: Progressing     Problem: Safety - Medical Restraint  Goal: Remains free of injury from restraints (Restraint for Interference with Medical Device)  Description: INTERVENTIONS:  1. Determine that other, less restrictive measures have been tried or would not be effective before applying the restraint  2. Evaluate the patient's condition at the time of restraint application  3. Inform patient/family regarding the reason for restraint  4.  Q2H: Monitor safety, psychosocial status, comfort, nutrition and hydration  Outcome: Progressing  Flowsheets (Taken 3/7/2023 8026)  Remains free of injury from restraints (restraint for interference with medical device): Determine that other, less restrictive measures have been tried or would not be effective before applying the restraint

## 2023-03-07 NOTE — PROGRESS NOTES
[address] [facility]  [facility] ICU 4D  DAILY NOTE    TIME   SLP Individual Minutes  Time In: 0900  Time Out: 9883  Minutes: 9  Timed Code Treatment Minutes: 0 Minutes       Date: 3/7/2023  Patient Name: Marleen Ly      CSN: [id number]   : 1940  (81 y.o.)  Gender: male   Referring Physician:   ANGELIC Bonilla CNP  Diagnosis: Elevated troponin  Precautions: Fall risk, aspiration precautions   Current Diet: NPO; NGT  Swallowing Strategies: Not Applicable  Date of Last MBS/FEES: Not Applicable    Pain:  No pain reported. Subjective:  Spoke to [employer] for approval of treatment session. Upon arrival, patient was laying in bed with live sitter at bedside. He was alert and cooperative the entire session     Short-Term Goals:  SHORT TERM GOAL #1:  Goal 1: Patient will consume conservative PO trials demonstrating consistency of pharyngeal trigger and adequate endurance measures to determine readiness for repeat instrumental evaluation. INTERVENTIONS:   Patient completed PO trials of ice chips x3 with adequate oral acceptance and functional bolus control. Thin liquid via tsp x1. STRONG suspicion of airway invasion. Patient with intense face grimacing follow the swallow and a weak, non effective cough. MBS is warranted as patient presents with improved alertness and sustained endurance this date. Recommend patient remain NPO with alternative means of nutrition via NG tube pending MBS results. *post therapy session, patient without respiratory distress upon leaving room; RN notified re: recommendations from the session; verbal receptiveness noted     SHORT TERM GOAL #2:  Goal 2: Staff will exhibit return demonstration for completion of comprehensive oral care procedural analysis to maximize oral integrity and to reduce potential bacteria colonization.   INTERVENTIONS:DNT due to focus on other STG     SHORT TERM GOAL #3:  Goal 3: Monitor mentation (hx dementia, CABG); complete alternate cognitive linguistic assessment should it be clinically indicated. INTERVENTIONS: DNT due to focus on other STG     Long-Term Goals:   No LTG established due to short ELOS. EDUCATION:  Learner: Patient  Education:  Reviewed ST goals and Plan of Care  Evaluation of Education: Verbalizes understanding and Needs further instruction    ASSESSMENT/PLAN:  Activity Tolerance:  Patient tolerance of  treatment: good. Assessment/Plan: Patient progressing toward established goals. Continues to require skilled care of licensed speech pathologist to progress toward achievement of established goals and plan of care.   Plan for Next Session: MBS  Discharge Recommendations: Continue to Assess Pending Progress     [facility] Student Speech Intern

## 2023-03-07 NOTE — PROGRESS NOTES
[facility] ICU 4D  Occupational Therapy  Daily Note  Time:   Time In: 7631  Time Out: 1334  Timed Code Treatment Minutes: 25 Minutes  Minutes: 25          Date: 3/7/2023  Patient Name: Danny Ho,   Gender: male      Room: [facility]008-A  MRN: [id number]  : 1940  (81 y.o.)  Referring Practitioner: Gm Ruiz PA-C  Diagnosis: elevated troponin  Additional Pertinent Hx: per chart review; \"The patient is an 81year old male former smoker with an approximately 20-pack-year history who quit in  with a past medical history of leukemia with remission in , hypothyroidism, \"seizures,\" hyperlipidemia, \"coma,\" and 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 \" patient underwent a CABG STONEY,  coronary artery bypass grafting x3 using LIMA to LAD, vein graft to to's marginal 1, vein graft to posterior descending artery, transesophageal echocardiography, endoscopic vein harvesting  on 23. Restrictions/Precautions:  Restrictions/Precautions: Fall Risk, General Precautions, Surgical Protocols  Position Activity Restriction  Sternal Precautions: move in the tube  Other position/activity restrictions: hx of brain injury     SUBJECTIVE: Pt sitting in recliner upon arrival, pt agreeable to OT session, RN gave verbal approval for session, live sitter present during session     PAIN: 0/10:     Vitals: Blood Pressure: 164/61  Oxygen: 97% on RA  Heart Rate: 71    COGNITION: Slow Processing, Decreased Recall, Decreased Insight, Impaired Memory, Inattention, Decreased Problem Solving, and Decreased Safety Awareness    ADL:   No ADL's completed this session. Sallyanne Chain BALANCE:  Sitting Balance:  Minimal Assistance.  To maintain sitting at the EOB with occasional moderate assistance due to left lateral lean, with tc's to correct   Standing Balance: Contact Guard Assistance, X 2, with cues for safety. [facility] with pt standing for 10 minutes with vc's for sternal precautions with good understanding    BED MOBILITY:  Sit to Supine: Maximum Assistance to maintain precautions with the [facility] lowered    TRANSFERS:  Sit to Stand: Moderate Assistance. From the recliner with vc's for pulling with [place]  Stand to Sit: Moderate Assistance. To the EOB    ADDITIONAL ACTIVITIES:  Pt unable to recall sternal precautions this date, and was educated during session regarding sternal precautions and ALEJANDRA, with continued education required     ASSESSMENT:     Activity Tolerance:  Patient tolerance of  treatment: Good treatment tolerance      Discharge Recommendations: Continue to assess pending progress  Equipment Recommendations: Equipment Needed: Yes  Mobility Devices: ADL Assistive Devices  Other: Pt reports he has a rolling walker and a shower chair but was only using off and on. Recommendations provided to use both for fall prevention. Plan: Times Per Week: 6x  Times Per Day: Once a day  Current Treatment Recommendations: Strengthening, Balance training, Functional mobility training, Neuromuscular re-education, Safety education & training, Self-Care / ADL, Endurance training, Patient/Caregiver education & training, Equipment evaluation, education, & procurement, ROM    Patient Education  Patient Education: ADL's and Precautions    Goals  Short Term Goals  Time Frame for Short Term Goals: by discharge  Short Term Goal 1: patient will tolerate 2 min static standing with sit to stand and maintain balance with MOD A x 1 with O2 >/= 90% to increase activity tolerance for self care routine. Short Term Goal 2: patient will complete UB ADLs with MIN A and 1-2 cues to initiate and sequence task. Short Term Goal 3: OTR to assesd stand pivot transfers and functional mobility and create goal as appropriate.   Short Term Goal 4: patient will tolerate CABG exer to increase activity tolerance for self care routine. Following session, patient left in safe position with all fall risk precautions in place.

## 2023-03-07 NOTE — PROGRESS NOTES
CRITICAL CARE PROGRESS NOTE      Patient:  Alcides Richards    Unit/Bed:4D-08/008-A  YOB: 1940  MRN: [id number]   PCP: Abundio Lyon MD  Date of Admission: 2/21/2023  Chief Complaint:- Chest Pain    Assessment and Plan:    CAD, s/p CABG:  [facility] on 02/21, 02/28 CABG x3 using LIMA to LAD, vein graft to marginal, vein graft to posterior descending artery. 4 drains in place after procedure. Anterior mediastinal drains removed on 03/03. Pleural drains removed on 03/0. +1337cc total I/O last 24 hours. Metolazone discontinued. Metoprolol switched to carvedilol on 03/04. Tolerating CPAP therapy with naps and overnight. Continue to monitor I/O's. Continue amiodarone, ASA, statin. Continue postop PPI  Dyspnea: Patient has displayed air hunger in spite of adequate oxygen saturation on room air. PRN low dose morphine ordered for dyspnea  Patient encouraged at bedside to use relaxed breathing exercises and frequently reoriented to his situation  Recommend against the use of supplemental oxygen unless SpO2 less than 92% on room air  Central and Obstructive Sleep  Apnea, Suspected  Prior to admission, patient's family endorses excessive daytime sleepiness, witnessed apneas, snoring, and sleep inertia  Patient has displayed witnessed apneas and snoring in the inpatient setting  Patient is at increased risk for central sleep apnea in the context of his cardiovascular disease as well as his history of CVA  Patient started on BiPAP 12/6 with BUR 10 which he has been compliant and benefitting from  PSG ordered per Ady MORALES for Pulm Service for outpatient followup  Progressive Cognitive Decline; Suspected Dementia:  Chronic small vessel disease seen on CT Head on 03/02. Recommendation for MOCA when stable. Baseline prior to admission oriented only to self. Has had episodes of agitation where he pulled out NG tube on 03/03, requiring soft restraints which family consented to.  PRN Zyprexa ordered for agitation. Continue with patient's home risperidone, carbamazepine, escitalopram. Continue with restraints as needed. Mentation has been stable the last 24 hours; patient is still oriented to self only. Restraints discontinued. No Zyprexa administered over last 24 hours. Will continue with appropriate day-night cues and frequent reorientation  5. Acute Hypoxic Respiratory Failure, s/p intubation/extubation, resolved  Weaned down to room air on 03/05; maintaining adequate oxygenation. Continue PRN albuterol. No interval changes noted on repeat CXR on 03/06  6. COPD: Likely secondary to history of tobacco use: No PFT available. Maintaining adequate oxygentation on 3L via nasal cannula. Continue to wean as tolerated. Incentive spirometer, Acapella. 7. Acute on Chronic Macrocytic Anemia, Stable :  Vitamin B12/Folate WNL; iron 64, iron saturation 23%, TIBC 278. Likely secondary to iron deficiency secondary to poor oral nutrition. Plan on iron every other day and vitamin C when patient is more stable. 8. At Risk for Malnutrition, Present on Admission: Tube feeding order placed 03/01. Continue tube feeding regimen  9. Hypocalcemia- likely secondary to malnutrition; tube feeding in place, replacement protocol in place  10. Hypoalbuminemia- likely secondary to poor oral nutrition, tube feeding in place  11. Left Mastoiditis, Identified on CT- S/p Rocephin therapy at admission. Recommendation for ENT evaluation when more stable. Patient is at increased risk for complication secondary to previous craniotomy. No indication for antibiotic therapy at this time  12. Severe Oral Dysphagia: Likely secondary to previous craniotomy with concern for worsening dementia; SLP following; patient receiving tube feeds without complication   13. Recurrent Seizure Disorder:  Continue risperidone, carbamazepine   14. Unspecified Anxiety Disorder: Continue escitalopram  15.  Right ICA Stenosis 70%, Right Subclavian Severe Stenosis: Identified on CTA. Seen and evaluated by vascular surgery with no plan for intervention  16. Hypothyroidism: 2/21/2023 TSH 4.130, free T40.92. Continue levothyroxine  17. Bilateral Pleural Effusions, Present on Admission, Resolved:  Likely secondary to cardiovascular and respiratory compromise on admission, continue furosemide/metolazone, metoprolol with potassium supplementation; daily weights, strict I/O's  18. Pneumonia, Present on Admission, Resolved:  s/p treatment with Rocephin and Azithromycin  19. Hypochloremia: Stable; Likely secondary to GI losses; continue to monitor  20. Leukocytosis, Resolved  21. Hypernatremia, Resolved  22. Hx Unspecified Leukemia:  Noted  21. Hx CVA:  baseline cognitive deficits per family; oriented only to self at baseline. CT Head without Contrast showing stable old infarct in the right cerebellum and stable old infarct in the right basal ganglia. INITIAL H AND P AND ICU COURSE:  This is an 81year old male who presented to [facility] on 02/21 with SOB and brown sputum production. The patient was admitted for a COPD exacerbation and started on bronchodilators, azithromcyin, and rocpehin. Cardiology was consulted secondary to EKG changes concerning for anterior ischemic changes. Given a troponin elevation, patient underwent LHC on 02/23 which showed evidence of multivessel disase. Patient underwent CABG on 20/33 without complication and was transferred to the ICU postoperatively; two chest tubes in place. Was extubated without complication and tolerated CPAP overnight. 03/01: Patient seen and evaluated at the bedside in no acute distress. Now not requiring any pressor support. Output from chest tubes reviewed (911cc serosanguinous). Denies chest pain, fever, chills, and N/V/D at bedside. No additional acute symptoms or concerns. Tolerated CPAP overnight. 03/02: Patient seen and evaluated at the beside in no acute distress. Patient tolerating weaned oxygen requirement. Patient requiring max assist with occupational therapy. No overnight events. Output from chest tubes reviewed. Patient is oriented only to self. He denies chest pain, fever, chills, and N/V/D. He denies any additional acute symptoms or concerns. He tolerated CPAP overnight. Message sent to CT surgery PA regarding transfer out of the ICU. CRP elevation likely secondary to major surgery. Case discussed extensively with primary RN.     03/03: Patient seen and evaluated at the bedside in moderate distress. He states that he wants to go home. He is  only oriented to self which is baseline. No new focal neurologic deficits. Case reviewed with primary RN, patient was comfortable throughout the night, tolerated BiPAP without complication. Reviewed hospital course and CT findings with overnight ICU team.     03/04: Patient pulled NG tube on 03/03 during the day, was replaced by Peyton Solorzano RN, was started on Zyprexa as needed daily for agitation. Patient placed in soft restraints and family made aware. Chest x-ray displayed improved aeration on both lungs with small residual left basilar atelectasis. 03/05: Patient seen and evaluated at the bedside. Case discussed with primary RN. Patient tolerated CPAP therapy with sitter in room for short period overnight. No additional Zyprexa administered. Patient's pain well controlled with reduced dose of oxicodone. Patient oriented only to self at bedside. No new focal neurologic deficits identified. Will continue to wean supplemental oxygen as tolerated. 03/06: Patient seen and evaluated at the bedside with sitter in room. Patient tolerated PAP therapy for most of the night, removed at 0400 on 03/06. No Zyprexa or additional analgesia administered overnight. 03/07: Patient seen and evaluated at the bedside with sitter in the room. Patient denying any chest pain, fever, chills, nausea, vomiting, or diarrhea. Patient is only oriented to self.  He is more cooperative today. Restraints removed last night without complication. He tolerated PAP therapy overnight. Supplemental oxygen discontinued at bedside as patient is maintaining adequate oxygenation. No Zyprexa or additional analgesia administered overnight. Past Medical History:    Leukemia, HLD, Brain Abscess s/p craniotomy, Recurrent Seizures. Family History:  No Pertinent Family history noted in EMR. .  Social History: Former smoker of tobacco 1ppd for 30 years; can't remember quit date; no recreational drug or alcohol use. ROS   Review of systems:    General: No fevers, chills. Pain controlled. HEENT: No headache, no vision changes, no hearing changes, no trauma. CV: No palpitations, no chest pain, no tachycarida  RESP: No respiratory distress, no cough, no wheeze, SOB. GI: No abdominal pain, no nausea, no vomiting, no diarrhea  : No dysuria, no hematuria, no incontinence  Neuro: No seizures, no focal deficits, no weakness, no confusion   Psych: No psychosis, no SI/HI, Depression, Anxiety.      Scheduled Meds:   carvedilol  6.25 mg Oral BID     amiodarone  200 mg Oral Daily    bisacodyl  10 mg Rectal Daily    calcium replacement protocol   Other RX Placeholder    aspirin  324 mg Oral Daily    carBAMazepine  400 mg Oral TID    potassium bicarb-citric acid  20 mEq Oral BID    insulin lispro  0-4 Units SubCUTAneous TID     insulin lispro  0-4 Units SubCUTAneous Nightly    furosemide  40 mg IntraVENous Daily    sodium chloride flush  5-40 mL IntraVENous 2 times per day    enoxaparin  40 mg SubCUTAneous Daily    sennosides-docusate sodium  1 tablet Oral BID    famotidine  20 mg Oral BID    Or    famotidine (PEPCID) injection  20 mg IntraVENous BID    atorvastatin  40 mg Oral Daily    escitalopram  20 mg Oral Daily    levothyroxine  50 mcg Oral Daily    risperiDONE  0.5 mg Oral BID    multivitamin  1 tablet Oral Daily     Continuous Infusions:   sodium chloride 10 mL/hr at 03/06/23 1113    sodium chloride dextrose      EPINEPHrine Stopped (23 0602)       PHYSICAL EXAMINATION:  T:  96.4. P:  78. RR:  16. B/P:  128/36. SpO2: 94% on RA. I/O:  -686 on ; +1328 on ; -2210 on   WEIGHTS: 192lb ; 194 lb on ; 197 lb   Body mass index is 28.42 kg/m². GCS:   14  BIPAP Settings: 12/6 with BUR 10; FiO2 30%  General:   This is an elderly man lying in bed. HEENT:  normocephalic and atraumatic. No scleral icterus. PERR. NG Tube in place. Neck: supple. No Thyromegaly. Lungs: diminished breath sounds bilaterally; rhonchi auscultated bilaterally. No retractions  Thorax: Midline incision clean and well approximated; no discoloration or discharge noted  Cardiac: RRR. No JVD. Abdomen: soft. Nontender. Extremities:  No clubbing, cyanosis, or edema x 4. Vasculature: capillary refill < 3 seconds. Palpable dorsalis pedis pulses. Skin:  warm and dry. Psych:  Oriented to self only. Affect appropriate  Lymph:  No supraclavicular adenopathy. Neurologic:  No focal deficit. No seizures. Data: (All radiographs, tracings, PFTs, and imaging are personally viewed and interpreted unless otherwise noted). BMP Sodium 136, Potassium 3.7, Chloride 92, Bicarb 30, BUN 38, Cr 1.0, iCAL 1.01  CBC WBC 9.5, RBC 2.88, Hb 9.5, Hct 29.9, .8, Platelets 331  Blood Gas : pH 7.51, PCO2 41, PO2 75, Bicarb 33  Imagin/06 CXR showing left basilar atelectasis  Telemetry shows normal sinus rhythm        Seen with multidisciplinary ICU team Dr. Kenny Ernandez. Meets Continued ICU Level Care Criteria:    [] Yes   [x] No - Transfer Planning Per Dr. Alethea Renteria  [] HOSPITALIST CONTACTED-      Case and plan discussed with Dr. Kenny Ernandez. Electronically signed by Ever Jo MD  Resident   Patient seen by me including key components of medical care. Case discussed with resident physician. Ok to transition to medical floor anytime from [address] [place] [address] perspective.   Italicized font, if present,  represents changes to the note made by me. CC time 25 minutes. Time was discontiguous. Time does not include procedure. Time does include my direct assessment of the patient and coordination of care. Time represents more than 50% of the time involved with patient care by the [address] [place] [address] team.  Electronically signed by Handy Bejarano.  Lisa Cali MD.

## 2023-03-07 NOTE — PLAN OF CARE
Problem: Respiratory - Adult  Goal: Achieves optimal ventilation and oxygenation  3/7/2023 0453 by Alejandra Berman RCP  Outcome: Progressing   Patient mutually agrees with goal of care and continues to wear bipap HS.

## 2023-03-07 NOTE — PROGRESS NOTES
Pt was in bed as a sitter was with him. He was dealing with elevated troponin and could not talk much. Prayer was appreciated. 03/07/23 1354   Encounter Summary   Service Provided For: Patient   Referral/Consult From: Candace   Last Encounter  03/07/23   Complexity of Encounter Low   Begin Time 1004   End Time  1008   Total Time Calculated 4 min   Spiritual/Emotional needs   Type Spiritual Support   Assessment/Intervention/Outcome   Assessment Calm   Intervention Empowerment; Active listening

## 2023-03-07 NOTE — PROGRESS NOTES
[facility] PHYSICAL THERAPY  DAILY NOTE  [facility] ICU 4D - 4D-08/008-A    Time In: 1102  Time Out: 1128  Timed Code Treatment Minutes: 26 Minutes  Minutes: 26          Date: 3/7/2023  Patient Name: Coral Oleary,  Gender:  male        MRN: [id number]  : 1940  (81 y.o.)     Referring Practitioner: ANGELIC Herrera CNP  Diagnosis: elevated tropnin level  Additional Pertinent Hx: per EMR- Coral Oleary is a 81 y.o. male with past medical history of anemia, brain abscess, seizures, former smoker approximately 40 pack year who presents to the emergency department for evaluation of fatigue, shortness of breath. Patient brought in by family as family has been reporting he has been more tired, fatigued for the past week however more so in the past 3 days. Today, patient verbalized that he was short of breath and had generalized muscle aches which prompted today's ED visit. Patient lives in a two-story home and states that it has been getting harder to walk around. Has been having dyspnea on exertion. Pt s/p CABG STONEY,  coronary artery bypass grafting x3 using LIMA to LAD, vein graft to to's marginal 1, vein graft to posterior descending artery, transesophageal echocardiography, endoscopic vein harvesting DOS . Prior Level of Function:  Lives With: Daughter  Type of Home: House  Home Layout: Two level, Bed/Bath upstairs  Home Access: Stairs to enter with rails  Entrance Stairs - Number of Steps: 2 LEVY and ~15 steps to second level  Home Equipment: Gill Yeager, rolling, Cane   Bathroom Shower/Tub: Tub/Shower unit  Bathroom Accessibility: Accessible    Receives Help From: Family  ADL Assistance: Independent  Homemaking Assistance: Needs assistance  Homemaking Responsibilities: Yes  Ambulation Assistance: Independent  Transfer Assistance: Independent  Active : No  Additional Comments: Daughter and son in law both work outside of the home.  Pt was previously independent with ADLs and functional mobility. Pt was not previously using AD for functional mobility. Pt is a questionable historian. Hx of brain surgery in 1995 and visual deficits. Restrictions/Precautions:  Restrictions/Precautions: Fall Risk, General Precautions, Surgical Protocols  Position Activity Restriction  Sternal Precautions: move in the tube  Other position/activity restrictions: hx of brain injury     SUBJECTIVE: RN approved session, pt is supine in bed, on room air, daughter and grandson present. Pt somewhat confused and agitated, however, sarcastic and unable to fully assess. Multiple verbal cue reminders for use of heart hugger when coughing, tactile cues to place and squeeze to facilitate pt to use. PAIN: rolled eyes and said yes but did not elaborate when asked where and how much    Vitals: Vitals not assessed per clinical judgement, see nursing flowsheet    OBJECTIVE:  Bed Mobility:  Supine to Sit: Maximum Assistance, X 1, with head of bed raised, with verbal cues , with increased time for completion  Scooting: Maximum Assistance, X 1    Transfers:  Sit to Stand: Moderate Assistance, X 2, with increased time for completion, cues for hand placement  Stand to Sit:Moderate Assistance, X 2, with verbal cues    Ambulation:  Moderate Assistance, X 2, with verbal cues , with increased time for completion  Distance: 2 feet to recliner  Surface: Level Tile  Device: Bar of lai ben (turned around to allow pt to ambulate)  Gait Deviations:   Forward Flexed Posture, Slow So, Decreased Step Length Bilaterally, Decreased Weight Shift Bilaterally, Decreased Gait Speed, Decreased Heel Strike Bilaterally, Unsteady Gait, and Decreased Terminal Knee Extension  **Constant cues to keep stepping, tactile cues to facilitate weight shifting, pt unsteady and stopping and talking throughout, cues for posture    Balance:  Static Sitting Balance:  Stand By Assistance- Minimal Assistance, X 1, with verbal cues, L sided lean, cues for midline postioning   Static Standing Balance: Moderate Assistance, X 2  Dynamic Standing Balance: Moderate Assistance, X 2    Functional Outcome Measures: Completed  AM-PAC Inpatient Mobility without Stair Climbing Raw Score : 7  AM-PAC Inpatient without Stair Climbing T-Scale Score : 28.66    ASSESSMENT:  Assessment: Patient progressing toward established goals. Activity Tolerance:  Patient tolerance of  treatment: good. Equipment Recommendations:Equipment Needed: No  Discharge Recommendations: Subacte/Skilled Nursing Facility  Plan: Current Treatment Recommendations: Strengthening, Balance training, Functional mobility training, Transfer training, Safety education & training, Equipment evaluation, education, & procurement, Home exercise program, Therapeutic activities, Patient/Caregiver education & training, Neuromuscular re-education, Endurance training, Gait training  General Plan:  (6x CABG)    Patient Education  Patient Education: Precautions/Restrictions, Bed Mobility, Transfers, Gait    Goals:  Patient Goals : family wants rehab following CABG  Short Term Goals  Time Frame for Short Term Goals: by discharge  Short Term Goal 1: Pt to complete supine <-> sit with CGA for ease getting out of bed with move in the tube guidelines  Short Term Goal 2: Pt to complete sit <-> stand with CGA for ease with mobiltiy from various surfaces  Short Term Goal 3: Pt to ambulate >10 feet with RW CGA for room ambulation. Short Term Goal 4: Pt to sit EOB for >=10 minutes with SBA with good midline posture for ease with sitting EOB to progress further mobility  Short Term Goal 5: .  Long Term Goals  Time Frame for Long Term Goals : NA due to short ELOS    Following session, patient left in safe position with all fall risk precautions in place.

## 2023-03-07 NOTE — PROGRESS NOTES
Comprehensive Nutrition Assessment    Type and Reason for Visit:  Reassess (TF management)    Nutrition Recommendations/Plan:   Continue Pivot tube feed at goal 50ml/ hour  Additional free water flush as per Physician  NPO versus Diet as per Speech therapy/ Physician - MBS today   Continue bowel meds as per Physician      Malnutrition Assessment:  Malnutrition Status: At risk for malnutrition (Comment) (03/01/23 9470)    Context:  Acute Illness     Findings of the 6 clinical characteristics of malnutrition:  Energy Intake:   (variable po since admit with procedures)  Weight Loss:  No significant weight loss     Body Fat Loss:  No significant body fat loss     Muscle Mass Loss:  No significant muscle mass loss    Fluid Accumulation:  Mild     Strength:  Not Performed    Nutrition Assessment:     Pt. Improving nutritionally AEB tolerating EN at goal, NPO d/t dysphagia. At risk for further nutrition compromise r/t admit with NSTEMI, cardiac cath 2/23, CABG 2/28 with intubation and extubation 2/28, increased needs with COPD and OHS for healing process, confusion noted and underlying medical condition advanced age, HLD, leukemia.       Nutrition Related Findings:    Pt. 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, RN reports has been tolerating tube feed  GI Status: loose large BM on 3/4, previous BM had been 2/26  Pertinent Labs: Sodium 143, Potassium 3.9, BUN 34, Creatinine 0.9, Glucose 126, Phosphorus 4.5  Pertinent Meds: dulcolax, tegretol, lasix, synthroid, MVI, senokot      Wound Type: Surgical Incision (CABG x3 2/28)       Current Nutrition Intake & Therapies:          Diet NPO Exceptions are: Sips of Water with Meds  ADULT TUBE FEEDING; Nasogastric; Immune Enhancing; Continuous; 20; Yes; 10; Q 4 hours; 50; 30; Q 4 hours  Current Tube Feeding (TF) Orders:  Feeding Route: Nasogastric  Formula: Immune Enhancing (PIvot)  Schedule: Continuous  Feeding Regimen: Pivot goa of 50ml/hour  Additives/Modulars: None  Water Flushes: per Physician  Goal TF & Flush Orders Provides: Pivot 50ml/hour provides 1800 kcals, 113gms protein, 207gms cho,9 gms fiber, 900ml free H20 in 1200ml total volume/24h    Anthropometric Measures:  Height: 5' 9\" (175.3 cm)  Ideal Body Weight (IBW): 160 lbs (73 kg)    Admission Body Weight: 202 lb (91.6 kg) (2/28 with scleral edema)  Current Body Weight: 184 lb 4.9 oz (83.6 kg) (3/7 note wt down 8# from 3/6; non pitting edema on 3/6),   Weight Source: Bed Scale  Current BMI (kg/m2): 27.2  Usual Body Weight: 199 lb (90.3 kg) (2/22/23; 204# 1/2017; 239# 12/2015; 207# 3/2014)  % Weight Change (Calculated): 1.5                    BMI Categories: Overweight (BMI 25.0-29.9)    Estimated Daily Nutrient Needs:  Energy Requirements Based On: Kcal/kg  Weight Used for Energy Requirements: Admission (92kgm)  Energy (kcal/day): 2178-3342 (20-25/kgm)  Weight Used for Protein Requirements: Ideal (73kgm)  Protein (g/day): 88-110gms (1.2-1.5/kgm IBW     Fluid (ml/day): per Physician    Nutrition Diagnosis:   Inadequate oral intake related to swallowing difficulty as evidenced by NPO or clear liquid status due to medical condition, nutrition support - enteral nutrition, swallow study results    Nutrition Interventions:   Food and/or Nutrient Delivery: Continue NPO, Continue Current Tube Feeding  Nutrition Education/Counseling: Education not appropriate  Coordination of Nutrition Care: Continue to monitor while inpatient, Interdisciplinary Rounds       Goals:     Goals: Tolerate nutrition support at goal rate, by next RD assessment       Nutrition Monitoring and Evaluation:      Food/Nutrient Intake Outcomes: Enteral Nutrition Intake/Tolerance  Physical Signs/Symptoms Outcomes: Biochemical Data, Chewing or Swallowing, GI Status, Fluid Status or Edema, Hemodynamic Status, Nutrition Focused Physical Findings, Skin, Weight    Discharge Planning:    Too soon to determine     Imelda ALBERTO  Har[place], [address] [place] [address],   Contact: [phone]

## 2023-03-07 NOTE — PLAN OF CARE
Problem: Discharge Planning  Goal: Discharge to home or other facility with appropriate resources  Outcome: Progressing  Flowsheets (Taken 3/6/2023 0800 by Linda Bose RN)  Discharge to home or other facility with appropriate resources:   Identify barriers to discharge with patient and caregiver   Arrange for needed discharge resources and transportation as appropriate   Identify discharge learning needs (meds, wound care, etc)   Refer to discharge planning if patient needs post-hospital services based on physician order or complex needs related to functional status, cognitive ability or social support system     Problem: Pain  Goal: Verbalizes/displays adequate comfort level or baseline comfort level  Outcome: Progressing  Flowsheets (Taken 3/7/2023 0846)  Verbalizes/displays adequate comfort level or baseline comfort level:   Encourage patient to monitor pain and request assistance   Assess pain using appropriate pain scale   Implement non-pharmacological measures as appropriate and evaluate response   Administer analgesics based on type and severity of pain and evaluate response     Problem: Respiratory - Adult  Goal: Achieves optimal ventilation and oxygenation  3/7/2023 0846 by Kiki Santiago RN  Outcome: Progressing  Flowsheets (Taken 3/6/2023 0800 by Linda Bose RN)  Achieves optimal ventilation and oxygenation:   Assess for changes in respiratory status   Assess for changes in mentation and behavior   Position to facilitate oxygenation and minimize respiratory effort   Oxygen supplementation based on oxygen saturation or arterial blood gases   Initiate smoking cessation protocol as indicated   Encourage broncho-pulmonary hygiene including cough, deep breathe, incentive spirometry   Assess and instruct to report shortness of breath or any respiratory difficulty   Respiratory therapy support as indicated  3/7/2023 0453 by Deacon Lyon RCP  Outcome: Progressing     Problem: Hematologic - Adult  Goal: Maintains hematologic stability  Outcome: Progressing  Flowsheets (Taken 3/6/2023 0800 by Robbie Campbell RN)  Maintains hematologic stability:   Assess for signs and symptoms of bleeding or hemorrhage   Monitor labs for bleeding or clotting disorders   Administer blood products/factors as ordered     Problem: Skin/Tissue Integrity - Adult  Goal: Incisions, wounds, or drain sites healing without S/S of infection  Outcome: Progressing  Flowsheets (Taken 3/7/2023 0846)  Incisions, Wounds, or Drain Sites Healing Without Sign and Symptoms of Infection: TWICE DAILY: Assess and document dressing/incision, wound bed, drain sites and surrounding tissue   Care plan reviewed with patient and family. Patient and family verbalize understanding of the plan of care and contribute to goal setting.

## 2023-03-08 ENCOUNTER — APPOINTMENT (OUTPATIENT)
Dept: GENERAL RADIOLOGY | Age: 83
DRG: 233 | End: 2023-03-08
Payer: MEDICARE

## 2023-03-08 LAB
ANION GAP SERPL CALC-SCNC: 14 MEQ/L (ref 8–16)
BUN SERPL-MCNC: 32 MG/DL (ref 7–22)
CA-I BLD ISE-SCNC: 0.96 MMOL/L (ref 1.12–1.32)
CALCIUM SERPL-MCNC: 8.3 MG/DL (ref 8.5–10.5)
CHLORIDE SERPL-SCNC: 101 MEQ/L (ref 98–111)
CO2 SERPL-SCNC: 29 MEQ/L (ref 23–33)
CREAT SERPL-MCNC: 1 MG/DL (ref 0.4–1.2)
DEPRECATED RDW RBC AUTO: 51.5 FL (ref 35–45)
ERYTHROCYTE [DISTWIDTH] IN BLOOD BY AUTOMATED COUNT: 12.7 % (ref 11.5–14.5)
GFR SERPL CREATININE-BSD FRML MDRD: > 60 ML/MIN/1.73M2
GLUCOSE BLD STRIP.AUTO-MCNC: 123 MG/DL (ref 70–108)
GLUCOSE BLD STRIP.AUTO-MCNC: 135 MG/DL (ref 70–108)
GLUCOSE BLD STRIP.AUTO-MCNC: 141 MG/DL (ref 70–108)
GLUCOSE BLD STRIP.AUTO-MCNC: 171 MG/DL (ref 70–108)
GLUCOSE SERPL-MCNC: 138 MG/DL (ref 70–108)
HCT VFR BLD AUTO: 32.8 % (ref 42–52)
HGB BLD-MCNC: 9.7 GM/DL (ref 14–18)
MCH RBC QN AUTO: 33 PG (ref 26–33)
MCHC RBC AUTO-ENTMCNC: 29.6 GM/DL (ref 32.2–35.5)
MCV RBC AUTO: 111.6 FL (ref 80–94)
PLATELET # BLD AUTO: 291 THOU/MM3 (ref 130–400)
PMV BLD AUTO: 11.3 FL (ref 9.4–12.4)
POTASSIUM SERPL-SCNC: 4 MEQ/L (ref 3.5–5.2)
RBC # BLD AUTO: 2.94 MILL/MM3 (ref 4.7–6.1)
SODIUM SERPL-SCNC: 144 MEQ/L (ref 135–145)
WBC # BLD AUTO: 12.8 THOU/MM3 (ref 4.8–10.8)

## 2023-03-08 PROCEDURE — 6370000000 HC RX 637 (ALT 250 FOR IP)

## 2023-03-08 PROCEDURE — 6360000002 HC RX W HCPCS: Performed by: PHYSICIAN ASSISTANT

## 2023-03-08 PROCEDURE — 94761 N-INVAS EAR/PLS OXIMETRY MLT: CPT

## 2023-03-08 PROCEDURE — 99233 SBSQ HOSP IP/OBS HIGH 50: CPT | Performed by: INTERNAL MEDICINE

## 2023-03-08 PROCEDURE — 6370000000 HC RX 637 (ALT 250 FOR IP): Performed by: THORACIC SURGERY (CARDIOTHORACIC VASCULAR SURGERY)

## 2023-03-08 PROCEDURE — 71045 X-RAY EXAM CHEST 1 VIEW: CPT

## 2023-03-08 PROCEDURE — 36415 COLL VENOUS BLD VENIPUNCTURE: CPT

## 2023-03-08 PROCEDURE — 97110 THERAPEUTIC EXERCISES: CPT

## 2023-03-08 PROCEDURE — A4216 STERILE WATER/SALINE, 10 ML: HCPCS | Performed by: PHYSICIAN ASSISTANT

## 2023-03-08 PROCEDURE — 6360000002 HC RX W HCPCS: Performed by: NURSE PRACTITIONER

## 2023-03-08 PROCEDURE — 82948 REAGENT STRIP/BLOOD GLUCOSE: CPT

## 2023-03-08 PROCEDURE — 85027 COMPLETE CBC AUTOMATED: CPT

## 2023-03-08 PROCEDURE — 6370000000 HC RX 637 (ALT 250 FOR IP): Performed by: NURSE PRACTITIONER

## 2023-03-08 PROCEDURE — 80048 BASIC METABOLIC PNL TOTAL CA: CPT

## 2023-03-08 PROCEDURE — 2580000003 HC RX 258: Performed by: PHYSICIAN ASSISTANT

## 2023-03-08 PROCEDURE — 92526 ORAL FUNCTION THERAPY: CPT

## 2023-03-08 PROCEDURE — 2500000003 HC RX 250 WO HCPCS: Performed by: PHYSICIAN ASSISTANT

## 2023-03-08 PROCEDURE — 6370000000 HC RX 637 (ALT 250 FOR IP): Performed by: PHYSICIAN ASSISTANT

## 2023-03-08 PROCEDURE — 94640 AIRWAY INHALATION TREATMENT: CPT

## 2023-03-08 PROCEDURE — 97530 THERAPEUTIC ACTIVITIES: CPT

## 2023-03-08 PROCEDURE — 2000000000 HC ICU R&B

## 2023-03-08 PROCEDURE — 82330 ASSAY OF CALCIUM: CPT

## 2023-03-08 RX ORDER — POLYETHYLENE GLYCOL 3350 17 G/17G
17 POWDER, FOR SOLUTION ORAL DAILY
Status: DISCONTINUED | OUTPATIENT
Start: 2023-03-08 | End: 2023-03-16 | Stop reason: HOSPADM

## 2023-03-08 RX ORDER — CALCIUM GLUCONATE 20 MG/ML
2000 INJECTION, SOLUTION INTRAVENOUS
Status: COMPLETED | OUTPATIENT
Start: 2023-03-08 | End: 2023-03-08

## 2023-03-08 RX ADMIN — SODIUM CHLORIDE, PRESERVATIVE FREE 20 MG: 5 INJECTION INTRAVENOUS at 08:15

## 2023-03-08 RX ADMIN — ACETAMINOPHEN 650 MG: 325 TABLET ORAL at 21:01

## 2023-03-08 RX ADMIN — SODIUM CHLORIDE, PRESERVATIVE FREE 10 ML: 5 INJECTION INTRAVENOUS at 12:07

## 2023-03-08 RX ADMIN — CALCIUM GLUCONATE 2000 MG: 20 INJECTION, SOLUTION INTRAVENOUS at 09:17

## 2023-03-08 RX ADMIN — ACETAMINOPHEN 650 MG: 325 TABLET ORAL at 05:38

## 2023-03-08 RX ADMIN — AMIODARONE HYDROCHLORIDE 200 MG: 200 TABLET ORAL at 08:18

## 2023-03-08 RX ADMIN — CALCIUM GLUCONATE 2000 MG: 20 INJECTION, SOLUTION INTRAVENOUS at 06:08

## 2023-03-08 RX ADMIN — LEVOTHYROXINE SODIUM 50 MCG: 0.05 TABLET ORAL at 05:38

## 2023-03-08 RX ADMIN — CARVEDILOL 6.25 MG: 6.25 TABLET, FILM COATED ORAL at 08:14

## 2023-03-08 RX ADMIN — BISACODYL 10 MG: 10 SUPPOSITORY RECTAL at 08:14

## 2023-03-08 RX ADMIN — RISPERIDONE 0.5 MG: 0.25 TABLET, FILM COATED ORAL at 20:57

## 2023-03-08 RX ADMIN — FAMOTIDINE 20 MG: 20 TABLET, FILM COATED ORAL at 20:57

## 2023-03-08 RX ADMIN — POTASSIUM BICARBONATE 20 MEQ: 782 TABLET, EFFERVESCENT ORAL at 20:57

## 2023-03-08 RX ADMIN — ESCITALOPRAM 20 MG: 20 TABLET, FILM COATED ORAL at 08:14

## 2023-03-08 RX ADMIN — Medication 1 TABLET: at 08:14

## 2023-03-08 RX ADMIN — TIOTROPIUM BROMIDE AND OLODATEROL 2 PUFF: 3.124; 2.736 SPRAY, METERED RESPIRATORY (INHALATION) at 13:02

## 2023-03-08 RX ADMIN — Medication 400 MG: at 16:32

## 2023-03-08 RX ADMIN — ATORVASTATIN CALCIUM 40 MG: 40 TABLET, FILM COATED ORAL at 08:14

## 2023-03-08 RX ADMIN — Medication 400 MG: at 08:15

## 2023-03-08 RX ADMIN — ASPIRIN 81 MG 324 MG: 81 TABLET ORAL at 08:16

## 2023-03-08 RX ADMIN — OXYCODONE HYDROCHLORIDE 5 MG: 5 TABLET ORAL at 08:15

## 2023-03-08 RX ADMIN — FUROSEMIDE 40 MG: 10 INJECTION, SOLUTION INTRAMUSCULAR; INTRAVENOUS at 08:15

## 2023-03-08 RX ADMIN — POTASSIUM BICARBONATE 20 MEQ: 782 TABLET, EFFERVESCENT ORAL at 08:15

## 2023-03-08 RX ADMIN — SENNOSIDES AND DOCUSATE SODIUM 1 TABLET: 50; 8.6 TABLET ORAL at 08:14

## 2023-03-08 RX ADMIN — RISPERIDONE 0.5 MG: 0.25 TABLET, FILM COATED ORAL at 08:14

## 2023-03-08 RX ADMIN — Medication 400 MG: at 20:57

## 2023-03-08 RX ADMIN — ENOXAPARIN SODIUM 40 MG: 100 INJECTION SUBCUTANEOUS at 08:15

## 2023-03-08 RX ADMIN — SODIUM CHLORIDE, PRESERVATIVE FREE 10 ML: 5 INJECTION INTRAVENOUS at 20:58

## 2023-03-08 RX ADMIN — SENNOSIDES AND DOCUSATE SODIUM 1 TABLET: 50; 8.6 TABLET ORAL at 20:57

## 2023-03-08 RX ADMIN — CARVEDILOL 6.25 MG: 6.25 TABLET, FILM COATED ORAL at 16:32

## 2023-03-08 ASSESSMENT — PAIN SCALES - GENERAL
PAINLEVEL_OUTOF10: 5
PAINLEVEL_OUTOF10: 1
PAINLEVEL_OUTOF10: 1

## 2023-03-08 ASSESSMENT — PAIN DESCRIPTION - PAIN TYPE: TYPE: ACUTE PAIN

## 2023-03-08 ASSESSMENT — PAIN - FUNCTIONAL ASSESSMENT
PAIN_FUNCTIONAL_ASSESSMENT: ACTIVITIES ARE NOT PREVENTED
PAIN_FUNCTIONAL_ASSESSMENT: PREVENTS OR INTERFERES SOME ACTIVE ACTIVITIES AND ADLS

## 2023-03-08 ASSESSMENT — PAIN DESCRIPTION - ORIENTATION
ORIENTATION: MID
ORIENTATION: LEFT;UPPER

## 2023-03-08 ASSESSMENT — PAIN DESCRIPTION - LOCATION
LOCATION: NECK
LOCATION: ARM;NOSE

## 2023-03-08 ASSESSMENT — PAIN DESCRIPTION - DESCRIPTORS
DESCRIPTORS: SORE
DESCRIPTORS: SORE

## 2023-03-08 ASSESSMENT — PAIN DESCRIPTION - ONSET: ONSET: GRADUAL

## 2023-03-08 NOTE — PROGRESS NOTES
CRITICAL CARE PROGRESS NOTE      Patient:  Leia Mosqueda    Unit/Bed:4D-08/008-A  YOB: 1940  MRN: [id number]   PCP: Elissa Martin MD  Date of Admission: 2/21/2023  Chief Complaint:- Chest Pain    Assessment and Plan:    CAD, s/p CABG:  [facility] on 02/21, 02/28 CABG x3 using LIMA to LAD, vein graft to marginal, vein graft to posterior descending artery. 4 drains in place after procedure. Anterior mediastinal drains removed on 03/03. Pleural drains removed on 03/05. -90cc total I/O last 24 hours. Metolazone discontinued. Metoprolol switched to carvedilol on 03/04. Tolerating CPAP therapy with naps and overnight. Continue to monitor I/O's. Continue amiodarone, ASA, statin. Continue postop PPI  Dyspnea, Improved: Patient has displayed air hunger in spite of adequate oxygen saturation on room air. PRN low dose morphine ordered for dyspnea  Patient encouraged at bedside to use relaxed breathing exercises and frequently reoriented to his situation  Recommend against the use of supplemental oxygen unless SpO2 less than 92% on room air  No morphine required in the last 24 hours  Central and Obstructive Sleep  Apnea, Suspected  Prior to admission, patient's family endorses excessive daytime sleepiness, witnessed apneas, snoring, and sleep inertia  Patient has displayed witnessed apneas and snoring in the inpatient setting  Patient is at increased risk for central sleep apnea in the context of his cardiovascular disease as well as his history of CVA  Patient started on BiPAP 12/6 with BUR 10 which he has been compliant and benefitting from  PSG ordered per [place] [facility] for outpatient followup  Compliant and benefitting from PAP therapy  Progressive Cognitive Decline; Suspected Dementia:  Chronic small vessel disease seen on CT Head on 03/02. Recommendation for MOCA when stable. Baseline prior to admission oriented only to self.  Has had episodes of agitation where he pulled out NG tube on 03/03, requiring soft restraints which family consented to. PRN Zyprexa ordered for agitation. Continue with patient's home risperidone, carbamazepine, escitalopram. Continue with restraints as needed. Mentation has been stable the last 24 hours; patient is still oriented to self only. Restraints discontinued. No Zyprexa administered over last 24 hours. Will continue with appropriate day-night cues and frequent reorientation  Sitter in room  MBS showing Laryngeal penetration with thin and mildly thick barium   Oropharyngeal Dysphagia  Likely secondary to previous history of CVA with worsening cognitive decline  MBS showing laryngeal penetration with thin and mildly thick barium  SLP following with recommendation for patient to remain NPO for now  NG tube in place; patient tolerating; dietitian following  5. Acute Hypoxic Respiratory Failure, s/p intubation/extubation, resolved  Weaned down to room air on 03/05; maintaining adequate oxygenation. Continue PRN albuterol. No interval changes noted on repeat CXR on 03/06  6. COPD: Likely secondary to history of tobacco use: No PFT available. Maintaining adequate oxygentation on 3L via nasal cannula. Continue to wean as tolerated. Incentive spirometer, Acapella. 7. Acute on Chronic Macrocytic Anemia, Stable :  Vitamin B12/Folate WNL; iron 64, iron saturation 23%, TIBC 278. Likely secondary to iron deficiency secondary to poor oral nutrition. Plan on iron every other day and vitamin C when patient is more stable. 8. At Risk for Malnutrition, Present on Admission: Tube feeding order placed 03/01. Continue tube feeding regimen  9. Hypocalcemia- likely secondary to malnutrition; tube feeding in place, replacement protocol in place  10. Hypoalbuminemia- likely secondary to poor oral nutrition, tube feeding in place  11. Left Mastoiditis, Identified on CT- S/p Rocephin therapy at admission. Recommendation for ENT evaluation when more stable.  Patient is at increased risk for complication secondary to previous craniotomy. No indication for antibiotic therapy at this time  12. Severe Oral Dysphagia: Likely secondary to previous craniotomy with concern for worsening dementia; SLP following; patient receiving tube feeds without complication   13. Recurrent Seizure Disorder:  Continue risperidone, carbamazepine   14. Unspecified Anxiety Disorder: Continue escitalopram  15. Right ICA Stenosis 70%, Right Subclavian Severe Stenosis:  Identified on CTA. Seen and evaluated by vascular surgery with no plan for intervention  16. Hypothyroidism: 2/21/2023 TSH 4.130, free T40.92. Continue levothyroxine  17. Bilateral Pleural Effusions, Present on Admission, Resolved:  Likely secondary to cardiovascular and respiratory compromise on admission, continue furosemide/metolazone, metoprolol with potassium supplementation; daily weights, strict I/O's  18. Pneumonia, Present on Admission, Resolved:  s/p treatment with Rocephin and Azithromycin  19. Hypochloremia: Stable; Likely secondary to GI losses; continue to monitor  20. Leukocytosis, Resolved  21. Hypernatremia, Resolved  22. Hx Unspecified Leukemia:  Noted  21. Hx CVA:  baseline cognitive deficits per family; oriented only to self at baseline. CT Head without Contrast showing stable old infarct in the right cerebellum and stable old infarct in the right basal ganglia. 24. Leukocytosis: Will rule out infectious etiology with sputum culture and molecular pneumonia panel; repeat CXR ordered      INITIAL H AND P AND ICU COURSE:  This is an 81year old male who presented to [facility] on 02/21 with SOB and brown sputum production. The patient was admitted for a COPD exacerbation and started on bronchodilators, azithromcyin, and rocpehin. Cardiology was consulted secondary to EKG changes concerning for anterior ischemic changes. Given a troponin elevation, patient underwent LHC on 02/23 which showed evidence of multivessel disase.  Patient underwent CABG on 13/36 without complication and was transferred to the ICU postoperatively; four chest tubes in place. Was extubated without complication and tolerated CPAP overnight. 03/01: Patient seen and evaluated at the bedside in no acute distress. Now not requiring any pressor support. Output from chest tubes reviewed (911cc serosanguinous). Denies chest pain, fever, chills, and N/V/D at bedside. No additional acute symptoms or concerns. Tolerated CPAP overnight. 03/02: Patient seen and evaluated at the beside in no acute distress. Patient tolerating weaned oxygen requirement. Patient requiring max assist with occupational therapy. No overnight events. Output from chest tubes reviewed. Patient is oriented only to self. He denies chest pain, fever, chills, and N/V/D. He denies any additional acute symptoms or concerns. He tolerated CPAP overnight. Message sent to CT surgery PA regarding transfer out of the ICU. CRP elevation likely secondary to major surgery. Case discussed extensively with primary RN.     03/03: Patient seen and evaluated at the bedside in moderate distress. He states that he wants to go home. He is  only oriented to self which is baseline. No new focal neurologic deficits. Case reviewed with primary RN, patient was comfortable throughout the night, tolerated BiPAP without complication. Reviewed hospital course and CT findings with overnight ICU team.     03/04: Patient pulled NG tube on 03/03 during the day, was replaced by Yolis Norwood RN, was started on Zyprexa as needed daily for agitation. Patient placed in soft restraints and family made aware. Chest x-ray displayed improved aeration on both lungs with small residual left basilar atelectasis. 03/05: Patient seen and evaluated at the bedside. Case discussed with primary RN. Patient tolerated CPAP therapy with sitter in room for short period overnight. No additional Zyprexa administered.  Patient's pain well controlled with reduced dose of oxicodone. Patient oriented only to self at bedside. No new focal neurologic deficits identified. Will continue to wean supplemental oxygen as tolerated. 03/06: Patient seen and evaluated at the bedside with sitter in room. Patient tolerated PAP therapy for most of the night, removed at 0400 on 03/06. No Zyprexa or additional analgesia administered overnight. 03/07: Patient seen and evaluated at the bedside with sitter in the room. Patient denying any chest pain, fever, chills, nausea, vomiting, or diarrhea. Patient is only oriented to self. He is more cooperative today. Restraints removed last night without complication. He tolerated PAP therapy overnight. Supplemental oxygen discontinued at bedside as patient is maintaining adequate oxygenation. No Zyprexa or additional analgesia administered overnight. 03/08 Patient seen and evaluated at the bedside with sitter in the room. Patient denies chest pain, fever, chills, nausea, vomiting, or diarrhea. He endorses some constipation and feeling ready to have a bowel movement. He endorses having difficulty coughing. Tolerated suction. Repeat CXR ordered. Will continue with PAP therapy for strong suspicion of sleep apnea. No additional analgesia or olanzapine administered overnight. Past Medical History:    Leukemia, HLD, Brain Abscess s/p craniotomy, Recurrent Seizures. Family History:  No Pertinent Family history noted in EMR. .  Social History: Former smoker of tobacco 1ppd for 30 years; can't remember quit date; no recreational drug or alcohol use. ROS   Review of systems:    General: No fevers, chills. Pain controlled. HEENT: No headache, no vision changes, no hearing changes, no trauma. CV: No palpitations, no chest pain, no tachycarida  RESP: No respiratory distress, no cough, no wheeze, SOB.   GI: No abdominal pain, no nausea, no vomiting, no diarrhea  : No dysuria, no hematuria, no incontinence  Neuro: No seizures, no focal deficits, no weakness, no confusion   Psych: No psychosis, no SI/HI, Depression, Anxiety. Scheduled Meds:   calcium gluconate  2,000 mg IntraVENous every 2 hours    carvedilol  6.25 mg Oral BID     amiodarone  200 mg Oral Daily    bisacodyl  10 mg Rectal Daily    calcium replacement protocol   Other RX Placeholder    aspirin  324 mg Oral Daily    carBAMazepine  400 mg Oral TID    potassium bicarb-citric acid  20 mEq Oral BID    insulin lispro  0-4 Units SubCUTAneous TID     insulin lispro  0-4 Units SubCUTAneous Nightly    furosemide  40 mg IntraVENous Daily    sodium chloride flush  5-40 mL IntraVENous 2 times per day    enoxaparin  40 mg SubCUTAneous Daily    sennosides-docusate sodium  1 tablet Oral BID    famotidine  20 mg Oral BID    Or    famotidine (PEPCID) injection  20 mg IntraVENous BID    atorvastatin  40 mg Oral Daily    escitalopram  20 mg Oral Daily    levothyroxine  50 mcg Oral Daily    risperiDONE  0.5 mg Oral BID    multivitamin  1 tablet Oral Daily     Continuous Infusions:   sodium chloride 10 mL/hr at 03/06/23 1113    sodium chloride      dextrose      EPINEPHrine Stopped (03/01/23 0602)       PHYSICAL EXAMINATION:  T:  98.7.  P:  21. RR:  22. B/P:  164/35. I/O:  -90 on 03/07; -711 on 03/06; +1328 on 03/05  WEIGHTS: 184 lb on 03/07; 192 lb on 03/06; 194 lb on 03/05  Body mass index is 27.22 kg/m². GCS:   14    General:   This is an elderly gentleman lying in bed; sitter in room  HEENT:  normocephalic and atraumatic. No scleral icterus. PERR; increased cough with sputum production during interview; suctioned thick white secretions; NG tube in place  Neck: supple. No Thyromegaly. Lungs: Diminished breath sound bilaterally  No retractions  Cardiac: RRR. No JVD. Abdomen: soft. Moderate distension with minimal tenderness to palpation  Extremities:  No clubbing, cyanosis, or edema x 4. Vasculature: capillary refill < 3 seconds. Palpable dorsalis pedis pulses.   Skin:  warm and dry.  Psych: Oriented only to self; pleasant and cooperative  Lymph:  No supraclavicular adenopathy. Neurologic:  No focal deficit. No seizures. Data: (All radiographs, tracings, PFTs, and imaging are personally viewed and interpreted unless otherwise noted). BMP Sodium 144, Potassium 4.0, Chloride 101, Bicarb 29, BUN 32, Cr 1.0  CBC WBC 12.8, RBC 2.94, Hb 9.7, Ht 32.8, .6, Platet 115  Imaging MBS showing laryngeal penetration with aspiration with both thin and mildly thick liquids  Telemetry shows Normal sinus rhythm        Seen with multidisciplinary ICU team Dr. Melba Nguyen. Meets Continued ICU Level Care Criteria:    [] Yes   [x] No - Transfer Planning per Dr. Amanda Cash  [] HOSPITALIST CONTACTED-      Case and plan discussed with Dr. Melba Nguyen. Electronically signed by Ivana Runner, MD IM Resident   Patient seen by me including key components of medical care. Case discussed with Dr. Amanda Cash.  Case discussed with resident physician. Cognitive impairment precludes transfer to stepdown. Patient remains in ICU due to level of nursing care required. Italicized font, if present,  represents changes to the note made by me. CC time 35 minutes. Time was discontiguous. Time does not include procedure. Time does include my direct assessment of the patient and coordination of care. Time represents more than 50% of the time involved with patient care by the [address] [place] [address] team.  Electronically signed by Rowan Escalante.  Melba Nguyen MD.

## 2023-03-08 NOTE — PROGRESS NOTES
Patient with depressed mental status today. Currently the team is getting him out of bed into a chair. He is less cooperative and more somnolent than yesterday. Yesterday he had a good day and was a lot brighter. He is having some trouble clearing secretions. We are giving him bronchodilators and mucolytic's to help. Dr. Vikash Lopez help managing his respiratory status and well determine his eligibility for transfer.

## 2023-03-08 NOTE — PROGRESS NOTES
[facility]  INPATIENT SPEECH THERAPY  [facility] ICU 4D  DAILY NOTE    TIME   SLP Individual Minutes  Time In: 1355  Time Out: 1403  Minutes: 8  Timed Code Treatment Minutes: 0 Minutes       Date: 3/8/2023  Patient Name: Jimmy Carlson      CSN: [id number]   : 1940  (82 y.o.)  Gender: male   Referring Physician:  ANGELIC Ortiz CNP  Diagnosis: Elevated troponin  Precautions: Fall risk, aspiration precautions   Current Diet:  NPO; NGT  Swallowing Strategies: Not Applicable  Date of Last MBS/FEES:  MBS 3/7/23    Pain:  No pain reported.    Subjective:  Patient seen upright in recliner chair with RN Luana permission. Patient is easy to wake, however, presenting with limited motivation for participation in skilled ST.   *no family present     Short-Term Goals:  SHORT TERM GOAL #1:  Goal 1: Patient will consume conservative PO trials of ICE CHIPS (x5 only) to improve pharyngeal pharyngeal function/constriction, airway protection as well as to elicit pharyngeal exercises and determine readiness for repeat instrumental evaluation.  INTERVENTIONS:Attempted to complete ice chips x2, however, patient refusing PO consumption 2/2 ice chip presentations. PT verbally declining followed by a tight labial seal.     SHORT TERM GOAL #2:  Goal 2: Patient will complete pharyngeal exericses x15 in order to improve overall pharyngeal function and airway protection  INTERVENTIONS:  Limited command following, however, pt agreeable to complete pharyngeal strengthening exercises.   Attempted x10 effortful swallows: 0/10 swallows triggered (ABSENT swallow this date)  -Oral cavity open at rest with lingual surface resting in front of oral cavity  -Moderate xerostomia - dry cracked lingual surface  -Limited lingual pumping noted with each attempted repetition for Effortful/hard swallow followed by ABSENT swallow.   -No thyrohyoid movement observed upon manual palpation.     RECOMMENDED STRICT NPO, aggressive daily oral  care, oral suctioning as needed throughout the day, and HIGHLY recommended long-term means of alternative means of nutrition/hydration via PEG vs comfort care measures as PATIENT PROGNOSIS IS GUARDED given severity of swallow function and limited ability to participate in pharyngeal strengthening exercises. SHORT TERM GOAL #3:  Goal 3: Staff will exhibit return demonstration for completion of comprehensive oral care procedural analysis to maximize oral integrity and to reduce potential bacteria colonization. INTERVENTIONS: Upon entrance to patient room, patient presented with a baseline wet gurgled cough. Cough is weak and unproductive. [place]ers suctioning is on the patients lap, however, patient is presenting with LIMITED oral acceptance. Unable to progress suction beyong anterior portion of oral cavity. No visible removal of secretions evident. *orally defensive  *lingual protrusion and resistance with attempts to progress to medial and posterior oral cavity. SHORT TERM GOAL #4:  Goal 4: Monitor mentation (hx dementia, CABG); complete alternate cognitive linguistic assessment should it be clinically indicated. INTERVENTIONS: Did not complete assessment this date, however, patient VERY confused with limited command following this date. No family present to discuss baseline mentation. ST to hold ST evaluation this date. Long-Term Goals:  No LTM Goals recommended, due to anticipated short ELOS. EDUCATION:  Learner: Patient  Education:  Reviewed results and recommendations of this evaluation, Reviewed diet and strategies, Reviewed signs, symptoms and risks of aspiration, Reviewed ST goals and Plan of Care, and Reviewed recommendations for follow-up  Evaluation of Education: Needs further instruction, No evidence of learning, and Family not present    ASSESSMENT/PLAN:  Activity Tolerance:  Patient tolerance of  treatment: poor. Assessment/Plan: Patient progressing toward established goals. Continues to require skilled care of licensed speech pathologist to progress toward achievement of established goals and plan of care. .     Plan for Next Session: limited PO trials as clinically indicated, oral care, pharyngeal strengthening exercises. Discharge Recommendations: SNF     Judy Mcneal MA., [id number] [facility] / #.[id number]

## 2023-03-08 NOTE — PLAN OF CARE
Problem: Discharge Planning  Goal: Discharge to home or other facility with appropriate resources  Outcome: Progressing  Flowsheets  Taken 3/8/2023 1655 by Tatum Cruz RN  Discharge to home or other facility with appropriate resources:   Identify barriers to discharge with patient and caregiver   Arrange for needed discharge resources and transportation as appropriate   Identify discharge learning needs (meds, wound care, etc)   Refer to discharge planning if patient needs post-hospital services based on physician order or complex needs related to functional status, cognitive ability or social support system  Taken 3/8/2023 0815 by Edward Ribeiro RN  Discharge to home or other facility with appropriate resources: Identify barriers to discharge with patient and caregiver     Problem: Pain  Goal: Verbalizes/displays adequate comfort level or baseline comfort level  Outcome: Progressing  Flowsheets (Taken 3/8/2023 1655)  Verbalizes/displays adequate comfort level or baseline comfort level:   Encourage patient to monitor pain and request assistance   Assess pain using appropriate pain scale   Administer analgesics based on type and severity of pain and evaluate response   Implement non-pharmacological measures as appropriate and evaluate response   Consider cultural and social influences on pain and pain management     Problem: Respiratory - Adult  Goal: Achieves optimal ventilation and oxygenation  3/8/2023 1655 by Tatum Cruz RN  Outcome: Progressing  Flowsheets (Taken 3/8/2023 1655)  Achieves optimal ventilation and oxygenation:   Assess for changes in respiratory status   Assess for changes in mentation and behavior   Position to facilitate oxygenation and minimize respiratory effort   Oxygen supplementation based on oxygen saturation or arterial blood gases   Encourage broncho-pulmonary hygiene including cough, deep breathe, incentive spirometry   Assess the need for suctioning and aspirate as needed   Assess and instruct to report shortness of breath or any respiratory difficulty   Respiratory therapy support as indicated  3/8/2023 1303 by Wagner Mercado RCP  Outcome: Progressing     Problem: Cardiovascular - Adult  Goal: Maintains optimal cardiac output and hemodynamic stability  Outcome: Progressing  Flowsheets (Taken 3/8/2023 1655)  Maintains optimal cardiac output and hemodynamic stability:   Monitor blood pressure and heart rate   Monitor urine output and notify Licensed Independent Practitioner for values outside of normal range   Assess for signs of decreased cardiac output   Administer fluid and/or volume expanders as ordered   Administer vasoactive medications as ordered     Problem: Cardiovascular - Adult  Goal: Absence of cardiac dysrhythmias or at baseline  Outcome: Progressing  Flowsheets (Taken 3/8/2023 1655)  Absence of cardiac dysrhythmias or at baseline:   Monitor cardiac rate and rhythm   Assess for signs of decreased cardiac output   Administer antiarrhythmia medication and electrolyte replacement as ordered     Problem: Metabolic/Fluid and Electrolytes - Adult  Goal: Electrolytes maintained within normal limits  Outcome: Progressing  Flowsheets  Taken 3/8/2023 1655 by Ervin Oconnor RN  Electrolytes maintained within normal limits:   Monitor labs and assess patient for signs and symptoms of electrolyte imbalances   Administer electrolyte replacement as ordered   Monitor response to electrolyte replacements, including repeat lab results as appropriate  Taken 3/8/2023 0815 by Shraddha Hernandez RN  Electrolytes maintained within normal limits: Monitor labs and assess patient for signs and symptoms of electrolyte imbalances     Problem: Metabolic/Fluid and Electrolytes - Adult  Goal: Glucose maintained within prescribed range  Outcome: Progressing  Flowsheets  Taken 3/8/2023 1655 by Ervin Oconnor RN  Glucose maintained within prescribed range:   Monitor blood glucose as ordered   Assess for signs and symptoms of hyperglycemia and hypoglycemia   Administer ordered medications to maintain glucose within target range   Assess barriers to adequate nutritional intake and initiate nutrition consult as needed  Taken 3/8/2023 0815 by Ankit Ortega RN  Glucose maintained within prescribed range: Monitor blood glucose as ordered     Problem: Hematologic - Adult  Goal: Maintains hematologic stability  Outcome: Progressing  Flowsheets  Taken 3/8/2023 1655 by Meme Jose RN  Maintains hematologic stability:   Assess for signs and symptoms of bleeding or hemorrhage   Monitor labs for bleeding or clotting disorders   Administer blood products/factors as ordered  Taken 3/8/2023 0815 by Ankit Ortega RN  Maintains hematologic stability: Assess for signs and symptoms of bleeding or hemorrhage     Problem: Neurosensory - Adult  Goal: Achieves stable or improved neurological status  Outcome: Progressing  Flowsheets  Taken 3/8/2023 1655 by Meme Jose RN  Achieves stable or improved neurological status:   Assess for and report changes in neurological status   Initiate measures to prevent increased intracranial pressure   Maintain blood pressure and fluid volume within ordered parameters to optimize cerebral perfusion and minimize risk of hemorrhage   Monitor temperature, glucose, and sodium.  Initiate appropriate interventions as ordered  Taken 3/8/2023 1200 by Ankit Ortega RN  Achieves stable or improved neurological status: Assess for and report changes in neurological status     Problem: Neurosensory - Adult  Goal: Achieves maximal functionality and self care  Outcome: Progressing  Flowsheets  Taken 3/8/2023 1655 by Meme Jose RN  Achieves maximal functionality and self care:   Monitor swallowing and airway patency with patient fatigue and changes in neurological status   Encourage and assist patient to increase activity and self care with guidance from physical therapy/occupational therapy  Taken 3/8/2023 1200 by Nahomi Hwang RN  Achieves maximal functionality and self care: Monitor swallowing and airway patency with patient fatigue and changes in neurological status     Problem: Skin/Tissue Integrity - Adult  Goal: Incisions, wounds, or drain sites healing without S/S of infection  Outcome: Progressing  Flowsheets  Taken 3/8/2023 1655 by Missael Jay RN  Incisions, Wounds, or Drain Sites Healing Without Sign and Symptoms of Infection:   TWICE DAILY: Assess and document dressing/incision, wound bed, drain sites and surrounding tissue   TWICE DAILY: Assess and document skin integrity  Taken 3/8/2023 1654 by Missael Jay RN  Incisions, Wounds, or Drain Sites Healing Without Sign and Symptoms of Infection:   TWICE DAILY: Assess and document skin integrity   TWICE DAILY: Assess and document dressing/incision, wound bed, drain sites and surrounding tissue  Taken 3/8/2023 0815 by Nahomi Hwang RN  Incisions, Wounds, or Drain Sites Healing Without Sign and Symptoms of Infection: TWICE DAILY: Assess and document skin integrity     Problem: Skin/Tissue Integrity - Adult  Goal: Oral mucous membranes remain intact  Outcome: Not Progressing  Flowsheets  Taken 3/8/2023 1655 by Missael Jay RN  Oral Mucous Membranes Remain Intact:   Assess oral mucosa and hygiene practices   Implement preventative oral hygiene regimen   Implement oral medicated treatments as ordered  Taken 3/8/2023 1654 by Missael Jay RN  Oral Mucous Membranes Remain Intact:   Assess oral mucosa and hygiene practices   Implement preventative oral hygiene regimen   Implement oral medicated treatments as ordered  Taken 3/8/2023 0815 by Nahomi Hwang RN  Oral Mucous Membranes Remain Intact: Assess oral mucosa and hygiene practices     Problem: Musculoskeletal - Adult  Goal: Return mobility to safest level of function  Outcome: Progressing  Flowsheets (Taken 3/8/2023 1655)  Return Mobility to Safest Level of Function:   Assess patient stability and activity tolerance for standing, transferring and ambulating with or without assistive devices   Assist with transfers and ambulation using safe patient handling equipment as needed   Ensure adequate protection for wounds/incisions during mobilization   Obtain physical therapy/occupational therapy consults as needed   Apply continuous passive motion per provider or physical therapy orders to increase flexion toward goal   Instruct patient/family in ordered activity level     Problem: Musculoskeletal - Adult  Goal: Return ADL status to a safe level of function  Outcome: Progressing  Flowsheets (Taken 3/8/2023 1655)  Return ADL Status to a Safe Level of Function:   Administer medication as ordered   Assess activities of daily living deficits and provide assistive devices as needed   Obtain physical therapy/occupational therapy consults as needed     Problem: Gastrointestinal - Adult  Goal: Maintains or returns to baseline bowel function  Outcome: Progressing  Flowsheets (Taken 3/8/2023 1655)  Maintains or returns to baseline bowel function:   Encourage oral fluids to ensure adequate hydration   Assess bowel function   Administer IV fluids as ordered to ensure adequate hydration   Administer ordered medications as needed   Encourage mobilization and activity   Nutrition consult to assist patient with appropriate food choices     Problem: Genitourinary - Adult  Goal: Absence of urinary retention  Outcome: Progressing  Flowsheets (Taken 3/8/2023 1655)  Absence of urinary retention:   Assess patients ability to void and empty bladder   Monitor intake/output and perform bladder scan as needed   Place urinary catheter per Licensed Independent Practitioner order if needed     Problem: Infection - Adult  Goal: Absence of infection at discharge  Outcome: Progressing  Flowsheets  Taken 3/8/2023 1655 by Ayla Malcolm RN  Absence of infection at discharge:   Assess and monitor for signs and symptoms of infection   Monitor lab/diagnostic results   Monitor all insertion sites i.e., indwelling lines, tubes and drains   Monitor endotracheal (as able) and nasal secretions for changes in amount and color   Administer medications as ordered   Instruct and encourage patient and family to use good hand hygiene technique   Identify and instruct in appropriate isolation precautions for identified infection/condition  Taken 3/8/2023 0815 by Alessio Raines RN  Absence of infection at discharge: Assess and monitor for signs and symptoms of infection     Problem: Nutrition Deficit:  Goal: Optimize nutritional status  Outcome: Progressing  Flowsheets (Taken 3/8/2023 1655)  Nutrient intake appropriate for improving, restoring, or maintaining nutritional needs:   Assess nutritional status and recommend course of action   Monitor oral intake, labs, and treatment plans   Recommend appropriate diets, oral nutritional supplements, and vitamin/mineral supplements   Recommend, monitor, and adjust tube feedings and TPN/PPN based on assessed needs   Provide specific nutrition education to patient or family as appropriate     Problem: Safety - Adult  Goal: Free from fall injury  Outcome: Progressing  Flowsheets (Taken 3/8/2023 1655)  Free From Fall Injury:   Instruct family/caregiver on patient safety   Based on caregiver fall risk screen, instruct family/caregiver to ask for assistance with transferring infant if caregiver noted to have fall risk factors     Problem: Anxiety  Goal: Will report anxiety at manageable levels  Description: INTERVENTIONS:  1. Administer medication as ordered  2. Teach and rehearse alternative coping skills  3.  Provide emotional support with 1:1 interaction with staff  Outcome: Progressing  Flowsheets (Taken 3/8/2023 1655)  Will report anxiety at manageable levels:   Administer medication as ordered   Teach and rehearse alternative coping skills     Problem: Change in Body Image  Goal: Pt/Family communicate acceptance of loss or change in body image and feel psychological comfort and peace  Description: INTERVENTIONS:  1. Assess patient/family anxiety and grief process related to change in body image, loss of functional status, loss of sense of self, and forgiveness  2. Provide emotional and spiritual support  3. Provide information about the patient's health status with consideration of family and cultural values  4. Communicate willingness to discuss loss and facilitate grief process with patient/family as appropriate  5. Emphasize sustaining relationships within family system and community, or roxi/spiritual traditions  6. Initiate Spiritual Care, Psychosocial Clinical Specialist consult as needed  Outcome: Completed     Problem: Confusion  Goal: Confusion, delirium, dementia, or psychosis is improved or at baseline  Description: INTERVENTIONS:  1. Assess for possible contributors to thought disturbance, including medications, impaired vision or hearing, underlying metabolic abnormalities, dehydration, psychiatric diagnoses, and notify attending LIP  2. [place] high risk fall precautions, as indicated  3. Provide frequent short contacts to provide reality reorientation, refocusing and direction  4. Decrease environmental stimuli, including noise as appropriate  5. Monitor and intervene to maintain adequate nutrition, hydration, elimination, sleep and activity  6. If unable to ensure safety without constant attention obtain sitter and review sitter guidelines with assigned personnel  7.  Initiate Psychosocial CNS and Spiritual Care consult, as indicated  Outcome: Progressing  Flowsheets (Taken 3/8/2023 3632)  Effect of thought disturbance (confusion, delirium, dementia, or psychosis) are managed with adequate functional status:   Assess for contributors to thought disturbance, including medications, impaired vision or hearing, underlying metabolic abnormalities, dehydration, psychiatric diagnoses, notify [place] high risk fall precautions, as indicated   Provide frequent short contacts to provide reality reorientation, refocusing and direction   Decrease environmental stimuli, including noise as appropriate   Monitor and intervene to maintain adequate nutrition, hydration, elimination, sleep and activity   If unable to ensure safety without constant attention obtain sitter and review sitter guidelines with assigned personnel     Problem: Skin/Tissue Integrity  Goal: Absence of new skin breakdown  Description: 1. Monitor for areas of redness and/or skin breakdown  2. Assess vascular access sites hourly  3. Every 4-6 hours minimum:  Change oxygen saturation probe site  4. Every 4-6 hours:  If on nasal continuous positive airway pressure, respiratory therapy assess nares and determine need for appliance change or resting period. Outcome: Progressing  Note: No new red or open areas      Problem: Safety - Medical Restraint  Goal: Remains free of injury from restraints (Restraint for Interference with Medical Device)  Description: INTERVENTIONS:  1. Determine that other, less restrictive measures have been tried or would not be effective before applying the restraint  2. Evaluate the patient's condition at the time of restraint application  3. Inform patient/family regarding the reason for restraint  4.  Q2H: Monitor safety, psychosocial status, comfort, nutrition and hydration  Outcome: Completed  Note: Restraints previously removed       Problem: Skin/Tissue Integrity - Adult  Goal: Oral mucous membranes remain intact  Outcome: Not Progressing  Flowsheets  Taken 3/8/2023 1655 by Chari Barfield RN  Oral Mucous Membranes Remain Intact:   Assess oral mucosa and hygiene practices   Implement preventative oral hygiene regimen   Implement oral medicated treatments as ordered  Taken 3/8/2023 1654 by Chari Barfield RN  Oral Mucous Membranes Remain Intact:   Assess oral mucosa and hygiene practices   Implement preventative oral hygiene regimen   Implement oral medicated treatments as ordered  Taken 3/8/2023 0815 by Akash Shabazz RN  Oral Mucous Membranes Remain Intact: Assess oral mucosa and hygiene practices   Care plan reviewed with patient and family . Patient and family  verbalize understanding of the plan of care and contribute to goal setting.

## 2023-03-08 NOTE — CARE COORDINATION
3/8/23, 2:49 PM EST    DISCHARGE ON GOING EVALUATION    Cici Mora day: 12  Location: -08/008-A Reason for admit: Elevated troponin level [R77.8]  Elevated troponin [R77.8]  Elevated brain natriuretic peptide (BNP) level [R79.89]  Dyspnea, unspecified type [R06.00]  NSTEMI (non-ST elevated myocardial infarction) ([facility] [address] [I21.4]   Procedure:   2/23 Cardiac Cath: Severe MVD  2/24 Bilateral Carotid Dopplers & Vein mapping  2/27 CTA Neck: Atherosclerosis of the right carotid bulb with a 76% stenosis at the origin the right internal carotid artery; No significant stenosis at the origin of the left internal carotid artery; Moderate stenosis at the origin of the left vertebral artery. 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 This can indicate acute mastoiditis in the appropriate clinical setting; Stable CT appearance the brain. No new abnormalities  3/5 Chest tubes removed  3/7 MBS: Laryngeal penetration and aspiration with thin and mildly thick barium  3/8 CXR: Left lower lobe consolidation and pleural effusion    Barriers to Discharge: POD #8. Patient to stay another day in ICU per Dr. Lizandro Ventura. CM reported to Dr. Lizandro Ventura that SLP suggesting PEG tube as patient failed MBS horribly yesterday and appears nowhere close to passing swallow eval. Dr. Lizandro Ventura states he does not want a consult for a PEG. Tmax 99. NSR. On room air. Bipap at HS. Oriented to person and place. Follows commands. Impulsive. Sitter. SLP/CR/PT/OT. Dietitian consulted. +VRE rectum. Telemetry, I&O, daily weight, IS, sternal precautions, NG w/TF, wound care, external urinary catheter, SCDs, ambulate.  Amio, asa, lipitor, dulcolax, tegretol, coreg, lovenox, lexapro, pepcid, IV lasix 40 mg daily, SSI, synthroid, prn roxicodone, glycolax daily, K+, risperdal, senokot, inhaler, electrolyte replacement protocols. Ical 0.96, wbc 12.8, hgb 9.7. PCP: Nicola Thornton MD  Readmission Risk Score: 19.2%  Patient Goals/Plan/Treatment Preferences: Plan for [facility] for rehab. No precert required. SW on case. Will need dietary ileus prevention protocol once starting to take diet by mouth.

## 2023-03-08 NOTE — PLAN OF CARE
Problem: Respiratory - Adult  Goal: Achieves optimal ventilation and oxygenation  Outcome: Progressing   Patient started in inhaler; tolerated well.   Will continue to monitor patients respiratory status

## 2023-03-08 NOTE — PROGRESS NOTES
[address] ICU 4D  Occupational Therapy  Daily Note  Time:    Time In:   Time Out: 818  Timed Code Treatment Minutes: 30 Minutes  Minutes: 30          Date: 3/8/2023  Patient Name: Eliana Sanchez,   Gender: male      Room: 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 \" patient underwent a CABG STONEY,  coronary artery bypass grafting x3 using LIMA to LAD, vein graft to to's marginal 1, vein graft to posterior descending artery, transesophageal echocardiography, endoscopic vein harvesting  on 23. Restrictions/Precautions:  Restrictions/Precautions: Fall Risk, General Precautions, Surgical Protocols  Position Activity Restriction  Sternal Precautions: move in the tube  Other position/activity restrictions: hx of brain injury      SUBJECTIVE: up in recliner upon arrival of therapist. Max encouragement to participate    PAIN: no number given/10: reports he has pain everywhere & was hyper sensitive to touch throughout session    Vitals: Blood Pressure: 176/70  Oxygen: 92% on RA  Heart Rate: 83    COGNITION: Slow Processing, Decreased Insight, Decreased Problem Solving, Decreased Safety Awareness, Impaired Attention, Difficulty Following Commands, and irritable, speech was difficult to understand    ADL:   Footwear Management: Dependent. For adjusting slipper socks .     BALANCE:  Standing Balance: Minimal Assistance, X 2. (Static)    BED MOBILITY:  Not Tested    TRANSFERS:  Sit to Stand: Moderate Assistance, X 2. From recliner 2x during session  Stand to Sit: Minimal Assistance, X 2. For technique    FUNCTIONAL MOBILITY/static standing:  Assistive Device: Rolling Walker  Assist Level:  Minimal Assistance and X 2. Distance:  stood with BUE on walker, but unable to take a  step        ADDITIONAL ACTIVITIES:  Completed 2/5 cardiac exercises x 10 reps. ASSESSMENT:     Activity Tolerance:  Patient tolerance of  treatment: Fair treatment tolerance       Discharge Recommendations: Continue to assess pending progress  Equipment Recommendations: Equipment Needed: Yes  Mobility Devices: ADL Assistive Devices  Other: Pt reports he has a rolling walker and a shower chair but was only using off and on. Recommendations provided to use both for fall prevention. Plan: Times Per Week: 6x  Times Per Day: Once a day  Current Treatment Recommendations: Strengthening, Balance training, Functional mobility training, Neuromuscular re-education, Safety education & training, Self-Care / ADL, Endurance training, Patient/Caregiver education & training, Equipment evaluation, education, & procurement, ROM    Patient Education  Patient Education:  see above    Goals  Short Term Goals  Time Frame for Short Term Goals: by discharge  Short Term Goal 1: patient will tolerate 2 min static standing with sit to stand and maintain balance with MOD A x 1 with O2 >/= 90% to increase activity tolerance for self care routine. Short Term Goal 2: patient will complete UB ADLs with MIN A and 1-2 cues to initiate and sequence task. Short Term Goal 3: OTR to assesd stand pivot transfers and functional mobility and create goal as appropriate. Short Term Goal 4: patient will tolerate CABG exer to increase activity tolerance for self care routine.     Following session, patient left in safe position with all fall risk precautions in place.

## 2023-03-08 NOTE — PROGRESS NOTES
[address] [facility] PHYSICAL THERAPY  DAILY NOTE  [facility] ICU 4D - 4D-08008-A     Time In: 4473  Time Out: 1553  Timed Code Treatment Minutes: 26 Minutes  Minutes: 26          Date: 3/8/2023  Patient Name: Kate Adam,  Gender:  male        MRN: [id number]  : 1940  (81 y.o.)     Referring Practitioner: ANGELIC Beltran CNP  Diagnosis: elevated tropnin level  Additional Pertinent Hx: per EMR- Kate Adam is a 81 y.o. male with past medical history of anemia, brain abscess, seizures, former smoker approximately 40 pack year who presents to the emergency department for evaluation of fatigue, shortness of breath. Patient brought in by family as family has been reporting he has been more tired, fatigued for the past week however more so in the past 3 days. Today, patient verbalized that he was short of breath and had generalized muscle aches which prompted today's ED visit. Patient lives in a two-story home and states that it has been getting harder to walk around. Has been having dyspnea on exertion. Pt s/p CABG STONEY,  coronary artery bypass grafting x3 using LIMA to LAD, vein graft to to's marginal 1, vein graft to posterior descending artery, transesophageal echocardiography, endoscopic vein harvesting DOS . Prior Level of Function:  Lives With: Daughter  Type of Home: House  Home Layout: Two level, Bed/Bath upstairs  Home Access: Stairs to enter with rails  Entrance Stairs - Number of Steps: 2 LEVY and ~15 steps to second level  Home Equipment: Dariol Sven, charu, Cane   Bathroom Shower/Tub: Tub/Shower unit  Bathroom Accessibility: Accessible    Receives Help From: Family  ADL Assistance: Independent  Homemaking Assistance: Needs assistance  Homemaking Responsibilities: Yes  Ambulation Assistance: Independent  Transfer Assistance: Independent  Active : No  Additional Comments: Daughter and son in law both work outside of the home.  Pt was previously independent with ADLs and functional mobility. Pt was not previously using AD for functional mobility. Pt is a questionable historian. Hx of brain surgery in 1995 and visual deficits. Restrictions/Precautions:  Restrictions/Precautions: Fall Risk, General Precautions, Surgical Protocols  Position Activity Restriction  Sternal Precautions: move in the tube  Other position/activity restrictions: hx of brain injury      SUBJECTIVE: Pt resting in recliner with sitter present. Pt agrees to getting back to bed. Pt feels he could walk to the bed. PAIN: not reported      Vitals:  ICU monitoring noted to be stable throughout    OBJECTIVE:  Bed Mobility:  Sit to Supine: Minimal Assistance, with head of bed flat, with verbal cues , with increased time for completion     Transfers:  Sit to Stand: Minimal Assistance  Stand to Sit:Minimal Assistance    Ambulation:  Minimal Assistance, with increased time for completion  Distance: 4 ft  Surface: Level Tile  Device:Rolling Walker  Gait Deviations:  Decreased Step Length Bilaterally, Decreased Gait Speed, Decreased Heel Strike Bilaterally, Unsteady Gait, and some light assist to manage walker. Balance:  Static Sitting Balance:  Stand By Assistance  Dynamic Sitting Balance: Stand By Assistance, Contact Guard Assistance  Static Standing Balance: Minimal Assistance  Dynamic Standing Balance: Minimal Assistance    Exercise:  Patient was guided in 1 set(s) 10 reps of exercise to both lower extremities. Ankle pumps, Glut sets, Quad sets, Heelslides, Hip abduction/adduction, Shoulder horizontal abduction/adduction, and Long arc quads. Exercises were completed for increased independence with functional mobility. Functional Outcome Measures: Completed  AM-PAC Inpatient Mobility Raw Score : 13  AM-PAC Inpatient T-Scale Score : 36.74    ASSESSMENT:  Assessment: Patient progressing toward established goals. Activity Tolerance:  Patient tolerance of  treatment: good.        Equipment Recommendations:Equipment Needed: No  Discharge Recommendations: Continue to assess pending progress, Subacte/Skilled Nursing Facility, ECF with PT, and Patient would benefit from continued PT at discharge  Plan: Current Treatment Recommendations: Strengthening, Balance training, Functional mobility training, Transfer training, Safety education & training, Equipment evaluation, education, & procurement, Home exercise program, Therapeutic activities, Patient/Caregiver education & training, Neuromuscular re-education, Endurance training, Gait training  General Plan:  (6x CABG)    Patient Education  Patient Education: Plan of Care, Home Exercise Program    Goals:  Patient Goals : family wants rehab following CABG  Short Term Goals  Time Frame for Short Term Goals: by discharge  Short Term Goal 1: Pt to complete supine <-> sit with CGA for ease getting out of bed with move in the tube guidelines  Short Term Goal 2: Pt to complete sit <-> stand with CGA for ease with mobiltiy from various surfaces  Short Term Goal 3: Pt to ambulate >10 feet with RW CGA for room ambulation. Short Term Goal 4: Pt to sit EOB for >=10 minutes with SBA with good midline posture for ease with sitting EOB to progress further mobility  Short Term Goal 5: .  Long Term Goals  Time Frame for Long Term Goals : NA due to short ELOS    Following session, patient left in safe position with all fall risk precautions in place. William Buitrago.  Savita Brock Donaldson 8

## 2023-03-09 ENCOUNTER — APPOINTMENT (OUTPATIENT)
Dept: GENERAL RADIOLOGY | Age: 83
DRG: 233 | End: 2023-03-09
Payer: MEDICARE

## 2023-03-09 LAB
ANION GAP SERPL CALC-SCNC: 14 MEQ/L (ref 8–16)
BACTERIA: ABNORMAL
BILIRUB UR QL STRIP: NEGATIVE
BUN SERPL-MCNC: 34 MG/DL (ref 7–22)
CA-I BLD ISE-SCNC: 1.01 MMOL/L (ref 1.12–1.32)
CALCIUM SERPL-MCNC: 8.1 MG/DL (ref 8.5–10.5)
CASTS #/AREA URNS LPF: ABNORMAL /LPF
CASTS #/AREA URNS LPF: ABNORMAL /LPF
CHARACTER UR: CLEAR
CHARCOAL URNS QL MICRO: ABNORMAL
CHLORIDE SERPL-SCNC: 103 MEQ/L (ref 98–111)
CO2 SERPL-SCNC: 28 MEQ/L (ref 23–33)
COLOR UR: YELLOW
CREAT SERPL-MCNC: 1 MG/DL (ref 0.4–1.2)
CRYSTALS URNS QL MICRO: ABNORMAL
DEPRECATED RDW RBC AUTO: 50 FL (ref 35–45)
EPITHELIAL CELLS, UA: ABNORMAL /HPF
ERYTHROCYTE [DISTWIDTH] IN BLOOD BY AUTOMATED COUNT: 12.7 % (ref 11.5–14.5)
GFR SERPL CREATININE-BSD FRML MDRD: > 60 ML/MIN/1.73M2
GLUCOSE BLD STRIP.AUTO-MCNC: 146 MG/DL (ref 70–108)
GLUCOSE BLD STRIP.AUTO-MCNC: 153 MG/DL (ref 70–108)
GLUCOSE BLD STRIP.AUTO-MCNC: 162 MG/DL (ref 70–108)
GLUCOSE BLD STRIP.AUTO-MCNC: 170 MG/DL (ref 70–108)
GLUCOSE BLD STRIP.AUTO-MCNC: 177 MG/DL (ref 70–108)
GLUCOSE SERPL-MCNC: 163 MG/DL (ref 70–108)
GLUCOSE UR QL STRIP.AUTO: NEGATIVE MG/DL
HCT VFR BLD AUTO: 29.8 % (ref 42–52)
HGB BLD-MCNC: 9.3 GM/DL (ref 14–18)
HGB UR QL STRIP.AUTO: ABNORMAL
KETONES UR QL STRIP.AUTO: NEGATIVE
LEUKOCYTE ESTERASE UR QL STRIP.AUTO: NEGATIVE
MAGNESIUM SERPL-MCNC: 2.7 MG/DL (ref 1.6–2.4)
MCH RBC QN AUTO: 33.6 PG (ref 26–33)
MCHC RBC AUTO-ENTMCNC: 31.2 GM/DL (ref 32.2–35.5)
MCV RBC AUTO: 107.6 FL (ref 80–94)
NITRITE UR QL STRIP.AUTO: NEGATIVE
PH UR STRIP.AUTO: 7.5 [PH] (ref 5–9)
PLATELET # BLD AUTO: 331 THOU/MM3 (ref 130–400)
PMV BLD AUTO: 10.8 FL (ref 9.4–12.4)
POTASSIUM SERPL-SCNC: 3.8 MEQ/L (ref 3.5–5.2)
PROT UR STRIP.AUTO-MCNC: NEGATIVE MG/DL
RBC # BLD AUTO: 2.77 MILL/MM3 (ref 4.7–6.1)
RBC #/AREA URNS HPF: ABNORMAL /HPF
RENAL EPI CELLS #/AREA URNS HPF: ABNORMAL /[HPF]
SODIUM SERPL-SCNC: 145 MEQ/L (ref 135–145)
SP GR UR REFRACT.AUTO: 1.01 (ref 1–1.03)
STREP PNEUMO AG, UR: NEGATIVE
UROBILINOGEN UR QL STRIP.AUTO: 1 EU/DL (ref 0–1)
WBC # BLD AUTO: 14.4 THOU/MM3 (ref 4.8–10.8)
WBC #/AREA URNS HPF: ABNORMAL /HPF
YEAST LIKE FUNGI URNS QL MICRO: ABNORMAL

## 2023-03-09 PROCEDURE — 87040 BLOOD CULTURE FOR BACTERIA: CPT

## 2023-03-09 PROCEDURE — 85027 COMPLETE CBC AUTOMATED: CPT

## 2023-03-09 PROCEDURE — 99233 SBSQ HOSP IP/OBS HIGH 50: CPT | Performed by: INTERNAL MEDICINE

## 2023-03-09 PROCEDURE — 2000000000 HC ICU R&B

## 2023-03-09 PROCEDURE — 2500000003 HC RX 250 WO HCPCS: Performed by: PHYSICIAN ASSISTANT

## 2023-03-09 PROCEDURE — 2580000003 HC RX 258: Performed by: PHYSICIAN ASSISTANT

## 2023-03-09 PROCEDURE — 97530 THERAPEUTIC ACTIVITIES: CPT

## 2023-03-09 PROCEDURE — 6360000002 HC RX W HCPCS

## 2023-03-09 PROCEDURE — A4216 STERILE WATER/SALINE, 10 ML: HCPCS | Performed by: PHYSICIAN ASSISTANT

## 2023-03-09 PROCEDURE — 6370000000 HC RX 637 (ALT 250 FOR IP): Performed by: NURSE PRACTITIONER

## 2023-03-09 PROCEDURE — 94660 CPAP INITIATION&MGMT: CPT

## 2023-03-09 PROCEDURE — 87899 AGENT NOS ASSAY W/OPTIC: CPT

## 2023-03-09 PROCEDURE — 6370000000 HC RX 637 (ALT 250 FOR IP)

## 2023-03-09 PROCEDURE — 2700000000 HC OXYGEN THERAPY PER DAY

## 2023-03-09 PROCEDURE — 93010 ELECTROCARDIOGRAM REPORT: CPT | Performed by: INTERNAL MEDICINE

## 2023-03-09 PROCEDURE — 71045 X-RAY EXAM CHEST 1 VIEW: CPT

## 2023-03-09 PROCEDURE — 82948 REAGENT STRIP/BLOOD GLUCOSE: CPT

## 2023-03-09 PROCEDURE — 81001 URINALYSIS AUTO W/SCOPE: CPT

## 2023-03-09 PROCEDURE — 6370000000 HC RX 637 (ALT 250 FOR IP): Performed by: THORACIC SURGERY (CARDIOTHORACIC VASCULAR SURGERY)

## 2023-03-09 PROCEDURE — 6360000002 HC RX W HCPCS: Performed by: PHYSICIAN ASSISTANT

## 2023-03-09 PROCEDURE — 93005 ELECTROCARDIOGRAM TRACING: CPT | Performed by: INTERNAL MEDICINE

## 2023-03-09 PROCEDURE — 94640 AIRWAY INHALATION TREATMENT: CPT

## 2023-03-09 PROCEDURE — 94761 N-INVAS EAR/PLS OXIMETRY MLT: CPT

## 2023-03-09 PROCEDURE — 80048 BASIC METABOLIC PNL TOTAL CA: CPT

## 2023-03-09 PROCEDURE — 97110 THERAPEUTIC EXERCISES: CPT

## 2023-03-09 PROCEDURE — 36415 COLL VENOUS BLD VENIPUNCTURE: CPT

## 2023-03-09 PROCEDURE — 31720 CLEARANCE OF AIRWAYS: CPT

## 2023-03-09 PROCEDURE — 83735 ASSAY OF MAGNESIUM: CPT

## 2023-03-09 PROCEDURE — 6370000000 HC RX 637 (ALT 250 FOR IP): Performed by: PHYSICIAN ASSISTANT

## 2023-03-09 PROCEDURE — 2580000003 HC RX 258

## 2023-03-09 PROCEDURE — 92526 ORAL FUNCTION THERAPY: CPT

## 2023-03-09 PROCEDURE — 97535 SELF CARE MNGMENT TRAINING: CPT

## 2023-03-09 PROCEDURE — 6360000002 HC RX W HCPCS: Performed by: NURSE PRACTITIONER

## 2023-03-09 PROCEDURE — 82330 ASSAY OF CALCIUM: CPT

## 2023-03-09 RX ORDER — ALBUTEROL SULFATE 2.5 MG/3ML
2.5 SOLUTION RESPIRATORY (INHALATION) EVERY 6 HOURS PRN
Status: DISCONTINUED | OUTPATIENT
Start: 2023-03-09 | End: 2023-03-16 | Stop reason: HOSPADM

## 2023-03-09 RX ORDER — CARVEDILOL 6.25 MG/1
12.5 TABLET ORAL 2 TIMES DAILY WITH MEALS
Status: DISCONTINUED | OUTPATIENT
Start: 2023-03-09 | End: 2023-03-16 | Stop reason: HOSPADM

## 2023-03-09 RX ORDER — CALCIUM GLUCONATE 20 MG/ML
2000 INJECTION, SOLUTION INTRAVENOUS ONCE
Status: COMPLETED | OUTPATIENT
Start: 2023-03-09 | End: 2023-03-09

## 2023-03-09 RX ADMIN — CALCIUM GLUCONATE 2000 MG: 20 INJECTION, SOLUTION INTRAVENOUS at 06:34

## 2023-03-09 RX ADMIN — SODIUM CHLORIDE, PRESERVATIVE FREE 20 MG: 5 INJECTION INTRAVENOUS at 20:01

## 2023-03-09 RX ADMIN — Medication 400 MG: at 20:32

## 2023-03-09 RX ADMIN — Medication 500000 UNITS: at 20:03

## 2023-03-09 RX ADMIN — LEVOTHYROXINE SODIUM 50 MCG: 0.05 TABLET ORAL at 05:22

## 2023-03-09 RX ADMIN — CARVEDILOL 12.5 MG: 6.25 TABLET, FILM COATED ORAL at 17:14

## 2023-03-09 RX ADMIN — Medication 1 TABLET: at 08:40

## 2023-03-09 RX ADMIN — ALBUTEROL SULFATE 2.5 MG: 2.5 SOLUTION RESPIRATORY (INHALATION) at 16:31

## 2023-03-09 RX ADMIN — SODIUM CHLORIDE, PRESERVATIVE FREE 10 ML: 5 INJECTION INTRAVENOUS at 11:15

## 2023-03-09 RX ADMIN — ACETAMINOPHEN 650 MG: 325 TABLET ORAL at 04:32

## 2023-03-09 RX ADMIN — AMIODARONE HYDROCHLORIDE 200 MG: 200 TABLET ORAL at 08:39

## 2023-03-09 RX ADMIN — SENNOSIDES AND DOCUSATE SODIUM 1 TABLET: 50; 8.6 TABLET ORAL at 08:40

## 2023-03-09 RX ADMIN — PIPERACILLIN AND TAZOBACTAM 3375 MG: 3; .375 INJECTION, POWDER, FOR SOLUTION INTRAVENOUS at 08:57

## 2023-03-09 RX ADMIN — BISACODYL 10 MG: 10 SUPPOSITORY RECTAL at 08:40

## 2023-03-09 RX ADMIN — ATORVASTATIN CALCIUM 40 MG: 40 TABLET, FILM COATED ORAL at 08:40

## 2023-03-09 RX ADMIN — Medication 400 MG: at 13:44

## 2023-03-09 RX ADMIN — SODIUM CHLORIDE, PRESERVATIVE FREE 20 MG: 5 INJECTION INTRAVENOUS at 08:41

## 2023-03-09 RX ADMIN — POTASSIUM BICARBONATE 20 MEQ: 782 TABLET, EFFERVESCENT ORAL at 08:40

## 2023-03-09 RX ADMIN — ASPIRIN 81 MG 324 MG: 81 TABLET ORAL at 08:40

## 2023-03-09 RX ADMIN — RISPERIDONE 0.5 MG: 0.25 TABLET, FILM COATED ORAL at 20:01

## 2023-03-09 RX ADMIN — TIOTROPIUM BROMIDE AND OLODATEROL 2 PUFF: 3.124; 2.736 SPRAY, METERED RESPIRATORY (INHALATION) at 09:14

## 2023-03-09 RX ADMIN — ENOXAPARIN SODIUM 40 MG: 100 INJECTION SUBCUTANEOUS at 08:47

## 2023-03-09 RX ADMIN — CARVEDILOL 6.25 MG: 6.25 TABLET, FILM COATED ORAL at 08:40

## 2023-03-09 RX ADMIN — RISPERIDONE 0.5 MG: 0.25 TABLET, FILM COATED ORAL at 08:40

## 2023-03-09 RX ADMIN — POLYETHYLENE GLYCOL 3350 17 G: 17 POWDER, FOR SOLUTION ORAL at 08:40

## 2023-03-09 RX ADMIN — SENNOSIDES AND DOCUSATE SODIUM 1 TABLET: 50; 8.6 TABLET ORAL at 20:01

## 2023-03-09 RX ADMIN — MAGNESIUM HYDROXIDE 30 ML: 400 SUSPENSION ORAL at 05:22

## 2023-03-09 RX ADMIN — Medication 500000 UNITS: at 13:43

## 2023-03-09 RX ADMIN — Medication 400 MG: at 08:40

## 2023-03-09 RX ADMIN — ESCITALOPRAM 20 MG: 20 TABLET, FILM COATED ORAL at 08:40

## 2023-03-09 RX ADMIN — POTASSIUM BICARBONATE 20 MEQ: 782 TABLET, EFFERVESCENT ORAL at 20:01

## 2023-03-09 RX ADMIN — PIPERACILLIN AND TAZOBACTAM 3375 MG: 3; .375 INJECTION, POWDER, FOR SOLUTION INTRAVENOUS at 16:43

## 2023-03-09 RX ADMIN — FUROSEMIDE 40 MG: 10 INJECTION, SOLUTION INTRAMUSCULAR; INTRAVENOUS at 08:40

## 2023-03-09 ASSESSMENT — PAIN SCALES - GENERAL
PAINLEVEL_OUTOF10: 0
PAINLEVEL_OUTOF10: 4

## 2023-03-09 ASSESSMENT — PAIN DESCRIPTION - LOCATION: LOCATION: CHEST

## 2023-03-09 ASSESSMENT — PAIN DESCRIPTION - ORIENTATION: ORIENTATION: LEFT

## 2023-03-09 NOTE — PROGRESS NOTES
CT/CV Surgery Progress Note    3/9/2023 9:12 AM  Surgeon:  Dr. Donta Sofia     Procedure: POD #9  CABG STONEY,  coronary artery bypass grafting x3 using LIMA to LAD, vein graft to to's marginal 1, vein graft to posterior descending artery, transesophageal echocardiography, endoscopic vein harvesting    Subjective:  Mr. Ney Hager is resting comfortably in chair with tube feeds via NG tube, somewhat somnolent, and in no acute distress. Pt denies chest pressure, SOB, fever,chills, N/V/D. Intake/Output Summary (Last 24 hours) at 3/9/2023 0912  Last data filed at 3/9/2023 0422  Gross per 24 hour   Intake 1252 ml   Output 1800 ml   Net -548 ml     Vital Signs: BP (!) 180/78   Pulse 83   Temp (!) 100.6 °F (38.1 °C) (Axillary)   Resp 24   Ht 5' 9\" (1.753 m)   Wt 184 lb 4.9 oz (83.6 kg)   SpO2 99%   BMI 27.22 kg/m²    Temp (24hrs), Av.8 °F (37.7 °C), Min:98.8 °F (37.1 °C), Max:100.6 °F (38.1 °C)    Labs:   CBC:  Recent Labs     23  0749 23  0401 23  0426   WBC 9.5 12.8* 14.4*   HGB 9.5* 9.7* 9.3*   HCT 29.9* 32.8* 29.8*   .8* 111.6* 107.6*    291 331     BMP:   Recent Labs     23  0749 23  0851 23  0407 23  0823 23  0426 23  0848     --  144  --  145  --    K 3.9  --  4.0  --  3.8  --    CL 99  --  101  --  103  --    CO2 29  --  29  --  28  --    PHOS 4.5  --   --   --   --   --    BUN 34*  --  32*  --  34*  --    CREATININE 0.9  --  1.0  --  1.0  --    MG 2.5*  --   --   --  2.7*  --    POCGLU  --    < >  --    < >  --  177*    < > = values in this interval not displayed. Last HgA1C:   Lab Results   Component Value Date    LABA1C 5.1 2023     Imaging:  CXR: 3/9/2023  Mild bibasilar atelectasis.     Scheduled Meds:    piperacillin-tazobactam  3,375 mg IntraVENous Q8H    magnesium hydroxide  30 mL Oral Daily    tiotropium-olodaterol  2 puff Inhalation Daily    polyethylene glycol  17 g Oral Daily    carvedilol  6.25 mg Oral BID WC amiodarone  200 mg Oral Daily    bisacodyl  10 mg Rectal Daily    calcium replacement protocol   Other RX Placeholder    aspirin  324 mg Oral Daily    carBAMazepine  400 mg Oral TID    potassium bicarb-citric acid  20 mEq Oral BID    insulin lispro  0-4 Units SubCUTAneous TID WC    insulin lispro  0-4 Units SubCUTAneous Nightly    furosemide  40 mg IntraVENous Daily    sodium chloride flush  5-40 mL IntraVENous 2 times per day    enoxaparin  40 mg SubCUTAneous Daily    sennosides-docusate sodium  1 tablet Oral BID    famotidine  20 mg Oral BID    Or    famotidine (PEPCID) injection  20 mg IntraVENous BID    atorvastatin  40 mg Oral Daily    escitalopram  20 mg Oral Daily    levothyroxine  50 mcg Oral Daily    risperiDONE  0.5 mg Oral BID    multivitamin  1 tablet Oral Daily     ROS: All neg unless specifically mentioned in subjective section. Exam:  General Appearance: alert ,conversing, in no acute distress  Cardiovascular: normal rate, regular rhythm, normal S1 and S2, no murmurs, rubs, clicks, or gallops  Pulmonary/Chest: clear to auscultation bilaterally- no wheezes, rales or rhonchi, normal air movement, no respiratory distress  Neurological: alert, oriented, normal speech, no focal findings or movement disorder noted  Sternum: Incision healing appropriately and no wound dehiscence noted.      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    Altered mental state    Syncope and collapse    Elevated troponin    Chronic obstructive pulmonary disease (HCC)    Personal history of tobacco use, presenting hazards to health    Pleural effusion, bilateral    Mediastinal lymphadenopathy    NSTEMI (non-ST elevated myocardial infarction) ([facility])    S/P CABG x 3    Excessive daytime sleepiness    Witnessed episode of apnea     Plan:   CXR reviewed- Continue daily CXR's   Increased Coreg to next dose  Continue current therapy - hold off on PEG tube today    The plan of care was discussed in detail with Dr. Venus Barth PA-C

## 2023-03-09 NOTE — PROGRESS NOTES
Pt was sitting on the chair beside his bed. He was dealing with elevated troponin but wanted prayer to cope and heal. Prayer was appreciated.     03/09/23 1404   Encounter Summary   Service Provided For: Patient and family together   Referral/Consult From: [address] [facility] Family members   Last Encounter  03/09/23  (Aninted)   Complexity of Encounter Low   Begin Time 1205   End Time  1210   Total Time Calculated 5 min   Spiritual/Emotional needs   Type Spiritual Support   Advance Care Planning   Type Completed AD/ACP document(s)   Assessment/Intervention/Outcome   Assessment Calm   Intervention Empowerment

## 2023-03-09 NOTE — PROGRESS NOTES
[address] [facility] PHYSICAL THERAPY  DAILY NOTE  [facility] ICU 4D - 4D-08/008-A     Time In: 5081  Time Out: 1600  Timed Code Treatment Minutes: 28 Minutes  Minutes: 28          Date: 3/9/2023  Patient Name: Esme Treadwell,  Gender:  male        MRN: [id number]  : 1940  (81 y.o.)     Referring Practitioner: ANGELIC Canchola CNP  Diagnosis: elevated tropnin level  Additional Pertinent Hx: per EMR- Esme Treadwell is a 81 y.o. male with past medical history of anemia, brain abscess, seizures, former smoker approximately 40 pack year who presents to the emergency department for evaluation of fatigue, shortness of breath. Patient brought in by family as family has been reporting he has been more tired, fatigued for the past week however more so in the past 3 days. Today, patient verbalized that he was short of breath and had generalized muscle aches which prompted today's ED visit. Patient lives in a two-story home and states that it has been getting harder to walk around. Has been having dyspnea on exertion. Pt s/p CABG STONEY,  coronary artery bypass grafting x3 using LIMA to LAD, vein graft to to's marginal 1, vein graft to posterior descending artery, transesophageal echocardiography, endoscopic vein harvesting DOS . Prior Level of Function:  Lives With: Daughter  Type of Home: House  Home Layout: Two level, Bed/Bath upstairs  Home Access: Stairs to enter with rails  Entrance Stairs - Number of Steps: 2 LEVY and ~15 steps to second level  Home Equipment: Melvia Crofts, rolling, Cane   Bathroom Shower/Tub: Tub/Shower unit  Bathroom Accessibility: Accessible    Receives Help From: Family  ADL Assistance: Independent  Homemaking Assistance: Needs assistance  Homemaking Responsibilities: Yes  Ambulation Assistance: Independent  Transfer Assistance: Independent  Active : No  Additional Comments: Daughter and son in law both work outside of the home.  Pt was previously independent with ADLs and functional mobility. Pt was not previously using AD for functional mobility. Pt is a questionable historian. Hx of brain surgery in 1995 and visual deficits. Restrictions/Precautions:  Restrictions/Precautions: Fall Risk, General Precautions, Surgical Protocols  Position Activity Restriction  Sternal Precautions: move in the tube  Other position/activity restrictions: hx of brain injury      SUBJECTIVE: Pt in chair and needs bottom cleaned from bowel incontinence and to return to bed. PAIN:  no # given: chest    Vitals:  ICU monitoring - stable    OBJECTIVE:  Bed Mobility:  Sit to Supine: Minimal Assistance and verbal cues    Transfers:  Sit to Stand: Minimal Assistance, X 1, with verbal cues, and CGA of another  Stand to Sit:Minimal Assistance    Ambulation:  Minimal Assistance, Moderate Assistance, X 1, with verbal cues , with increased time for completion, cues to keep moving feet and assist to move walker to help guide him  Distance: 5 ft  Surface: Level Tile  Device:Rolling Walker  Gait Deviations: Forward Flexed Posture, Decreased Step Length Bilaterally, Decreased Weight Shift Bilaterally, Decreased Gait Speed, Decreased Heel Strike Bilaterally, and Unsteady Gait    Balance:  Static Sitting Balance:  Stand By Assistance  Dynamic Sitting Balance: Contact Guard Assistance, Minimal Assistance  Static Standing Balance: Minimal Assistance  Dynamic Standing Balance: Minimal Assistance, Moderate Assistance    Exercise:  Patient was guided in 1 set(s) 10 reps of exercise to both lower extremities. Ankle pumps, Heelslides, Hip abduction/adduction, Seated marches, and Long arc quads. Exercises were completed for increased independence with functional mobility. Functional Outcome Measures: Completed  AM-PAC Inpatient Mobility Raw Score : 12  -PAC Inpatient T-Scale Score : 35.33    ASSESSMENT:  Assessment: Patient progressing toward established goals.   Activity Tolerance:  Patient tolerance of treatment: good. Equipment Recommendations:Equipment Needed: No  Discharge Recommendations: Continue to assess pending progress, Subacte/Skilled Nursing Facility, and Patient would benefit from continued PT at discharge  Plan: Current Treatment Recommendations: Strengthening, Balance training, Functional mobility training, Transfer training, Safety education & training, Equipment evaluation, education, & procurement, Home exercise program, Therapeutic activities, Patient/Caregiver education & training, Neuromuscular re-education, Endurance training, Gait training  General Plan:  (6x CABG)    Patient Education  Patient Education: Plan of Care, Home Exercise Program    Goals:  Patient Goals : family wants rehab following CABG  Short Term Goals  Time Frame for Short Term Goals: by discharge  Short Term Goal 1: Pt to complete supine <-> sit with CGA for ease getting out of bed with move in the tube guidelines  Short Term Goal 2: Pt to complete sit <-> stand with CGA for ease with mobiltiy from various surfaces  Short Term Goal 3: Pt to ambulate >10 feet with RW CGA for room ambulation. Short Term Goal 4: Pt to sit EOB for >=10 minutes with SBA with good midline posture for ease with sitting EOB to progress further mobility  Short Term Goal 5: .  Long Term Goals  Time Frame for Long Term Goals : NA due to short ELOS    Following session, patient left in safe position with all fall risk precautions in place. Pineda Zamora.  Savita Slaughter [place] 8

## 2023-03-09 NOTE — PROGRESS NOTES
[address] ICU 4D  Occupational Therapy  Daily Note  Time:    Time In: 1531  Time Out: 6979  Timed Code Treatment Minutes: 38 Minutes  Minutes: 38          Date: 3/9/2023  Patient Name: Sandre Nissen,   Gender: male      Room: [facility]008-A  MRN: [id number]  : 1940  (81 y.o.)  Referring Practitioner: Judy Patel PA-C  Diagnosis: elevated troponin  Additional Pertinent Hx: per chart review; \"The patient is an 81year old male former smoker with an approximately 20-pack-year history who quit in  with a past medical history of leukemia with remission in , hypothyroidism, \"seizures,\" hyperlipidemia, \"coma,\" and 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 \" patient underwent a CABG STONEY,  coronary artery bypass grafting x3 using LIMA to LAD, vein graft to to's marginal 1, vein graft to posterior descending artery, transesophageal echocardiography, endoscopic vein harvesting  on 23. Restrictions/Precautions:  Restrictions/Precautions: Fall Risk, General Precautions, Surgical Protocols  Position Activity Restriction  Sternal Precautions: move in the tube  Other position/activity restrictions: hx of brain injury      SUBJECTIVE: Pt sleeping in chair upon arrival of therapist. Max encouragement to keep eyes open    PAIN: no number given/10: c/o pain in L great toe-therapist adjusted socks & Pt reported it was better    Vitals: Blood Pressure: 163/62  Oxygen: 91%- 6L O2  Heart Rate: 85    COGNITION: Slow Processing, Decreased Insight, Decreased Problem Solving, and Decreased Safety Awareness    ADL:   Grooming: Moderate Assistance. For completeness of washing face  Footwear Management: Dependent. For adjusting slipper socks  Toileting: Dependent.   For cleaning bottom after incontinent of BM  Toilet Transfer: Moderate Assistance and X 1. +close SBA of another for lines & safety . BALANCE:  Standing Balance: Minimal Assistance, X 1. -mod A; vcs for not leaning posteriorly    BED MOBILITY:  Not Tested    TRANSFERS:  Sit to Stand: Moderate Assistance. From recliner x 2 occassions  Stand to Sit: Minimal Assistance. For safety & technique    FUNCTIONAL MOBILITY:  Assistive Device: Rolling Walker  Assist Level: Moderate Assistance and X 1. Distance:  3ft to/from [facility]  Vcs for posture     ADDITIONAL ACTIVITIES:  Unable to get Pt to engage in exercises at this time. Educated Pt on importance of deep breathing & coughing-encouraged Pt to complete (weak cough noted)    ASSESSMENT:     Activity Tolerance:  Patient tolerance of  treatment: Fair treatment tolerance       Discharge Recommendations: Continue to assess pending progress  Equipment Recommendations: Equipment Needed: Yes  Mobility Devices: ADL Assistive Devices  Other: Pt reports he has a rolling walker and a shower chair but was only using off and on. Recommendations provided to use both for fall prevention. Plan: Times Per Week: 6x  Times Per Day: Once a day  Current Treatment Recommendations: Strengthening, Balance training, Functional mobility training, Neuromuscular re-education, Safety education & training, Self-Care / ADL, Endurance training, Patient/Caregiver education & training, Equipment evaluation, education, & procurement, ROM    Patient Education  Patient Education:  see above    Goals  Short Term Goals  Time Frame for Short Term Goals: by discharge  Short Term Goal 1: patient will tolerate 2 min static standing with sit to stand and maintain balance with MOD A x 1 with O2 >/= 90% to increase activity tolerance for self care routine. NOT MET, CONTINUE  Short Term Goal 2: patient will complete UB ADLs with MIN A and 1-2 cues to initiate and sequence task.  NOT MET, CONTINUE  Short Term Goal 3: OTR to assesd stand pivot transfers and functional mobility and create goal as appropriate. MET, REVISE  Short Term Goal 4: patient will tolerate CABG exer to increase activity tolerance for self care routine. NOT MET, CONTINUE  Revised Short-Term Goals  Short Term Goals  Time Frame for Short Term Goals: by discharge  Short Term Goal 1: patient will tolerate 2 min static standing with sit to stand and maintain balance with MOD A x 1 with O2 >/= 90% to increase activity tolerance for self care routine. Short Term Goal 2: patient will complete UB ADLs with MIN A and 1-2 cues to initiate and sequence task. Short Term Goal 3: Pt will complete mobility to/from [facility] or bedside chair with RW, CGA, & 0-2 vcs for posture & walker safety  Short Term Goal 4: patient will tolerate CABG exer to increase activity tolerance for self care routine. Following session, patient left in safe position with all fall risk precautions in place.

## 2023-03-09 NOTE — PLAN OF CARE
Problem: Discharge Planning  Goal: Discharge to home or other facility with appropriate resources  3/9/2023 0934 by Pop Ibanez RN  Outcome: Progressing  Flowsheets (Taken 3/9/2023 0934)  Discharge to home or other facility with appropriate resources:   Identify barriers to discharge with patient and caregiver   Arrange for needed discharge resources and transportation as appropriate   Identify discharge learning needs (meds, wound care, etc)     Problem: Pain  Goal: Verbalizes/displays adequate comfort level or baseline comfort level  3/9/2023 0934 by Pop Ibanez RN  Outcome: Progressing  Flowsheets (Taken 3/9/2023 0934)  Verbalizes/displays adequate comfort level or baseline comfort level:   Encourage patient to monitor pain and request assistance   Assess pain using appropriate pain scale   Administer analgesics based on type and severity of pain and evaluate response   Implement non-pharmacological measures as appropriate and evaluate response     Problem: Respiratory - Adult  Goal: Achieves optimal ventilation and oxygenation  3/9/2023 0934 by Pop Ibanez RN  Outcome: Progressing  Flowsheets (Taken 3/9/2023 0934)  Achieves optimal ventilation and oxygenation:   Assess for changes in respiratory status   Assess for changes in mentation and behavior   Position to facilitate oxygenation and minimize respiratory effort   Oxygen supplementation based on oxygen saturation or arterial blood gases     Problem: Cardiovascular - Adult  Goal: Maintains optimal cardiac output and hemodynamic stability  3/9/2023 0934 by Rick Caputo RN  Outcome: Progressing  Flowsheets (Taken 3/9/2023 0934)  Maintains optimal cardiac output and hemodynamic stability:   Monitor blood pressure and heart rate   Monitor urine output and notify Licensed Independent Practitioner for values outside of normal range   Assess for signs of decreased cardiac output     Problem: Cardiovascular - Adult  Goal: Absence of cardiac dysrhythmias or at baseline  3/9/2023 0934 by Keiry Caputo RN  Outcome: Progressing  Flowsheets (Taken 3/9/2023 0934)  Absence of cardiac dysrhythmias or at baseline:   Monitor cardiac rate and rhythm   Assess for signs of decreased cardiac output     Problem: Metabolic/Fluid and Electrolytes - Adult  Goal: Electrolytes maintained within normal limits  3/9/2023 0934 by Keiry Caputo RN  Outcome: Progressing  Flowsheets (Taken 3/9/2023 0934)  Electrolytes maintained within normal limits:   Monitor labs and assess patient for signs and symptoms of electrolyte imbalances   Administer electrolyte replacement as ordered   Monitor response to electrolyte replacements, including repeat lab results as appropriate     Problem: Metabolic/Fluid and Electrolytes - Adult  Goal: Glucose maintained within prescribed range  3/9/2023 0934 by Keiry Caputo RN  Outcome: Progressing  Flowsheets (Taken 3/9/2023 0934)  Glucose maintained within prescribed range:   Monitor blood glucose as ordered   Assess for signs and symptoms of hyperglycemia and hypoglycemia   Administer ordered medications to maintain glucose within target range     Problem: Hematologic - Adult  Goal: Maintains hematologic stability  3/9/2023 0934 by Keiry Caputo RN  Outcome: Progressing  Flowsheets (Taken 3/9/2023 0934)  Maintains hematologic stability:   Assess for signs and symptoms of bleeding or hemorrhage   Administer blood products/factors as ordered     Problem: Skin/Tissue Integrity - Adult  Goal: Incisions, wounds, or drain sites healing without S/S of infection  3/9/2023 0934 by Keiry Caputo RN  Outcome: Progressing     Problem: Musculoskeletal - Adult  Goal: Return mobility to safest level of function  3/9/2023 0934 by Keiry Caputo RN  Outcome: Progressing     Problem: Gastrointestinal - Adult  Goal: Maintains or returns to baseline bowel function  3/9/2023 0934 by Keiry Caputo RN  Outcome: Progressing

## 2023-03-09 NOTE — PLAN OF CARE
Problem: Discharge Planning  Goal: Discharge to home or other facility with appropriate resources  3/8/2023 2337 by Mony Abdalla RN  Outcome: Progressing  Flowsheets (Taken 3/8/2023 2337)  Discharge to home or other facility with appropriate resources: Identify barriers to discharge with patient and caregiver     Problem: Pain  Goal: Verbalizes/displays adequate comfort level or baseline comfort level  3/8/2023 2337 by Mony Abdalla RN  Outcome: Progressing  Flowsheets (Taken 3/8/2023 2337)  Verbalizes/displays adequate comfort level or baseline comfort level:   Encourage patient to monitor pain and request assistance   Assess pain using appropriate pain scale   Administer analgesics based on type and severity of pain and evaluate response   Implement non-pharmacological measures as appropriate and evaluate response   Consider cultural and social influences on pain and pain management   Notify Licensed Independent Practitioner if interventions unsuccessful or patient reports new pain     Problem: Respiratory - Adult  Goal: Achieves optimal ventilation and oxygenation  3/8/2023 2337 by Mony Abdalla RN  Outcome: Progressing  Flowsheets (Taken 3/8/2023 2337)  Achieves optimal ventilation and oxygenation:   Assess for changes in respiratory status   Assess for changes in mentation and behavior   Position to facilitate oxygenation and minimize respiratory effort   Oxygen supplementation based on oxygen saturation or arterial blood gases   Initiate smoking cessation protocol as indicated   Encourage broncho-pulmonary hygiene including cough, deep breathe, incentive spirometry     Problem: Cardiovascular - Adult  Goal: Maintains optimal cardiac output and hemodynamic stability  3/8/2023 2337 by Mony Abdalla RN  Outcome: Progressing  Flowsheets (Taken 3/8/2023 2337)  Maintains optimal cardiac output and hemodynamic stability:   Monitor blood pressure and heart rate   Monitor urine output and notify Licensed Independent Practitioner for values outside of normal range   Assess for signs of decreased cardiac output     Problem: Cardiovascular - Adult  Goal: Absence of cardiac dysrhythmias or at baseline  3/8/2023 2337 by Leidy Batres RN  Outcome: Progressing  Flowsheets (Taken 3/8/2023 2337)  Absence of cardiac dysrhythmias or at baseline:   Monitor cardiac rate and rhythm   Assess for signs of decreased cardiac output   Administer antiarrhythmia medication and electrolyte replacement as ordered     Problem: Metabolic/Fluid and Electrolytes - Adult  Goal: Electrolytes maintained within normal limits  3/8/2023 2337 by Leidy Batres RN  Outcome: Progressing  Flowsheets (Taken 3/8/2023 2337)  Electrolytes maintained within normal limits:   Monitor labs and assess patient for signs and symptoms of electrolyte imbalances   Administer electrolyte replacement as ordered   Monitor response to electrolyte replacements, including repeat lab results as appropriate     Problem: Metabolic/Fluid and Electrolytes - Adult  Goal: Glucose maintained within prescribed range  3/8/2023 2337 by Leidy Batres RN  Outcome: Progressing  Flowsheets (Taken 3/8/2023 2337)  Glucose maintained within prescribed range:   Monitor blood glucose as ordered   Assess for signs and symptoms of hyperglycemia and hypoglycemia   Administer ordered medications to maintain glucose within target range   Assess barriers to adequate nutritional intake and initiate nutrition consult as needed   Instruct patient on self management of diabetes and initiate consult as needed     Problem: Hematologic - Adult  Goal: Maintains hematologic stability  3/8/2023 2337 by Leidy Batres RN  Outcome: Progressing  Flowsheets (Taken 3/8/2023 2337)  Maintains hematologic stability: Assess for signs and symptoms of bleeding or hemorrhage     Problem: Nutrition Deficit:  Goal: Optimize nutritional status  3/8/2023 2337 by Leidy Batres RN  Outcome: Progressing  Flowsheets (Taken 3/8/2023 2337)  Nutrient intake appropriate for improving, restoring, or maintaining nutritional needs:   Assess nutritional status and recommend course of action   Monitor oral intake, labs, and treatment plans   Recommend appropriate diets, oral nutritional supplements, and vitamin/mineral supplements   Recommend, monitor, and adjust tube feedings and TPN/PPN based on assessed needs   Provide specific nutrition education to patient or family as appropriate     Problem: Safety - Adult  Goal: Free from fall injury  3/8/2023 2337 by Paramjit Crane RN  Outcome: Progressing  Flowsheets (Taken 3/8/2023 2337)  Free From Fall Injury: Instruct family/caregiver on patient safety     Problem: Neurosensory - Adult  Goal: Achieves stable or improved neurological status  3/8/2023 2337 by Paramjit Crane RN  Outcome: Progressing  Flowsheets (Taken 3/8/2023 2337)  Achieves stable or improved neurological status: Assess for and report changes in neurological status     Problem: Neurosensory - Adult  Goal: Achieves maximal functionality and self care  3/8/2023 2337 by Paramjit Crane RN  Outcome: Progressing  Flowsheets (Taken 3/8/2023 2337)  Achieves maximal functionality and self care:   Monitor swallowing and airway patency with patient fatigue and changes in neurological status   Encourage and assist patient to increase activity and self care with guidance from physical therapy/occupational therapy   Encourage visually impaired, hearing impaired and aphasic patients to use assistive/communication devices     Problem: Skin/Tissue Integrity - Adult  Goal: Incisions, wounds, or drain sites healing without S/S of infection  3/8/2023 2337 by Paramjit Crane RN  Outcome: Progressing  Flowsheets (Taken 3/8/2023 2337)  Incisions, Wounds, or Drain Sites Healing Without Sign and Symptoms of Infection:   ADMISSION and DAILY: Assess and document risk factors for pressure ulcer development   TWICE DAILY: Assess and document skin integrity     Problem: Skin/Tissue Integrity - Adult  Goal: Oral mucous membranes remain intact  3/8/2023 2337 by Jung Tyson RN  Outcome: Progressing  Flowsheets (Taken 3/8/2023 2337)  Oral Mucous Membranes Remain Intact:   Assess oral mucosa and hygiene practices   Implement preventative oral hygiene regimen   Implement oral medicated treatments as ordered     Problem: Musculoskeletal - Adult  Goal: Return mobility to safest level of function  3/8/2023 2337 by Jung Tsyon RN  Outcome: Progressing  Flowsheets (Taken 3/8/2023 2337)  Return Mobility to Safest Level of Function:   Assess patient stability and activity tolerance for standing, transferring and ambulating with or without assistive devices   Ensure adequate protection for wounds/incisions during mobilization   Assist with transfers and ambulation using safe patient handling equipment as needed   Obtain physical therapy/occupational therapy consults as needed   Apply continuous passive motion per provider or physical therapy orders to increase flexion toward goal   Instruct patient/family in ordered activity level     Problem: Musculoskeletal - Adult  Goal: Return ADL status to a safe level of function  3/8/2023 2337 by Jung Tyson RN  Outcome: Progressing  Flowsheets (Taken 3/8/2023 2337)  Return ADL Status to a Safe Level of Function:   Administer medication as ordered   Assess activities of daily living deficits and provide assistive devices as needed   Obtain physical therapy/occupational therapy consults as needed   Assist and instruct patient to increase activity and self care as tolerated     Problem: Gastrointestinal - Adult  Goal: Maintains or returns to baseline bowel function  3/8/2023 2337 by Jung Tyson RN  Outcome: Progressing  Flowsheets (Taken 3/8/2023 2337)  Maintains or returns to baseline bowel function:   Assess bowel function   Administer ordered medications as needed   Encourage mobilization and activity   Nutrition consult to assist patient with appropriate food choices     Problem: Genitourinary - Adult  Goal: Absence of urinary retention  3/8/2023 2337 by Ebony Brooks RN  Outcome: Progressing  Flowsheets (Taken 3/8/2023 2337)  Absence of urinary retention:   Assess patients ability to void and empty bladder   Monitor intake/output and perform bladder scan as needed     Problem: Infection - Adult  Goal: Absence of infection at discharge  3/8/2023 2337 by Ebony Brooks RN  Outcome: Progressing  Flowsheets (Taken 3/8/2023 2337)  Absence of infection at discharge:   Assess and monitor for signs and symptoms of infection   Monitor lab/diagnostic results   Monitor all insertion sites i.e., indwelling lines, tubes and drains   Administer medications as ordered   Instruct and encourage patient and family to use good hand hygiene technique   Identify and instruct in appropriate isolation precautions for identified infection/condition     Problem: Anxiety  Goal: Will report anxiety at manageable levels  Description: INTERVENTIONS:  1. Administer medication as ordered  2. Teach and rehearse alternative coping skills  3. Provide emotional support with 1:1 interaction with staff  3/8/2023 2337 by Ebony Brooks RN  Outcome: Progressing  Flowsheets (Taken 3/8/2023 2337)  Will report anxiety at manageable levels:   Administer medication as ordered   Teach and rehearse alternative coping skills     Problem: Confusion  Goal: Confusion, delirium, dementia, or psychosis is improved or at baseline  Description: INTERVENTIONS:  1. Assess for possible contributors to thought disturbance, including medications, impaired vision or hearing, underlying metabolic abnormalities, dehydration, psychiatric diagnoses, and notify attending LIP  2. [place] high risk fall precautions, as indicated  3. Provide frequent short contacts to provide reality reorientation, refocusing and direction  4.  Decrease environmental stimuli, including noise as appropriate  5. Monitor and intervene to maintain adequate nutrition, hydration, elimination, sleep and activity  6. If unable to ensure safety without constant attention obtain sitter and review sitter guidelines with assigned personnel  7. Initiate Psychosocial CNS and Spiritual Care consult, as indicated  3/8/2023 2337 by Liu Patton RN  Outcome: Progressing  Flowsheets (Taken 3/8/2023 2337)  Effect of thought disturbance (confusion, delirium, dementia, or psychosis) are managed with adequate functional status:   Assess for contributors to thought disturbance, including medications, impaired vision or hearing, underlying metabolic abnormalities, dehydration, psychiatric diagnoses, notify [facility] high risk fall precautions, as indicated   Provide frequent short contacts to provide reality reorientation, refocusing and direction   Decrease environmental stimuli, including noise as appropriate   Monitor and intervene to maintain adequate nutrition, hydration, elimination, sleep and activity     Problem: Skin/Tissue Integrity  Goal: Absence of new skin breakdown  Description: 1. Monitor for areas of redness and/or skin breakdown  2. Assess vascular access sites hourly  3. Every 4-6 hours minimum:  Change oxygen saturation probe site  4. Every 4-6 hours:  If on nasal continuous positive airway pressure, respiratory therapy assess nares and determine need for appliance change or resting period. 3/8/2023 2337 by Liu Patton RN  Outcome: Progressing  Note: No new skin breakdown noted this shift. Pt turned every 2 hours and PRN, pillow support provided. Care plan reviewed with patient. Patient verbalize understanding of the plan of care and contribute to goal setting.

## 2023-03-09 NOTE — PLAN OF CARE
Patient's family updated at bedside. The patient's suspected sleep apnea, empiric antibiotics, hospital course, and dysphagia were discussed at length. The patient's family was given the opportunity to ask additional questions. They denied any additional questions, verbalized understanding, and express agreement with the plan of care.     -Ivana Runner, MD IM Resident

## 2023-03-09 NOTE — PROGRESS NOTES
[address] [place] [facility]  [facility] ICU 4D  DAILY NOTE    TIME   SLP Individual Minutes  Time In: 5536  Time Out: 7803  Minutes: 15  Timed Code Treatment Minutes: 0 Minutes       Date: 3/9/2023  Patient Name: Brittany Royal      CSN: [id number]   : 1940  (81 y.o.)  Gender: male   Referring Physician:  ANGELIC Tim CNP  Diagnosis: Elevated troponin  Precautions: Fall risk, aspiration precautions   Current Diet:  NPO; NGT  Swallowing Strategies: Not Applicable  Date of Last MBS/FEES:  MBS 3/7/23    Pain:  No pain reported. Subjective:  Patient seen upright in recliner chair with HANS Zamora permission. Patient alert with improved awareness to environment and ST interventions. Patient on 6L; oxygen levels varying from 85-90. HANS Zamora present attending to patient. Live sitter also present. No family present. Short-Term Goals:  SHORT TERM GOAL #1:  Goal 1: Patient will consume conservative PO trials of ICE CHIPS (x5 only) to improve pharyngeal pharyngeal function/constriction, airway protection as well as to elicit pharyngeal exercises and determine readiness for repeat instrumental evaluation. INTERVENTIONS: ST presented patient with the following PO trials:   Trial 1 (ice chip x1): Patient demonstrated with appropriate oral acceptance, oral holding of the bolus, ABSENT swallow trigger noted. Trial 2 (ice chip x1): Patient demonstrated with appropriate oral acceptance, oral holding of the bolus, MAX cues provided to \"close lips tight and swallow. \" x1-2 swallow triggers initiated   Trial 3 (ice chips x1): Patient demonstrated with appropriate oral acceptance, oral holding of the bolus, ABSENT swallow trigger noted. PO trials terminated d/t ultra high risk for aspiration.  Throughout conservative PO trials patient demonstrated with fluctuating oxygen levels from 85-90, congested wet/non-productive cough overall, wet vocal quality within inability to clear despite verbal cues to \"swallow\" and utilize oral suction. SHORT TERM GOAL #2:  Goal 2: Patient will complete pharyngeal exericses x15 in order to improve overall pharyngeal function and airway protection  INTERVENTIONS:  ST attempted \"effortful swallows\" with use of conservative ice chips completed in STG1; patient unable to complete despite high motivation to participate. RECOMMENDED STRICT NPO, aggressive daily oral care, oral suctioning as needed throughout the day, and highly recommended long-term means of alternative means of nutrition/hydration. Patient prognosis is guarded  given severity of swallow function as evidence by MBS completed 03/07 compounded with complex medical history. SHORT TERM GOAL #3:  Goal 3: Staff will exhibit return demonstration for completion of comprehensive oral care procedural analysis to maximize oral integrity and to reduce potential bacteria colonization. INTERVENTIONS: Upon entrance to patient room, patient presented with a baseline wet congested cough. Cough is weak and unproductive. [place] suctioning is on the patients lap, however, patient is presenting with limited oral acceptance. Unable to progress suction beyong anterior portion of oral cavity. Patient with dry/cracked dorsal surface of tongue with white-like coating; ST removing copious amounts of debris with use of hydrogen peroxide/water solution. ST also noted patient with hard/dry/dark in color debris coating hard palate; ST able to remove some bacterial debris, patient with fair acceptance/tolerance. Despite oral care completed prior to PO trials patient continued to remain with very poor oral hygiene. ST provided education to live sitter re: oral care regimine of hydrogen peroxide/water solution and toothet. Live sitter (nurse tech) verbalized understanding.      SHORT TERM GOAL #4:  Goal 4: Monitor mentation (hx dementia, CABG); complete alternate cognitive linguistic assessment should it be clinically indicated. INTERVENTIONS: Did not complete assessment this date, however, patient VERY confused with limited command following this date. No family present to discuss baseline mentation. ST to hold ST evaluation this date. Long-Term Goals:  No LTM Goals recommended, due to anticipated short ELOS. EDUCATION:  Learner: Patient  Education:  Reviewed results and recommendations of this evaluation, Reviewed diet and strategies, Reviewed signs, symptoms and risks of aspiration, Reviewed ST goals and Plan of Care, and Reviewed recommendations for follow-up  Evaluation of Education: Needs further instruction, No evidence of learning, and Family not present    ASSESSMENT/PLAN:  Activity Tolerance:  Patient tolerance of  treatment: poor. Assessment/Plan: Patient progressing toward established goals. Continues to require skilled care of licensed speech pathologist to progress toward achievement of established goals and plan of care. .     Plan for Next Session: limited PO trials as clinically indicated, oral care, pharyngeal strengthening exercises.    Discharge Recommendations: [facility]     STELLA Dejesus 23

## 2023-03-10 ENCOUNTER — APPOINTMENT (OUTPATIENT)
Dept: GENERAL RADIOLOGY | Age: 83
DRG: 233 | End: 2023-03-10
Payer: MEDICARE

## 2023-03-10 LAB
ACINETOBACTER CALCOACETICUS-BAUMANNII BY PCR: NOT DETECTED
ALBUMIN SERPL BCG-MCNC: 3.2 G/DL (ref 3.5–5.1)
ALP SERPL-CCNC: 49 U/L (ref 38–126)
ALT SERPL W/O P-5'-P-CCNC: 84 U/L (ref 11–66)
ANION GAP SERPL CALC-SCNC: 14 MEQ/L (ref 8–16)
AST SERPL-CCNC: 105 U/L (ref 5–40)
BILIRUB SERPL-MCNC: 0.4 MG/DL (ref 0.3–1.2)
BLACTX-M ISLT/SPM QL: ABNORMAL
BLAIMP ISLT/SPM QL: ABNORMAL
BLAKPC ISLT/SPM QL: ABNORMAL
BLAOXA-48-LIKE ISLT/SPM QL: ABNORMAL
BLAVIM ISLT/SPM QL: ABNORMAL
BUN SERPL-MCNC: 38 MG/DL (ref 7–22)
C PNEUM DNA LOWER RESP QL NAA+NON-PROBE: NOT DETECTED
CALCIUM SERPL-MCNC: 8.1 MG/DL (ref 8.5–10.5)
CHLORIDE SERPL-SCNC: 105 MEQ/L (ref 98–111)
CO2 SERPL-SCNC: 31 MEQ/L (ref 23–33)
CORTIS SERPL-MCNC: 18.8 UG/DL
CORTISOL COLLECTION INFO: NORMAL
CREAT SERPL-MCNC: 1 MG/DL (ref 0.4–1.2)
DEPRECATED RDW RBC AUTO: 50.4 FL (ref 35–45)
ENTEROBACTER CLOACAE COMPLEX BY PCR: NOT DETECTED
ERYTHROCYTE [DISTWIDTH] IN BLOOD BY AUTOMATED COUNT: 12.8 % (ref 11.5–14.5)
ESCHERICHIA COLI BY PCR: NOT DETECTED
FLUAV RNA LOWER RESP QL NAA+NON-PROBE: NOT DETECTED
FLUBV RNA LOWER RESP QL NAA+NON-PROBE: NOT DETECTED
GFR SERPL CREATININE-BSD FRML MDRD: > 60 ML/MIN/1.73M2
GLUCOSE BLD STRIP.AUTO-MCNC: 135 MG/DL (ref 70–108)
GLUCOSE BLD STRIP.AUTO-MCNC: 147 MG/DL (ref 70–108)
GLUCOSE BLD STRIP.AUTO-MCNC: 150 MG/DL (ref 70–108)
GLUCOSE BLD STRIP.AUTO-MCNC: 150 MG/DL (ref 70–108)
GLUCOSE BLD STRIP.AUTO-MCNC: 181 MG/DL (ref 70–108)
GLUCOSE SERPL-MCNC: 145 MG/DL (ref 70–108)
HADV DNA LOWER RESP QL NAA+NON-PROBE: NOT DETECTED
HAEMOPHILUS INFLUENZAE BY PCR: NOT DETECTED
HCOV RNA LOWER RESP QL NAA+NON-PROBE: DETECTED
HCT VFR BLD AUTO: 29.5 % (ref 42–52)
HGB BLD-MCNC: 9 GM/DL (ref 14–18)
HMPV RNA LOWER RESP QL NAA+NON-PROBE: NOT DETECTED
HPIV RNA LOWER RESP QL NAA+NON-PROBE: NOT DETECTED
KLEBSIELLA AEROGENES BY PCR: NOT DETECTED
KLEBSIELLA OXYTOCA BY PCR: NOT DETECTED
KLEBSIELLA PNEUMONIAE GROUP BY PCR: NOT DETECTED
L PNEUMO DNA LOWER RESP QL NAA+NON-PROBE: NOT DETECTED
M PNEUMO DNA LOWER RESP QL NAA+NON-PROBE: NOT DETECTED
MCH RBC QN AUTO: 33.1 PG (ref 26–33)
MCHC RBC AUTO-ENTMCNC: 30.5 GM/DL (ref 32.2–35.5)
MCV RBC AUTO: 108.5 FL (ref 80–94)
MORAXELLA CATARRHALIS BY PCR: NOT DETECTED
PLATELET # BLD AUTO: 334 THOU/MM3 (ref 130–400)
PMV BLD AUTO: 11.1 FL (ref 9.4–12.4)
POTASSIUM SERPL-SCNC: 3.8 MEQ/L (ref 3.5–5.2)
PROT SERPL-MCNC: 6.2 G/DL (ref 6.1–8)
PROTEUS SPECIES BY PCR: NOT DETECTED
PSEUDOMONAS AERUGINOSA BY PCR: NOT DETECTED
RBC # BLD AUTO: 2.72 MILL/MM3 (ref 4.7–6.1)
RESISTANT GENE MECA/C & MREJ BY PCR: ABNORMAL
RESISTANT GENE NDM BY PCR: ABNORMAL
RSV RNA LOWER RESP QL NAA+NON-PROBE: NOT DETECTED
RV+EV RNA LOWER RESP QL NAA+NON-PROBE: NOT DETECTED
SERRATIA MARCESCENS BY PCR: NOT DETECTED
SODIUM SERPL-SCNC: 150 MEQ/L (ref 135–145)
SOURCE: ABNORMAL
SPECIMEN ACCEPTABILITY: ABNORMAL
STAPH AUREUS BY PCR: NOT DETECTED
STREP AGALACTIAE BY PCR: NOT DETECTED
STREP PNEUMONIAE BY PCR: NOT DETECTED
STREP PYOGENES BY PCR: NOT DETECTED
T4 FREE SERPL-MCNC: 1.14 NG/DL (ref 0.93–1.76)
TSH SERPL DL<=0.005 MIU/L-ACNC: 5.9 UIU/ML (ref 0.4–4.2)
WBC # BLD AUTO: 14 THOU/MM3 (ref 4.8–10.8)

## 2023-03-10 PROCEDURE — 87205 SMEAR GRAM STAIN: CPT

## 2023-03-10 PROCEDURE — 87581 M.PNEUMON DNA AMP PROBE: CPT

## 2023-03-10 PROCEDURE — 85027 COMPLETE CBC AUTOMATED: CPT

## 2023-03-10 PROCEDURE — 87798 DETECT AGENT NOS DNA AMP: CPT

## 2023-03-10 PROCEDURE — 97110 THERAPEUTIC EXERCISES: CPT

## 2023-03-10 PROCEDURE — 80053 COMPREHEN METABOLIC PANEL: CPT

## 2023-03-10 PROCEDURE — 87541 LEGION PNEUMO DNA AMP PROB: CPT

## 2023-03-10 PROCEDURE — 36415 COLL VENOUS BLD VENIPUNCTURE: CPT

## 2023-03-10 PROCEDURE — 2580000003 HC RX 258

## 2023-03-10 PROCEDURE — 94640 AIRWAY INHALATION TREATMENT: CPT

## 2023-03-10 PROCEDURE — 82948 REAGENT STRIP/BLOOD GLUCOSE: CPT

## 2023-03-10 PROCEDURE — 2700000000 HC OXYGEN THERAPY PER DAY

## 2023-03-10 PROCEDURE — A4216 STERILE WATER/SALINE, 10 ML: HCPCS | Performed by: PHYSICIAN ASSISTANT

## 2023-03-10 PROCEDURE — 82533 TOTAL CORTISOL: CPT

## 2023-03-10 PROCEDURE — 84439 ASSAY OF FREE THYROXINE: CPT

## 2023-03-10 PROCEDURE — 71045 X-RAY EXAM CHEST 1 VIEW: CPT

## 2023-03-10 PROCEDURE — 84443 ASSAY THYROID STIM HORMONE: CPT

## 2023-03-10 PROCEDURE — 2500000003 HC RX 250 WO HCPCS: Performed by: PHYSICIAN ASSISTANT

## 2023-03-10 PROCEDURE — 94660 CPAP INITIATION&MGMT: CPT

## 2023-03-10 PROCEDURE — 99233 SBSQ HOSP IP/OBS HIGH 50: CPT | Performed by: INTERNAL MEDICINE

## 2023-03-10 PROCEDURE — 87070 CULTURE OTHR SPECIMN AEROBIC: CPT

## 2023-03-10 PROCEDURE — 6370000000 HC RX 637 (ALT 250 FOR IP): Performed by: PHYSICIAN ASSISTANT

## 2023-03-10 PROCEDURE — 6370000000 HC RX 637 (ALT 250 FOR IP): Performed by: THORACIC SURGERY (CARDIOTHORACIC VASCULAR SURGERY)

## 2023-03-10 PROCEDURE — 6370000000 HC RX 637 (ALT 250 FOR IP): Performed by: NURSE PRACTITIONER

## 2023-03-10 PROCEDURE — 87631 RESP VIRUS 3-5 TARGETS: CPT

## 2023-03-10 PROCEDURE — 94761 N-INVAS EAR/PLS OXIMETRY MLT: CPT

## 2023-03-10 PROCEDURE — 6360000002 HC RX W HCPCS

## 2023-03-10 PROCEDURE — 6360000002 HC RX W HCPCS: Performed by: PHYSICIAN ASSISTANT

## 2023-03-10 PROCEDURE — 97530 THERAPEUTIC ACTIVITIES: CPT

## 2023-03-10 PROCEDURE — 2580000003 HC RX 258: Performed by: PHYSICIAN ASSISTANT

## 2023-03-10 PROCEDURE — 74018 RADEX ABDOMEN 1 VIEW: CPT

## 2023-03-10 PROCEDURE — 2000000000 HC ICU R&B

## 2023-03-10 PROCEDURE — 87486 CHLMYD PNEUM DNA AMP PROBE: CPT

## 2023-03-10 PROCEDURE — 6370000000 HC RX 637 (ALT 250 FOR IP)

## 2023-03-10 RX ORDER — MODAFINIL 100 MG/1
200 TABLET ORAL EVERY MORNING
Status: DISCONTINUED | OUTPATIENT
Start: 2023-03-11 | End: 2023-03-16 | Stop reason: HOSPADM

## 2023-03-10 RX ADMIN — ATORVASTATIN CALCIUM 40 MG: 40 TABLET, FILM COATED ORAL at 08:38

## 2023-03-10 RX ADMIN — Medication 400 MG: at 20:23

## 2023-03-10 RX ADMIN — PIPERACILLIN AND TAZOBACTAM 3375 MG: 3; .375 INJECTION, POWDER, FOR SOLUTION INTRAVENOUS at 17:55

## 2023-03-10 RX ADMIN — CARVEDILOL 12.5 MG: 6.25 TABLET, FILM COATED ORAL at 17:55

## 2023-03-10 RX ADMIN — FUROSEMIDE 40 MG: 10 INJECTION, SOLUTION INTRAMUSCULAR; INTRAVENOUS at 08:42

## 2023-03-10 RX ADMIN — SENNOSIDES AND DOCUSATE SODIUM 1 TABLET: 50; 8.6 TABLET ORAL at 08:38

## 2023-03-10 RX ADMIN — ENOXAPARIN SODIUM 40 MG: 100 INJECTION SUBCUTANEOUS at 08:38

## 2023-03-10 RX ADMIN — AMIODARONE HYDROCHLORIDE 200 MG: 200 TABLET ORAL at 08:39

## 2023-03-10 RX ADMIN — Medication 400 MG: at 08:38

## 2023-03-10 RX ADMIN — SENNOSIDES AND DOCUSATE SODIUM 1 TABLET: 50; 8.6 TABLET ORAL at 20:23

## 2023-03-10 RX ADMIN — Medication 500000 UNITS: at 08:37

## 2023-03-10 RX ADMIN — CARVEDILOL 12.5 MG: 6.25 TABLET, FILM COATED ORAL at 08:38

## 2023-03-10 RX ADMIN — LEVOTHYROXINE SODIUM 50 MCG: 0.05 TABLET ORAL at 06:35

## 2023-03-10 RX ADMIN — TIOTROPIUM BROMIDE AND OLODATEROL 2 PUFF: 3.124; 2.736 SPRAY, METERED RESPIRATORY (INHALATION) at 08:07

## 2023-03-10 RX ADMIN — PIPERACILLIN AND TAZOBACTAM 3375 MG: 3; .375 INJECTION, POWDER, FOR SOLUTION INTRAVENOUS at 01:21

## 2023-03-10 RX ADMIN — POTASSIUM BICARBONATE 20 MEQ: 782 TABLET, EFFERVESCENT ORAL at 08:42

## 2023-03-10 RX ADMIN — Medication 500000 UNITS: at 20:58

## 2023-03-10 RX ADMIN — POTASSIUM BICARBONATE 20 MEQ: 782 TABLET, EFFERVESCENT ORAL at 20:22

## 2023-03-10 RX ADMIN — RISPERIDONE 0.5 MG: 0.25 TABLET, FILM COATED ORAL at 08:42

## 2023-03-10 RX ADMIN — FAMOTIDINE 20 MG: 20 TABLET, FILM COATED ORAL at 08:38

## 2023-03-10 RX ADMIN — PIPERACILLIN AND TAZOBACTAM 3375 MG: 3; .375 INJECTION, POWDER, FOR SOLUTION INTRAVENOUS at 09:03

## 2023-03-10 RX ADMIN — RISPERIDONE 0.5 MG: 0.25 TABLET, FILM COATED ORAL at 20:23

## 2023-03-10 RX ADMIN — SODIUM CHLORIDE, PRESERVATIVE FREE 20 MG: 5 INJECTION INTRAVENOUS at 20:22

## 2023-03-10 RX ADMIN — ASPIRIN 81 MG 324 MG: 81 TABLET ORAL at 08:38

## 2023-03-10 RX ADMIN — Medication 1 TABLET: at 08:37

## 2023-03-10 RX ADMIN — ALBUTEROL SULFATE 2.5 MG: 2.5 SOLUTION RESPIRATORY (INHALATION) at 04:56

## 2023-03-10 RX ADMIN — ESCITALOPRAM 20 MG: 20 TABLET, FILM COATED ORAL at 08:42

## 2023-03-10 ASSESSMENT — PAIN SCALES - GENERAL
PAINLEVEL_OUTOF10: 0

## 2023-03-10 NOTE — PROGRESS NOTES
CT/CV Surgery Progress Note    3/10/2023 9:15 AM  Surgeon:  Dr. Tong Rowland     Procedure: POD #10  CABG STONEY,  coronary artery bypass grafting x3 using LIMA to LAD, vein graft to to's marginal 1, vein graft to posterior descending artery, transesophageal echocardiography, endoscopic vein harvesting    Subjective:  Mr. Evy Neal is resting comfortably in chair with tube feeds via NG tube, somewhat somnolent, and in no acute distress. Pt denies chest pressure, SOB, fever,chills, N/V/D. Intake/Output Summary (Last 24 hours) at 3/10/2023 0915  Last data filed at 3/10/2023 6020  Gross per 24 hour   Intake 1721.21 ml   Output 2140 ml   Net -418.79 ml       Vital Signs: BP (!) 162/90   Pulse 80   Temp 99 °F (37.2 °C) (Oral)   Resp 26   Ht 5' 9\" (1.753 m)   Wt 184 lb 4.9 oz (83.6 kg)   SpO2 93%   BMI 27.22 kg/m²    Temp (24hrs), Av.9 °F (37.2 °C), Min:98.1 °F (36.7 °C), Max:99.5 °F (37.5 °C)    Labs:   CBC:  Recent Labs     23  0401 03/09/23  0426 03/10/23  0419   WBC 12.8* 14.4* 14.0*   HGB 9.7* 9.3* 9.0*   HCT 32.8* 29.8* 29.5*   .6* 107.6* 108.5*    331 334       BMP:   Recent Labs     23  04023  08236 23  0848 03/10/23  0419 03/10/23  0806     --  145  --  150*  --    K 4.0  --  3.8  --  3.8  --      --  103  --  105  --    CO2 29  --  28  --  31  --    BUN 32*  --  34*  --  38*  --    CREATININE 1.0  --  1.0  --  1.0  --    MG  --   --  2.7*  --   --   --    POCGLU  --    < >  --    < >  --  150*    < > = values in this interval not displayed. Last HgA1C:   Lab Results   Component Value Date    LABA1C 5.1 2023     Imaging:  CXR: 3/10/2023  Mild bibasilar atelectasis.     Scheduled Meds:    piperacillin-tazobactam  3,375 mg IntraVENous Q8H    magnesium hydroxide  30 mL Oral Daily    carvedilol  12.5 mg Oral BID WC    nystatin  5 mL Oral 4x Daily    tiotropium-olodaterol  2 puff Inhalation Daily    polyethylene glycol  17 g Oral Daily amiodarone  200 mg Oral Daily    bisacodyl  10 mg Rectal Daily    calcium replacement protocol   Other RX Placeholder    aspirin  324 mg Oral Daily    carBAMazepine  400 mg Oral TID    potassium bicarb-citric acid  20 mEq Oral BID    insulin lispro  0-4 Units SubCUTAneous TID WC    insulin lispro  0-4 Units SubCUTAneous Nightly    furosemide  40 mg IntraVENous Daily    sodium chloride flush  5-40 mL IntraVENous 2 times per day    enoxaparin  40 mg SubCUTAneous Daily    sennosides-docusate sodium  1 tablet Oral BID    famotidine  20 mg Oral BID    Or    famotidine (PEPCID) injection  20 mg IntraVENous BID    atorvastatin  40 mg Oral Daily    escitalopram  20 mg Oral Daily    levothyroxine  50 mcg Oral Daily    risperiDONE  0.5 mg Oral BID    multivitamin  1 tablet Oral Daily     ROS: All neg unless specifically mentioned in subjective section. Exam:  General Appearance: alert ,conversing, in no acute distress  Cardiovascular: normal rate, regular rhythm, normal S1 and S2, no murmurs, rubs, clicks, or gallops  Pulmonary/Chest: clear to auscultation bilaterally- no wheezes, rales or rhonchi, normal air movement, no respiratory distress  Neurological: alert, oriented, normal speech, no focal findings or movement disorder noted  Sternum: Incision healing appropriately and no wound dehiscence noted.      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

## 2023-03-10 NOTE — PLAN OF CARE
Problem: Respiratory - Adult  Goal: Achieves optimal ventilation and oxygenation  3/10/2023 0812 by Shawn Feliz RCP  Outcome: Progressing  3/10/2023 0812 by Shawn Feliz RCP  Outcome: Progressing  3/9/2023 2221 by Taylor Panchal RN  Outcome: Progressing  Flowsheets (Taken 3/9/2023 2000)  Achieves optimal ventilation and oxygenation:   Assess for changes in respiratory status   Assess for changes in mentation and behavior   Assess the need for suctioning and aspirate as needed   Assess and instruct to report shortness of breath or any respiratory difficulty   Respiratory therapy support as indicated

## 2023-03-10 NOTE — PLAN OF CARE
Problem: Discharge Planning  Goal: Discharge to home or other facility with appropriate resources  3/9/2023 2221 by Justina Tilley RN  Outcome: Progressing  Flowsheets (Taken 3/9/2023 2000)  Discharge to home or other facility with appropriate resources: Identify barriers to discharge with patient and caregiver  3/9/2023 0934 by David Quarles RN  Outcome: Progressing  Flowsheets (Taken 3/9/2023 0934)  Discharge to home or other facility with appropriate resources:   Identify barriers to discharge with patient and caregiver   Arrange for needed discharge resources and transportation as appropriate   Identify discharge learning needs (meds, wound care, etc)     Problem: Pain  Goal: Verbalizes/displays adequate comfort level or baseline comfort level  3/9/2023 2221 by Justina Tilley RN  Outcome: Progressing  Flowsheets (Taken 3/9/2023 2000)  Verbalizes/displays adequate comfort level or baseline comfort level:   Encourage patient to monitor pain and request assistance   Assess pain using appropriate pain scale   Implement non-pharmacological measures as appropriate and evaluate response  3/9/2023 0934 by David Quarles RN  Outcome: Progressing  Flowsheets (Taken 3/9/2023 0934)  Verbalizes/displays adequate comfort level or baseline comfort level:   Encourage patient to monitor pain and request assistance   Assess pain using appropriate pain scale   Administer analgesics based on type and severity of pain and evaluate response   Implement non-pharmacological measures as appropriate and evaluate response     Problem: Respiratory - Adult  Goal: Achieves optimal ventilation and oxygenation  3/9/2023 2221 by Justina Tilley RN  Outcome: Progressing  Flowsheets (Taken 3/9/2023 2000)  Achieves optimal ventilation and oxygenation:   Assess for changes in respiratory status   Assess for changes in mentation and behavior   Assess the need for suctioning and aspirate as needed   Assess and instruct to report shortness of breath or any respiratory difficulty   Respiratory therapy support as indicated  3/9/2023 0934 by David Quarles RN  Outcome: Progressing  Flowsheets (Taken 3/9/2023 0934)  Achieves optimal ventilation and oxygenation:   Assess for changes in respiratory status   Assess for changes in mentation and behavior   Position to facilitate oxygenation and minimize respiratory effort   Oxygen supplementation based on oxygen saturation or arterial blood gases     Problem: Cardiovascular - Adult  Goal: Maintains optimal cardiac output and hemodynamic stability  3/9/2023 2221 by Justina Tilley RN  Outcome: Progressing  Flowsheets (Taken 3/9/2023 2000)  Maintains optimal cardiac output and hemodynamic stability:   Monitor blood pressure and heart rate   Assess for signs of decreased cardiac output  3/9/2023 0934 by David Quarles RN  Outcome: Progressing  Flowsheets (Taken 3/9/2023 0934)  Maintains optimal cardiac output and hemodynamic stability:   Monitor blood pressure and heart rate   Monitor urine output and notify Licensed Independent Practitioner for values outside of normal range   Assess for signs of decreased cardiac output  Goal: Absence of cardiac dysrhythmias or at baseline  3/9/2023 2221 by Justina Tilley RN  Outcome: Progressing  Flowsheets (Taken 3/9/2023 2000)  Absence of cardiac dysrhythmias or at baseline: Monitor cardiac rate and rhythm  3/9/2023 0934 by David Quarles RN  Outcome: Progressing  Flowsheets (Taken 3/9/2023 0934)  Absence of cardiac dysrhythmias or at baseline:   Monitor cardiac rate and rhythm   Assess for signs of decreased cardiac output     Problem: Metabolic/Fluid and Electrolytes - Adult  Goal: Electrolytes maintained within normal limits  3/9/2023 2221 by Justina Tilley RN  Outcome: Progressing  Flowsheets (Taken 3/9/2023 2000)  Electrolytes maintained within normal limits: Monitor labs and assess patient for signs and symptoms of electrolyte imbalances  3/9/2023 0934 by David Quarles RN  Outcome: Progressing  Flowsheets (Taken 3/9/2023 0934)  Electrolytes maintained within normal limits:   Monitor labs and assess patient for signs and symptoms of electrolyte imbalances   Administer electrolyte replacement as ordered   Monitor response to electrolyte replacements, including repeat lab results as appropriate  Goal: Glucose maintained within prescribed range  3/9/2023 2221 by Pete Babinski, RN  Outcome: Progressing  Flowsheets (Taken 3/9/2023 2000)  Glucose maintained within prescribed range:   Monitor blood glucose as ordered   Assess for signs and symptoms of hyperglycemia and hypoglycemia  3/9/2023 0934 by Olamide Bartholomew RN  Outcome: Progressing  Flowsheets (Taken 3/9/2023 0934)  Glucose maintained within prescribed range:   Monitor blood glucose as ordered   Assess for signs and symptoms of hyperglycemia and hypoglycemia   Administer ordered medications to maintain glucose within target range     Problem: Hematologic - Adult  Goal: Maintains hematologic stability  3/9/2023 2221 by Pete Babinski, RN  Outcome: Progressing  Flowsheets (Taken 3/9/2023 2000)  Maintains hematologic stability: Assess for signs and symptoms of bleeding or hemorrhage  3/9/2023 0934 by lOamide Bartholomew RN  Outcome: Progressing  Flowsheets (Taken 3/9/2023 0934)  Maintains hematologic stability:   Assess for signs and symptoms of bleeding or hemorrhage   Administer blood products/factors as ordered     Problem: Nutrition Deficit:  Goal: Optimize nutritional status  3/9/2023 2221 by Pete Babinski, RN  Outcome: Progressing  Flowsheets (Taken 3/8/2023 2337 by Jung Tyson RN)  Nutrient intake appropriate for improving, restoring, or maintaining nutritional needs:   Assess nutritional status and recommend course of action   Monitor oral intake, labs, and treatment plans   Recommend appropriate diets, oral nutritional supplements, and vitamin/mineral supplements   Recommend, monitor, and adjust tube feedings and TPN/PPN based on assessed needs   Provide specific nutrition education to patient or family as appropriate  3/9/2023 0934 by Catracho Dominguez RN  Outcome: Progressing     Problem: Safety - Adult  Goal: Free from fall injury  3/9/2023 2221 by Tommy Fenton RN  Outcome: [place] (Taken 3/8/2023 2337 by Martha Moore RN)  Free From Fall Injury: Instruct family/caregiver on patient safety  3/9/2023 0934 by Catracho Dominguez RN  Outcome: Progressing     Problem: Neurosensory - Adult  Goal: Achieves stable or improved neurological status  3/9/2023 2221 by Tommy Fenton RN  Outcome: Progressing  Flowsheets (Taken 3/9/2023 2000)  Achieves stable or improved neurological status: Assess for and report changes in neurological status  3/9/2023 0934 by Catracho Dominguez RN  Outcome: Progressing  Goal: Achieves maximal functionality and self care  3/9/2023 2221 by Tommy Fenton RN  Outcome: Progressing  Flowsheets (Taken 3/9/2023 2000)  Achieves maximal functionality and self care: Monitor swallowing and airway patency with patient fatigue and changes in neurological status  3/9/2023 0934 by Catracho Dominguez RN  Outcome: Progressing     Problem: Skin/Tissue Integrity - Adult  Goal: Incisions, wounds, or drain sites healing without S/S of infection  3/9/2023 2221 by Tommy Fenton RN  Outcome: Progressing  Flowsheets  Taken 3/9/2023 2219  Incisions, Wounds, or Drain Sites Healing Without Sign and Symptoms of Infection: TWICE DAILY: Assess and document skin integrity  Taken 3/9/2023 2000  Incisions, Wounds, or Drain Sites Healing Without Sign and Symptoms of Infection: TWICE DAILY: Assess and document skin integrity  3/9/2023 0934 by Catracho Dominguez RN  Outcome: Progressing  Goal: Oral mucous membranes remain intact  3/9/2023 2221 by Tommy Fenton RN  Outcome: Progressing  Flowsheets  Taken 3/9/2023 2219  Oral Mucous Membranes Remain Intact: Assess oral mucosa and hygiene practices  Taken 3/9/2023 2000  Oral Mucous Membranes Remain Intact: Assess oral mucosa and hygiene practices  3/9/2023 0934 by Magy Burdick RN  Outcome: Progressing     Problem: Musculoskeletal - Adult  Goal: Return mobility to safest level of function  3/9/2023 2221 by Ary Jenkins RN  Outcome: Progressing  Flowsheets (Taken 3/9/2023 2000)  Return Mobility to Safest Level of Function:   Assess patient stability and activity tolerance for standing, transferring and ambulating with or without assistive devices   Instruct patient/family in ordered activity level  3/9/2023 0934 by Magy Burdick RN  Outcome: Progressing  Goal: Return ADL status to a safe level of function  3/9/2023 2221 by Ary Jenkins RN  Outcome: Progressing  Flowsheets (Taken 3/9/2023 2000)  Return ADL Status to a Safe Level of Function: Administer medication as ordered  3/9/2023 0934 by Magy Burdick RN  Outcome: Progressing     Problem: Gastrointestinal - Adult  Goal: Maintains or returns to baseline bowel function  3/9/2023 2221 by Ary Jenkins RN  Outcome: Progressing  Flowsheets (Taken 3/9/2023 2000)  Maintains or returns to baseline bowel function:   Assess bowel function   Administer ordered medications as needed  3/9/2023 0934 by Magy Burdick RN  Outcome: Progressing     Problem: Genitourinary - Adult  Goal: Absence of urinary retention  3/9/2023 2221 by Ary Jenkins RN  Outcome: Progressing  Flowsheets (Taken 3/9/2023 2000)  Absence of urinary retention: Assess patients ability to void and empty bladder  3/9/2023 0934 by Magy Burdick RN  Outcome: Progressing     Problem: Infection - Adult  Goal: Absence of infection at discharge  3/9/2023 2221 by Ary Jenkins RN  Outcome: Progressing  Flowsheets (Taken 3/9/2023 2000)  Absence of infection at discharge:   Assess and monitor for signs and symptoms of infection   Monitor lab/diagnostic results   Administer medications as ordered   Instruct and encourage patient and family to use good hand hygiene technique   Identify and instruct in appropriate isolation precautions for identified infection/condition  3/9/2023 0934 by Naomy Alston RN  Outcome: Progressing     Problem: Anxiety  Goal: Will report anxiety at manageable levels  Description: INTERVENTIONS:  1. Administer medication as ordered  2. Teach and rehearse alternative coping skills  3. Provide emotional support with 1:1 interaction with staff  3/9/2023 2221 by Bradley Simmons RN  Outcome: Progressing  Flowsheets (Taken 3/9/2023 2000)  Will report anxiety at manageable levels: Administer medication as ordered  3/9/2023 0934 by Naomy Alston RN  Outcome: Progressing     Problem: Confusion  Goal: Confusion, delirium, dementia, or psychosis is improved or at baseline  Description: INTERVENTIONS:  1. Assess for possible contributors to thought disturbance, including medications, impaired vision or hearing, underlying metabolic abnormalities, dehydration, psychiatric diagnoses, and notify attending LIP  2. [place] high risk fall precautions, as indicated  3. Provide frequent short contacts to provide reality reorientation, refocusing and direction  4. Decrease environmental stimuli, including noise as appropriate  5. Monitor and intervene to maintain adequate nutrition, hydration, elimination, sleep and activity  6. If unable to ensure safety without constant attention obtain sitter and review sitter guidelines with assigned personnel  7.  Initiate Psychosocial CNS and Spiritual Care consult, as indicated  3/9/2023 2221 by Bradley Simmons RN  Outcome: Progressing  Flowsheets (Taken 3/9/2023 2000)  Effect of thought disturbance (confusion, delirium, dementia, or psychosis) are managed with adequate functional status:   Assess for contributors to thought disturbance, including medications, impaired vision or hearing, underlying metabolic abnormalities, dehydration, psychiatric diagnoses, notify LIP   Provide frequent short contacts to provide reality reorientation, refocusing and direction   Decrease environmental stimuli, including noise as appropriate   Monitor and intervene to maintain adequate nutrition, hydration, elimination, sleep and activity   If unable to ensure safety without constant attention obtain sitter and review sitter guidelines with assigned personnel  3/9/2023 0934 by Sherri Drew RN  Outcome: Progressing     Problem: Skin/Tissue Integrity  Goal: Absence of new skin breakdown  Description: 1. Monitor for areas of redness and/or skin breakdown  2. Assess vascular access sites hourly  3. Every 4-6 hours minimum:  Change oxygen saturation probe site  4. Every 4-6 hours:  If on nasal continuous positive airway pressure, respiratory therapy assess nares and determine need for appliance change or resting period.   3/9/2023 2221 by Brook Uribe RN  Outcome: Progressing  Note: Continue Q2 h turns and PRN  3/9/2023 0934 by Sherri Drew RN  Outcome: Progressing

## 2023-03-10 NOTE — PROGRESS NOTES
[address] ICU 4D  Occupational Therapy  Daily Note  Time:    Time In: 4918  Time Out: 3323  Timed Code Treatment Minutes: 32 Minutes  Minutes: 32          Date: 3/10/2023  Patient Name: Zaina Doherty,   Gender: male      Room: [facility]008-A  MRN: [id number]  : 1940  (81 y.o.)  Referring Practitioner: Joslyn Lyon PA-C  Diagnosis: elevated troponin  Additional Pertinent Hx: per chart review; \"The patient is an 81year old male former smoker with an approximately 20-pack-year history who quit in  with a past medical history of leukemia with remission in , hypothyroidism, \"seizures,\" hyperlipidemia, \"coma,\" and 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 \" patient underwent a CABG STONEY,  coronary artery bypass grafting x3 using LIMA to LAD, vein graft to to's marginal 1, vein graft to posterior descending artery, transesophageal echocardiography, endoscopic vein harvesting  on 23. Restrictions/Precautions:  Restrictions/Precautions: Fall Risk, General Precautions, Surgical Protocols  Position Activity Restriction  Sternal Precautions: move in the tube  Other position/activity restrictions: hx of brain injury      SUBJECTIVE: Pt up in recliner sleeping upon arrival of therapist. Real Triplett reports Pt has been easy to wake-Pt answered therapist right away when spoken to.  Pt perservated on going back to sleep throughout session-max encouragement to keep eyes open    PAIN: 0/10: no c/o pain during session, other than hypersensitive with any touching of NG tube    Vitals: Blood Pressure: 136/52  Oxygen: 92%  Heart Rate: 80    COGNITION: Slow Processing, Decreased Insight, Decreased Problem Solving, Decreased Safety Awareness, Impaired Attention, and Difficulty Following Commands    ADL:   Grooming: Moderate Assistance. Dependent for washing eyelids, wash mouth with s/u -not very complete job . BALANCE:  Standing Balance: Moderate Assistance, X 1. + CGA x 1; heavy posterior lean & forward flexed posture, tactile cues provided    BED MOBILITY:  Not Tested    TRANSFERS:  Sit to Stand:  Maximum Assistance, X 1. From recliner x 2 occassions  Stand to Sit: Minimal Assistance. To recliner, vcs for technique    FUNCTIONAL MOBILITY:  Assistive Device: Rolling Walker  Assist Level:  Maximum Assistance and X 1 + CGA x 1  Distance:  2 steps forward & back  Continued to have forward flexed posture & posterior lean     ADDITIONAL ACTIVITIES:  Completed 3/5 cardiac exercises x 10 reps while seated in recliner with max encouragement to continue    ASSESSMENT:     Activity Tolerance:  Patient tolerance of  treatment: Fair treatment tolerance       Discharge Recommendations: Continue to assess pending progress and Subacute/skilled nursing facility  Equipment Recommendations: Equipment Needed: Yes  Mobility Devices: ADL Assistive Devices  Other: Pt reports he has a rolling walker and a shower chair but was only using off and on. Recommendations provided to use both for fall prevention. Plan: Times Per Week: 6x  Times Per Day: Once a day  Current Treatment Recommendations: Strengthening, Balance training, Functional mobility training, Neuromuscular re-education, Safety education & training, Self-Care / ADL, Endurance training, Patient/Caregiver education & training, Equipment evaluation, education, & procurement, ROM    Patient Education  Patient Education:  see above    Goals  Short Term Goals  Time Frame for Short Term Goals: by discharge  Short Term Goal 1: patient will tolerate 2 min static standing with sit to stand and maintain balance with MOD A x 1 with O2 >/= 90% to increase activity tolerance for self care routine.   Short Term Goal 2: patient will complete UB ADLs with MIN A and 1-2 cues to initiate and sequence task. Short Term Goal 3: Pt will complete mobility to/from [facility] or bedside chair with RW, CGA, & 0-2 vcs for posture & walker safety  Short Term Goal 4: patient will tolerate CABG exer to increase activity tolerance for self care routine. Following session, patient left in safe position with all fall risk precautions in place.

## 2023-03-10 NOTE — PROGRESS NOTES
[address] [facility] PHYSICAL THERAPY  DAILY NOTE  [facility] ICU 4D - 4D-08/008-A     Time In: 1534  Time Out: 1600  Timed Code Treatment Minutes: 26 Minutes  Minutes: 26          Date: 3/10/2023  Patient Name: Cassi Hidalgo,  Gender:  male        MRN: [id number]  : 1940  (81 y.o.)     Referring Practitioner: ANGELCI Lorenzana CNP  Diagnosis: elevated tropnin level  Additional Pertinent Hx: per EMR- Cassi Hidalgo is a 81 y.o. male with past medical history of anemia, brain abscess, seizures, former smoker approximately 40 pack year who presents to the emergency department for evaluation of fatigue, shortness of breath. Patient brought in by family as family has been reporting he has been more tired, fatigued for the past week however more so in the past 3 days. Today, patient verbalized that he was short of breath and had generalized muscle aches which prompted today's ED visit. Patient lives in a two-story home and states that it has been getting harder to walk around. Has been having dyspnea on exertion. Pt s/p CABG STONEY,  coronary artery bypass grafting x3 using LIMA to LAD, vein graft to to's marginal 1, vein graft to posterior descending artery, transesophageal echocardiography, endoscopic vein harvesting DOS . Prior Level of Function:  Lives With: Daughter  Type of Home: House  Home Layout: Two level, Bed/Bath upstairs  Home Access: Stairs to enter with rails  Entrance Stairs - Number of Steps: 2 LEVY and ~15 steps to second level  Home Equipment: [place], rolling, Cane   Bathroom Shower/Tub: Tub/Shower unit  Bathroom Accessibility: Accessible    Receives Help From: Family  ADL Assistance: Independent  Homemaking Assistance: Needs assistance  Homemaking Responsibilities: Yes  Ambulation Assistance: Independent  Transfer Assistance: Independent  Active : No  Additional Comments: Daughter and son in law both work outside of the home.  Pt was previously independent with ADLs and functional mobility. Pt was not previously using AD for functional mobility. Pt is a questionable historian. Hx of brain surgery in 1995 and visual deficits. Restrictions/Precautions:  Restrictions/Precautions: Fall Risk, General Precautions, Surgical Protocols  Position Activity Restriction  Sternal Precautions: move in the tube  Other position/activity restrictions: hx of brain injury      SUBJECTIVE: Pt resting in recliner with sitter present. Pt awakened easily and cooperated for exercises and to return to bed    PAIN: not reported      Vitals:  per ICU monitoring    OBJECTIVE:  Bed Mobility:  Sit to Supine: Minimal Assistance, Moderate Assistance     Transfers:  Sit to Stand: Minimal Assistance, X 1, and CGA x 1  Stand to Sit:Minimal Assistance, X 1    Ambulation:  Minimal Assistance, X 1, and CGA x 1  Distance: 4 ft  Surface: Level Tile  Device:Rolling Walker  Gait Deviations: Forward Flexed Posture, Decreased Step Length Bilaterally, Decreased Weight Shift Bilaterally, Decreased Gait Speed, Decreased Heel Strike Bilaterally, Narrow Base of Support, and cues to move toward the bed and follow the walker as it was directed    Balance:  Static Sitting Balance:  Stand By Assistance, Contact Guard Assistance  Dynamic Sitting Balance: Contact Guard Assistance, Minimal Assistance  Static Standing Balance: Minimal Assistance, X 1 and CGA x 1  Dynamic Standing Balance: Minimal Assistance, X 1-2    Exercise:  Patient was guided in 1 set(s) 5-10 reps of exercise to both lower extremities. Ankle pumps, Shoulder horizontal abduction/adduction, Seated marches, Seated heel/toe raises, and Long arc quads. Exercises were completed for increased independence with functional mobility. Functional Outcome Measures: Completed  AM-PAC Inpatient Mobility Raw Score : 12  AM-PAC Inpatient T-Scale Score : 35.33    ASSESSMENT:  Assessment: Patient progressing toward established goals.  Pt is not safe for return to home at this time and would benefit from continued skilled PT to address strengthening, balance, bed mobility, endurance building, and functional mobility training. Activity Tolerance:  Patient tolerance of  treatment: good. Equipment Recommendations:Equipment Needed: No  Discharge Recommendations: Continue to assess pending progress, Subacte/Skilled Nursing Facility, Patient would benefit from continued PT at discharge, and Inpatient Therapy Stay  Plan: Current Treatment Recommendations: Strengthening, Balance training, Functional mobility training, Transfer training, Safety education & training, Equipment evaluation, education, & procurement, Home exercise program, Therapeutic activities, Patient/Caregiver education & training, Neuromuscular re-education, Endurance training, Gait training  General Plan:  (6x CABG)    Patient Education  Patient Education: Plan of Care, Home Exercise Program    Goals:  Patient Goals : family wants rehab following CABG  Short Term Goals  Time Frame for Short Term Goals: by discharge  Short Term Goal 1: Pt to complete supine <-> sit with CGA for ease getting out of bed with move in the tube guidelines  Short Term Goal 2: Pt to complete sit <-> stand with CGA for ease with mobiltiy from various surfaces  Short Term Goal 3: Pt to ambulate >10 feet with RW CGA for room ambulation. Short Term Goal 4: Pt to sit EOB for >=10 minutes with SBA with good midline posture for ease with sitting EOB to progress further mobility  Short Term Goal 5: .  Long Term Goals  Time Frame for Long Term Goals : NA due to short ELOS    Following session, patient left in safe position with all fall risk precautions in place. Miguel Mckay.  Savita Winters [place] [address]

## 2023-03-10 NOTE — PROGRESS NOTES
CRITICAL CARE PROGRESS NOTE      Patient:  Jamie Cadet    Unit/Bed:4D-08/008-A  YOB: 1940  MRN: [id number]   PCP: Rahel Guillory MD  Date of Admission: 2/21/2023  Chief Complaint:- Cough with Sputum Production    Assessment and Plan:    CAD, s/p CABG:  [facility] on 02/21, 02/28 CABG x3 using LIMA to LAD, vein graft to marginal, vein graft to posterior descending artery. 4 drains in place after procedure. Anterior mediastinal drains removed on 03/03. Pleural drains removed on 03/05. +821.2cc total I/O last 24 hours. Metolazone discontinued; patient maintained on daily furosemide per CT surgrey. Metoprolol switched to carvedilol on 03/04. Tolerating CPAP therapy with naps and overnight. Continue to monitor I/O's. Continue amiodarone, ASA, statin. Continue postop PPI  Aspiration Event  Secondary to oropharyngeal dysphagia with silent aspiration  Molecular pneumonia panel growing Non-SARS coronavirus  Respiratory culture growing moderate gram positive positive bacilli and cocci  Patient started empirically on piperacillin-tazobactam in order to prevent development of pneumonia  Circadian Rhythm Disturbance, Suspected  Likely secondary to sleep apnea as well as baseline cognitive deficit secondary to dementia  Appropriate day-night cues (TV on during the day, open/close blinds, frequent reorientation)  Modafinil 200mg ordered  TSH, Cortisol levels ordered to rule out other pathologies  Dyspnea, Improved: Patient has displayed air hunger in spite of adequate oxygen saturation on room air.   PRN low dose morphine ordered for dyspnea  Patient encouraged at bedside to use relaxed breathing exercises and frequently reoriented to his situation  Recommend against the use of supplemental oxygen unless SpO2 less than 92% on room air  No morphine required in the last 24 hours  Hypernatremia, Acute  Likely secondary to aggressive diuresis  Free water boluses per NG tube increased  Central and Obstructive Sleep Apnea, Suspected  Prior to admission, patient's family endorses excessive daytime sleepiness, witnessed apneas, snoring, and sleep inertia  Patient has displayed witnessed apneas and snoring in the inpatient setting  Patient is at increased risk for central sleep apnea in the context of his cardiovascular disease as well as his history of CVA  Patient started on BiPAP 12/6 with BUR 10 which he has been compliant and benefitting from  PSG ordered per [employer] for [facility] for outpatient followup  Cognitive Decline; Suspected Dementia, Stable:  Chronic small vessel disease seen on CT Head on 03/02. Recommendation for MOCA when stable. Baseline prior to admission oriented only to self. Has had episodes of agitation where he pulled out NG tube on 03/03, requiring soft restraints which family consented to. PRN Zyprexa ordered for agitation. Continue with patient's home risperidone, carbamazepine, escitalopram. Continue with restraints as needed. Mentation has been stable the last 24 hours; patient is still oriented to self only. Restraints discontinued. No Zyprexa administered over last 24 hours. Will continue with appropriate day-night cues and frequent reorientation  Sitter in room  MBS showing Laryngeal penetration with thin and mildly thick barium; Continue with NG Tube feeds  PT/OT/SLP/Dietitian following  Oropharyngeal Dysphagia  Likely secondary to previous history of CVA with worsening cognitive decline  MBS showing laryngeal penetration with thin and mildly thick barium  SLP following with patient assessed to have weak cough reflex, absent swallow, and difficulty participating in pharyngeal strengthening exercises  NG tube in place; patient tolerating; dietitian following  Physical Deconditioning, Improved  AMPAC 13, PT/OT following  10. Acute Hypoxic Respiratory Failure, s/p intubation/extubation, resolved  Weaned down to room air on 03/05; maintaining adequate oxygenation.  Continue PRN albuterol. 11.  COPD: No PFT available. Maintaining adequate oxygentation on 3L via nasal cannula. Continue to wean as tolerated. Incentive spirometer, Acapella. 12. Acute on Chronic Macrocytic Anemia, Stable :  Vitamin B12/Folate WNL; iron 64, iron saturation 23%, TIBC 278. Likely secondary to iron deficiency secondary to poor oral nutrition. Plan on iron every other day and vitamin C when patient is more stable. 13. At Risk for Malnutrition, Present on Admission: Tube feeding order placed 03/01. Continue tube feeding regimen  14. Hypocalcemia- tube feeding in place, replacement protocol in place  15. Hypoalbuminemia- likely secondary to poor oral nutrition, tube feeding in place  16. Left Mastoiditis, Identified on CT- No indication for antibiotic therapy at this time  17. Recurrent Seizure Disorder:  Continue risperidone, carbamazepine   18. Unspecified Anxiety Disorder: Continue escitalopram  19. Right ICA Stenosis 70%, Right Subclavian Severe Stenosis:  Identified on CTA. Seen and evaluated by vascular surgery with no plan for intervention  20. Hypothyroidism: 2/21/2023 TSH 4.130, free T40.92. Continue levothyroxine; Repeat TSH/FT4 ordered  21. Hx Unspecified Leukemia:  Noted  22. Hx CVA:  baseline cognitive deficits per family; oriented only to self at baseline. CT Head without Contrast showing stable old infarct in the right cerebellum and stable old infarct in the right basal ganglia. INITIAL H AND P AND ICU COURSE:  This is an 81year old male who [facility] with SOB and brown sputum production. The patient was admitted for a COPD exacerbation and started on bronchodilators, azithromcyin, and rocpehin. Cardiology was consulted secondary to EKG changes concerning for anterior ischemic changes. Given a troponin elevation, patient underwent LHC on 02/23 which showed evidence of multivessel disase.  Patient underwent CABG on 38/26 without complication and was transferred to the ICU postoperatively; four chest tubes in place. Was extubated without complication and tolerated CPAP overnight. 03/01: Patient seen and evaluated at the bedside in no acute distress. Now not requiring any pressor support. Output from chest tubes reviewed (911cc serosanguinous). Denies chest pain, fever, chills, and N/V/D at bedside. No additional acute symptoms or concerns. Tolerated CPAP overnight. 03/02: Patient seen and evaluated at the beside in no acute distress. Patient tolerating weaned oxygen requirement. Patient requiring max assist with occupational therapy. No overnight events. Output from chest tubes reviewed. Patient is oriented only to self. He denies chest pain, fever, chills, and N/V/D. He denies any additional acute symptoms or concerns. He tolerated CPAP overnight. Message sent to CT surgery PA regarding transfer out of the ICU. CRP elevation likely secondary to major surgery. Case discussed extensively with primary RN.     03/03: Patient seen and evaluated at the bedside in moderate distress. He states that he wants to go home. He is  only oriented to self which is baseline. No new focal neurologic deficits. Case reviewed with primary RN, patient was comfortable throughout the night, tolerated BiPAP without complication. Reviewed hospital course and CT findings with overnight ICU team.     03/04: Patient pulled NG tube on 03/03 during the day, was replaced by Dave Andrade RN, was started on Zyprexa as needed daily for agitation. Patient placed in soft restraints and family made aware. Chest x-ray displayed improved aeration on both lungs with small residual left basilar atelectasis. 03/05: Patient seen and evaluated at the bedside. Case discussed with primary RN. Patient tolerated CPAP therapy with sitter in room for short period overnight. No additional Zyprexa administered. Patient's pain well controlled with reduced dose of oxicodone. Patient oriented only to self at bedside.  No new focal neurologic deficits identified. Will continue to wean supplemental oxygen as tolerated. 03/06: Patient seen and evaluated at the bedside with sitter in room. Patient tolerated PAP therapy for most of the night, removed at 0400 on 03/06. No Zyprexa or additional analgesia administered overnight. 03/07: Patient seen and evaluated at the bedside with sitter in the room. Patient denying any chest pain, fever, chills, nausea, vomiting, or diarrhea. Patient is only oriented to self. He is more cooperative today. Restraints removed last night without complication. He tolerated PAP therapy overnight. Supplemental oxygen discontinued at bedside as patient is maintaining adequate oxygenation. No Zyprexa or additional analgesia administered overnight. 03/08 Patient seen and evaluated at the bedside with sitter in the room. Patient denies chest pain, fever, chills, nausea, vomiting, or diarrhea. He endorses some constipation and feeling ready to have a bowel movement. He endorses having difficulty coughing. Tolerated suction. Repeat CXR ordered. Will continue with PAP therapy for strong suspicion of sleep apnea. No additional analgesia or olanzapine administered overnight. 03/09: Patient seen and evaluated at the bedside with nurse in the room. No overnight events noted. Worsening leukocytosis with fever. Case discussed with SLP with concern that patient has significant oropharyngeal dysphagia with concern for silent aspiration. Cultures ordered and patient started on Zosyn. Case discussed with PT, patient's participation has improved. No zyprexa or morphine administered overnight.    03/10: Patient seen and evaluated at the bedside with primary RN in room. Patient noted to have difficulty with clearing secretions. Patient noted to have some blood admixed with secretions; likely secondary to attempts to suction. Patient tolerated PAP therapy overnight.  Plan to initiate modafinil regimen to ensure appropriate circadian rhythm. Case discussed with patient's family at bedside. All questions were answered fully; they verbalized understanding and are agreeable to the plan of care. Past Medical History:    Leukemia, HLD, Brain Abscess s/p craniotomy, Recurrent Seizures. Family History:  No Pertinent Family history noted in EMR. .  Social History: Former smoker of tobacco 1ppd for 30 years; can't remember quit date; no recreational drug or alcohol use. ROS   Review of systems:    General: No fevers, chills. Pain controlled. HEENT: No headache, no vision changes, no hearing changes, no trauma. CV: No palpitations, no chest pain, no tachycarida  RESP: No respiratory distress, no cough, no wheeze, SOB. GI: No abdominal pain, no nausea, no vomiting, no diarrhea  : No dysuria, no hematuria, no incontinence  Neuro: No seizures, no focal deficits, no weakness, no confusion   Psych: No psychosis, no SI/HI, Depression, Anxiety.      Scheduled Meds:   [START ON 3/11/2023] modafinil  200 mg Oral QAM    piperacillin-tazobactam  3,375 mg IntraVENous Q8H    magnesium hydroxide  30 mL Oral Daily    carvedilol  12.5 mg Oral BID WC    nystatin  5 mL Oral 4x Daily    tiotropium-olodaterol  2 puff Inhalation Daily    polyethylene glycol  17 g Oral Daily    amiodarone  200 mg Oral Daily    bisacodyl  10 mg Rectal Daily    calcium replacement protocol   Other RX Placeholder    aspirin  324 mg Oral Daily    carBAMazepine  400 mg Oral TID    potassium bicarb-citric acid  20 mEq Oral BID    insulin lispro  0-4 Units SubCUTAneous TID WC    insulin lispro  0-4 Units SubCUTAneous Nightly    furosemide  40 mg IntraVENous Daily    sodium chloride flush  5-40 mL IntraVENous 2 times per day    enoxaparin  40 mg SubCUTAneous Daily    sennosides-docusate sodium  1 tablet Oral BID    famotidine  20 mg Oral BID    Or    famotidine (PEPCID) injection  20 mg IntraVENous BID    atorvastatin  40 mg Oral Daily    escitalopram  20 mg Oral Daily    levothyroxine  50 mcg Oral Daily    risperiDONE  0.5 mg Oral BID    multivitamin  1 tablet Oral Daily     Continuous Infusions:   sodium chloride Stopped (03/07/23 1237)    sodium chloride      dextrose      EPINEPHrine Stopped (03/01/23 0602)       PHYSICAL EXAMINATION:  T:  99.  P:  78. RR:  18. B/P:  141/50. O2 Sat:  97% on RA    I/O:  +821.2 on 03/09; -548 on 03/08; -90 on 03/07  WEIGHTS: 184lb on 03/09; 184lb on 03/07  Body mass index is 27.22 kg/m². GCS:   13    General:   This is an elderly gentleman lying in bed  HEENT:  normocephalic and atraumatic. No scleral icterus. PERR; NG Tube in place; patient noted to have difficulty clearing his throat and coughing  Neck: supple. No Thyromegaly. Lungs: diminished breath sounds bilaterally  Cardiac: RRR. No JVD. Abdomen: soft. Nontender. Extremities:  No clubbing, cyanosis, or edema x 4. Vasculature: capillary refill < 3 seconds. Palpable dorsalis pedis pulses. Skin:  warm and dry. Psych:  Alert and oriented to self only. Affect appropriate  Lymph:  No supraclavicular adenopathy. Neurologic:  No focal deficit. No seizures. Data: (All radiographs, tracings, PFTs, and imaging are personally viewed and interpreted unless otherwise noted). BMP Sodium 150, Potassium 3.8, Chloride 105, CO2 31, BUN 38, Cr 1.0, Glucose 181  LFT Albumin 3.2, Alk Phos 49, ALT 84, , Tbili 0.4  TSH 5.900, FT4 1.14  CBC WBC 14, RBC 2.72, Hb 9.0, Hct 29.5, .5, Platelets 982  Imaging CXR showing prominent pulmonary vasculature  Micro Molecular Pneumonia Panel positive for non SARS Coronavirus; Respiratory Culture with moderate gram positive cocci and bacilli  Telemetry shows normal sinus rhythm        Seen with multidisciplinary ICU team Dr. Melba Nguyen.   Meets Continued ICU Level Care Criteria:    [] Yes   [x] No - Transfer Planning Dr. Amanda Cash; Plan per Dr. Amanda Cash to stay through weekend  [] HOSPITALIST CONTACTED-      Case and plan discussed with  Kayleen Jacinto        Electronically signed by Shantanu Grimes MD IM Resident   Patient seen by me including key components of medical care. Case discussed with resident physician. Remains in ICU for nursing care. Increase free water. .  Italicized font, if present,  represents changes to the note made by me. CC time 35 minutes. Time was discontiguous. Time does not include procedure. Time does include my direct assessment of the patient and coordination of care. Time represents more than 50% of the time involved with patient care by the [address] [place] [address] team.  Electronically signed by Abran Willoughby MD.

## 2023-03-10 NOTE — PROGRESS NOTES
Comprehensive Nutrition Assessment    Type and Reason for Visit:  Reassess (TF management)    Nutrition Recommendations/Plan:   Continue Pivot tube feed at goal 50ml/ hour  Additional free water flush as per Physician - 100ml every 6h   NPO as per Physician and SLP recommendations  Continue bowel meds as per Physician      Malnutrition Assessment:  Malnutrition Status: At risk for malnutrition (Comment) (03/10/23 1416)    Context:  Acute Illness     Findings of the 6 clinical characteristics of malnutrition:  Energy Intake:  Mild decrease in energy intake (Comment) (variable po admit to OHS; tolerating EN since 3/1)  Weight Loss:  No significant weight loss     Body Fat Loss:  No significant body fat loss     Muscle Mass Loss:  No significant muscle mass loss    Fluid Accumulation:  Mild     Strength:  Not Performed    Nutrition Assessment:     Pt. Improving nutritionally AEB tolerating EN at goal, NPO d/t dysphagia. At risk for further nutrition compromise r/t admit with NSTEMI, cardiac cath 2/23, CABG 2/28 with intubation and extubation 2/28, increased needs with COPD and OHS for healing process, confusion noted, LOS day 14 and underlying medical condition advanced age, HLD, leukemia.       Nutrition Related Findings:    Pt. Report/Treatments/Miscellaneous: pt. Seen this am; sitter at bedside; discussed on rounds - increasing free H20 from 30ml every 8h to 100ml every 6h today - Na 711; per Dr. Jessica Brown holding on PEG for now; difficulty clearing secretions; good UO  GI Status: BM x2 3/9; tolerating TF at 50ml/hour ; strict NPO per SLP  Pertinent Labs: glucose 150  BUN 38  creatinine 1  K+ 3.8  Na 150    Pertinent Meds: pepcid, synthroid, MVI, ATB    Wound Type: Surgical Incision (CABG x3 2/28; pressue ulcer stage 2 nose)       Current Nutrition Intake & Therapies:     Diet NPO Exceptions are: Sips of Water with Meds  ADULT TUBE FEEDING; Nasogastric; Immune Enhancing; Continuous; 50; No; 100; Q 6 hours  Current Tube Feeding (TF) Orders:  Feeding Route: Nasogastric  Formula: Immune Enhancing (PIvot)  Schedule: Continuous  Feeding Regimen: Pivot goal of 50ml/hour  Additives/Modulars: None  Water Flushes: per Physician - 3/10 per Dr. Yogi Enciso 100ml every 6h  Goal TF & Flush Orders Provides: Pivot 50ml/hour provides 1800 kcals, 113gms protein, 207gms cho,9 gms fiber, 900ml free H20 (1300 with MD flush) in 1200ml total volume (1600 with MD flush)/24h    Anthropometric Measures:  Height: 5' 9\" (175.3 cm)  Ideal Body Weight (IBW): 160 lbs (73 kg)    Admission Body Weight: 202 lb (91.6 kg) (2/28 with scleral edema)  Current Body Weight: 184 lb (83.5 kg) (3/9 with non pitting edema),    Weight Source: Bed Scale  Current BMI (kg/m2): 27.2  Usual Body Weight: 199 lb (90.3 kg) (2/22/23; 204# 1/2017; 239# 12/2015; 207# 3/2014)  % Weight Change (Calculated): 1.5   BMI Categories: Overweight (BMI 25.0-29. 9)    Estimated Daily Nutrient Needs:  Energy Requirements Based On: Kcal/kg  Weight Used for Energy Requirements: Admission (92kgm)  Energy (kcal/day): 7919-6609 (20-25/kgm)  Weight Used for Protein Requirements: Ideal (73kgm)  Protein (g/day): 88-110gms (1.2-1.5/kgm IBW     Fluid (ml/day): per Physician    Nutrition Diagnosis:   Inadequate oral intake related to swallowing difficulty as evidenced by NPO or clear liquid status due to medical condition, nutrition support - enteral nutrition, swallow study results    Nutrition Interventions:   Food and/or Nutrient Delivery: Continue NPO, Continue Current Tube Feeding  Nutrition Education/Counseling: Education not appropriate  Coordination of Nutrition Care: Continue to monitor while inpatient, Interdisciplinary Rounds       Goals:     Goals:  Tolerate nutrition support at goal rate, by next RD assessment       Nutrition Monitoring and Evaluation:      Food/Nutrient Intake Outcomes: Enteral Nutrition Intake/Tolerance  Physical Signs/Symptoms Outcomes: Biochemical Data, Chewing or Swallowing, GI Status, Fluid Status or Edema, Hemodynamic Status, Nutrition Focused Physical Findings, Skin, Weight    Discharge Planning:     Too soon to determine     [address], [place], [place]  Contact: [phone]

## 2023-03-11 LAB
ALBUMIN SERPL BCG-MCNC: 3 G/DL (ref 3.5–5.1)
ALP SERPL-CCNC: 50 U/L (ref 38–126)
ALT SERPL W/O P-5'-P-CCNC: 94 U/L (ref 11–66)
ANION GAP SERPL CALC-SCNC: 12 MEQ/L (ref 8–16)
AST SERPL-CCNC: 113 U/L (ref 5–40)
BILIRUB SERPL-MCNC: 0.4 MG/DL (ref 0.3–1.2)
BUN SERPL-MCNC: 40 MG/DL (ref 7–22)
CALCIUM SERPL-MCNC: 7.6 MG/DL (ref 8.5–10.5)
CHLORIDE SERPL-SCNC: 109 MEQ/L (ref 98–111)
CO2 SERPL-SCNC: 31 MEQ/L (ref 23–33)
CREAT SERPL-MCNC: 1.1 MG/DL (ref 0.4–1.2)
DEPRECATED RDW RBC AUTO: 51.2 FL (ref 35–45)
ERYTHROCYTE [DISTWIDTH] IN BLOOD BY AUTOMATED COUNT: 13 % (ref 11.5–14.5)
GFR SERPL CREATININE-BSD FRML MDRD: > 60 ML/MIN/1.73M2
GLUCOSE BLD STRIP.AUTO-MCNC: 137 MG/DL (ref 70–108)
GLUCOSE BLD STRIP.AUTO-MCNC: 155 MG/DL (ref 70–108)
GLUCOSE BLD STRIP.AUTO-MCNC: 161 MG/DL (ref 70–108)
GLUCOSE BLD STRIP.AUTO-MCNC: 161 MG/DL (ref 70–108)
GLUCOSE BLD STRIP.AUTO-MCNC: 168 MG/DL (ref 70–108)
GLUCOSE SERPL-MCNC: 158 MG/DL (ref 70–108)
HCT VFR BLD AUTO: 28.6 % (ref 42–52)
HGB BLD-MCNC: 8.6 GM/DL (ref 14–18)
MCH RBC QN AUTO: 32.5 PG (ref 26–33)
MCHC RBC AUTO-ENTMCNC: 30.1 GM/DL (ref 32.2–35.5)
MCV RBC AUTO: 107.9 FL (ref 80–94)
PLATELET # BLD AUTO: 297 THOU/MM3 (ref 130–400)
PMV BLD AUTO: 11 FL (ref 9.4–12.4)
POTASSIUM SERPL-SCNC: 3.7 MEQ/L (ref 3.5–5.2)
PROT SERPL-MCNC: 5.9 G/DL (ref 6.1–8)
RBC # BLD AUTO: 2.65 MILL/MM3 (ref 4.7–6.1)
SODIUM SERPL-SCNC: 152 MEQ/L (ref 135–145)
WBC # BLD AUTO: 10.5 THOU/MM3 (ref 4.8–10.8)

## 2023-03-11 PROCEDURE — 2000000000 HC ICU R&B

## 2023-03-11 PROCEDURE — 94640 AIRWAY INHALATION TREATMENT: CPT

## 2023-03-11 PROCEDURE — 2500000003 HC RX 250 WO HCPCS: Performed by: PHYSICIAN ASSISTANT

## 2023-03-11 PROCEDURE — 6370000000 HC RX 637 (ALT 250 FOR IP): Performed by: PHYSICIAN ASSISTANT

## 2023-03-11 PROCEDURE — 82948 REAGENT STRIP/BLOOD GLUCOSE: CPT

## 2023-03-11 PROCEDURE — 6370000000 HC RX 637 (ALT 250 FOR IP)

## 2023-03-11 PROCEDURE — 36415 COLL VENOUS BLD VENIPUNCTURE: CPT

## 2023-03-11 PROCEDURE — 6370000000 HC RX 637 (ALT 250 FOR IP): Performed by: NURSE PRACTITIONER

## 2023-03-11 PROCEDURE — 80053 COMPREHEN METABOLIC PANEL: CPT

## 2023-03-11 PROCEDURE — 92526 ORAL FUNCTION THERAPY: CPT

## 2023-03-11 PROCEDURE — 97530 THERAPEUTIC ACTIVITIES: CPT

## 2023-03-11 PROCEDURE — 6360000002 HC RX W HCPCS

## 2023-03-11 PROCEDURE — 2580000003 HC RX 258

## 2023-03-11 PROCEDURE — 2580000003 HC RX 258: Performed by: PHYSICIAN ASSISTANT

## 2023-03-11 PROCEDURE — 94761 N-INVAS EAR/PLS OXIMETRY MLT: CPT

## 2023-03-11 PROCEDURE — 99291 CRITICAL CARE FIRST HOUR: CPT | Performed by: SURGERY

## 2023-03-11 PROCEDURE — 85027 COMPLETE CBC AUTOMATED: CPT

## 2023-03-11 PROCEDURE — A4216 STERILE WATER/SALINE, 10 ML: HCPCS | Performed by: PHYSICIAN ASSISTANT

## 2023-03-11 PROCEDURE — 97110 THERAPEUTIC EXERCISES: CPT

## 2023-03-11 PROCEDURE — 6360000002 HC RX W HCPCS: Performed by: PHYSICIAN ASSISTANT

## 2023-03-11 PROCEDURE — 94660 CPAP INITIATION&MGMT: CPT

## 2023-03-11 PROCEDURE — 6370000000 HC RX 637 (ALT 250 FOR IP): Performed by: THORACIC SURGERY (CARDIOTHORACIC VASCULAR SURGERY)

## 2023-03-11 RX ADMIN — ATORVASTATIN CALCIUM 40 MG: 40 TABLET, FILM COATED ORAL at 09:09

## 2023-03-11 RX ADMIN — CARVEDILOL 12.5 MG: 6.25 TABLET, FILM COATED ORAL at 09:06

## 2023-03-11 RX ADMIN — Medication 400 MG: at 09:09

## 2023-03-11 RX ADMIN — POTASSIUM BICARBONATE 20 MEQ: 782 TABLET, EFFERVESCENT ORAL at 20:44

## 2023-03-11 RX ADMIN — LEVOTHYROXINE SODIUM 50 MCG: 0.05 TABLET ORAL at 07:33

## 2023-03-11 RX ADMIN — Medication 500000 UNITS: at 09:22

## 2023-03-11 RX ADMIN — Medication 500000 UNITS: at 16:46

## 2023-03-11 RX ADMIN — ENOXAPARIN SODIUM 40 MG: 100 INJECTION SUBCUTANEOUS at 08:56

## 2023-03-11 RX ADMIN — PIPERACILLIN AND TAZOBACTAM 3375 MG: 3; .375 INJECTION, POWDER, FOR SOLUTION INTRAVENOUS at 17:09

## 2023-03-11 RX ADMIN — PIPERACILLIN AND TAZOBACTAM 3375 MG: 3; .375 INJECTION, POWDER, FOR SOLUTION INTRAVENOUS at 02:13

## 2023-03-11 RX ADMIN — AMIODARONE HYDROCHLORIDE 200 MG: 200 TABLET ORAL at 09:04

## 2023-03-11 RX ADMIN — FUROSEMIDE 40 MG: 10 INJECTION, SOLUTION INTRAMUSCULAR; INTRAVENOUS at 08:58

## 2023-03-11 RX ADMIN — FAMOTIDINE 20 MG: 20 TABLET, FILM COATED ORAL at 08:55

## 2023-03-11 RX ADMIN — RISPERIDONE 0.5 MG: 0.25 TABLET, FILM COATED ORAL at 09:04

## 2023-03-11 RX ADMIN — Medication 500000 UNITS: at 14:41

## 2023-03-11 RX ADMIN — RISPERIDONE 0.5 MG: 0.25 TABLET, FILM COATED ORAL at 20:44

## 2023-03-11 RX ADMIN — Medication 1 TABLET: at 09:07

## 2023-03-11 RX ADMIN — CARVEDILOL 12.5 MG: 6.25 TABLET, FILM COATED ORAL at 16:48

## 2023-03-11 RX ADMIN — Medication 400 MG: at 20:43

## 2023-03-11 RX ADMIN — MODAFINIL 200 MG: 100 TABLET ORAL at 08:54

## 2023-03-11 RX ADMIN — SODIUM CHLORIDE, PRESERVATIVE FREE 10 ML: 5 INJECTION INTRAVENOUS at 09:21

## 2023-03-11 RX ADMIN — TIOTROPIUM BROMIDE AND OLODATEROL 2 PUFF: 3.124; 2.736 SPRAY, METERED RESPIRATORY (INHALATION) at 08:52

## 2023-03-11 RX ADMIN — MAGNESIUM HYDROXIDE 30 ML: 400 SUSPENSION ORAL at 08:51

## 2023-03-11 RX ADMIN — Medication 500000 UNITS: at 20:44

## 2023-03-11 RX ADMIN — PIPERACILLIN AND TAZOBACTAM 3375 MG: 3; .375 INJECTION, POWDER, FOR SOLUTION INTRAVENOUS at 09:23

## 2023-03-11 RX ADMIN — POTASSIUM BICARBONATE 20 MEQ: 782 TABLET, EFFERVESCENT ORAL at 08:52

## 2023-03-11 RX ADMIN — SODIUM CHLORIDE, PRESERVATIVE FREE 10 ML: 5 INJECTION INTRAVENOUS at 20:45

## 2023-03-11 RX ADMIN — Medication 400 MG: at 14:44

## 2023-03-11 RX ADMIN — SENNOSIDES AND DOCUSATE SODIUM 1 TABLET: 50; 8.6 TABLET ORAL at 08:53

## 2023-03-11 RX ADMIN — ESCITALOPRAM 20 MG: 20 TABLET, FILM COATED ORAL at 09:06

## 2023-03-11 RX ADMIN — SODIUM CHLORIDE, PRESERVATIVE FREE 20 MG: 5 INJECTION INTRAVENOUS at 20:44

## 2023-03-11 RX ADMIN — ASPIRIN 81 MG 324 MG: 81 TABLET ORAL at 08:48

## 2023-03-11 NOTE — PLAN OF CARE
Problem: Discharge Planning  Goal: Discharge to home or other facility with appropriate resources  Outcome: Progressing  Flowsheets (Taken 3/11/2023 0636)  Discharge to home or other facility with appropriate resources:   Identify barriers to discharge with patient and caregiver   Arrange for needed discharge resources and transportation as appropriate   Identify discharge learning needs (meds, wound care, etc)   Arrange for interpreters to assist at discharge as needed   Refer to discharge planning if patient needs post-hospital services based on physician order or complex needs related to functional status, cognitive ability or social support system     Problem: Pain  Goal: Verbalizes/displays adequate comfort level or baseline comfort level  Outcome: Progressing  Flowsheets (Taken 3/11/2023 0636)  Verbalizes/displays adequate comfort level or baseline comfort level:   Encourage patient to monitor pain and request assistance   Assess pain using appropriate pain scale   Administer analgesics based on type and severity of pain and evaluate response   Implement non-pharmacological measures as appropriate and evaluate response   Consider cultural and social influences on pain and pain management   Notify Licensed Independent Practitioner if interventions unsuccessful or patient reports new pain     Problem: Cardiovascular - Adult  Goal: Maintains optimal cardiac output and hemodynamic stability  Outcome: Progressing  Flowsheets (Taken 3/11/2023 0636)  Maintains optimal cardiac output and hemodynamic stability:   Monitor blood pressure and heart rate   Monitor urine output and notify Licensed Independent Practitioner for values outside of normal range   Assess for signs of decreased cardiac output   Administer fluid and/or volume expanders as ordered   Administer vasoactive medications as ordered     Problem: Metabolic/Fluid and Electrolytes - Adult  Goal: Electrolytes maintained within normal limits  Outcome: Progressing  Flowsheets (Taken 3/11/2023 2695)  Electrolytes maintained within normal limits:   Monitor labs and assess patient for signs and symptoms of electrolyte imbalances   Administer electrolyte replacement as ordered   Monitor response to electrolyte replacements, including repeat lab results as appropriate   Instruct patient on fluid and nutrition restrictions as appropriate

## 2023-03-11 NOTE — PLAN OF CARE
Problem: Respiratory - Adult  Goal: Achieves optimal ventilation and oxygenation  Outcome: Progressing   Pt wore BiPAP on ordered settings hs. Will continue to monitor pt.

## 2023-03-11 NOTE — PROGRESS NOTES
CRITICAL CARE PROGRESS NOTE      Patient:  Jen Horan    Unit/Bed:4D-08/008-A  YOB: 1940  MRN: [id number]   PCP: Fercho Patel MD  Date of Admission: 2/21/2023  Chief Complaint:- Cough with Sputum Production    Assessment and Plan:    CAD, s/p CABG:  C on 02/21/2023, 02/28/2023 CABG x3 using LIMA to LAD, vein graft to marginal, vein graft to posterior descending artery. 4 drains in place after procedure. Anterior mediastinal drains removed on 03/03. Pleural drains removed on 03/05. +2090cc total I/O last 24 hours. - Continue Lasix per CTS, Carvedilol with holding parameters, Amiodarone, ASA, Statin and PPI  - BiPAP overnight and for naps at 12/6 cmH2O  - Strict I&O    Non-SARS Pneumonia - Secondary to oropharyngeal dysphagia with silent aspiration. PCR molecular pneumonia panel growing Non-SARS coronavirus (3/10/2023). Respiratory Cx growing moderate gram (+) bacilli and cocci.  - Continue Zosyn per [facility] antibiogram    Dyspnea, Improved: Patient has displayed air hunger in spite of adequate oxygen saturation on room air.  - PRN low dose morphine ordered for dyspnea  - Recommend against the use of supplemental oxygen unless SpO2 less than 92% on room air    Hypernatremia, Acute - Likely secondary to aggressive diuresis  - Continue Free water boluses via NG tube  - Repeat Na every 12 hours    Central and Obstructive Sleep  Apnea, Suspected - Patient's family endorses excessive daytime sleepiness, witnessed apneas, snoring, and sleep inertia. This was also witness while inpatient  - Continue BiPAP 12/6 with BUR=10 for sleep and naps  - PSG ordered for outpatient follow-up    Cognitive Decline; Suspected Dementia, Stable:  Chronic small vessel disease seen on CT Head on 03/02/23. Recommendation for MOCA when stable. Baseline prior to admission oriented only to self.   - PRN Zyprexa ordered for agitation.  - Continue with patient's home risperidone, carbamazepine, escitalopram.  - Continue with bedside sitter restraints as needed  - Continue with appropriate day-night cues and frequent reorientation    Oropharyngeal Dysphagia -Likely secondary to previous history of CVA with worsening cognitive decline. MBS showing laryngeal penetration with thin and mildly thick barium.  - Continue NG tube; SLP following. Circadian Rhythm Disturbance, Suspected - Likely secondary to sleep apnea as well as baseline cognitive deficit secondary to dementia. - Appropriate day-night cues (TV on during the day, open/close blinds, frequent reorientation)  - Continue Modafinil 200mg ordered    Physical Deconditioning and calorie malnutrition - Noted  - [facility] 13, PT/OT following  - Continue tube feeds at goal    Acute Hypoxic Respiratory Failure - s/p intubation/extubation. Currently requiring 3L NC  - Continue to wean supplemental oxygen as tolereated    COPD - No PFT available. Maintaining adequate oxygentation on 3L via nasal cannula. - Continue incentive spirometer and Acapella. - Albuterol as needed    Acute on Chronic Macrocytic Anemia, Stable - Vitamin B12/Folate WNL; iron 64, iron saturation 23%, TIBC 278. Likely secondary to iron deficiency secondary to poor oral nutrition.  - Iron every other day and vitamin C when patient is more stable. Hypocalcemia - tube feeding in place, replacement protocol in place    Left Mastoiditis, Identified on CT - No indication for antibiotic therapy at this time    Recurrent Seizure Disorder - Continue risperidone, carbamazepine     Unspecified Anxiety Disorder - Continue escitalopram    Right ICA Stenosis 70%, Right Subclavian Severe Stenosis - Identified on CTA. Seen and evaluated by vascular surgery with no plan for intervention    Subclinical Hypothyroidism - TSH=5.9 and free T4=1.14 (3/10/2023)  - Continue levothyroxine\    Hx Unspecified Leukemia:  Noted    Hx CVA:  baseline cognitive deficits per family; oriented only to self at baseline.  CT Head without Contrast showing stable old infarct in the right cerebellum and stable old infarct in the right basal ganglia. INITIAL H AND P AND ICU COURSE:  \"This is an 81year old male who [facility] with SOB and brown sputum production. The patient was admitted for a COPD exacerbation and started on bronchodilators, azithromcyin, and rocpehin. Cardiology was consulted secondary to EKG changes concerning for anterior ischemic changes. Given a troponin elevation, patient underwent LHC on 02/23 which showed evidence of multivessel disase. Patient underwent CABG on 23/18 without complication and was transferred to the ICU postoperatively; four chest tubes in place. Was extubated without complication and tolerated BiPAP overnight. 03/01: Patient seen and evaluated at the bedside in no acute distress. Now not requiring any pressor support. Output from chest tubes reviewed (911cc serosanguinous). Denies chest pain, fever, chills, and N/V/D at bedside. No additional acute symptoms or concerns. Tolerated CPAP overnight. 03/02: Patient seen and evaluated at the beside in no acute distress. Patient tolerating weaned oxygen requirement. Patient requiring max assist with occupational therapy. No overnight events. Output from chest tubes reviewed. Patient is oriented only to self. He denies chest pain, fever, chills, and N/V/D. He denies any additional acute symptoms or concerns. He tolerated CPAP overnight. Message sent to CT surgery PA regarding transfer out of the ICU. CRP elevation likely secondary to major surgery. Case discussed extensively with primary RN.     03/03: Patient seen and evaluated at the bedside in moderate distress. He states that he wants to go home. He is  only oriented to self which is baseline. No new focal neurologic deficits. Case reviewed with primary RN, patient was comfortable throughout the night, tolerated BiPAP without complication.  Reviewed hospital course and CT findings with overnight ICU team.     03/04: Patient pulled NG tube on 03/03 during the day, was replaced by Catherine Benites RN, was started on Zyprexa as needed daily for agitation. Patient placed in soft restraints and family made aware. Chest x-ray displayed improved aeration on both lungs with small residual left basilar atelectasis. 03/05: Patient seen and evaluated at the bedside. Case discussed with primary RN. Patient tolerated CPAP therapy with sitter in room for short period overnight. No additional Zyprexa administered. Patient's pain well controlled with reduced dose of oxicodone. Patient oriented only to self at bedside. No new focal neurologic deficits identified. Will continue to wean supplemental oxygen as tolerated. 03/06: Patient seen and evaluated at the bedside with sitter in room. Patient tolerated PAP therapy for most of the night, removed at 0400 on 03/06. No Zyprexa or additional analgesia administered overnight. 03/07: Patient seen and evaluated at the bedside with sitter in the room. Patient denying any chest pain, fever, chills, nausea, vomiting, or diarrhea. Patient is only oriented to self. He is more cooperative today. Restraints removed last night without complication. He tolerated PAP therapy overnight. Supplemental oxygen discontinued at bedside as patient is maintaining adequate oxygenation. No Zyprexa or additional analgesia administered overnight. 03/08 Patient seen and evaluated at the bedside with sitter in the room. Patient denies chest pain, fever, chills, nausea, vomiting, or diarrhea. He endorses some constipation and feeling ready to have a bowel movement. He endorses having difficulty coughing. Tolerated suction. Repeat CXR ordered. Will continue with PAP therapy for strong suspicion of sleep apnea. No additional analgesia or olanzapine administered overnight. 03/09: Patient seen and evaluated at the bedside with nurse in the room. No overnight events noted.  Worsening leukocytosis with fever. Case discussed with SLP with concern that patient has significant oropharyngeal dysphagia with concern for silent aspiration. Cultures ordered and patient started on Zosyn. Case discussed with PT, patient's participation has improved. No zyprexa or morphine administered overnight.    03/10: Patient seen and evaluated at the bedside with primary RN in room. Patient noted to have difficulty with clearing secretions. Patient noted to have some blood admixed with secretions; likely secondary to attempts to suction. Patient tolerated PAP therapy overnight. Plan to initiate modafinil regimen to ensure appropriate circadian rhythm. Case discussed with patient's family at bedside. All questions were answered fully; they verbalized understanding and are agreeable to the plan of care. Subjective (last 24 hours): Per nursing there were no acute events overnight. Patient continues to have difficulty in clearing secretions. Continuing with BiPAP and tolerating well. He continues to not meet ICU level of care need. Past Medical History:    Leukemia, HLD, Brain Abscess s/p craniotomy, Recurrent Seizures. Family History:  No Pertinent Family history noted in EMR. .  Social History: Former smoker of tobacco 1ppd for 30 years; can't remember quit date; no recreational drug or alcohol use. ROS   Review of systems:    General: No fevers, chills. Pain controlled. HEENT: No headache, no vision changes, no hearing changes, no trauma. CV: No palpitations, no chest pain, no tachycarida  RESP: No respiratory distress, no cough, no wheeze, SOB. GI: No abdominal pain, no nausea, no vomiting, no diarrhea  : No dysuria, no hematuria, no incontinence  Neuro: No seizures, no focal deficits, no weakness, no confusion   Psych: No psychosis, no SI/HI, Depression, Anxiety.      Scheduled Meds:   modafinil  200 mg Oral QAM    piperacillin-tazobactam  3,375 mg IntraVENous Q8H    magnesium hydroxide  30 mL Oral Daily    carvedilol  12.5 mg Oral BID WC    nystatin  5 mL Oral 4x Daily    tiotropium-olodaterol  2 puff Inhalation Daily    polyethylene glycol  17 g Oral Daily    amiodarone  200 mg Oral Daily    bisacodyl  10 mg Rectal Daily    calcium replacement protocol   Other RX Placeholder    aspirin  324 mg Oral Daily    carBAMazepine  400 mg Oral TID    potassium bicarb-citric acid  20 mEq Oral BID    insulin lispro  0-4 Units SubCUTAneous TID WC    insulin lispro  0-4 Units SubCUTAneous Nightly    furosemide  40 mg IntraVENous Daily    sodium chloride flush  5-40 mL IntraVENous 2 times per day    enoxaparin  40 mg SubCUTAneous Daily    sennosides-docusate sodium  1 tablet Oral BID    famotidine  20 mg Oral BID    Or    famotidine (PEPCID) injection  20 mg IntraVENous BID    atorvastatin  40 mg Oral Daily    escitalopram  20 mg Oral Daily    levothyroxine  50 mcg Oral Daily    risperiDONE  0.5 mg Oral BID    multivitamin  1 tablet Oral Daily     Continuous Infusions:   sodium chloride Stopped (03/07/23 1237)    sodium chloride      dextrose      EPINEPHrine Stopped (03/01/23 0602)       PHYSICAL EXAMINATION:  T:  97.7. P:  68. RR:  15. B/P:  119/45. O2 Sat:  95% on 3L NC    I/O:  +821.2 on 3/11/2023  WEIGHTS: 184lb on 03/09; 184lb on 03/07  Body mass index is 27.61 kg/m². GCS: 13    General:   This is an elderly gentleman lying in bed  HEENT:  normocephalic and atraumatic. No scleral icterus. PERR; NG Tube in place; patient noted to have difficulty clearing his throat and coughing  Neck: supple. No Thyromegaly. Lungs: diminished breath sounds bilaterally  Cardiac: RRR. No JVD. Abdomen: soft. Nontender. Extremities:  No clubbing, cyanosis, or edema x 4. Vasculature: capillary refill < 3 seconds. Palpable dorsalis pedis pulses. Skin:  warm and dry. Psych:  Alert and oriented to self only. Affect appropriate  Lymph:  No supraclavicular adenopathy. Neurologic:  No focal deficit. No seizures.     Data: (All radiographs, tracings, PFTs, and imaging are personally viewed and interpreted unless otherwise noted). BMP Sodium 152, Potassium 3.7, Chloride 195, CO2 31, BUN 40, Cr 1.1, Glucose 158  LFT Albumin 3.0, Alk Phos 50, ALT 94, , Tbili 0.4  TSH 5.900, FT4 1.14  CBC WBC 10.5, Hb 8.6, Hct 28.6, .9, Platelets 306  Imaging CXR showing prominent pulmonary vasculature  Micro Molecular Pneumonia Panel positive for non SARS Coronavirus; Respiratory Culture with moderate gram positive cocci and bacilli  Telemetry shows normal sinus rhythm        Seen with multidisciplinary ICU team Dr. John Matthews.   Meets Continued ICU Level Care Criteria:    [] Yes   [x] No - Transfer Planning Dr. Korey Chaudhari; Plan per Dr. Korey Chaudhari to stay through weekend  [] HOSPITALIST CONTACTED-      Case and plan discussed with Dr. Brandt Marshall    Electronically signed by Marielos España DO, MBA on 3/11/2023 at 4:48 AM

## 2023-03-11 NOTE — PROGRESS NOTES
[address] [facility]ERAPY  [facility] ICU 4D  DAILY NOTE    TIME   SLP Individual Minutes  Time In: 5277  Time Out: 9763  Minutes: 14  Timed Code Treatment Minutes: 0 Minutes       Date: 3/11/2023  Patient Name: Michelle Hager      CSN: [id number]   : 1940  (81 y.o.)  Gender: male   Referring Physician:  ANGELIC Merchant CNP  Diagnosis: Elevated troponin  Precautions: Fall risk, aspiration precautions   Current Diet:  NPO; NGT  Swallowing Strategies: Oral care q2h   Date of Last MBS/FEES: MBS 2023    Pain:  No pain reported. Subjective:  HANS Jiménez with approval to proceed with session. Upon arrival, patient resting in recliner chair with live sitter at bedside. Fair awareness to immediate surroundings, however, confusion maintains to be notable with re-directions required; per chart review for consistency of documentation, indications previously conveyed from family to indicate current level of mentation to be at baseline with difficulties exhibited for basic command executions. Absence of family at date     Short-Term Goals:  Goal 1: Patient will consume conservative PO trials of ICE CHIPS (x5 only) to improve pharyngeal pharyngeal function/constriction, airway protection as well as to elicit pharyngeal exercises and determine readiness for repeat instrumental evaluation. Goal 2: Patient will complete pharyngeal exericses x15 in order to improve overall pharyngeal function and airway protection  Goal 3: Staff will exhibit return demonstration for completion of comprehensive oral care procedural analysis to maximize oral integrity and to reduce potential bacteria colonization. Goal 4: Monitor mentation (hx dementia, CABG); complete alternate cognitive linguistic assessment should it be clinically indicated.     Interventions:  Concerns significant for ability to manage secretions given notable wet vocal quality at date with patient not able to follow commands for volitional cough reflex for ejection of adverse secretions. RN Jessy reporting increased frequency of [place] oral suctioning, however, tubing with presence of blood-tinged secretions, likely given consistent needs for oral suctioning requirement. Comprehensive oral care completed via toothetes+hydrogen peroxide rinse. Bilabial surfaces and oral cavity with dried mucosa with extraction of mild hardened secretions from hard palate. Nursing endorsed utilization for Nystatin given previous clinical findings for a white lingual coating. Ongoing recommendations for oral care q2h to reduce biofilm colonization and to assist with removal of hardened debris from bilabial surfaces. Subsequent presentation of conservative ice chips x2. Fair oral initiation skills with max cues required for lingual body dissension to permit optimal entrance of ice chip. Oral phase prolonged given lack of sustained focus for mastication skills with ABSENT pharyngeal trigger noted despite max cues provided. Attempted to have patient engage in volitional cough reflex with patient deferral to vocal phonation vs cough production (compromised mentation). Further trials deferred. Pharyngeal exercise program not able to be completed given patient not able to execute basic commands despite max cues provided. RECOMMENDED STRICT NPO in addition to long-term alternative means of nutrition/hydration measures. Alternate recommendations for aggressive oral care q2h and oral suctioning PRN. Patient prognosis is GUARDED given severity of oropharyngeal dysphagia clinical derived by instrumental evaluation via MBS on 03/07/2023 compounded with complex medical hx as well as inability to engage in intensive exercise program d/t severity of compromised mentation.       Long-Term Goals:  No LTG established given short ELOS    EDUCATION:  Learner: Patient  Education:  Reviewed diet and strategies, Reviewed signs, symptoms and risks of aspiration, Reviewed ST goals and Plan of Care, and Reviewed recommendations for follow-up  Evaluation of Education: Needs further instruction, No evidence of learning, and Family not present    ASSESSMENT/PLAN:  Activity Tolerance:  Patient tolerance of  treatment: poor. Assessment/Plan: Patient progressing toward established goals. Continues to require skilled care of licensed speech pathologist to progress toward achievement of established goals and plan of care. .     Plan for Next Session: dysphagia management  Discharge Recommendations: SNF       Dolores Cotter M.A., [address] [facility]

## 2023-03-11 NOTE — PROGRESS NOTES
[facility]  INPATIENT PHYSICAL THERAPY  DAILY NOTE  [facility], [place] ICU 4D - [id number]  Time In: 9809  Time Out: 1443  Timed Code Treatment Minutes: 28 Minutes  Minutes: 28          Date: 3/11/2023  Patient Name: Gisella Appiah,  Gender:  male        MRN: [id number]  : 1940  (81 y.o.)     Referring Practitioner: ANGELIC Pearce CNP  Diagnosis: elevated tropnin level  Additional Pertinent Hx: per EMR- Gisella Appiah is a 81 y.o. male with past medical history of anemia, brain abscess, seizures, former smoker approximately 40 pack year who presents to the emergency department for evaluation of fatigue, shortness of breath. Patient brought in by family as family has been reporting he has been more tired, fatigued for the past week however more so in the past 3 days. Today, patient verbalized that he was short of breath and had generalized muscle aches which prompted today's ED visit. Patient lives in a two-story home and states that it has been getting harder to walk around. Has been having dyspnea on exertion. Pt s/p CABG STONEY,  coronary artery bypass grafting x3 using LIMA to LAD, vein graft to to's marginal 1, vein graft to posterior descending artery, transesophageal echocardiography, endoscopic vein harvesting DOS . Prior Level of Function:  Lives With: Daughter  Type of Home: House  Home Layout: Two level, Bed/Bath upstairs  Home Access: Stairs to enter with rails  Entrance Stairs - Number of Steps: 2 LEVY and ~15 steps to second level  Home Equipment: charu [place] Cane   Bathroom Shower/Tub: Tub/Shower unit  Bathroom Accessibility: Accessible    Receives Help From: Family  ADL Assistance: Independent  Homemaking Assistance: Needs assistance  Homemaking Responsibilities: Yes  Ambulation Assistance: Independent  Transfer Assistance: Independent  Active : No  Additional Comments: Daughter and son in law both work outside of the home.  Pt was previously independent with ADLs and functional mobility. Pt was not previously using AD for functional mobility. Pt is a questionable historian. Hx of brain surgery in 1995 and visual deficits. Restrictions/Precautions:  Restrictions/Precautions: Fall Risk, General Precautions, Surgical Protocols  Position Activity Restriction  Sternal Precautions: move in the tube  Other position/activity restrictions: hx of brain injury     SUBJECTIVE: OK to see pt per nursing. Pt in bed when PT arrived, pleasant and agreeable to PT session. Difficult to understand at times during session with garbled speech. Sitter present. PAIN: denies during session    Vitals: Blood Pressure: 134/43  Oxygen: 95%, 3 L of O2  Heart Rate: 68  Remained stable during session  OBJECTIVE:  Bed Mobility:  Rolling to Right: Moderate Assistance, X 1, with head of bed raised, with rail, with verbal cues    Supine to Sit: Minimal Assistance x1 and, Moderate Assistance, X 1, with verbal cues, HOB slightly raised due to NG tube    Transfers:  Sit to Stand: [address], X 2, cues for hand placement, with verbal cues  Stand to Sit:Contact Guard Assistance, X 2, cues for hand placement, with verbal cues  Educated on move in the tube precautions, good demo, denies increased pain with mobility. Once in chair, required max A x2 for scooting back in chair for support. Ambulation:  Contact Guard Assistance, X 2, with cues for safety, with verbal cues , with increased time for completion  Distance: 6 feet to chair  Surface: Level Tile  Device:Rolling Walker  Gait Deviations:  Slow So, Decreased Step Length Bilaterally, Decreased Weight Shift Bilaterally, and Decreased Gait Speed  2 assist for safety and to manage lines and IV pole/tube feeding. Cues for sequencing for overall safety, with fair demo,slow pace noted and increased time for processing for completion.    Balance:  Static Sitting Balance:  Stand By Assistance, Contact Guard Assistance, X 1  Static Standing Balance: Contact Guard Assistance, X 2, with cues for safety, with verbal cues   RW for support once in standing, no LOB noted, cues and assist required for hand placement on walker to prepare for gait. Exercise:  Patient was guided in 1 set(s) 15 reps of exercise to both lower extremities. Ankle pumps, Glut sets, Heelslides, Hip abduction/adduction, and Straight leg raises. Exercises were completed for increased independence with functional mobility. Pt required mod VC and visual cues for proper technique of exercises with good-fair demo. Cues to stay on task during completion. Functional Outcome Measures: Completed  AM-PAC Inpatient Mobility Raw Score : 13  AM-PAC Inpatient T-Scale Score : 36.74    ASSESSMENT:  Assessment: Patient progressing toward established goals. Activity Tolerance:  Patient tolerance of  treatment: good.      Equipment Recommendations:Equipment Needed: No  Discharge Recommendations: Inpatient Therapy Stay  Plan: Current Treatment Recommendations: Strengthening, Balance training, Functional mobility training, Transfer training, Safety education & training, Equipment evaluation, education, & procurement, Home exercise program, Therapeutic activities, Patient/Caregiver education & training, Neuromuscular re-education, Endurance training, Gait training  General Plan:  (6x CABG)    Patient Education  Patient Education: Plan of Care, Precautions/Restrictions, Bed Mobility, Equipment Education, Transfers, Gait, Use of Gait [place], Verbal Exercise Instruction,  - Patient Verbalized Understanding, - Patient Requires Continued Education    Goals:  Patient Goals : family wants rehab following CABG  Short Term Goals  Time Frame for Short Term Goals: by discharge  Short Term Goal 1: Pt to complete supine <-> sit with CGA for ease getting out of bed with move in the tube guidelines  Short Term Goal 2: Pt to complete sit <-> stand with CGA for ease with mobiltiy from various surfaces  Short Term Goal 3: Pt to ambulate >10 feet with RW CGA for room ambulation. Short Term Goal 4: Pt to sit EOB for >=10 minutes with SBA with good midline posture for ease with sitting EOB to progress further mobility  Short Term Goal 5: .  Long Term Goals  Time Frame for Long Term Goals : NA due to short ELOS    Following session, patient left in safe position with all fall risk precautions in place. Pt in bedside chair following session, all needs and call light in reach, alarm on.

## 2023-03-11 NOTE — PLAN OF CARE
Problem: Respiratory - Adult  Goal: Clear lung sounds  Outcome: Not Progressing   Continue inhaler as ordered to improve breath sounds. Suction as needed to clear secretions.   Pt agrees with plan of care

## 2023-03-11 NOTE — PROGRESS NOTES
CT/CV Surgery Progress Note    3/11/2023 8:30 AM  Surgeon:  Dr. Duran He     Procedure: POD #11  CABG SOTNEY,  coronary artery bypass grafting x3 using LIMA to LAD, vein graft to to's marginal 1, vein graft to posterior descending artery, transesophageal echocardiography, endoscopic vein harvesting    Subjective:  Mr. Hakan Gillette is resting comfortably in bed with tube feeds via NG tube, somewhat somnolent, and in no acute distress. Pt denies chest pressure, SOB, fever,chills, N/V/D. Intake/Output Summary (Last 24 hours) at 3/11/2023 0830  Last data filed at 3/11/2023 0618  Gross per 24 hour   Intake 1395.98 ml   Output 2390 ml   Net -994.02 ml       Vital Signs: BP (!) 150/115   Pulse 73   Temp 98.6 °F (37 °C) (Oral)   Resp 16   Ht 5' 9\" (1.753 m)   Wt 186 lb 15.2 oz (84.8 kg)   SpO2 94%   BMI 27.61 kg/m²    Temp (24hrs), Av °F (37.2 °C), Min:98.6 °F (37 °C), Max:99.2 °F (37.3 °C)    Labs:   CBC:  Recent Labs     03/09/23  0426 03/10/23  0419 03/11/23  0423   WBC 14.4* 14.0* 10.5   HGB 9.3* 9.0* 8.6*   HCT 29.8* 29.5* 28.6*   .6* 108.5* 107.9*    334 297       BMP:   Recent Labs     23  0848 03/10/23  0419 03/10/23  0806 03/10/23  2050 03/11/23  0423     --  150*  --   --  152*   K 3.8  --  3.8  --   --  3.7     --  105  --   --  109   CO2 28  --  31  --   --  31   BUN 34*  --  38*  --   --  40*   CREATININE 1.0  --  1.0  --   --  1.1   MG 2.7*  --   --   --   --   --    POCGLU  --    < >  --    < > 135*  --     < > = values in this interval not displayed. Last HgA1C:   Lab Results   Component Value Date    LABA1C 5.1 2023     Imaging:  CXR: 3/11/2023  Mild bibasilar atelectasis.     Scheduled Meds:    modafinil  200 mg Oral QAM    piperacillin-tazobactam  3,375 mg IntraVENous Q8H    magnesium hydroxide  30 mL Oral Daily    carvedilol  12.5 mg Oral BID WC    nystatin  5 mL Oral 4x Daily    tiotropium-olodaterol  2 puff Inhalation Daily    polyethylene glycol  17 g Oral Daily    amiodarone  200 mg Oral Daily    bisacodyl  10 mg Rectal Daily    calcium replacement protocol   Other RX Placeholder    aspirin  324 mg Oral Daily    carBAMazepine  400 mg Oral TID    potassium bicarb-citric acid  20 mEq Oral BID    insulin lispro  0-4 Units SubCUTAneous TID WC    insulin lispro  0-4 Units SubCUTAneous Nightly    furosemide  40 mg IntraVENous Daily    sodium chloride flush  5-40 mL IntraVENous 2 times per day    enoxaparin  40 mg SubCUTAneous Daily    sennosides-docusate sodium  1 tablet Oral BID    famotidine  20 mg Oral BID    Or    famotidine (PEPCID) injection  20 mg IntraVENous BID    atorvastatin  40 mg Oral Daily    escitalopram  20 mg Oral Daily    levothyroxine  50 mcg Oral Daily    risperiDONE  0.5 mg Oral BID    multivitamin  1 tablet Oral Daily     ROS: All neg unless specifically mentioned in subjective section. Exam:  General Appearance: alert ,conversing, in no acute distress  Cardiovascular: normal rate, regular rhythm, normal S1 and S2, no murmurs, rubs, clicks, or gallops  Pulmonary/Chest: clear to auscultation bilaterally- no wheezes, rales or rhonchi, normal air movement, no respiratory distress  Neurological: alert, oriented, normal speech, no focal findings or movement disorder noted  Sternum: Incision healing appropriately and no wound dehiscence noted.      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    Altered mental state    Syncope and collapse    Elevated troponin    Chronic obstructive pulmonary disease ([facility])    Personal history of tobacco use, presenting hazards to health    Pleural effusion, bilateral    Mediastinal lymphadenopathy    NSTEMI (non-ST elevated myocardial infarction) ([facility])    S/P CABG x 3    Excessive daytime sleepiness    Witnessed episode of apnea     Plan:   CXR reviewed- Continue daily CXR's   Increased Coreg  pt tolerated well  Continue current therapy - hold off on PEG for now cont  feedings via NG  Pulmonary care  Care per ICU team        Erwin Corral MD

## 2023-03-12 ENCOUNTER — APPOINTMENT (OUTPATIENT)
Dept: GENERAL RADIOLOGY | Age: 83
DRG: 233 | End: 2023-03-12
Payer: MEDICARE

## 2023-03-12 LAB
ALBUMIN SERPL BCG-MCNC: 3.2 G/DL (ref 3.5–5.1)
ALP SERPL-CCNC: 58 U/L (ref 38–126)
ALT SERPL W/O P-5'-P-CCNC: 128 U/L (ref 11–66)
ANION GAP SERPL CALC-SCNC: 11 MEQ/L (ref 8–16)
AST SERPL-CCNC: 140 U/L (ref 5–40)
BACTERIA SPEC RESP CULT: NORMAL
BILIRUB SERPL-MCNC: 0.4 MG/DL (ref 0.3–1.2)
BUN SERPL-MCNC: 37 MG/DL (ref 7–22)
CALCIUM SERPL-MCNC: 7.7 MG/DL (ref 8.5–10.5)
CHLORIDE SERPL-SCNC: 112 MEQ/L (ref 98–111)
CO2 SERPL-SCNC: 30 MEQ/L (ref 23–33)
CREAT SERPL-MCNC: 0.9 MG/DL (ref 0.4–1.2)
DEPRECATED RDW RBC AUTO: 53.1 FL (ref 35–45)
EKG ATRIAL RATE: 74 BPM
EKG P AXIS: -8 DEGREES
EKG P-R INTERVAL: 236 MS
EKG Q-T INTERVAL: 394 MS
EKG QRS DURATION: 98 MS
EKG QTC CALCULATION (BAZETT): 437 MS
EKG R AXIS: 0 DEGREES
EKG T AXIS: 168 DEGREES
EKG VENTRICULAR RATE: 74 BPM
ERYTHROCYTE [DISTWIDTH] IN BLOOD BY AUTOMATED COUNT: 12.9 % (ref 11.5–14.5)
GFR SERPL CREATININE-BSD FRML MDRD: > 60 ML/MIN/1.73M2
GLUCOSE BLD STRIP.AUTO-MCNC: 125 MG/DL (ref 70–108)
GLUCOSE BLD STRIP.AUTO-MCNC: 126 MG/DL (ref 70–108)
GLUCOSE BLD STRIP.AUTO-MCNC: 132 MG/DL (ref 70–108)
GLUCOSE BLD STRIP.AUTO-MCNC: 136 MG/DL (ref 70–108)
GLUCOSE BLD STRIP.AUTO-MCNC: 159 MG/DL (ref 70–108)
GLUCOSE SERPL-MCNC: 136 MG/DL (ref 70–108)
GRAM STN SPEC: NORMAL
HCT VFR BLD AUTO: 31.6 % (ref 42–52)
HGB BLD-MCNC: 9.2 GM/DL (ref 14–18)
MCH RBC QN AUTO: 32.5 PG (ref 26–33)
MCHC RBC AUTO-ENTMCNC: 29.1 GM/DL (ref 32.2–35.5)
MCV RBC AUTO: 111.7 FL (ref 80–94)
PLATELET # BLD AUTO: 320 THOU/MM3 (ref 130–400)
PMV BLD AUTO: 11.1 FL (ref 9.4–12.4)
POTASSIUM SERPL-SCNC: 4.5 MEQ/L (ref 3.5–5.2)
PROT SERPL-MCNC: 6.4 G/DL (ref 6.1–8)
RBC # BLD AUTO: 2.83 MILL/MM3 (ref 4.7–6.1)
SODIUM SERPL-SCNC: 153 MEQ/L (ref 135–145)
SODIUM SERPL-SCNC: 155 MEQ/L (ref 135–145)
WBC # BLD AUTO: 10.6 THOU/MM3 (ref 4.8–10.8)

## 2023-03-12 PROCEDURE — 2000000000 HC ICU R&B

## 2023-03-12 PROCEDURE — 94761 N-INVAS EAR/PLS OXIMETRY MLT: CPT

## 2023-03-12 PROCEDURE — 6370000000 HC RX 637 (ALT 250 FOR IP): Performed by: THORACIC SURGERY (CARDIOTHORACIC VASCULAR SURGERY)

## 2023-03-12 PROCEDURE — 94640 AIRWAY INHALATION TREATMENT: CPT

## 2023-03-12 PROCEDURE — 6360000002 HC RX W HCPCS

## 2023-03-12 PROCEDURE — 85027 COMPLETE CBC AUTOMATED: CPT

## 2023-03-12 PROCEDURE — 80053 COMPREHEN METABOLIC PANEL: CPT

## 2023-03-12 PROCEDURE — 6370000000 HC RX 637 (ALT 250 FOR IP)

## 2023-03-12 PROCEDURE — 6370000000 HC RX 637 (ALT 250 FOR IP): Performed by: NURSE PRACTITIONER

## 2023-03-12 PROCEDURE — 84295 ASSAY OF SERUM SODIUM: CPT

## 2023-03-12 PROCEDURE — 94660 CPAP INITIATION&MGMT: CPT

## 2023-03-12 PROCEDURE — 2580000003 HC RX 258: Performed by: PHYSICIAN ASSISTANT

## 2023-03-12 PROCEDURE — 82948 REAGENT STRIP/BLOOD GLUCOSE: CPT

## 2023-03-12 PROCEDURE — 36415 COLL VENOUS BLD VENIPUNCTURE: CPT

## 2023-03-12 PROCEDURE — 6370000000 HC RX 637 (ALT 250 FOR IP): Performed by: PHYSICIAN ASSISTANT

## 2023-03-12 PROCEDURE — 6360000002 HC RX W HCPCS: Performed by: PHYSICIAN ASSISTANT

## 2023-03-12 PROCEDURE — 99291 CRITICAL CARE FIRST HOUR: CPT | Performed by: SURGERY

## 2023-03-12 PROCEDURE — 97110 THERAPEUTIC EXERCISES: CPT

## 2023-03-12 PROCEDURE — 2580000003 HC RX 258

## 2023-03-12 PROCEDURE — 97530 THERAPEUTIC ACTIVITIES: CPT

## 2023-03-12 PROCEDURE — 2700000000 HC OXYGEN THERAPY PER DAY

## 2023-03-12 RX ORDER — CARBAMAZEPINE 100 MG/1
400 TABLET, CHEWABLE ORAL 3 TIMES DAILY
Status: DISCONTINUED | OUTPATIENT
Start: 2023-03-13 | End: 2023-03-16 | Stop reason: HOSPADM

## 2023-03-12 RX ADMIN — ACETAMINOPHEN 650 MG: 325 TABLET ORAL at 09:03

## 2023-03-12 RX ADMIN — ATORVASTATIN CALCIUM 40 MG: 40 TABLET, FILM COATED ORAL at 09:03

## 2023-03-12 RX ADMIN — AMIODARONE HYDROCHLORIDE 200 MG: 200 TABLET ORAL at 09:03

## 2023-03-12 RX ADMIN — ENOXAPARIN SODIUM 40 MG: 100 INJECTION SUBCUTANEOUS at 08:55

## 2023-03-12 RX ADMIN — SODIUM CHLORIDE, PRESERVATIVE FREE 10 ML: 5 INJECTION INTRAVENOUS at 21:45

## 2023-03-12 RX ADMIN — FAMOTIDINE 20 MG: 20 TABLET, FILM COATED ORAL at 09:03

## 2023-03-12 RX ADMIN — LEVOTHYROXINE SODIUM 50 MCG: 0.05 TABLET ORAL at 06:50

## 2023-03-12 RX ADMIN — MODAFINIL 200 MG: 100 TABLET ORAL at 09:03

## 2023-03-12 RX ADMIN — CARVEDILOL 12.5 MG: 6.25 TABLET, FILM COATED ORAL at 09:03

## 2023-03-12 RX ADMIN — Medication 500000 UNITS: at 17:29

## 2023-03-12 RX ADMIN — ESCITALOPRAM 20 MG: 20 TABLET, FILM COATED ORAL at 09:03

## 2023-03-12 RX ADMIN — PIPERACILLIN AND TAZOBACTAM 3375 MG: 3; .375 INJECTION, POWDER, FOR SOLUTION INTRAVENOUS at 03:14

## 2023-03-12 RX ADMIN — Medication 1 TABLET: at 09:03

## 2023-03-12 RX ADMIN — POTASSIUM BICARBONATE 20 MEQ: 782 TABLET, EFFERVESCENT ORAL at 21:45

## 2023-03-12 RX ADMIN — ASPIRIN 81 MG 324 MG: 81 TABLET ORAL at 09:02

## 2023-03-12 RX ADMIN — FUROSEMIDE 40 MG: 10 INJECTION, SOLUTION INTRAMUSCULAR; INTRAVENOUS at 08:57

## 2023-03-12 RX ADMIN — PIPERACILLIN AND TAZOBACTAM 3375 MG: 3; .375 INJECTION, POWDER, FOR SOLUTION INTRAVENOUS at 11:11

## 2023-03-12 RX ADMIN — Medication 400 MG: at 09:01

## 2023-03-12 RX ADMIN — Medication 400 MG: at 13:44

## 2023-03-12 RX ADMIN — POTASSIUM BICARBONATE 20 MEQ: 782 TABLET, EFFERVESCENT ORAL at 09:02

## 2023-03-12 RX ADMIN — Medication 500000 UNITS: at 09:01

## 2023-03-12 RX ADMIN — PIPERACILLIN AND TAZOBACTAM 3375 MG: 3; .375 INJECTION, POWDER, FOR SOLUTION INTRAVENOUS at 17:28

## 2023-03-12 RX ADMIN — Medication 400 MG: at 21:45

## 2023-03-12 RX ADMIN — TIOTROPIUM BROMIDE AND OLODATEROL 2 PUFF: 3.124; 2.736 SPRAY, METERED RESPIRATORY (INHALATION) at 09:49

## 2023-03-12 RX ADMIN — CARVEDILOL 12.5 MG: 6.25 TABLET, FILM COATED ORAL at 17:29

## 2023-03-12 RX ADMIN — RISPERIDONE 0.5 MG: 0.25 TABLET, FILM COATED ORAL at 21:45

## 2023-03-12 RX ADMIN — RISPERIDONE 0.5 MG: 0.25 TABLET, FILM COATED ORAL at 09:03

## 2023-03-12 RX ADMIN — FAMOTIDINE 20 MG: 20 TABLET, FILM COATED ORAL at 21:45

## 2023-03-12 RX ADMIN — Medication 500000 UNITS: at 21:54

## 2023-03-12 RX ADMIN — Medication 500000 UNITS: at 13:44

## 2023-03-12 ASSESSMENT — PAIN SCALES - GENERAL
PAINLEVEL_OUTOF10: 0
PAINLEVEL_OUTOF10: 3
PAINLEVEL_OUTOF10: 0

## 2023-03-12 NOTE — PROGRESS NOTES
Critical Care Progress Note      Patient: Cuco Aguila  Unit/Bed: 4N-43/155-K  YOB: 1940  MRN: [id number]  PCP: Maria R Perez MD  Date of Admission: 2/21/2023  Chief Complaint: Cough with sputum production    Assessment and Plan:  CAD, s/p CABG:  C on 02/21/2023, 02/28/2023 CABG x3 using LIMA to LAD, vein graft to marginal, vein graft to posterior descending artery. 4 drains in place after procedure. Anterior mediastinal drains removed on 03/03. Pleural drains removed on 03/05. +2090cc total I/O last 24 hours. - Continue Lasix per CTS, Carvedilol with holding parameters, Amiodarone, ASA, Statin and PPI  - BiPAP overnight and for naps at 12/6 cmH2O with backup rate of 10.  - Strict I&O     Non-SARS Pneumonia - Secondary to oropharyngeal dysphagia with silent aspiration. PCR molecular pneumonia panel growing Non-SARS coronavirus (3/10/2023). Respiratory Cx growing moderate gram (+) bacilli and cocci.  - Continue Zosyn per [facility] antibiogram     Dyspnea, Improved: Patient has displayed air hunger in spite of adequate oxygen saturation on room air.  - PRN low dose morphine ordered for dyspnea  - Recommend against the use of supplemental oxygen unless SpO2 less than 92% on room air     Hypernatremia, Acute - Likely secondary to aggressive diuresis  - Continue Free water boluses via NG tube  - Repeat Na every 12 hours     Central and Obstructive Sleep  Apnea, Suspected - Patient's family endorses excessive daytime sleepiness, witnessed apneas, snoring, and sleep inertia. This was also witness while inpatient  - Continue BiPAP 12/6 with BUR=10 for sleep and naps  - PSG ordered for outpatient follow-up     Cognitive Decline; Suspected Dementia, Stable:  Chronic small vessel disease seen on CT Head on 03/02/23. Recommendation for MOCA when stable. Baseline prior to admission oriented only to self.   - PRN Zyprexa ordered for agitation.  - Continue with patient's home risperidone, carbamazepine, escitalopram.  - Continue with bedside sitter restraints as needed  - Continue with appropriate day-night cues and frequent reorientation     Oropharyngeal Dysphagia -Likely secondary to previous history of CVA with worsening cognitive decline. MBS showing laryngeal penetration with thin and mildly thick barium.  - Continue NG tube; SLP following. Circadian Rhythm Disturbance, Suspected - Likely secondary to sleep apnea as well as baseline cognitive deficit secondary to dementia. - Appropriate day-night cues (TV on during the day, open/close blinds, frequent reorientation)  - Continue Modafinil 200mg ordered     Physical Deconditioning and calorie malnutrition - Noted  - [facility] 13, PT/OT following  - Continue tube feeds at goal     Acute Hypoxic Respiratory Failure - s/p intubation/extubation. Currently requiring 3L NC  - Continue to wean supplemental oxygen as tolerated     COPD - No PFT available. Maintaining adequate oxygentation on 2L via nasal cannula. - Continue incentive spirometer and Acapella. - Albuterol as needed     Acute on Chronic Macrocytic Anemia, Stable - Vitamin B12/Folate WNL; iron 64, iron saturation 23%, TIBC 278. Likely secondary to iron deficiency secondary to poor oral nutrition.  - Iron every other day and vitamin C when patient is more stable. Hypocalcemia - tube feeding in place, replacement protocol in place     Left Mastoiditis, Identified on CT - No indication for antibiotic therapy at this time     Recurrent Seizure Disorder - Continue risperidone, carbamazepine      Unspecified Anxiety Disorder - Continue escitalopram     Right ICA Stenosis 70%, Right Subclavian Severe Stenosis - Identified on CTA.  Seen and evaluated by vascular surgery with no plan for intervention     Subclinical Hypothyroidism - TSH=5.9 and free T4=1.14 (3/10/2023)  - Continue levothyroxine\     Hx Unspecified Leukemia:  Noted     Hx CVA:  baseline cognitive deficits per family; oriented only to self at baseline. CT Head without Contrast showing stable old infarct in the right cerebellum and stable old infarct in the right basal ganglia. Initial H&P and ICU Course: This is an 81year old male who presented to [facility] on 02/21 with SOB and brown sputum production. The patient was admitted for a COPD exacerbation and started on bronchodilators, azithromcyin, and rocpehin. Cardiology was consulted secondary to EKG changes concerning for anterior ischemic changes. Given a troponin elevation, patient underwent LHC on 02/23 which showed evidence of multivessel disase. Patient underwent CABG on 92/35 without complication and was transferred to the ICU postoperatively; four chest tubes in place. Was extubated without complication and tolerated BiPAP overnight. 03/01: Patient seen and evaluated at the bedside in no acute distress. Now not requiring any pressor support. Output from chest tubes reviewed (911cc serosanguinous). Denies chest pain, fever, chills, and N/V/D at bedside. No additional acute symptoms or concerns. Tolerated CPAP overnight. 03/02: Patient seen and evaluated at the beside in no acute distress. Patient tolerating weaned oxygen requirement. Patient requiring max assist with occupational therapy. No overnight events. Output from chest tubes reviewed. Patient is oriented only to self. He denies chest pain, fever, chills, and N/V/D. He denies any additional acute symptoms or concerns. He tolerated CPAP overnight. Message sent to CT surgery PA regarding transfer out of the ICU. CRP elevation likely secondary to major surgery. Case discussed extensively with primary RN.     03/03: Patient seen and evaluated at the bedside in moderate distress. He states that he wants to go home. He is  only oriented to self which is baseline. No new focal neurologic deficits. Case reviewed with primary RN, patient was comfortable throughout the night, tolerated BiPAP without complication.  Reviewed hospital course and CT findings with overnight ICU team.     03/04: Patient pulled NG tube on 03/03 during the day, was replaced by Aby Yan RN, was started on Zyprexa as needed daily for agitation. Patient placed in soft restraints and family made aware. Chest x-ray displayed improved aeration on both lungs with small residual left basilar atelectasis. 03/05: Patient seen and evaluated at the bedside. Case discussed with primary RN. Patient tolerated CPAP therapy with sitter in room for short period overnight. No additional Zyprexa administered. Patient's pain well controlled with reduced dose of oxicodone. Patient oriented only to self at bedside. No new focal neurologic deficits identified. Will continue to wean supplemental oxygen as tolerated. 03/06: Patient seen and evaluated at the bedside with sitter in room. Patient tolerated PAP therapy for most of the night, removed at 0400 on 03/06. No Zyprexa or additional analgesia administered overnight. 03/07: Patient seen and evaluated at the bedside with sitter in the room. Patient denying any chest pain, fever, chills, nausea, vomiting, or diarrhea. Patient is only oriented to self. He is more cooperative today. Restraints removed last night without complication. He tolerated PAP therapy overnight. Supplemental oxygen discontinued at bedside as patient is maintaining adequate oxygenation. No Zyprexa or additional analgesia administered overnight. 03/08 Patient seen and evaluated at the bedside with sitter in the room. Patient denies chest pain, fever, chills, nausea, vomiting, or diarrhea. He endorses some constipation and feeling ready to have a bowel movement. He endorses having difficulty coughing. Tolerated suction. Repeat CXR ordered. Will continue with PAP therapy for strong suspicion of sleep apnea. No additional analgesia or olanzapine administered overnight. 03/09: Patient seen and evaluated at the bedside with nurse in the room.  No overnight events noted. Worsening leukocytosis with fever. Case discussed with SLP with concern that patient has significant oropharyngeal dysphagia with concern for silent aspiration. Cultures ordered and patient started on Zosyn. Case discussed with PT, patient's participation has improved. No zyprexa or morphine administered overnight.     03/10: Patient seen and evaluated at the bedside with primary RN in room. Patient noted to have difficulty with clearing secretions. Patient noted to have some blood admixed with secretions; likely secondary to attempts to suction. Patient tolerated PAP therapy overnight. Plan to initiate modafinil regimen to ensure appropriate circadian rhythm. Case discussed with patient's family at bedside. All questions were answered fully; they verbalized understanding and are agreeable to the plan of care. Patient continues to have difficulty in clearing secretions. Continuing with BiPAP and tolerating well. He continues to not meet ICU level of care need. Subjective (past 24 hours): Per nursing no acute events overnight. The patient was able rest well and tolerated BiPAP. Today he had no complaints that were discernable and was pleasantly alert to self. The patient will remain in the ICU until tomorrow     Past Medical History: Per HPI  History reviewed. No pertinent family history. Social History       Tobacco History       Smoking Status  Former      Smokeless Tobacco Use  Unknown      Tobacco Comments  doesn't remember when he quit              Alcohol History       Alcohol Use Status  No              Drug Use       Drug Use Status  No              Sexual Activity       Sexually Active  Never                    Review of Systems: 12 point review of systems completed and pertinent positives are noted in the HPI. All other systems reviewed and negative.     Scheduled Meds:   modafinil  200 mg Oral QAM    piperacillin-tazobactam  3,375 mg IntraVENous Q8H    magnesium hydroxide  30 mL Oral Daily    carvedilol  12.5 mg Oral BID WC    nystatin  5 mL Oral 4x Daily    tiotropium-olodaterol  2 puff Inhalation Daily    polyethylene glycol  17 g Oral Daily    amiodarone  200 mg Oral Daily    bisacodyl  10 mg Rectal Daily    calcium replacement protocol   Other RX Placeholder    aspirin  324 mg Oral Daily    carBAMazepine  400 mg Oral TID    potassium bicarb-citric acid  20 mEq Oral BID    insulin lispro  0-4 Units SubCUTAneous TID WC    insulin lispro  0-4 Units SubCUTAneous Nightly    furosemide  40 mg IntraVENous Daily    sodium chloride flush  5-40 mL IntraVENous 2 times per day    enoxaparin  40 mg SubCUTAneous Daily    sennosides-docusate sodium  1 tablet Oral BID    famotidine  20 mg Oral BID    Or    famotidine (PEPCID) injection  20 mg IntraVENous BID    atorvastatin  40 mg Oral Daily    escitalopram  20 mg Oral Daily    levothyroxine  50 mcg Oral Daily    risperiDONE  0.5 mg Oral BID    multivitamin  1 tablet Oral Daily     Continuous Infusions:   sodium chloride Stopped (03/07/23 1237)    sodium chloride      dextrose      EPINEPHrine Stopped (03/01/23 0602)       PHYSICAL EXAMINATION:  Vitals:  Temp: 98.7 °F (37.1 °C)  Heart Rate: 69  Resp: 28  BP: (!) 113/50  FiO2 : 30 %  SpO2: 97 %  [place] Coma Scale  Eye Opening: Spontaneous  Best Verbal Response: Confused  Best Motor Response: Obeys commands  [place] Coma Scale Score: 14  OPO Notified: Not indicated      24-hour Input/Output  In: 1317.1   Out: 1000 [Urine:1000]    BMI:  Body mass index is 27.61 kg/m². Oxygenation/Ventilation:  Vent Mode: CPAP/PS  Pressure Ordered: 0  PEEP/CPAP (cmH2O): 8  FiO2 : 30 %  SpO2: 97 %  Resp Rate (Set): 0 bmp  Rate Measured: 12 br/min  I Time/ I Time %: 1 s      Physical Exam  General:   This is an elderly gentleman lying in bed  HEENT:  normocephalic and atraumatic. No scleral icterus. PERR; NG Tube in place; patient noted to have difficulty clearing his throat and coughing  Neck: supple.   No Thyromegaly. Lungs: diminished breath sounds bilaterally  Cardiac: RRR. No JVD. Abdomen: soft. Nontender. Extremities:  No clubbing, cyanosis, or edema x 4. Vasculature: capillary refill < 3 seconds. Palpable dorsalis pedis pulses. Skin:  warm and dry. Psych:  Alert and oriented to self only. Affect appropriate  Lymph:  No supraclavicular adenopathy. Neurologic:  No focal deficit. No seizures. Data: (All radiographs, tracings, PFTs, and imaging are personally viewed and interpreted unless otherwise noted). Ir=655, K=4.5, Chloride=112, CO2=30, BUN=37, Cr=0.9, Glucose=136, Anion Gap=11  WBC=10.6, Hgb=9.2, HCT=31.6, Platelets=320  Imaging CXR showing prominent pulmonary vasculature  Micro Molecular Pneumonia Panel positive for non SARS Coronavirus; Respiratory Culture with moderate gram positive cocci and bacilli  Telemetry shows normal sinus rhythm        Seen with multidisciplinary ICU team.  Meets Continued ICU Level Care Criteria:  [] Yes  [x] No - Transfer Planned to listed location:  [] HOSPITALIST CONTACTED -     Case and plan discussed with Dr. Nathan Solis.     Electronically signed by Ron Rosales DO, MBA on 3/12/2023 at 9:22 AM  Kat Li

## 2023-03-12 NOTE — PROGRESS NOTES
CT/CV Surgery Progress Note    3/12/2023 8:50 AM  Surgeon:  Dr. Christos Neumann     Procedure: POD #12  CABG STONEY,  coronary artery bypass grafting x3 using LIMA to LAD, vein graft to to's marginal 1, vein graft to posterior descending artery, transesophageal echocardiography, endoscopic vein harvesting    Subjective:  Mr. Serene Stanton is resting comfortably in bed with tube feeds via NG tube, a little more alert this morning, and in no acute distress. Pt denies chest pressure, SOB, fever,chills, N/V/D. Intake/Output Summary (Last 24 hours) at 3/12/2023 0850  Last data filed at 3/12/2023 0837  Gross per 24 hour   Intake 1317.14 ml   Output 1000 ml   Net 317.14 ml       Vital Signs: BP (!) 165/48   Pulse 69   Temp 98.7 °F (37.1 °C) (Oral)   Resp 28   Ht 5' 9\" (1.753 m)   Wt 186 lb 15.2 oz (84.8 kg)   SpO2 97%   BMI 27.61 kg/m²    Temp (24hrs), Av.3 °F (36.8 °C), Min:97.7 °F (36.5 °C), Max:98.7 °F (37.1 °C)    Labs:   CBC:  Recent Labs     03/10/23  0419 03/11/23  0423 03/12/23  0807   WBC 14.0* 10.5 10.6   HGB 9.0* 8.6* 9.2*   HCT 29.5* 28.6* 31.6*   .5* 107.9* 111.7*    297 320       BMP:   Recent Labs     03/10/23  0419 03/10/23  0806 23  0824 23  0411 23  0807   *  --  152*  --   --  153*   K 3.8  --  3.7  --   --  4.5     --  109  --   --  112*   CO2 31  --  31  --   --  30   BUN 38*  --  40*  --   --  37*   CREATININE 1.0  --  1.1  --   --  0.9   POCGLU  --    < >  --    < > 159*  --     < > = values in this interval not displayed. Last HgA1C:   Lab Results   Component Value Date    LABA1C 5.1 2023     Imaging:  CXR: 3/12/2023  Mild bibasilar atelectasis.     Scheduled Meds:    modafinil  200 mg Oral QAM    piperacillin-tazobactam  3,375 mg IntraVENous Q8H    magnesium hydroxide  30 mL Oral Daily    carvedilol  12.5 mg Oral BID WC    nystatin  5 mL Oral 4x Daily    tiotropium-olodaterol  2 puff Inhalation Daily    polyethylene glycol  17 g Oral Daily    amiodarone  200 mg Oral Daily    bisacodyl  10 mg Rectal Daily    calcium replacement protocol   Other RX Placeholder    aspirin  324 mg Oral Daily    carBAMazepine  400 mg Oral TID    potassium bicarb-citric acid  20 mEq Oral BID    insulin lispro  0-4 Units SubCUTAneous TID WC    insulin lispro  0-4 Units SubCUTAneous Nightly    furosemide  40 mg IntraVENous Daily    sodium chloride flush  5-40 mL IntraVENous 2 times per day    enoxaparin  40 mg SubCUTAneous Daily    sennosides-docusate sodium  1 tablet Oral BID    famotidine  20 mg Oral BID    Or    famotidine (PEPCID) injection  20 mg IntraVENous BID    atorvastatin  40 mg Oral Daily    escitalopram  20 mg Oral Daily    levothyroxine  50 mcg Oral Daily    risperiDONE  0.5 mg Oral BID    multivitamin  1 tablet Oral Daily     ROS: All neg unless specifically mentioned in subjective section. Exam:  General Appearance: alert ,conversing, in no acute distress  Cardiovascular: normal rate, regular rhythm, normal S1 and S2, no murmurs, rubs, clicks, or gallops  Pulmonary/Chest: clear to auscultation bilaterally- no wheezes, rales or rhonchi, normal air movement, no respiratory distress  Neurological: alert, oriented, normal speech, no focal findings or movement disorder noted  Sternum: Incision healing appropriately and no wound dehiscence noted.      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

## 2023-03-12 NOTE — PLAN OF CARE
Problem: Respiratory - Adult  Goal: Clear lung sounds  Outcome: Progressing   Patient continues on inhaler; tolerating well.   Will continue to monitor patients respiratory status

## 2023-03-12 NOTE — PROGRESS NOTES
[facility] ICU 4D  Occupational Therapy  Daily Note  Time:   Time In: 3538  Time Out: 1413  Timed Code Treatment Minutes: 30 Minutes  Minutes: 30          Date: 3/12/2023  Patient Name: Gisella Appiah,   Gender: male      Room: [facility]008-A  MRN: [id number]  : 1940  (81 y.o.)  Referring Practitioner: Tamika Irving PA-C  Diagnosis: elevated troponin  Additional Pertinent Hx: per chart review; \"The patient is an 81year old male former smoker with an approximately 20-pack-year history who quit in  with a past medical history of leukemia with remission in , hypothyroidism, \"seizures,\" hyperlipidemia, \"coma,\" and 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 \" patient underwent a CABG STONEY,  coronary artery bypass grafting x3 using LIMA to LAD, vein graft to to's marginal 1, vein graft to posterior descending artery, transesophageal echocardiography, endoscopic vein harvesting  on 23. Restrictions/Precautions:  Restrictions/Precautions: Fall Risk, General Precautions, Surgical Protocols  Position Activity Restriction  Sternal Precautions: move in the tube  Other position/activity restrictions: hx of brain injury     SUBJECTIVE: RN okayed OT session. Upon arrival patient was sitting up in recliner. Sitter present in room. Difficult to direct and understand at times during session. PAIN: 0/10: Pt denies pain. Vitals: Blood Pressure: Beginning of session: 128/44 End of session: 124/39  Oxygen: Pt is on 2 Liters O2 via NC. O2 sat % throughout session.    Heart Rate: 67-72 BPM    COGNITION: Slow Processing, Decreased Recall, Decreased Insight, Decreased Problem Solving, Decreased Safety Awareness, and Difficulty Following Commands    ADL:   Footwear Management: Maximum Assistance. To adjust B Socks . BALANCE:  Sitting Balance:  Stand By Assistance. Sitting forward in recliner. Standing Balance: Contact Guard Assistance, X 2, with cues for safety. With BUE on RW. BED MOBILITY:  Not Tested    TRANSFERS:  Sit to Stand:  Minimal Assistance, X 1, with increased time for completion, cues for hand placement. From recliner. Stand to Sit: Minimal Assistance, X 1. Onto recliner. FUNCTIONAL MOBILITY:  Assistive Device: Rolling Walker  Assist Level:  Minimal Assistance, X 1, with verbal cues , and with increased time for completion. Distance:  to/from room door. Sitter assisted with lines, Slow pace, Pt reporting increased dizziness-RN notified and present in room. ADDITIONAL ACTIVITIES:  Pt completed CABG Step II exercises x8-10 reps x1 set this date in order to increase strength and improve activity tolerance for ADLs and homemaking tasks. Pt exhibited moderate fatigue during task, requring frequent rest breaks and moderate VCs for technique. ASSESSMENT:   Activity Tolerance:  Patient tolerance of  treatment: Fair treatment tolerancelimited by dizziness. Discharge Recommendations: Continue to assess pending progress, Patient would benefit from continued OT at discharge, and Inpatient Therapy Stay  Equipment Recommendations: Equipment Needed: Yes  Mobility Devices: ADL Assistive Devices  Other: Pt reports he has a rolling walker and a shower chair but was only using off and on. Recommendations provided to use both for fall prevention. Plan: Times Per Week: 6x  Times Per Day:  Once a day  Current Treatment Recommendations: Strengthening, Balance training, Functional mobility training, Neuromuscular re-education, Safety education & training, Self-Care / ADL, Endurance training, Patient/Caregiver education & training, Equipment evaluation, education, & procurement, ROM    Patient Education  Patient Education: Role of OT, Plan of Care, ADL's, Home Exercise Program, Precautions, Importance of Increasing Activity, and Assistive Device Safety    Goals  Short Term Goals  Time Frame for Short Term Goals: by discharge  Short Term Goal 1: patient will tolerate 2 min static standing with sit to stand and maintain balance with MOD A x 1 with O2 >/= 90% to increase activity tolerance for self care routine. Short Term Goal 2: patient will complete UB ADLs with MIN A and 1-2 cues to initiate and sequence task. Short Term Goal 3: Pt will complete mobility to/from [facility] or bedside chair with RW, CGA, & 0-2 vcs for posture & walker safety  Short Term Goal 4: patient will tolerate CABG exer to increase activity tolerance for self care routine. Following session, patient left in safe position with all fall risk precautions in place.

## 2023-03-13 ENCOUNTER — APPOINTMENT (OUTPATIENT)
Dept: GENERAL RADIOLOGY | Age: 83
DRG: 233 | End: 2023-03-13
Payer: MEDICARE

## 2023-03-13 PROBLEM — R13.10 DYSPHAGIA: Status: ACTIVE | Noted: 2023-03-13

## 2023-03-13 PROBLEM — G47.30 SLEEP APNEA: Status: ACTIVE | Noted: 2023-03-13

## 2023-03-13 LAB
ALBUMIN SERPL BCG-MCNC: 3 G/DL (ref 3.5–5.1)
ALP SERPL-CCNC: 59 U/L (ref 38–126)
ALT SERPL W/O P-5'-P-CCNC: 103 U/L (ref 11–66)
ANION GAP SERPL CALC-SCNC: 11 MEQ/L (ref 8–16)
AST SERPL-CCNC: 78 U/L (ref 5–40)
BILIRUB SERPL-MCNC: 0.3 MG/DL (ref 0.3–1.2)
BUN SERPL-MCNC: 35 MG/DL (ref 7–22)
CALCIUM SERPL-MCNC: 7.6 MG/DL (ref 8.5–10.5)
CHLORIDE SERPL-SCNC: 113 MEQ/L (ref 98–111)
CO2 SERPL-SCNC: 31 MEQ/L (ref 23–33)
CREAT SERPL-MCNC: 0.9 MG/DL (ref 0.4–1.2)
DEPRECATED RDW RBC AUTO: 50.4 FL (ref 35–45)
ERYTHROCYTE [DISTWIDTH] IN BLOOD BY AUTOMATED COUNT: 12.8 % (ref 11.5–14.5)
GFR SERPL CREATININE-BSD FRML MDRD: > 60 ML/MIN/1.73M2
GLUCOSE BLD STRIP.AUTO-MCNC: 100 MG/DL (ref 70–108)
GLUCOSE BLD STRIP.AUTO-MCNC: 124 MG/DL (ref 70–108)
GLUCOSE BLD STRIP.AUTO-MCNC: 140 MG/DL (ref 70–108)
GLUCOSE BLD STRIP.AUTO-MCNC: 97 MG/DL (ref 70–108)
GLUCOSE SERPL-MCNC: 149 MG/DL (ref 70–108)
HCT VFR BLD AUTO: 30.2 % (ref 42–52)
HGB BLD-MCNC: 8.9 GM/DL (ref 14–18)
MAGNESIUM SERPL-MCNC: 3.3 MG/DL (ref 1.6–2.4)
MCH RBC QN AUTO: 32.2 PG (ref 26–33)
MCHC RBC AUTO-ENTMCNC: 29.5 GM/DL (ref 32.2–35.5)
MCV RBC AUTO: 109.4 FL (ref 80–94)
PHOSPHATE SERPL-MCNC: 3.6 MG/DL (ref 2.4–4.7)
PLATELET # BLD AUTO: 418 THOU/MM3 (ref 130–400)
PMV BLD AUTO: 11.1 FL (ref 9.4–12.4)
POTASSIUM SERPL-SCNC: 3.8 MEQ/L (ref 3.5–5.2)
PROT SERPL-MCNC: 6.1 G/DL (ref 6.1–8)
RBC # BLD AUTO: 2.76 MILL/MM3 (ref 4.7–6.1)
SODIUM SERPL-SCNC: 155 MEQ/L (ref 135–145)
WBC # BLD AUTO: 11 THOU/MM3 (ref 4.8–10.8)

## 2023-03-13 PROCEDURE — 94660 CPAP INITIATION&MGMT: CPT

## 2023-03-13 PROCEDURE — 94640 AIRWAY INHALATION TREATMENT: CPT

## 2023-03-13 PROCEDURE — 82948 REAGENT STRIP/BLOOD GLUCOSE: CPT

## 2023-03-13 PROCEDURE — 85027 COMPLETE CBC AUTOMATED: CPT

## 2023-03-13 PROCEDURE — 2000000000 HC ICU R&B

## 2023-03-13 PROCEDURE — 94761 N-INVAS EAR/PLS OXIMETRY MLT: CPT

## 2023-03-13 PROCEDURE — APPSS30 APP SPLIT SHARED TIME 16-30 MINUTES: Performed by: PHYSICIAN ASSISTANT

## 2023-03-13 PROCEDURE — A4216 STERILE WATER/SALINE, 10 ML: HCPCS | Performed by: PHYSICIAN ASSISTANT

## 2023-03-13 PROCEDURE — 6360000002 HC RX W HCPCS

## 2023-03-13 PROCEDURE — 92526 ORAL FUNCTION THERAPY: CPT

## 2023-03-13 PROCEDURE — 2700000000 HC OXYGEN THERAPY PER DAY

## 2023-03-13 PROCEDURE — 83735 ASSAY OF MAGNESIUM: CPT

## 2023-03-13 PROCEDURE — 97110 THERAPEUTIC EXERCISES: CPT

## 2023-03-13 PROCEDURE — 6370000000 HC RX 637 (ALT 250 FOR IP)

## 2023-03-13 PROCEDURE — 97535 SELF CARE MNGMENT TRAINING: CPT

## 2023-03-13 PROCEDURE — 6370000000 HC RX 637 (ALT 250 FOR IP): Performed by: PHYSICIAN ASSISTANT

## 2023-03-13 PROCEDURE — 80053 COMPREHEN METABOLIC PANEL: CPT

## 2023-03-13 PROCEDURE — 2580000003 HC RX 258: Performed by: PHYSICIAN ASSISTANT

## 2023-03-13 PROCEDURE — 6360000002 HC RX W HCPCS: Performed by: PHYSICIAN ASSISTANT

## 2023-03-13 PROCEDURE — 2500000003 HC RX 250 WO HCPCS: Performed by: PHYSICIAN ASSISTANT

## 2023-03-13 PROCEDURE — 36415 COLL VENOUS BLD VENIPUNCTURE: CPT

## 2023-03-13 PROCEDURE — 84100 ASSAY OF PHOSPHORUS: CPT

## 2023-03-13 PROCEDURE — 97116 GAIT TRAINING THERAPY: CPT

## 2023-03-13 PROCEDURE — 6370000000 HC RX 637 (ALT 250 FOR IP): Performed by: THORACIC SURGERY (CARDIOTHORACIC VASCULAR SURGERY)

## 2023-03-13 PROCEDURE — 99233 SBSQ HOSP IP/OBS HIGH 50: CPT | Performed by: INTERNAL MEDICINE

## 2023-03-13 PROCEDURE — 71045 X-RAY EXAM CHEST 1 VIEW: CPT

## 2023-03-13 PROCEDURE — 99223 1ST HOSP IP/OBS HIGH 75: CPT | Performed by: NURSE PRACTITIONER

## 2023-03-13 PROCEDURE — 6370000000 HC RX 637 (ALT 250 FOR IP): Performed by: NURSE PRACTITIONER

## 2023-03-13 PROCEDURE — 2580000003 HC RX 258

## 2023-03-13 RX ADMIN — PIPERACILLIN AND TAZOBACTAM 3375 MG: 3; .375 INJECTION, POWDER, FOR SOLUTION INTRAVENOUS at 16:32

## 2023-03-13 RX ADMIN — ASPIRIN 81 MG 324 MG: 81 TABLET ORAL at 08:36

## 2023-03-13 RX ADMIN — ENOXAPARIN SODIUM 40 MG: 100 INJECTION SUBCUTANEOUS at 08:35

## 2023-03-13 RX ADMIN — POTASSIUM BICARBONATE 20 MEQ: 782 TABLET, EFFERVESCENT ORAL at 08:35

## 2023-03-13 RX ADMIN — FAMOTIDINE 20 MG: 20 TABLET, FILM COATED ORAL at 08:36

## 2023-03-13 RX ADMIN — ESCITALOPRAM 20 MG: 20 TABLET, FILM COATED ORAL at 08:35

## 2023-03-13 RX ADMIN — PIPERACILLIN AND TAZOBACTAM 3375 MG: 3; .375 INJECTION, POWDER, FOR SOLUTION INTRAVENOUS at 01:44

## 2023-03-13 RX ADMIN — SODIUM CHLORIDE, PRESERVATIVE FREE 10 ML: 5 INJECTION INTRAVENOUS at 08:36

## 2023-03-13 RX ADMIN — SODIUM CHLORIDE, PRESERVATIVE FREE 10 ML: 5 INJECTION INTRAVENOUS at 22:00

## 2023-03-13 RX ADMIN — Medication 500000 UNITS: at 08:36

## 2023-03-13 RX ADMIN — CARVEDILOL 12.5 MG: 6.25 TABLET, FILM COATED ORAL at 08:35

## 2023-03-13 RX ADMIN — Medication 500000 UNITS: at 16:29

## 2023-03-13 RX ADMIN — CARBAMAZEPINE 400 MG: 100 TABLET, CHEWABLE ORAL at 10:59

## 2023-03-13 RX ADMIN — Medication 500000 UNITS: at 23:14

## 2023-03-13 RX ADMIN — Medication 500000 UNITS: at 13:13

## 2023-03-13 RX ADMIN — ATORVASTATIN CALCIUM 40 MG: 40 TABLET, FILM COATED ORAL at 08:35

## 2023-03-13 RX ADMIN — TIOTROPIUM BROMIDE AND OLODATEROL 2 PUFF: 3.124; 2.736 SPRAY, METERED RESPIRATORY (INHALATION) at 07:57

## 2023-03-13 RX ADMIN — AMIODARONE HYDROCHLORIDE 200 MG: 200 TABLET ORAL at 08:36

## 2023-03-13 RX ADMIN — FUROSEMIDE 40 MG: 10 INJECTION, SOLUTION INTRAMUSCULAR; INTRAVENOUS at 08:35

## 2023-03-13 RX ADMIN — LEVOTHYROXINE SODIUM 50 MCG: 0.05 TABLET ORAL at 07:07

## 2023-03-13 RX ADMIN — Medication 1 TABLET: at 08:35

## 2023-03-13 RX ADMIN — PIPERACILLIN AND TAZOBACTAM 3375 MG: 3; .375 INJECTION, POWDER, FOR SOLUTION INTRAVENOUS at 09:00

## 2023-03-13 RX ADMIN — MODAFINIL 200 MG: 100 TABLET ORAL at 08:35

## 2023-03-13 RX ADMIN — SODIUM CHLORIDE, PRESERVATIVE FREE 20 MG: 5 INJECTION INTRAVENOUS at 22:00

## 2023-03-13 RX ADMIN — RISPERIDONE 0.5 MG: 0.25 TABLET, FILM COATED ORAL at 08:35

## 2023-03-13 ASSESSMENT — ENCOUNTER SYMPTOMS
VOMITING: 0
WHEEZING: 0
BACK PAIN: 0
TROUBLE SWALLOWING: 1
CHEST TIGHTNESS: 0
SHORTNESS OF BREATH: 0
NAUSEA: 0
VOICE CHANGE: 0
PHOTOPHOBIA: 0
COLOR CHANGE: 0
ABDOMINAL PAIN: 0

## 2023-03-13 ASSESSMENT — PAIN SCALES - GENERAL
PAINLEVEL_OUTOF10: 0

## 2023-03-13 NOTE — PROGRESS NOTES
Comprehensive Nutrition Assessment    Type and Reason for Visit:  Reassess (TF management)    Nutrition Recommendations/Plan:   Continue Pivot at goal 50ml/ hour  Additional free water flush as per Physician 3/13 : 200ml every 4 hours   NPO per Speech Therapy/ Physician      Malnutrition Assessment:  Malnutrition Status: At risk for malnutrition (Comment) (03/10/23 1416)    Context:  Acute Illness     Findings of the 6 clinical characteristics of malnutrition:  Energy Intake:  Mild decrease in energy intake (Comment) (variable po admit to [facility]; tolerating EN since 3/1)  Weight Loss:  No significant weight loss     Body Fat Loss:  No significant body fat loss     Muscle Mass Loss:  No significant muscle mass loss    Fluid Accumulation:  Mild     Strength:  Not Performed    Nutrition Assessment:      Pt. Improving nutritionally AEB tolerating EN at goal, NPO d/t dysphagia. At risk for further nutrition compromise r/t admit with NSTEMI, cardiac cath 2/23, CABG 2/28 with intubation and extubation 2/28, increased needs with COPD and OHS for healing process, confusion noted, LOS day 17 and underlying medical condition advanced age, HLD, leukemia.       Nutrition Related Findings:    Pt. Report/Treatments/Miscellaneous: tolerating tube feed at goal via NG tube per RN and reports of more alert, NPO per Speech Therapy   GI Status: multiple loose stools this morning per RN, bowel meds held   Pertinent Labs: Sodium 155, Potassium 3.8, BUN 35, Creatinine 0.9, Glucose 149, Phosphorus 3.6  Pertinent Meds: dulcolax, glycolax, senokot, tegretol, lasix, humalog, synrhoid, MOM, MVI, nystatin, zosyn     Wound Type: Surgical Incision (CABG x3 2/28; pressue ulcer stage 2 nose)       Current Nutrition Intake & Therapies:          Diet NPO Exceptions are: Sips of Water with Meds  ADULT TUBE FEEDING; Nasogastric; Immune Enhancing; Continuous; 50; No; 200; Q 4 hours  Current Tube Feeding (TF) Orders:  Feeding Route: Nasogastric  Formula: Immune Enhancing (PIvot)  Schedule: Continuous  Feeding Regimen: Pivot goa of 50ml/hour  Additives/Modulars: None  Water Flushes: per Provider- 3/13 per Vern Sharma CNP: 200ml every 4 hours  Goal TF & Flush Orders Provides: Pivot 50ml/hour provides 1800 kcals, 113gms protein, 207gms cho,9 gms fiber, 900ml free H20 (2100 with MD flush) in 1200ml total volume (2400 with MD flush)/24h    Anthropometric Measures:  Height: 5' 9\" (175.3 cm)  Ideal Body Weight (IBW): 160 lbs (73 kg)    Admission Body Weight: 202 lb (91.6 kg) (2/28 with scleral edema)  Current Body Weight: 190 lb 0.6 oz (86.2 kg) (3/13; non pitting edema),   Weight Source: Bed Scale  Current BMI (kg/m2): 28.1  Usual Body Weight: 199 lb (90.3 kg) (2/22/23; 204# 1/2017; 239# 12/2015; 207# 3/2014)  % Weight Change (Calculated): 1.5                    BMI Categories: Overweight (BMI 25.0-29. 9)    Estimated Daily Nutrient Needs:  Energy Requirements Based On: Kcal/kg  Weight Used for Energy Requirements: Admission (92kgm)  Energy (kcal/day): 0150-4440 (20-25/kgm)  Weight Used for Protein Requirements: Ideal (73kgm)  Protein (g/day): 88-110gms (1.2-1.5/kgm IBW     Fluid (ml/day): per Physician    Nutrition Diagnosis:   Inadequate oral intake related to swallowing difficulty as evidenced by NPO or clear liquid status due to medical condition, nutrition support - enteral nutrition, swallow study results    Nutrition Interventions:   Food and/or Nutrient Delivery: Continue NPO, Continue Current Tube Feeding  Nutrition Education/Counseling: Education not appropriate  Coordination of Nutrition Care: Continue to monitor while inpatient, Interdisciplinary Rounds       Goals:     Goals:  Tolerate nutrition support at goal rate, by next RD assessment       Nutrition Monitoring and Evaluation:      Food/Nutrient Intake Outcomes: Enteral Nutrition Intake/Tolerance  Physical Signs/Symptoms Outcomes: Biochemical Data, Chewing or Swallowing, GI Status, Fluid Status or Edema, Hemodynamic Status, Nutrition Focused Physical Findings, Skin, Weight    Discharge Planning:     Too soon to determine     Melissa Reeves RD, LD  Contact: [phone]

## 2023-03-13 NOTE — PROGRESS NOTES
CRITICAL CARE PROGRESS NOTE      Patient:  Coral Oleary    Unit/Bed:4D-08/008-A  YOB: 1940  MRN: [id number]   PCP: Richard Samano MD  Date of Admission: 2/21/2023  Chief Complaint:- I want to eat     Assessment and Plan:      CAD status post CABG: Underwent three-vessel CABG on 2/28. Status post chest tubes. Continue with carvedilol amiodarone. Pneumonia: On Zosyn day #4. No fever. Chest x-ray appears clear. We will continue with 7-day course  Hypernatremia: Likely secondary to dehydration. Stop Lasix. Increased free water on tube feed. Monitor. Sleep apnea: CPAP. At night and for naps. 12/6. Dementia: At baseline. Dysphagia: Working with speech therapy. Failed fees. CV surgery request that NG tube is removed. Will repeat modified tomorrow. Anxiety: Lexapro  Right ICA: 70% right severe stenosis. Was seen by vascular surgeon. We will follow  Hypermagnesemia: Magnesium 3.3, monitor. Mildly elevated liver enzymes: ALT/AST, bilirubin normal   Hyperlipidemia: Lipitor 40 mg  Hypothyroidism: Synthroid 50 mcg daily  Acute hyperglycemia: Continue with sliding scale insulin    INITIAL H AND P AND ICU COURSE:  Joseph Rangel is an 81-year-old white male who presented to [facility] on 2/21/2023 with complaints of shortness of breath and sputum production. He has a past medical history of reformed smoker, CVA hyperlipidemia, seizures, leukocytosis. Per report patient was admitted for concerns of COPD exacerbation and started on bronchodilators and antibiotics. Cardiology was consulted secondary to EKG changes and concern for ischemia. Elevated troponin was noted and patient underwent left heart cath on 2/23/2023 which showed multivessel disease. He was scheduled for CABG on 5/08 without complication and was transferred to the ICU postoperatively. He was extubated and placed on BiPAP the day of surgery.   Patient continued to require additional support including NT suctioning. He was having problems with orientation. Patient was started on Zyprexa. He required bedside sitter. Past Medical History: Per HPI. Family History: No known family history. Social History: Reformed smoker, denies alcohol or drug use. .    Review of Systems   Constitutional:  Negative for fatigue and fever. HENT:  Positive for trouble swallowing. Negative for nosebleeds and voice change. Eyes:  Negative for photophobia. Respiratory:  Negative for chest tightness, shortness of breath and wheezing. Cardiovascular:  Negative for chest pain and leg swelling. Gastrointestinal:  Negative for abdominal pain, nausea and vomiting. Endocrine: Negative for polyphagia. Genitourinary:  Negative for decreased urine volume and flank pain. Musculoskeletal:  Negative for back pain and neck pain. Skin:  Negative for color change, pallor and rash. Allergic/Immunologic: Negative for food allergies. Neurological:  Positive for weakness. Negative for dizziness and headaches. Hematological:  Negative for adenopathy. Psychiatric/Behavioral:  Positive for agitation and confusion. The patient is nervous/anxious.         Scheduled Meds:   carBAMazepine  400 mg Oral TID    modafinil  200 mg Oral QAM    piperacillin-tazobactam  3,375 mg IntraVENous Q8H    magnesium hydroxide  30 mL Oral Daily    carvedilol  12.5 mg Oral BID WC    nystatin  5 mL Oral 4x Daily    tiotropium-olodaterol  2 puff Inhalation Daily    polyethylene glycol  17 g Oral Daily    amiodarone  200 mg Oral Daily    bisacodyl  10 mg Rectal Daily    calcium replacement protocol   Other RX Placeholder    aspirin  324 mg Oral Daily    potassium bicarb-citric acid  20 mEq Oral BID    insulin lispro  0-4 Units SubCUTAneous TID WC    insulin lispro  0-4 Units SubCUTAneous Nightly    [Held by provider] furosemide  40 mg IntraVENous Daily    sodium chloride flush  5-40 mL IntraVENous 2 times per day    enoxaparin  40 mg SubCUTAneous Daily sennosides-docusate sodium  1 tablet Oral BID    famotidine  20 mg Oral BID    Or    famotidine (PEPCID) injection  20 mg IntraVENous BID    atorvastatin  40 mg Oral Daily    escitalopram  20 mg Oral Daily    levothyroxine  50 mcg Oral Daily    risperiDONE  0.5 mg Oral BID    multivitamin  1 tablet Oral Daily     Continuous Infusions:        PHYSICAL EXAMINATION:  T:  98.5. P:  68. RR:  15. B/P:  130/36. FiO2:  2. O2 Sat:  97.  I/O:  1496/1100  Body mass index is 28.06 kg/m². General:   Acute on chronically ill-appearing white male  HEENT:  normocephalic and atraumatic. No scleral icterus. PERR  Neck: supple. No Thyromegaly. Lungs: clear to auscultation. No retractions  Cardiac: RRR. No JVD. Abdomen: soft. Nontender. Extremities:  No clubbing, cyanosis, or edema x 4. Vasculature: capillary refill < 3 seconds. Palpable dorsalis pedis pulses. Skin:  warm and dry. Psych:  Alert and oriented x2, confused, baseline. Lymph:  No supraclavicular adenopathy. Neurologic:  No focal deficit. No seizures. Data: (All radiographs, tracings, PFTs, and imaging are personally viewed and interpreted unless otherwise noted). Sodium 155, potassium 3.8, chloride 113, CO2 31, BUN 35, creatinine 0.9, anion gap 11.0, glucose 149. WBC 11.0, hemoglobin 8.9, hematocrit 30.2, platelet count 186  Telemetry shows NSR   Chest x-ray 3/13/2023 reports no acute findings  KUB 3/10/2023 reports feeding tube bowel gas pattern  CT head 3/2/2023 reports no new abnormalities  Echocardiogram 2/22/2023 reports ejection fraction 50%        Meets Continued ICU Level Care Criteria:    [x] Yes   [] No - Transfer Planned to listed location:  [] HOSPITALIST CONTACTED-      Case and plan discussed with Dr. Marcella Son and Yusuf Heard, CV surgery PA        Electronically signed by Loreda Spotted. Julietta Fothergill, APRN - CNP  CRITICAL CARE SPECIALIST   Patient seen by me including key components of medical care. Case discussed with NP.   Patient much more interactive with environment. Repeat swallowing test in am.  Case discussed with [facility].  Italicized font, if present,  represents changes to the note made by me. CC time 35 minutes. Time was discontiguous. Time does not include procedure. Time does include my direct assessment of the patient and coordination of care. Time represents more than 50% of the time involved with patient care by the [address] [place] [address] team.  Electronically signed by Amy Glez.  Chito Rivers MD.

## 2023-03-13 NOTE — PROGRESS NOTES
03/13/23 0950   Encounter Summary   Encounter Overview/Reason  Spiritual/Emotional Needs   Service Provided For: Patient and family together   Referral/Consult From: [address] Children;Family members   Last Encounter  03/13/23   Complexity of Encounter Moderate   Spiritual/Emotional needs   Type Spiritual Support   As a follow up , this  met Wesley\" as he likes to be called. His daughter and grandson are in the room with him supporting him. Words of encouragement given as well as prayers for his healing and strength. Care Plan:  Continue spiritual and emotional care for patient and family. Including prayers.

## 2023-03-13 NOTE — PLAN OF CARE
Problem: Neurosensory - Adult  Goal: Achieves stable or improved neurological status  Outcome: Not Progressing  Flowsheets (Taken 3/12/2023 2000)  Achieves stable or improved neurological status:   Assess for and report changes in neurological status   Initiate measures to prevent increased intracranial pressure   Maintain blood pressure and fluid volume within ordered parameters to optimize cerebral perfusion and minimize risk of hemorrhage   Monitor temperature, glucose, and sodium.  Initiate appropriate interventions as ordered     Problem: Neurosensory - Adult  Goal: Achieves maximal functionality and self care  Outcome: Not Progressing  Flowsheets (Taken 3/12/2023 2000)  Achieves maximal functionality and self care:   Monitor swallowing and airway patency with patient fatigue and changes in neurological status   Encourage and assist patient to increase activity and self care with guidance from physical therapy/occupational therapy   Encourage visually impaired, hearing impaired and aphasic patients to use assistive/communication devices     Problem: Discharge Planning  Goal: Discharge to home or other facility with appropriate resources  Outcome: Progressing  Flowsheets (Taken 3/12/2023 2000)  Discharge to home or other facility with appropriate resources:   Identify barriers to discharge with patient and caregiver   Arrange for needed discharge resources and transportation as appropriate   Identify discharge learning needs (meds, wound care, etc)   Refer to discharge planning if patient needs post-hospital services based on physician order or complex needs related to functional status, cognitive ability or social support system     Problem: Cardiovascular - Adult  Goal: Maintains optimal cardiac output and hemodynamic stability  Outcome: Progressing  Flowsheets (Taken 3/12/2023 2000)  Maintains optimal cardiac output and hemodynamic stability:   Monitor blood pressure and heart rate   Monitor urine output and notify Licensed Independent Practitioner for values outside of normal range   Assess for signs of decreased cardiac output   Administer fluid and/or volume expanders as ordered   Administer vasoactive medications as ordered     Problem: Cardiovascular - Adult  Goal: Absence of cardiac dysrhythmias or at baseline  Outcome: Progressing  Flowsheets (Taken 3/12/2023 2000)  Absence of cardiac dysrhythmias or at baseline:   Monitor cardiac rate and rhythm   Assess for signs of decreased cardiac output   Administer antiarrhythmia medication and electrolyte replacement as ordered     Problem: Metabolic/Fluid and Electrolytes - Adult  Goal: Electrolytes maintained within normal limits  Outcome: Progressing  Flowsheets (Taken 3/12/2023 2000)  Electrolytes maintained within normal limits:   Monitor labs and assess patient for signs and symptoms of electrolyte imbalances   Administer electrolyte replacement as ordered   Monitor response to electrolyte replacements, including repeat lab results as appropriate   Fluid restriction as ordered   Instruct patient on fluid and nutrition restrictions as appropriate     Problem: Metabolic/Fluid and Electrolytes - Adult  Goal: Glucose maintained within prescribed range  Outcome: Progressing  Flowsheets (Taken 3/12/2023 2000)  Glucose maintained within prescribed range:   Monitor blood glucose as ordered   Assess for signs and symptoms of hyperglycemia and hypoglycemia   Administer ordered medications to maintain glucose within target range   Assess barriers to adequate nutritional intake and initiate nutrition consult as needed   Instruct patient on self management of diabetes and initiate consult as needed     Problem: Hematologic - Adult  Goal: Maintains hematologic stability  Outcome: Progressing  Flowsheets (Taken 3/12/2023 2000)  Maintains hematologic stability:   Assess for signs and symptoms of bleeding or hemorrhage   Monitor labs for bleeding or clotting disorders   Administer blood products/factors as ordered     Problem: Skin/Tissue Integrity - Adult  Goal: Incisions, wounds, or drain sites healing without S/S of infection  Outcome: Progressing  Flowsheets  Taken 3/13/2023 0241  Incisions, Wounds, or Drain Sites Healing Without Sign and Symptoms of Infection:   Implement wound care per orders   TWICE DAILY: Assess and document skin integrity   TWICE DAILY: Assess and document dressing/incision, wound bed, drain sites and surrounding tissue  Taken 3/12/2023 2000  Incisions, Wounds, or Drain Sites Healing Without Sign and Symptoms of Infection: TWICE DAILY: Assess and document skin integrity     Problem: Genitourinary - Adult  Goal: Absence of urinary retention  Outcome: Progressing  Flowsheets (Taken 3/12/2023 2000)  Absence of urinary retention:   Assess patients ability to void and empty bladder   Monitor intake/output and perform bladder scan as needed   Place urinary catheter per Licensed Independent Practitioner order if needed   Discuss with Licensed Independent Practitioner  medications to alleviate retention as needed   Discuss catheterization for long term situations as appropriate     Problem: Infection - Adult  Goal: Absence of infection at discharge  Outcome: Progressing  Flowsheets (Taken 3/12/2023 2000)  Absence of infection at discharge:   Assess and monitor for signs and symptoms of infection   Monitor lab/diagnostic results   Monitor all insertion sites i.e., indwelling lines, tubes and drains   Administer medications as ordered   Instruct and encourage patient and family to use good hand hygiene technique   Identify and instruct in appropriate isolation precautions for identified infection/condition     Problem: Pain  Goal: Verbalizes/displays adequate comfort level or baseline comfort level  Flowsheets (Taken 3/13/2023 0241)  Verbalizes/displays adequate comfort level or baseline comfort level:   Encourage patient to monitor pain and request assistance   Assess pain using appropriate pain scale   Administer analgesics based on type and severity of pain and evaluate response  Note: Pain Assessment: None - Denies Pain    Patient's Stated Pain Goal: 1   Is pain goal met at this time?   Yes     Non-Pharmaceutical Pain Intervention(s): Repositioned       Problem: Respiratory - Adult  Goal: Achieves optimal ventilation and oxygenation  Flowsheets (Taken 3/12/2023 2000)  Achieves optimal ventilation and oxygenation:   Assess for changes in respiratory status   Assess for changes in mentation and behavior   Position to facilitate oxygenation and minimize respiratory effort   Oxygen supplementation based on oxygen saturation or arterial blood gases   Assess and instruct to report shortness of breath or any respiratory difficulty   Respiratory therapy support as indicated     Problem: Nutrition Deficit:  Goal: Optimize nutritional status  Flowsheets (Taken 3/13/2023 0241)  Nutrient intake appropriate for improving, restoring, or maintaining nutritional needs:   Assess nutritional status and recommend course of action   Monitor oral intake, labs, and treatment plans   Recommend appropriate diets, oral nutritional supplements, and vitamin/mineral supplements   Order, calculate, and assess calorie counts as needed   Recommend, monitor, and adjust tube feedings and TPN/PPN based on assessed needs   Provide specific nutrition education to patient or family as appropriate     Problem: Safety - Adult  Goal: Free from fall injury  Flowsheets (Taken 3/13/2023 0241)  Free From Fall Injury:   Instruct family/caregiver on patient safety   Based on caregiver fall risk screen, instruct family/caregiver to ask for assistance with transferring infant if caregiver noted to have fall risk factors     Problem: Skin/Tissue Integrity - Adult  Goal: Oral mucous membranes remain intact  Flowsheets (Taken 3/12/2023 2000)  Oral Mucous Membranes Remain Intact:   Assess oral mucosa and hygiene practices   Implement preventative oral hygiene regimen   Implement oral medicated treatments as ordered     Problem: Musculoskeletal - Adult  Goal: Return mobility to safest level of function  Flowsheets (Taken 3/12/2023 2000)  Return Mobility to Safest Level of Function:   Assess patient stability and activity tolerance for standing, transferring and ambulating with or without assistive devices   Assist with transfers and ambulation using safe patient handling equipment as needed   Ensure adequate protection for wounds/incisions during mobilization   Obtain physical therapy/occupational therapy consults as needed   Apply continuous passive motion per provider or physical therapy orders to increase flexion toward goal   Instruct patient/family in ordered activity level     Problem: Musculoskeletal - Adult  Goal: Return ADL status to a safe level of function  Flowsheets (Taken 3/12/2023 2000)  Return ADL Status to a Safe Level of Function:   Administer medication as ordered   Assess activities of daily living deficits and provide assistive devices as needed   Obtain physical therapy/occupational therapy consults as needed   Assist and instruct patient to increase activity and self care as tolerated     Problem: Gastrointestinal - Adult  Goal: Maintains or returns to baseline bowel function  Flowsheets (Taken 3/12/2023 2000)  Maintains or returns to baseline bowel function:   Assess bowel function   Encourage oral fluids to ensure adequate hydration   Administer IV fluids as ordered to ensure adequate hydration   Administer ordered medications as needed   Encourage mobilization and activity   Nutrition consult to assist patient with appropriate food choices     Problem: Anxiety  Goal: Will report anxiety at manageable levels  Description: INTERVENTIONS:  1. Administer medication as ordered  2. Teach and rehearse alternative coping skills  3.  Provide emotional support with 1:1 interaction with staff  Flowsheets  Taken 3/13/2023 0241  Will report anxiety at manageable levels:   Administer medication as ordered   Teach and rehearse alternative coping skills   Provide emotional support with 1:1 interaction with staff  Taken 3/12/2023 2000  Will report anxiety at manageable levels:   Administer medication as ordered   Teach and rehearse alternative coping skills   Provide emotional support with 1:1 interaction with staff     Problem: Change in Body Image  Goal: Pt/Family communicate acceptance of loss or change in body image and feel psychological comfort and peace  Description: INTERVENTIONS:  1. Assess patient/family anxiety and grief process related to change in body image, loss of functional status, loss of sense of self, and forgiveness  2. Provide emotional and spiritual support  3. Provide information about the patient's health status with consideration of family and cultural values  4. Communicate willingness to discuss loss and facilitate grief process with patient/family as appropriate  5. Emphasize sustaining relationships within family system and community, or roxi/spiritual traditions  6.  Initiate Spiritual Care, Psychosocial Clinical Specialist consult as needed  Recent Flowsheet Documentation  Taken 3/12/2023 2000 by Tavon Mas RN  Patient/family communicate acceptance of loss or change in body image and feel psychological comfort and peace:   Assess patient/family anxiety and grief process related to change in body image, loss of functional status, loss of sense of self, and forgiveness   Provide emotional and spiritual support   Provide information about the patients health status with consideration of family and cultural values   Communicate willingness to discuss loss and facilitate grief process with patient/family as appropriate   Emphasize sustaining relationships within family system and community, or roxi/spiritual traditions   [id number] Charlene Stanley, Psychosocial Clinical Specialist consult as needed     Problem: Confusion  Goal: Confusion, delirium, dementia, or psychosis is improved or at baseline  Description: INTERVENTIONS:  1. Assess for possible contributors to thought disturbance, including medications, impaired vision or hearing, underlying metabolic abnormalities, dehydration, psychiatric diagnoses, and notify attending LIP  2. [place] high risk fall precautions, as indicated  3. Provide frequent short contacts to provide reality reorientation, refocusing and direction  4. Decrease environmental stimuli, including noise as appropriate  5. Monitor and intervene to maintain adequate nutrition, hydration, elimination, sleep and activity  6. If unable to ensure safety without constant attention obtain sitter and review sitter guidelines with assigned personnel  7. Initiate Psychosocial CNS and Spiritual Care consult, as indicated  Flowsheets (Taken 3/12/2023 2000)  Effect of thought disturbance (confusion, delirium, dementia, or psychosis) are managed with adequate functional status:   Assess for contributors to thought disturbance, including medications, impaired vision or hearing, underlying metabolic abnormalities, dehydration, psychiatric diagnoses, notify [place] high risk fall precautions, as indicated   Provide frequent short contacts to provide reality reorientation, refocusing and direction   Decrease environmental stimuli, including noise as appropriate   Monitor and intervene to maintain adequate nutrition, hydration, elimination, sleep and activity   If unable to ensure safety without constant attention obtain sitter and review sitter guidelines with assigned personnel   Initiate Psychosocial Clinical Nurse Specialist and [facility] consult, as indicated     Problem: Skin/Tissue Integrity  Goal: Absence of new skin breakdown  Description: 1. Monitor for areas of redness and/or skin breakdown  2. Assess vascular access sites hourly  3.   Every 4-6 hours minimum:  Change oxygen saturation probe site  4. Every 4-6 hours:  If on nasal continuous positive airway pressure, respiratory therapy assess nares and determine need for appliance change or resting period. Note: No sxs new skin breakdown- patient turned/assisted to reposition every 2 hours     Problem: Neurosensory - Adult  Goal: Achieves stable or improved neurological status  Outcome: Not Progressing  Flowsheets (Taken 3/12/2023 2000)  Achieves stable or improved neurological status:   Assess for and report changes in neurological status   Initiate measures to prevent increased intracranial pressure   Maintain blood pressure and fluid volume within ordered parameters to optimize cerebral perfusion and minimize risk of hemorrhage   Monitor temperature, glucose, and sodium. Initiate appropriate interventions as ordered  Goal: Achieves maximal functionality and self care  Outcome: Not Progressing  Flowsheets (Taken 3/12/2023 2000)  Achieves maximal functionality and self care:   Monitor swallowing and airway patency with patient fatigue and changes in neurological status   Encourage and assist patient to increase activity and self care with guidance from physical therapy/occupational therapy   Encourage visually impaired, hearing impaired and aphasic patients to use assistive/communication devices   Care plan reviewed with patient. Patient verbalizes understanding of the plan of care and contributes to goal setting.

## 2023-03-13 NOTE — PROGRESS NOTES
[address] ICU 4D  Occupational Therapy  Daily Note  Time:   Time In: 5381  Time Out: 0945  Timed Code Treatment Minutes: 42 Minutes  Minutes: 42          Date: 3/13/2023  Patient Name: Coral Oleary,   Gender: male      Room: [facility]008-A  MRN: [id number]  : 1940  (81 y.o.)  Referring Practitioner: Melanie Olvera PA-C  Diagnosis: elevated troponin  Additional Pertinent Hx: per chart review; \"The patient is an 81year old male former smoker with an approximately 20-pack-year history who quit in  with a past medical history of leukemia with remission in , hypothyroidism, \"seizures,\" hyperlipidemia, \"coma,\" and 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 \" patient underwent a CABG STONEY,  coronary artery bypass grafting x3 using LIMA to LAD, vein graft to to's marginal 1, vein graft to posterior descending artery, transesophageal echocardiography, endoscopic vein harvesting  on 23. Restrictions/Precautions:  Restrictions/Precautions: Fall Risk, General Precautions, Surgical Protocols  Position Activity Restriction  Sternal Precautions: move in the tube  Other position/activity restrictions: hx of brain injury      SUBJECTIVE: Pt reclined in bedside chair, alert, talkative, and agreeable to OT session. Pt continues with live sitter present. PAIN: 0/10:     Vitals: Blood Pressure: 135/50  Oxygen: 98% on 2L O2 via NC  Heart Rate: 73 bpm  All vitals remained stable throughout    COGNITION: Decreased Recall, Decreased Insight, Impaired Memory, Decreased Problem Solving, Decreased Safety Awareness, Difficulty Following Commands, Impulsive, and Tangential    ADL:   Toileting: Maximum Assistance. Hygiene after bm  Toilet Transfer: Minimal Assistance. To/from [facility].  Minimal cues for transfer technique/hand placement. **Increased time required for pt to have BM. BALANCE:  Sitting Balance:  Stand By Assistance. Standing Balance: Minimal Assistance. BED MOBILITY:  Not Tested    TRANSFERS:  Sit to Stand:  Minimal Assistance, X 1, with increased time for completion, cues for hand placement. Stand to Sit: Contact Guard Assistance, X 1, with increased time for completion, cues for hand placement. **minimal cues for move in the tube precautions during transfers    FUNCTIONAL MOBILITY:  Assistive Device: Rolling Walker  Assist Level:  Minimal Assistance, X 1, with verbal cues , and with increased time for completion. Distance:  bedside chair <>BSC  Minimal cues for safety, slight LOB requiring minimal assistance X2 to correct. ADDITIONAL ACTIVITIES:  Pt completed BUE/BLE AROM exercises while seated in chair. Pt completed 1 set X 10 repetitions in all planes within move in the tube restrictions with short rest breaks in between variations. Moderate cues for correct technique. Exercises completed to increase overall strength/endurance needed for ADLs and transfers. ASSESSMENT:     Activity Tolerance:  Patient tolerance of  treatment: Good treatment tolerance       Discharge Recommendations: ECF with OT  Equipment Recommendations: Equipment Needed: Yes  Mobility Devices: ADL Assistive Devices  Other: Pt reports he has a rolling walker and a shower chair but was only using off and on. Recommendations provided to use both for fall prevention. Plan: Times Per Week: 6x  Times Per Day:  Once a day  Current Treatment Recommendations: Strengthening, Balance training, Functional mobility training, Neuromuscular re-education, Safety education & training, Self-Care / ADL, Endurance training, Patient/Caregiver education & training, Equipment evaluation, education, & procurement, ROM    Patient Education  Patient Education: ADL's, Home Exercise Program, and transfer training    Goals  Short Term Goals  Time Frame for Short Term Goals: by discharge  Short Term Goal 1: patient will tolerate 2 min static standing with sit to stand and maintain balance with MOD A x 1 with O2 >/= 90% to increase activity tolerance for self care routine. Short Term Goal 2: patient will complete UB ADLs with MIN A and 1-2 cues to initiate and sequence task. Short Term Goal 3: Pt will complete mobility to/from [facility] or bedside chair with RW, CGA, & 0-2 vcs for posture & walker safety  Short Term Goal 4: patient will tolerate CABG exer to increase activity tolerance for self care routine. Following session, patient left in safe position with all fall risk precautions in place.

## 2023-03-13 NOTE — CONSULTS
Physical Medicine & Rehabilitation   Consult Note      Admitting Physician: Magdalene Guillen MD    Primary Care Provider: Dimitrios Navarro MD     Reason for Consult:  CABG. Dysphagia. Rehab needs    History of Present Illness:  Michelle Hager is a 81 y.o. male admitted to [facility] on 2/21/2023. Patient  has a past medical history of Cancer ([facility] [address] Coma ([facility] [address] Hyperlipidemia, Leukemia ([facility] [address] and Seizures ([facility] [address] Patient presented to [facility] ER due to fatigue, shortness of breath, cough with brown sputum. Symptoms for around one week, with worsening in the 3 days prior. Chest x-ray showing stable findings of diffuse COPD. Troponin was elevated at 0.166 as well as proBNP 1675. SPO2 93% however upon physical exam and moving patient, did note desaturation to 87%. Patient was admitted for exertional dyspnea, elevated troponin, elevated BNP. Patient started on Zithromax and rocephin. Pulmonary consulted and planned duonebs and bedside spirometry for COPD. Cardiology consulted and reviewed ECHO, prompting left heart cath on 2/23/23. LHC showed Severe multi-vessel CAD with heavy calcification of left main stenosis with preserved LV ejection fraction and patent aortoiliac system. Cardiovascular surgery consulted and patient underwent coronary artery bypass grafting x3 using LIMA to LAD, vein graft to to's marginal 1, vein graft to posterior descending artery, transesophageal echocardiography, endoscopic vein harvesting with Dr Alethea Renteria on 1/21/62. Patient with 4 chest tubes post op, was successfully intubated and placed on bipap. Patient with ongoing confusion in the days following, pulled out NG tube, require soft restraints at times. Pain medications reduced and zyprexa started. Patient was found to have severe dysphagia and MBS completed with recommendations for strict NPO. Patient with noted circadian rhythm disturbance and modafinil initiated. Patient seen today, resting in bed. Patient with sitter. Patient appropriate, follows commands. Alerted to name only, unaware if or what surgery he had. Zyprexa was discontinued. Patient is from home alone, hx CVA with some residual cognitive deficits. Awaiting MOCA, SLP with need to focus on swallowing though. NG was removed today, patient has not been cleared to swallow. MBS planned for tomorrow. Current Rehabilitation Assessments:  PT:    Bed Mobility:  Rolling to Right: Moderate Assistance, X 1, with head of bed raised, with rail, with verbal cues    Supine to Sit: Minimal Assistance x1 and, Moderate Assistance, X 1, with verbal cues, HOB slightly raised due to NG tube  Transfers:  Sit to Stand: [employer], X 2, cues for hand placement, with verbal cues  Stand to [place] [id number], X 2, cues for hand placement, with verbal cues  Educated on move in the tube precautions, good demo, denies increased pain with mobility. Once in chair, required max A x2 for scooting back in chair for support. Ambulation:  Contact Guard Assistance, X 2, with cues for safety, with verbal cues , with increased time for completion  Distance: 6 feet to chair  Surface: Level Tile  Device:Rolling Walker  Gait Deviations:  Slow So, Decreased Step Length Bilaterally, Decreased Weight Shift Bilaterally, and Decreased Gait Speed  2 assist for safety and to manage lines and IV pole/tube feeding. Cues for sequencing for overall safety, with fair demo,slow pace noted and increased time for processing for completion. Balance:  Static Sitting Balance:  Stand By Assistance, Contact Guard Assistance, X 1  Static Standing Balance: Contact Guard Assistance, X 2, with cues for safety, with verbal cues   RW for support once in standing, no LOB noted, cues and assist required for hand placement on walker to prepare for gait. Exercise:  Patient was guided in 1 set(s) 15 reps of exercise to both lower extremities.   Ankle pumps, Glut sets, Heelslides, Hip abduction/adduction, and Straight leg raises. Exercises were completed for increased independence with functional mobility. Pt required mod VC and visual cues for proper technique of exercises with good-fair demo. Cues to stay on task during completion. OT:    COGNITION: Decreased Recall, Decreased Insight, Impaired Memory, Decreased Problem Solving, Decreased Safety Awareness, Difficulty Following Commands, Impulsive, and Tangential  ADL:   Toileting: Maximum Assistance. Hygiene after bm  Toilet Transfer: Minimal Assistance. To/from [facility]. Minimal cues for transfer technique/hand placement. **Increased time required for pt to have BM. BALANCE:  Sitting Balance:  Stand By Assistance. Standing Balance: Minimal Assistance. TRANSFERS:  Sit to Stand:  Minimal Assistance, X 1, with increased time for completion, cues for hand placement. Stand to Sit: Contact Guard Assistance, X 1, with increased time for completion, cues for hand placement. **minimal cues for move in the tube precautions during transfers  FUNCTIONAL MOBILITY:  Assistive Device: Rolling Walker  Assist Level:  Minimal Assistance, X 1, with verbal cues , and with increased time for completion. Distance:  bedside chair <>[facility]  Minimal cues for safety, slight LOB requiring minimal assistance X2 to correct. ADDITIONAL ACTIVITIES:  Pt completed BUE/BLE AROM exercises while seated in chair. Pt completed 1 set X 10 repetitions in all planes within move in the tube restrictions with short rest breaks in between variations. Moderate cues for correct technique. Exercises completed to increase overall strength/endurance needed for ADLs and transfers. ST:   Interventions:  Concerns significant for ability to manage secretions given notable wet vocal quality at date with patient not able to follow commands for volitional cough reflex for ejection of adverse secretions.  HANS Jiménez reporting increased frequency of [place] oral suctioning, however, tubing with presence of blood-tinged secretions, likely given consistent needs for oral suctioning requirement.      Comprehensive oral care completed via toothetes+hydrogen peroxide rinse. Bilabial surfaces and oral cavity with dried mucosa with extraction of mild hardened secretions from hard palate. Nursing endorsed utilization for Nystatin given previous clinical findings for a white lingual coating. Ongoing recommendations for oral care q2h to reduce biofilm colonization and to assist with removal of hardened debris from bilabial surfaces.      Subsequent presentation of conservative ice chips x2. Fair oral initiation skills with max cues required for lingual body dissension to permit optimal entrance of ice chip. Oral phase prolonged given lack of sustained focus for mastication skills with ABSENT pharyngeal trigger noted despite max cues provided. Attempted to have patient engage in volitional cough reflex with patient deferral to vocal phonation vs cough production (compromised mentation). Further trials deferred.      Pharyngeal exercise program not able to be completed given patient not able to execute basic commands despite max cues provided.         Past Medical History:        Diagnosis Date    Cancer (HCC)     Coma (HCC)     Hyperlipidemia     Leukemia (HCC)     Seizures (HCC)        Past Surgical History:        Procedure Laterality Date    BRAIN SURGERY      infection    CORONARY ARTERY BYPASS GRAFT N/A 2/28/2023    CABG STONEY performed by Lex Chaudhry MD at [facility] OR    OTHER SURGICAL HISTORY      Tooth Abscess removed '95       Allergies:    No Known Allergies     Current Medications:   Current Facility-Administered Medications: carBAMazepine (TEGRETOL) chewable tablet 400 mg, 400 mg, Oral, TID  modafinil (PROVIGIL) tablet 200 mg, 200 mg, Oral, QAM  piperacillin-tazobactam (ZOSYN) 3,375 mg in sodium chloride 0.9 % 50 mL IVPB (mini-bag), 3,375 mg, IntraVENous, Q8H  magnesium hydroxide (MILK OF  MAGNESIA) 400 MG/5ML suspension 30 mL, 30 mL, Oral, Daily  carvedilol (COREG) tablet 12.5 mg, 12.5 mg, Oral, BID WC  nystatin (MYCOSTATIN) 551456 UNIT/ML suspension 500,000 Units, 5 mL, Oral, 4x Daily  albuterol (PROVENTIL) nebulizer solution 2.5 mg, 2.5 mg, Nebulization, Q6H PRN  tiotropium-olodaterol (STIOLTO) 2.5-2.5 MCG/ACT inhaler 2 puff, 2 puff, Inhalation, Daily  polyethylene glycol (GLYCOLAX) packet 17 g, 17 g, Oral, Daily  amiodarone (CORDARONE) tablet 200 mg, 200 mg, Oral, Daily  bisacodyl (DULCOLAX) suppository 10 mg, 10 mg, Rectal, Daily  calcium replacement protocol, , Other, RX Placeholder  aspirin chewable tablet 324 mg, 324 mg, Oral, Daily  potassium bicarb-citric acid (EFFER-K) effervescent tablet 20 mEq, 20 mEq, Oral, BID  insulin lispro (HUMALOG) injection vial 0-4 Units, 0-4 Units, SubCUTAneous, TID WC  insulin lispro (HUMALOG) injection vial 0-4 Units, 0-4 Units, SubCUTAneous, Nightly  [Held by provider] furosemide (LASIX) injection 40 mg, 40 mg, IntraVENous, Daily  sodium chloride flush 0.9 % injection 5-40 mL, 5-40 mL, IntraVENous, 2 times per day  sodium chloride flush 0.9 % injection 5-40 mL, 5-40 mL, IntraVENous, PRN  enoxaparin (LOVENOX) injection 40 mg, 40 mg, SubCUTAneous, Daily  ondansetron (ZOFRAN-ODT) disintegrating tablet 4 mg, 4 mg, Oral, Q8H PRN **OR** ondansetron (ZOFRAN) injection 4 mg, 4 mg, IntraVENous, Q6H PRN  acetaminophen (TYLENOL) tablet 650 mg, 650 mg, Oral, Q4H PRN  metoprolol (LOPRESSOR) injection 2.5 mg, 2.5 mg, IntraVENous, Q10 Min PRN  sennosides-docusate sodium (SENOKOT-S) 8.6-50 MG tablet 1 tablet, 1 tablet, Oral, BID  magnesium hydroxide (MILK OF MAGNESIA) 400 MG/5ML suspension 30 mL, 30 mL, Oral, Daily PRN  senna (SENOKOT) tablet 8.6 mg, 1 tablet, Oral, Daily PRN  famotidine (PEPCID) tablet 20 mg, 20 mg, Oral, BID **OR** famotidine (PEPCID) 20 mg in sodium chloride (PF) 0.9 % 10 mL injection, 20 mg, IntraVENous, BID  potassium chloride 20 mEq/50 mL IVPB (Central Line), 20 mEq, IntraVENous, PRN  magnesium sulfate 2000 mg in 50 mL IVPB premix, 2,000 mg, IntraVENous, PRN  glucose chewable tablet 16 g, 4 tablet, Oral, PRN  glucagon (rDNA) injection 1 mg, 1 mg, IntraMUSCular, PRN  muscle rub cream, , Topical, PRN  atorvastatin (LIPITOR) tablet 40 mg, 40 mg, Oral, Daily  escitalopram (LEXAPRO) tablet 20 mg, 20 mg, Oral, Daily  levothyroxine (SYNTHROID) tablet 50 mcg, 50 mcg, Oral, Daily  risperiDONE (RISPERDAL) tablet 0.5 mg, 0.5 mg, Oral, BID  multivitamin 1 tablet, 1 tablet, Oral, Daily    Social History:  Social History     Socioeconomic History    Marital status:      Spouse name: Not on file    Number of children: 2    Years of education: Not on file    Highest education level: Not on file   Occupational History    Not on file   Tobacco Use    Smoking status: Former    Smokeless tobacco: Not on file    Tobacco comments:     doesn't remember when he quit   Substance and Sexual Activity    Alcohol use: No    Drug use: No    Sexual activity: Never   Other Topics Concern    Not on file   Social History Narrative    Not on file     Social Determinants of Health     Financial Resource Strain: Not on file   Food Insecurity: Not on file   Transportation Needs: Not on file   Physical Activity: Not on file   Stress: Not on file   Social Connections: Not on file   Intimate Partner Violence: Not on file   Housing Stability: Not on file     Lives With: Daughter  Type of Home: House  Home Layout: Two level, Bed/Bath upstairs  Home Access: Stairs to enter with rails  Entrance Stairs - Number of Steps: 2 LEVY and ~15 steps to second level  Home Equipment: Walker, rolling, Cane   Bathroom Shower/Tub: Tub/Shower unit  Bathroom Accessibility: Accessible     Receives Help From: Family  ADL Assistance: Independent  Homemaking Assistance: Needs assistance  Homemaking Responsibilities: Yes  Ambulation Assistance: Independent  Transfer Assistance: Independent  Active : No  Additional  Comments: Daughter and son in law both work outside of the home. Pt was previously independent with ADLs and functional mobility. Pt was not previously using AD for functional mobility. Pt is a questionable historian. Hx of brain surgery in 1995 and visual deficits. Family History:   History reviewed. No pertinent family history. Review of Systems:  CONSTITUTIONAL:  positive for  fatigue and confusion  EYES:  negative for  double vision, blurred vision, blind spots, and visual disturbance  HEENT:  negative  RESPIRATORY:  positive for  PAP needs  CARDIOVASCULAR:  recent CABG  GASTROINTESTINAL:  positive for dysphagia  GENITOURINARY:  negative  SKIN:  sternal incision  HEMATOLOGIC/LYMPHATIC:  negative  MUSCULOSKELETAL:  positive for  muscle weakness  NEUROLOGICAL:  positive for memory problems, coordination problems, gait problems, dysphagia, weakness, and numbness  BEHAVIOR/PSYCH:  negative  System review otherwise negative    Physical Exam:  BP (!) 121/39   Pulse 64   Temp 97.5 °F (36.4 °C) (Oral)   Resp 16   Ht 5' 9\" (1.753 m)   Wt 190 lb 0.6 oz (86.2 kg)   SpO2 98%   BMI 28.06 kg/m²   awake  Orientation:   person only, gave 4 options for place and he reported he was at his home place. Patient unaware if or what surgery he has had this admission. Mood: within normal limits, talkative, slightly anxious  Affect: slightly anxious  General appearance: Patient is well nourished, well developed, well groomed and in no acute distress    Memory:   abnormal    Attention/Concentration: normal  Language:  some slight dysarthria/mumbling at times. Cranial Nerves:  cranial nerves II-XII are grossly intact  ROM:  normal  Motor Exam:  Motor exam is 4+ out of 5 all extremities with the exception of BUE not assessed except HG. Finger amputations noted to right hand of 2nd and 3rd digits.    Tone:  normal  Muscle bulk: within normal limits  Sensory:  decreased sensation bilateral upper and lower extremities with numbness. Patient reports this is old, but unable to give further details.    Coordination:   normal    Heart: normal rate, regular rhythm, normal S1, S2, no murmurs, rubs, clicks or gallops  Lungs: clear to auscultation without wheezes or rales  Abdomen: soft, non-tender, non-distended, normal bowel sounds, no masses or organomegaly    Skin: warm and dry, no rash or erythema and sternal incision without redness, swelling, or drainage   Peripheral vascular: Pulses: Normal upper and lower extremity pulses; Edema: trace      Diagnostics:  Recent Results (from the past 24 hour(s))   POCT glucose    Collection Time: 03/12/23  4:36 PM   Result Value Ref Range    POC Glucose 125 (H) 70 - 108 mg/dl   Sodium    Collection Time: 03/12/23  6:49 PM   Result Value Ref Range    Sodium 155 (H) 135 - 145 meq/L   POCT glucose    Collection Time: 03/12/23  9:49 PM   Result Value Ref Range    POC Glucose 126 (H) 70 - 108 mg/dl   Comprehensive Metabolic Panel    Collection Time: 03/13/23  5:00 AM   Result Value Ref Range    Glucose 149 (H) 70 - 108 mg/dL    Creatinine 0.9 0.4 - 1.2 mg/dL    BUN 35 (H) 7 - 22 mg/dL    Sodium 155 (H) 135 - 145 meq/L    Potassium 3.8 3.5 - 5.2 meq/L    Chloride 113 (H) 98 - 111 meq/L    CO2 31 23 - 33 meq/L    Calcium 7.6 (L) 8.5 - 10.5 mg/dL    AST 78 (H) 5 - 40 U/L    Alkaline Phosphatase 59 38 - 126 U/L    Total Protein 6.1 6.1 - 8.0 g/dL    Albumin 3.0 (L) 3.5 - 5.1 g/dL    Total Bilirubin 0.3 0.3 - 1.2 mg/dL     (H) 11 - 66 U/L   Magnesium    Collection Time: 03/13/23  5:00 AM   Result Value Ref Range    Magnesium 3.3 (H) 1.6 - 2.4 mg/dL   Phosphorus    Collection Time: 03/13/23  5:00 AM   Result Value Ref Range    Phosphorus 3.6 2.4 - 4.7 mg/dL   Anion Gap    Collection Time: 03/13/23  5:00 AM   Result Value Ref Range    Anion Gap 11.0 8.0 - 16.0 meq/L   Glomerular Filtration Rate, Estimated    Collection Time: 03/13/23  5:00 AM   Result Value Ref Range    Est, Glom Filt Rate >60 >60 ml/min/1.73m2   CBC    Collection Time: 03/13/23  5:12 AM   Result Value Ref Range    WBC 11.0 (H) 4.8 - 10.8 thou/mm3    RBC 2.76 (L) 4.70 - 6.10 mill/mm3    Hemoglobin 8.9 (L) 14.0 - 18.0 gm/dl    Hematocrit 30.2 (L) 42.0 - 52.0 %    .4 (H) 80.0 - 94.0 fL    MCH 32.2 26.0 - 33.0 pg    MCHC 29.5 (L) 32.2 - 35.5 gm/dl    RDW-CV 12.8 11.5 - 14.5 %    RDW-SD 50.4 (H) 35.0 - 45.0 fL    Platelets 583 (H) 187 - 400 thou/mm3    MPV 11.1 9.4 - 12.4 fL   POCT glucose    Collection Time: 03/13/23  7:50 AM   Result Value Ref Range    POC Glucose 124 (H) 70 - 108 mg/dl   POCT glucose    Collection Time: 03/13/23 12:00 PM   Result Value Ref Range    POC Glucose 140 (H) 70 - 108 mg/dl       Impression:  CAD, LAD  S/p LHC 2/23/23  S/p CABGx3 2/28/23  Dysphagia  Non-SARS pneumonia   NORMA, suspected  COPD  Right ICA stenosis ans subclavian stenosis  Cognitive deficits   Hypothyroidism  Seizures  Hyperlipidemia  CVA 1995  History of leukemia with remission in 1984    Recommendations:  Continue current therapies  Patient strict NPO with NG removal today  SLP with dysphagia focus, await cognitive eval when able  MBS planned for tomorrow  Will follow patient's course. Patient is not a precert for next level of care (SNF or IPR) Patient from home alone. Patient in need of stable nutrition source for IPR (Pv vs PEG). Bed availability on IPR may be next week, this may fluctuation through the week though, but may need SNF. Will follow. It was my pleasure to evaluate Tysona Larger today. Please call with questions.     Eric Castle, APRN - CNP

## 2023-03-13 NOTE — PROGRESS NOTES
[address] [place] THERAPY  [facility] ICU 4D  DAILY NOTE    TIME   SLP Individual Minutes  Time In: 2611  Time Out: 1200  Minutes: 15  Timed Code Treatment Minutes: 0 Minutes       Date: 3/13/2023  Patient Name: Krishan Wu      CSN: [id number]   : 1940  (81 y.o.)  Gender: male   Referring Physician:  ANGELIC Colbert CNP  Diagnosis: Elevated troponin  Precautions: Fall risk, aspiration precautions   Current Diet:  NPO; NGT  Swallowing Strategies: Oral care q2h   Date of Last MBS/FEES: MBS 2023    Pain:  No pain reported. Subjective:  HANS Pascual with approval to proceed with session. Upon arrival, patient resting in bed s/p OT interventions. Fair awareness to immediate surroundings, however, confusion maintains to be exhibited with re-directions required; per chart review for consistency of documentation, indications previously conveyed from family to indicate current level of mentation to be at baseline with difficulties exhibited for basic command executions. Absence of family at date. Short-Term Goals:  Goal 1: Patient will consume conservative PO trials of ICE CHIPS (x5 only) to improve pharyngeal pharyngeal function/constriction, airway protection as well as to elicit pharyngeal exercises and determine readiness for repeat instrumental evaluation. Goal 2: Patient will complete pharyngeal exericses x15 in order to improve overall pharyngeal function and airway protection  Goal 3: Staff will exhibit return demonstration for completion of comprehensive oral care procedural analysis to maximize oral integrity and to reduce potential bacteria colonization. Goal 4: Monitor mentation (hx dementia, CABG); complete alternate cognitive linguistic assessment should it be clinically indicated.     Interventions:  Concerns significant for ability to manage secretions given notable wet vocal quality at date with patient not able to follow commands for volitional cough reflex for ejection of adverse secretions. Comprehensive oral care completed via toothetes+hydrogen peroxide rinse. Bilabial surfaces and oral cavity with dried mucosa with extraction of mild hardened secretions from hard palate. Ongoing recommendations for oral care q2h to reduce biofilm colonization and to assist with removal of hardened debris from bilabial surfaces. Subsequent presentation of conservative ice chips x3. Fair oral initiation skills with max cues required for lingual body dissension to permit optimal entrance of ice chip. Oral phase prolonged given lack of sustained focus for mastication skills with questionable onset of pharyngeal trigger given limited hyolaryngeal elevation per manual palpitation (would require formal imaging to confirm). 2/3 trials with at least delayed swallow onset with s/s for delayed cough reflex+wet vocal quality. Attempted to have patient engage in volitional cough reflex with patient deferral to vocal phonation vs cough production (compromised mentation). Further trials deferred. Pharyngeal exercise program not able to be completed given patient not able to execute basic commands despite max cues provided. Recommend STRICT NPO. Will complete repeat instrumental evaluation via MBS on subsequent date to assist with further POC development. Alternate recommendations for aggressive oral care q2h and oral suctioning PRN. Patient prognosis is guarded given severity of oropharyngeal dysphagia clinical derived by instrumental evaluation via MBS on 03/07/2023 compounded with complex medical hx as well as inability to engage in intensive exercise program d/t severity of compromised mentation.       Long-Term Goals:  No LTG established given short ELOS    EDUCATION:  Learner: Patient  Education:  Reviewed diet and strategies, Reviewed signs, symptoms and risks of aspiration, Reviewed ST goals and Plan of Care, and Reviewed recommendations for follow-up  Evaluation of Education: Needs further instruction, No evidence of learning, and Family not present    ASSESSMENT/PLAN:  Activity Tolerance:  Patient tolerance of  treatment: poor. Assessment/Plan: Patient progressing toward established goals. Continues to require skilled care of licensed speech pathologist to progress toward achievement of established goals and plan of care. .     Plan for Next Session: MBS  Discharge Recommendations: SNF       Melly Diallo M.A., [address] [place] [address]

## 2023-03-13 NOTE — PLAN OF CARE
Problem: Pain  Goal: Verbalizes/displays adequate comfort level or baseline comfort level  3/13/2023 1053 by Urvashi Mcclelland RN  Outcome: Progressing  Flowsheets (Taken 3/13/2023 0815)  Verbalizes/displays adequate comfort level or baseline comfort level:   Encourage patient to monitor pain and request assistance   Assess pain using appropriate pain scale   Administer analgesics based on type and severity of pain and evaluate response      Note: Pain Assessment: None - Denies Pain    Patient's Stated Pain Goal: 1   Is pain goal met at this time?   Yes     Non-Pharmaceutical Pain Intervention(s): Repositioned       Problem: Safety - Adult  Goal: Free from fall injury  3/13/2023 1053 by Urvashi Mcclelland, RN  Outcome: Progressing

## 2023-03-13 NOTE — PROGRESS NOTES
CT/CV Surgery Progress Note    3/13/2023 9:20 AM  Surgeon:  Dr. Ann Marie Moulton     Procedure: POD #13  CABG STONEY,  coronary artery bypass grafting x3 using LIMA to LAD, vein graft to to's marginal 1, vein graft to posterior descending artery, transesophageal echocardiography, endoscopic vein harvesting    Subjective:  Mr. Mary Marcus is resting comfortably in chair with tube feeds via NG tube. He is much more alert today and in no acute distress. Pt denies chest pressure, SOB, fever,chills, N/V/D. Intake/Output Summary (Last 24 hours) at 3/13/2023 0920  Last data filed at 3/13/2023 4006  Gross per 24 hour   Intake 1482.82 ml   Output 1100 ml   Net 382.82 ml       Vital Signs: BP (!) 130/36   Pulse 68   Temp 98.5 °F (36.9 °C) (Oral)   Resp 15   Ht 5' 9\" (1.753 m)   Wt 184 lb 1.4 oz (83.5 kg)   SpO2 97%   BMI 27.18 kg/m²    Temp (24hrs), Av.1 °F (36.7 °C), Min:97.7 °F (36.5 °C), Max:98.9 °F (37.2 °C)    Labs:   CBC:  Recent Labs     23  0423 23  0807 23  0512   WBC 10.5 10.6 11.0*   HGB 8.6* 9.2* 8.9*   HCT 28.6* 31.6* 30.2*   .9* 111.7* 109.4*    320 418*       BMP:   Recent Labs     23  0423 23  0824 23  0807 23  0839 23  1849 23  2149 23  0500 23  0750   *  --  153*  --  155*  --  155*  --    K 3.7  --  4.5  --   --   --  3.8  --      --  112*  --   --   --  113*  --    CO2 31  --  30  --   --   --  31  --    PHOS  --   --   --   --   --   --  3.6  --    BUN 40*  --  37*  --   --   --  35*  --    CREATININE 1.1  --  0.9  --   --   --  0.9  --    MG  --   --   --   --   --   --  3.3*  --    POCGLU  --    < >  --    < >  --    < >  --  124*    < > = values in this interval not displayed. Last HgA1C:   Lab Results   Component Value Date    LABA1C 5.1 2023     Imaging:  CXR: 3/13/2023  Findings:       Lungs are clear without acute infiltrates. No pneumothorax. Heart size enlarged. No acute bony abnormalities. Sternotomy. NG tube is unchanged. Impression   Impression:       No acute processes. Scheduled Meds:    carBAMazepine  400 mg Oral TID    modafinil  200 mg Oral QAM    piperacillin-tazobactam  3,375 mg IntraVENous Q8H    magnesium hydroxide  30 mL Oral Daily    carvedilol  12.5 mg Oral BID WC    nystatin  5 mL Oral 4x Daily    tiotropium-olodaterol  2 puff Inhalation Daily    polyethylene glycol  17 g Oral Daily    amiodarone  200 mg Oral Daily    bisacodyl  10 mg Rectal Daily    calcium replacement protocol   Other RX Placeholder    aspirin  324 mg Oral Daily    potassium bicarb-citric acid  20 mEq Oral BID    insulin lispro  0-4 Units SubCUTAneous TID WC    insulin lispro  0-4 Units SubCUTAneous Nightly    furosemide  40 mg IntraVENous Daily    sodium chloride flush  5-40 mL IntraVENous 2 times per day    enoxaparin  40 mg SubCUTAneous Daily    sennosides-docusate sodium  1 tablet Oral BID    famotidine  20 mg Oral BID    Or    famotidine (PEPCID) injection  20 mg IntraVENous BID    atorvastatin  40 mg Oral Daily    escitalopram  20 mg Oral Daily    levothyroxine  50 mcg Oral Daily    risperiDONE  0.5 mg Oral BID    multivitamin  1 tablet Oral Daily     ROS: All neg unless specifically mentioned in subjective section. Exam:  General Appearance: alert ,conversing, in no acute distress with NG tube in place  Cardiovascular: normal rate, regular rhythm, normal S1 and S2, no murmurs, rubs, clicks, or gallops  Pulmonary/Chest: clear to auscultation bilaterally- no wheezes, rales or rhonchi, normal air movement, no respiratory distress  Neurological: alert, oriented, normal speech, no focal findings or movement disorder noted  Sternum: Incision healing appropriately and no wound dehiscence noted.      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 Lenin Villarreal PA-C

## 2023-03-13 NOTE — PROGRESS NOTES
[address] [address]  INPATIENT PHYSICAL THERAPY  DAILY NOTE  STR ICU 4D - 4D-08/008-A    Time In: 6755  Time Out: 1506  Timed Code Treatment Minutes: 13 Minutes  Minutes: 13          Date: 3/13/2023  Patient Name: Jeanne Russo,  Gender:  male        MRN: [id number]  : 1940  (81 y.o.)     Referring Practitioner: ANGELIC Ny CNP  Diagnosis: elevated tropnin level  Additional Pertinent Hx: per EMR- Jeanne Russo is a 81 y.o. male with past medical history of anemia, brain abscess, seizures, former smoker approximately 40 pack year who presents to the emergency department for evaluation of fatigue, shortness of breath. Patient brought in by family as family has been reporting he has been more tired, fatigued for the past week however more so in the past 3 days. Today, patient verbalized that he was short of breath and had generalized muscle aches which prompted today's ED visit. Patient lives in a two-story home and states that it has been getting harder to walk around. Has been having dyspnea on exertion. Pt s/p CABG STONEY,  coronary artery bypass grafting x3 using LIMA to LAD, vein graft to to's marginal 1, vein graft to posterior descending artery, transesophageal echocardiography, endoscopic vein harvesting DOS . Prior Level of Function:  Lives With: Daughter  Type of Home: House  Home Layout: Two level, Bed/Bath upstairs  Home Access: Stairs to enter with rails  Entrance Stairs - Number of Steps: 2 LEVY and ~15 steps to second level  Home Equipment: Ye Lev, rolling, Cane   Bathroom Shower/Tub: Tub/Shower unit  Bathroom Accessibility: Accessible    Receives Help From: Family  ADL Assistance: Independent  Homemaking Assistance: Needs assistance  Homemaking Responsibilities: Yes  Ambulation Assistance: Independent  Transfer Assistance: Independent  Active : No  Additional Comments: Daughter and son in law both work outside of the home.  Pt was previously independent with ADLs and functional mobility. Pt was not previously using AD for functional mobility. Pt is a questionable historian. Hx of brain surgery in 1995 and visual deficits. Restrictions/Precautions:  Restrictions/Precautions: Fall Risk, General Precautions, Surgical Protocols  Position Activity Restriction  Sternal Precautions: move in the tube  Other position/activity restrictions: hx of brain injury     SUBJECTIVE: RN approved session. Pt pleasant and agreeable to therapy. Pt very talkative throughout session but not purposeful conversations. Pt difficult to redirect at times and had difficulty following commands. PAIN: 0/10: denies pain     Vitals:  WFL     OBJECTIVE:  Bed Mobility:  Supine to Sit: Moderate Assistance, X 1, with head of bed raised, with rail, with verbal cues     Transfers:  Sit to Stand: Contact Guard Assistance, X 1, with verbal cues  Stand to Sit:Minimal Assistance, X 1, cues for hand placement, with verbal cues, to/from chair with arms    Ambulation:  Contact Guard Assistance, X 1, with verbal cues   Distance: 10'   Surface: Level Tile  Device:Rolling Walker  Gait Deviations:  Slow So, Decreased Step Length Bilaterally, and Decreased Gait Speed    Balance:  Static Sitting Balance:  Contact Guard Assistance  Static Standing Balance: Contact Guard Assistance    Functional Outcome Measures: Completed  AM-PAC Inpatient Mobility Raw Score : 13  AM-PAC Inpatient T-Scale Score : 36.74    ASSESSMENT:  Assessment: Patient progressing toward established goals. Activity Tolerance:  Patient tolerance of  treatment: good.       Equipment Recommendations:Equipment Needed: No  Discharge Recommendations: Subacte/Skilled Nursing Facility  Plan: Current Treatment Recommendations: Strengthening, Balance training, Functional mobility training, Transfer training, Safety education & training, Equipment evaluation, education, & procurement, Home exercise program, Therapeutic activities, Patient/Caregiver education & training, Neuromuscular re-education, Endurance training, Gait training  General Plan:  (6x CABG)    Patient Education  Patient Education: Plan of Care, Bed Mobility, Transfers, Gait    Goals:  Patient Goals : family wants rehab following CABG  Short Term Goals  Time Frame for Short Term Goals: by discharge  Short Term Goal 1: Pt to complete supine <-> sit with CGA for ease getting out of bed with move in the tube guidelines  Short Term Goal 2: Pt to complete sit <-> stand with CGA for ease with mobiltiy from various surfaces  Short Term Goal 3: Pt to ambulate >10 feet with RW CGA for room ambulation. Short Term Goal 4: Pt to sit EOB for >=10 minutes with SBA with good midline posture for ease with sitting EOB to progress further mobility  Short Term Goal 5: .  Long Term Goals  Time Frame for Long Term Goals : NA due to short ELOS    Following session, patient left in safe position with all fall risk precautions in place.      Bereket Machado ([place]) PT, DPT

## 2023-03-14 ENCOUNTER — APPOINTMENT (OUTPATIENT)
Dept: GENERAL RADIOLOGY | Age: 83
DRG: 233 | End: 2023-03-14
Payer: MEDICARE

## 2023-03-14 LAB
ALBUMIN SERPL BCG-MCNC: 3.3 G/DL (ref 3.5–5.1)
ALP SERPL-CCNC: 66 U/L (ref 38–126)
ALT SERPL W/O P-5'-P-CCNC: 75 U/L (ref 11–66)
ANION GAP SERPL CALC-SCNC: 13 MEQ/L (ref 8–16)
AST SERPL-CCNC: 46 U/L (ref 5–40)
BACTERIA BLD AEROBE CULT: NORMAL
BACTERIA BLD AEROBE CULT: NORMAL
BILIRUB SERPL-MCNC: 0.4 MG/DL (ref 0.3–1.2)
BUN SERPL-MCNC: 28 MG/DL (ref 7–22)
CALCIUM SERPL-MCNC: 8 MG/DL (ref 8.5–10.5)
CHLORIDE SERPL-SCNC: 118 MEQ/L (ref 98–111)
CO2 SERPL-SCNC: 26 MEQ/L (ref 23–33)
CREAT SERPL-MCNC: 0.9 MG/DL (ref 0.4–1.2)
DEPRECATED RDW RBC AUTO: 52.2 FL (ref 35–45)
ERYTHROCYTE [DISTWIDTH] IN BLOOD BY AUTOMATED COUNT: 12.6 % (ref 11.5–14.5)
GFR SERPL CREATININE-BSD FRML MDRD: > 60 ML/MIN/1.73M2
GLUCOSE BLD STRIP.AUTO-MCNC: 101 MG/DL (ref 70–108)
GLUCOSE BLD STRIP.AUTO-MCNC: 103 MG/DL (ref 70–108)
GLUCOSE BLD STRIP.AUTO-MCNC: 103 MG/DL (ref 70–108)
GLUCOSE BLD STRIP.AUTO-MCNC: 118 MG/DL (ref 70–108)
GLUCOSE BLD STRIP.AUTO-MCNC: 165 MG/DL (ref 70–108)
GLUCOSE BLD STRIP.AUTO-MCNC: 94 MG/DL (ref 70–108)
GLUCOSE SERPL-MCNC: 102 MG/DL (ref 70–108)
HCT VFR BLD AUTO: 34.9 % (ref 42–52)
HGB BLD-MCNC: 10.2 GM/DL (ref 14–18)
MAGNESIUM SERPL-MCNC: 3.3 MG/DL (ref 1.6–2.4)
MCH RBC QN AUTO: 33.2 PG (ref 26–33)
MCHC RBC AUTO-ENTMCNC: 29.2 GM/DL (ref 32.2–35.5)
MCV RBC AUTO: 113.7 FL (ref 80–94)
PHOSPHATE SERPL-MCNC: 3.6 MG/DL (ref 2.4–4.7)
PLATELET # BLD AUTO: 490 THOU/MM3 (ref 130–400)
PMV BLD AUTO: 11.2 FL (ref 9.4–12.4)
POTASSIUM SERPL-SCNC: 3.8 MEQ/L (ref 3.5–5.2)
PROT SERPL-MCNC: 6.3 G/DL (ref 6.1–8)
RBC # BLD AUTO: 3.07 MILL/MM3 (ref 4.7–6.1)
SODIUM SERPL-SCNC: 157 MEQ/L (ref 135–145)
WBC # BLD AUTO: 11.1 THOU/MM3 (ref 4.8–10.8)

## 2023-03-14 PROCEDURE — 97535 SELF CARE MNGMENT TRAINING: CPT

## 2023-03-14 PROCEDURE — 80053 COMPREHEN METABOLIC PANEL: CPT

## 2023-03-14 PROCEDURE — 94640 AIRWAY INHALATION TREATMENT: CPT

## 2023-03-14 PROCEDURE — 2100000000 HC CCU R&B

## 2023-03-14 PROCEDURE — 99233 SBSQ HOSP IP/OBS HIGH 50: CPT | Performed by: INTERNAL MEDICINE

## 2023-03-14 PROCEDURE — 2580000003 HC RX 258

## 2023-03-14 PROCEDURE — 82948 REAGENT STRIP/BLOOD GLUCOSE: CPT

## 2023-03-14 PROCEDURE — 6370000000 HC RX 637 (ALT 250 FOR IP): Performed by: THORACIC SURGERY (CARDIOTHORACIC VASCULAR SURGERY)

## 2023-03-14 PROCEDURE — 6360000002 HC RX W HCPCS: Performed by: PHYSICIAN ASSISTANT

## 2023-03-14 PROCEDURE — 97116 GAIT TRAINING THERAPY: CPT

## 2023-03-14 PROCEDURE — 6370000000 HC RX 637 (ALT 250 FOR IP)

## 2023-03-14 PROCEDURE — 6370000000 HC RX 637 (ALT 250 FOR IP): Performed by: NURSE PRACTITIONER

## 2023-03-14 PROCEDURE — 2580000003 HC RX 258: Performed by: NURSE PRACTITIONER

## 2023-03-14 PROCEDURE — 97110 THERAPEUTIC EXERCISES: CPT

## 2023-03-14 PROCEDURE — 92611 MOTION FLUOROSCOPY/SWALLOW: CPT

## 2023-03-14 PROCEDURE — 36415 COLL VENOUS BLD VENIPUNCTURE: CPT

## 2023-03-14 PROCEDURE — 6370000000 HC RX 637 (ALT 250 FOR IP): Performed by: PHYSICIAN ASSISTANT

## 2023-03-14 PROCEDURE — A4216 STERILE WATER/SALINE, 10 ML: HCPCS | Performed by: PHYSICIAN ASSISTANT

## 2023-03-14 PROCEDURE — 84100 ASSAY OF PHOSPHORUS: CPT

## 2023-03-14 PROCEDURE — 97530 THERAPEUTIC ACTIVITIES: CPT

## 2023-03-14 PROCEDURE — 2580000003 HC RX 258: Performed by: PHYSICIAN ASSISTANT

## 2023-03-14 PROCEDURE — 2500000003 HC RX 250 WO HCPCS: Performed by: PHYSICIAN ASSISTANT

## 2023-03-14 PROCEDURE — 6360000002 HC RX W HCPCS

## 2023-03-14 PROCEDURE — 85027 COMPLETE CBC AUTOMATED: CPT

## 2023-03-14 PROCEDURE — 83735 ASSAY OF MAGNESIUM: CPT

## 2023-03-14 PROCEDURE — 74230 X-RAY XM SWLNG FUNCJ C+: CPT

## 2023-03-14 PROCEDURE — 2500000003 HC RX 250 WO HCPCS: Performed by: NURSE PRACTITIONER

## 2023-03-14 RX ORDER — DEXTROSE MONOHYDRATE 50 MG/ML
INJECTION, SOLUTION INTRAVENOUS CONTINUOUS
Status: DISCONTINUED | OUTPATIENT
Start: 2023-03-14 | End: 2023-03-15

## 2023-03-14 RX ADMIN — CARBAMAZEPINE 400 MG: 100 TABLET, CHEWABLE ORAL at 15:27

## 2023-03-14 RX ADMIN — CARVEDILOL 12.5 MG: 6.25 TABLET, FILM COATED ORAL at 10:05

## 2023-03-14 RX ADMIN — CARBAMAZEPINE 400 MG: 100 TABLET, CHEWABLE ORAL at 21:25

## 2023-03-14 RX ADMIN — ESCITALOPRAM 20 MG: 20 TABLET, FILM COATED ORAL at 10:15

## 2023-03-14 RX ADMIN — SODIUM CHLORIDE, PRESERVATIVE FREE 10 ML: 5 INJECTION INTRAVENOUS at 10:18

## 2023-03-14 RX ADMIN — BARIUM SULFATE 20 ML: 0.81 POWDER, FOR SUSPENSION ORAL at 09:47

## 2023-03-14 RX ADMIN — POTASSIUM BICARBONATE 20 MEQ: 782 TABLET, EFFERVESCENT ORAL at 21:26

## 2023-03-14 RX ADMIN — RISPERIDONE 0.5 MG: 0.25 TABLET, FILM COATED ORAL at 21:26

## 2023-03-14 RX ADMIN — Medication 500000 UNITS: at 21:26

## 2023-03-14 RX ADMIN — SENNOSIDES AND DOCUSATE SODIUM 1 TABLET: 50; 8.6 TABLET ORAL at 21:26

## 2023-03-14 RX ADMIN — SODIUM CHLORIDE, PRESERVATIVE FREE 20 MG: 5 INJECTION INTRAVENOUS at 07:46

## 2023-03-14 RX ADMIN — MODAFINIL 200 MG: 100 TABLET ORAL at 10:05

## 2023-03-14 RX ADMIN — TIOTROPIUM BROMIDE AND OLODATEROL 2 PUFF: 3.124; 2.736 SPRAY, METERED RESPIRATORY (INHALATION) at 08:49

## 2023-03-14 RX ADMIN — PIPERACILLIN AND TAZOBACTAM 3375 MG: 3; .375 INJECTION, POWDER, FOR SOLUTION INTRAVENOUS at 10:21

## 2023-03-14 RX ADMIN — BARIUM SULFATE 10 ML: 400 PASTE ORAL at 09:47

## 2023-03-14 RX ADMIN — CARBAMAZEPINE 400 MG: 100 TABLET, CHEWABLE ORAL at 10:15

## 2023-03-14 RX ADMIN — POTASSIUM BICARBONATE 20 MEQ: 782 TABLET, EFFERVESCENT ORAL at 10:06

## 2023-03-14 RX ADMIN — SENNOSIDES 8.6 MG: 8.6 TABLET, FILM COATED ORAL at 10:06

## 2023-03-14 RX ADMIN — Medication 500000 UNITS: at 08:30

## 2023-03-14 RX ADMIN — PIPERACILLIN AND TAZOBACTAM 3375 MG: 3; .375 INJECTION, POWDER, FOR SOLUTION INTRAVENOUS at 01:25

## 2023-03-14 RX ADMIN — Medication 500000 UNITS: at 18:06

## 2023-03-14 RX ADMIN — PIPERACILLIN AND TAZOBACTAM 3375 MG: 3; .375 INJECTION, POWDER, FOR SOLUTION INTRAVENOUS at 18:07

## 2023-03-14 RX ADMIN — BARIUM SULFATE 20 ML: 400 SUSPENSION ORAL at 09:48

## 2023-03-14 RX ADMIN — ENOXAPARIN SODIUM 40 MG: 100 INJECTION SUBCUTANEOUS at 07:56

## 2023-03-14 RX ADMIN — ATORVASTATIN CALCIUM 40 MG: 40 TABLET, FILM COATED ORAL at 10:05

## 2023-03-14 RX ADMIN — RISPERIDONE 0.5 MG: 0.25 TABLET, FILM COATED ORAL at 10:06

## 2023-03-14 RX ADMIN — DEXTROSE MONOHYDRATE: 50 INJECTION, SOLUTION INTRAVENOUS at 07:50

## 2023-03-14 RX ADMIN — Medication 500000 UNITS: at 15:27

## 2023-03-14 RX ADMIN — AMIODARONE HYDROCHLORIDE 200 MG: 200 TABLET ORAL at 10:06

## 2023-03-14 RX ADMIN — FAMOTIDINE 20 MG: 20 TABLET, FILM COATED ORAL at 21:26

## 2023-03-14 RX ADMIN — BARIUM SULFATE 10 ML: 400 SUSPENSION ORAL at 09:48

## 2023-03-14 RX ADMIN — Medication 1 TABLET: at 10:06

## 2023-03-14 RX ADMIN — ASPIRIN 81 MG 324 MG: 81 TABLET ORAL at 10:06

## 2023-03-14 RX ADMIN — CARVEDILOL 12.5 MG: 6.25 TABLET, FILM COATED ORAL at 18:05

## 2023-03-14 ASSESSMENT — PAIN SCALES - PAIN ASSESSMENT IN ADVANCED DEMENTIA (PAINAD)
NEGVOCALIZATION: 0
BODYLANGUAGE: 0
FACIALEXPRESSION: 0
FACIALEXPRESSION: 0
TOTALSCORE: 0
TOTALSCORE: 0
BODYLANGUAGE: 0
BODYLANGUAGE: 0
BREATHING: 0
NEGVOCALIZATION: 0
BODYLANGUAGE: 0
CONSOLABILITY: 0
BREATHING: 0
NEGVOCALIZATION: 0
FACIALEXPRESSION: 0
CONSOLABILITY: 0
BREATHING: 0
BREATHING: 0
TOTALSCORE: 0
CONSOLABILITY: 0
CONSOLABILITY: 0
TOTALSCORE: 0
NEGVOCALIZATION: 0
FACIALEXPRESSION: 0

## 2023-03-14 ASSESSMENT — PAIN SCALES - GENERAL
PAINLEVEL_OUTOF10: 0

## 2023-03-14 ASSESSMENT — ENCOUNTER SYMPTOMS
COLOR CHANGE: 0
TROUBLE SWALLOWING: 1
WHEEZING: 0
BACK PAIN: 0
SORE THROAT: 0
ANAL BLEEDING: 0
VOMITING: 0
SHORTNESS OF BREATH: 0
CHEST TIGHTNESS: 0
NAUSEA: 0
PHOTOPHOBIA: 0

## 2023-03-14 NOTE — PLAN OF CARE
Problem: Respiratory - Adult  Goal: Clear lung sounds  Outcome: Progressing   Breath sounds clear and diminished, continuing Stiolto daily. Patient mutually agreed on goals.

## 2023-03-14 NOTE — PLAN OF CARE
Problem: Discharge Planning  Goal: Discharge to home or other facility with appropriate resources  Outcome: Progressing  Flowsheets (Taken 3/13/2023 1945)  Discharge to home or other facility with appropriate resources:   Identify barriers to discharge with patient and caregiver   Arrange for needed discharge resources and transportation as appropriate   Identify discharge learning needs (meds, wound care, etc)   Refer to discharge planning if patient needs post-hospital services based on physician order or complex needs related to functional status, cognitive ability or social support system     Problem: Cardiovascular - Adult  Goal: Maintains optimal cardiac output and hemodynamic stability  Outcome: Progressing  Flowsheets (Taken 3/13/2023 1945)  Maintains optimal cardiac output and hemodynamic stability:   Monitor blood pressure and heart rate   Monitor urine output and notify Licensed Independent Practitioner for values outside of normal range   Assess for signs of decreased cardiac output   Administer fluid and/or volume expanders as ordered     Problem: Pain  Goal: Verbalizes/displays adequate comfort level or baseline comfort level  Flowsheets (Taken 3/14/2023 0243)  Verbalizes/displays adequate comfort level or baseline comfort level:   Encourage patient to monitor pain and request assistance   Assess pain using appropriate pain scale   Implement non-pharmacological measures as appropriate and evaluate response  Note: Pain Assessment: None - Denies Pain  Pain Level: 0   Patient's Stated Pain Goal: 1   Is pain goal met at this time?   Yes     Non-Pharmaceutical Pain Intervention(s): Repositioned       Problem: Respiratory - Adult  Goal: Achieves optimal ventilation and oxygenation  Flowsheets (Taken 3/13/2023 1945)  Achieves optimal ventilation and oxygenation:   Assess for changes in respiratory status   Assess for changes in mentation and behavior   Position to facilitate oxygenation and minimize respiratory effort   Oxygen supplementation based on oxygen saturation or arterial blood gases   Assess and instruct to report shortness of breath or any respiratory difficulty   Respiratory therapy support as indicated     Problem: Cardiovascular - Adult  Goal: Absence of cardiac dysrhythmias or at baseline  Flowsheets (Taken 3/13/2023 1945)  Absence of cardiac dysrhythmias or at baseline:   Monitor cardiac rate and rhythm   Assess for signs of decreased cardiac output   Administer antiarrhythmia medication and electrolyte replacement as ordered     Problem: Metabolic/Fluid and Electrolytes - Adult  Goal: Electrolytes maintained within normal limits  Flowsheets (Taken 3/13/2023 1945)  Electrolytes maintained within normal limits:   Monitor labs and assess patient for signs and symptoms of electrolyte imbalances   Administer electrolyte replacement as ordered   Monitor response to electrolyte replacements, including repeat lab results as appropriate   Fluid restriction as ordered   Instruct patient on fluid and nutrition restrictions as appropriate     Problem: Metabolic/Fluid and Electrolytes - Adult  Goal: Glucose maintained within prescribed range  Flowsheets (Taken 3/13/2023 1945)  Glucose maintained within prescribed range:   Monitor blood glucose as ordered   Assess for signs and symptoms of hyperglycemia and hypoglycemia   Administer ordered medications to maintain glucose within target range   Instruct patient on self management of diabetes and initiate consult as needed   Assess barriers to adequate nutritional intake and initiate nutrition consult as needed     Problem: Hematologic - Adult  Goal: Maintains hematologic stability  Flowsheets (Taken 3/13/2023 1945)  Maintains hematologic stability:   Assess for signs and symptoms of bleeding or hemorrhage   Monitor labs for bleeding or clotting disorders   Administer blood products/factors as ordered     Problem: Nutrition Deficit:  Goal: Optimize nutritional status  Flowsheets (Taken 3/14/2023 0243)  Nutrient intake appropriate for improving, restoring, or maintaining nutritional needs:   Assess nutritional status and recommend course of action   Monitor oral intake, labs, and treatment plans   Recommend appropriate diets, oral nutritional supplements, and vitamin/mineral supplements   Order, calculate, and assess calorie counts as needed   Recommend, monitor, and adjust tube feedings and TPN/PPN based on assessed needs     Problem: Safety - Adult  Goal: Free from fall injury  Flowsheets (Taken 3/13/2023 0241)  Free From Fall Injury:   Instruct family/caregiver on patient safety   Based on caregiver fall risk screen, instruct family/caregiver to ask for assistance with transferring infant if caregiver noted to have fall risk factors     Problem: Neurosensory - Adult  Goal: Achieves stable or improved neurological status  Flowsheets (Taken 3/13/2023 1945)  Achieves stable or improved neurological status:   Assess for and report changes in neurological status   Initiate measures to prevent increased intracranial pressure   Maintain blood pressure and fluid volume within ordered parameters to optimize cerebral perfusion and minimize risk of hemorrhage   Monitor temperature, glucose, and sodium.  Initiate appropriate interventions as ordered     Problem: Neurosensory - Adult  Goal: Achieves maximal functionality and self care  Flowsheets (Taken 3/13/2023 1945)  Achieves maximal functionality and self care:   Monitor swallowing and airway patency with patient fatigue and changes in neurological status   Encourage and assist patient to increase activity and self care with guidance from physical therapy/occupational therapy   Encourage visually impaired, hearing impaired and aphasic patients to use assistive/communication devices     Problem: Skin/Tissue Integrity - Adult  Goal: Incisions, wounds, or drain sites healing without S/S of infection  Flowsheets (Taken 3/13/2023 1945)  Incisions, Wounds, or Drain Sites Healing Without Sign and Symptoms of Infection:   ADMISSION and DAILY: Assess and document risk factors for pressure ulcer development   TWICE DAILY: Assess and document skin integrity   Initiate pressure ulcer prevention bundle as indicated     Problem: Skin/Tissue Integrity - Adult  Goal: Oral mucous membranes remain intact  Flowsheets (Taken 3/13/2023 1945)  Oral Mucous Membranes Remain Intact:   Assess oral mucosa and hygiene practices   Implement preventative oral hygiene regimen   Implement oral medicated treatments as ordered     Problem: Musculoskeletal - Adult  Goal: Return mobility to safest level of function  Flowsheets (Taken 3/13/2023 1945)  Return Mobility to Safest Level of Function:   Assess patient stability and activity tolerance for standing, transferring and ambulating with or without assistive devices   Assist with transfers and ambulation using safe patient handling equipment as needed   Obtain physical therapy/occupational therapy consults as needed   Apply continuous passive motion per provider or physical therapy orders to increase flexion toward goal     Problem: Musculoskeletal - Adult  Goal: Return ADL status to a safe level of function  Flowsheets (Taken 3/13/2023 1945)  Return ADL Status to a Safe Level of Function:   Administer medication as ordered   Assess activities of daily living deficits and provide assistive devices as needed   Obtain physical therapy/occupational therapy consults as needed   Assist and instruct patient to increase activity and self care as tolerated     Problem: Gastrointestinal - Adult  Goal: Maintains or returns to baseline bowel function  Flowsheets (Taken 3/13/2023 1945)  Maintains or returns to baseline bowel function:   Assess bowel function   Encourage oral fluids to ensure adequate hydration   Administer IV fluids as ordered to ensure adequate hydration     Problem: Genitourinary - Adult  Goal: Absence of urinary retention  Flowsheets (Taken 3/13/2023 1945)  Absence of urinary retention:   Assess patients ability to void and empty bladder   Monitor intake/output and perform bladder scan as needed   Place urinary catheter per Licensed Independent Practitioner order if needed     Problem: Infection - Adult  Goal: Absence of infection at discharge  Flowsheets (Taken 3/13/2023 1945)  Absence of infection at discharge:   Assess and monitor for signs and symptoms of infection   Monitor lab/diagnostic results   Monitor all insertion sites i.e., indwelling lines, tubes and drains   Instruct and encourage patient and family to use good hand hygiene technique   Identify and instruct in appropriate isolation precautions for identified infection/condition     Problem: Anxiety  Goal: Will report anxiety at manageable levels  Description: INTERVENTIONS:  1. Administer medication as ordered  2. Teach and rehearse alternative coping skills  3. Provide emotional support with 1:1 interaction with staff  Flowsheets (Taken 3/13/2023 1945)  Will report anxiety at manageable levels:   Administer medication as ordered   Teach and rehearse alternative coping skills   Provide emotional support with 1:1 interaction with staff     Problem: Change in Body Image  Goal: Pt/Family communicate acceptance of loss or change in body image and feel psychological comfort and peace  Description: INTERVENTIONS:  1. Assess patient/family anxiety and grief process related to change in body image, loss of functional status, loss of sense of self, and forgiveness  2. Provide emotional and spiritual support  3. Provide information about the patient's health status with consideration of family and cultural values  4. Communicate willingness to discuss loss and facilitate grief process with patient/family as appropriate  5. Emphasize sustaining relationships within family system and community, or roxi/spiritual traditions  6.  Initiate Spiritual Care, Psychosocial Clinical Specialist consult as needed  Recent Flowsheet Documentation  Taken 3/13/2023 1945 by Kris Simeon RN  Patient/family communicate acceptance of loss or change in body image and feel psychological comfort and peace:   Assess patient/family anxiety and grief process related to change in body image, loss of functional status, loss of sense of self, and forgiveness   Provide emotional and spiritual support   Provide information about the patients health status with consideration of family and cultural values   Communicate willingness to discuss loss and facilitate grief process with patient/family as appropriate   Emphasize sustaining relationships within family system and community, or roxi/spiritual traditions   Initiate Spiritual Care, Psychosocial Clinical Specialist consult as needed     Problem: Confusion  Goal: Confusion, delirium, dementia, or psychosis is improved or at baseline  Description: INTERVENTIONS:  1. Assess for possible contributors to thought disturbance, including medications, impaired vision or hearing, underlying metabolic abnormalities, dehydration, psychiatric diagnoses, and notify attending LIP  2. [place] high risk fall precautions, as indicated  3. Provide frequent short contacts to provide reality reorientation, refocusing and direction  4. Decrease environmental stimuli, including noise as appropriate  5. Monitor and intervene to maintain adequate nutrition, hydration, elimination, sleep and activity  6. If unable to ensure safety without constant attention obtain sitter and review sitter guidelines with assigned personnel  7.  Initiate Psychosocial CNS and Spiritual Care consult, as indicated  Flowsheets (Taken 3/13/2023 1945)  Effect of thought disturbance (confusion, delirium, dementia, or psychosis) are managed with adequate functional status:   Assess for contributors to thought disturbance, including medications, impaired vision or hearing, underlying metabolic abnormalities, dehydration, psychiatric diagnoses, notify [place] high risk fall precautions, as indicated   Provide frequent short contacts to provide reality reorientation, refocusing and direction   Decrease environmental stimuli, including noise as appropriate   Monitor and intervene to maintain adequate nutrition, hydration, elimination, sleep and activity   If unable to ensure safety without constant attention obtain sitter and review sitter guidelines with assigned personnel   Initiate Psychosocial Clinical Nurse Specialist and [facility] consult, as indicated     Problem: Skin/Tissue Integrity  Goal: Absence of new skin breakdown  Description: 1. Monitor for areas of redness and/or skin breakdown  2. Assess vascular access sites hourly  3. Every 4-6 hours minimum:  Change oxygen saturation probe site  4. Every 4-6 hours:  If on nasal continuous positive airway pressure, respiratory therapy assess nares and determine need for appliance change or resting period. Note: No changes to previous skin assessments   Care plan reviewed with patient. Patient verbalizes understanding of the plan of care and contributes to goal setting.

## 2023-03-14 NOTE — PROGRESS NOTES
[address] ICU 4D  Modified Barium Swallow    SLP Individual Minutes  Time In: 1432  Time Out: 7470  Minutes: 22  Timed Code Treatment Minutes: 0 Minutes       Date: 3/14/2023  Patient Name: Juan Khalil      CSN: [id number]   : 1940  (81 y.o.)  Gender: male   Referring Physician:  SHA Chavis   Diagnosis: Elevated Troponin Level   Precautions: Aspiration   History of Present Illness/Injury: Patient admitted to [facility] with above med dx; please refer to physician H&P for full details. Per chart review, \"81 year old male who presented to [facility] on  with SOB and brown sputum production. The patient was admitted for a COPD exacerbation and started on bronchodilators, azithromcyin, and rocpehin. Cardiology was consulted secondary to EKG changes concerning for anterior ischemic changes. Given a troponin elevation, patient underwent LHC on  which showed evidence of multivessel disase. Patient underwent CABG on 95/15 without complication and was transferred to the ICU postoperatively; two chest tubes in place. Was extubated without complication and tolerated CPAP overnight. \"      Intubation/extubation 2023 with pertinent hx for COPD and CVA. MBS completed 23 recommending NPO + ongoing dysphagia interventions. ST currently following patient POC targeting dysphagia; ST recommending repeat instrumental evaluation to further evaluate pharyngeal function of the swallow and determine safety of PO intake/assist with establishing dysphagia POC. Please see medical history questionnaire for information related to prior medical history, allergies and medications  Patient has a past medical history of Cancer ([address] Coma ([address] Hyperlipidemia, Leukemia ([address] and Seizures ([address] Current Diet: NPO; per medical team NG tube removed prior to completion of repeat MBS     Pain: No pain reported.     SUBJECTIVE:   Patient seen sitting upright in wheelchair within fluoroscopy suite; alert and pleasant. Patient with pleasant confusion; able to follow 100% of commands. Live sitter present with patient. OBJECTIVE:    Respiratory Status:  Room Air    Behavioral Observation:  Alert and Cooperative and compliant     PATIENT WAS EVALUATED USING:  Barium: Thin Liquids, Mildly Thick Liquids, Moderately Thick Liquids, Puree, Soft Solids, Coarse Solids, and Mixed Consistency    ORAL PHASE DENNIS SCORE: (Dysphagia outcome and severity scale)  3 = Moderate Dysphagia - Total assist with strategies - Two or more consistencies restricted - Moderate oral residue clears with cue    PHARYNGEAL PHASE DENNIS SCORE: (Dysphagia outcome and severity scale)  3 = Moderate Dysphagia - Two or more diet consistencies restricted - May exhibit one or more of the following: Moderate residue clears with cue, Airway penetration to the level of the vocal folds without cough with two or more consistencies, Aspiration with two consistencies with weak or no reflexive cough, Aspiration of one consistency, no cough and airway penetration with one consistency, no cough    EVIDENCE FOR LARYNGEAL PENETRATION AND/OR ASPIRATION:  Laryngeal penetration evident with thin liquids, mildly thick liquids   Silent aspiration evident with thin liquids, mildly thick liquids     PENETRATION-ASPIRATION SCALE (PAS):   Thin Liquids: 8 = Material enters the airway, passes below the vocal folds, and no effort is made to eject  Mildly Thick Liquids:  8 = Material enters the airway, passes below the vocal folds, and no effort is made to eject  Moderately Thick Liquids: 1 = Material does not enter the airway  Puree:  1 = Material does not enter the airway  Soft Solid:  1 = Material does not enter the airway  Mixed Consistencies: 1 = Material does not enter the airway  Hard Solid: 1 = Material does not enter the airway    ESOPHAGEAL PHASE:   No significant findings and See Radiology Report for details    ATTEMPTED TECHNIQUES:  Small Bolus Size Effective    Straw Ineffective    Cup Effective    Large Drinks Ineffective    Consecutive Drinks Ineffective    Chin Tuck Not Attempted    Head Turn Not Attempted    Spoon Presentations Not Attempted    Volitional Cough Ineffective    Spontaneous Cough Not Attempted    Double swallow` Ineffective; patient unable to execute/comprehend strategy       DIAGNOSTIC IMPRESSIONS:  Patient presents with moderate oropharyngeal dysphagia as evidence by the findings outlined above. Patient seen sitting upright in chair, alert and very pleasant/cooperative. Patient consumed PO trials of thin and mildly thick liquids; both resulting in laryngeal penetration to the VF inconsistently and SILENT aspiration of both consistencies. PO trials of moderately thick liquids via cup and puree; improved bolus control, no penetration or aspiration noted, mild pharyngeal residue remaining post swallow. Impaired mastication with reduced bolus formation noted of soft solids and hard solids; patient with edentulous dentition at baseline, poor bolus cohesion with premature spillage over BOT to the valleculae, no penetration or aspiration following delayed swallow trigger. Overall. Patient demonstrated with high level of impulsivity (large drinks, consecutive), poor sensation (evidence of silent aspiration), delayed swallow with decreased TBR and impaired pharyngeal constriction. Patient remains at high risk for aspiration d/t moderate oropharyngeal dysphagia findings. Patient also with poor ability to execute strong cough d/t recent CABG. ST recommending puree diet with moderately thick liquids + compensatory strategies. Patient would benefit from ongoing skilled ST services to target dysphagia.    HANS Aldana updated via phone     Diet Recommendations:  Puree diet with moderately thick liquids   Strategies:  Full Upright Position, Small Bite/Sip, No Straw, Medications Crushed with Puree, Direct 1:1 Supervision, Limit Distractions, and Monitor for Fatigue   Rehabilitation Potential: fair  Discharge Recommendations: SNF    EDUCATION:  Learner: Patient  Education:  Reviewed results and recommendations of this evaluation, Reviewed diet and strategies, Reviewed signs, symptoms and risks of aspiration, Reviewed ST goals and Plan of Care, Reviewed recommendations for follow-up, and Education Related to Potential Risks and Complications Due to Impairment/Illness/Injury  Evaluation of Education: Verbalizes understanding, Needs further instruction, No evidence of learning, and Family not present    PLAN:  Skilled SLP intervention on acute care 3-5 x per week or until goals met and/or pt plateaus in function. Specific interventions for next session may include: dysphagia tx . PATIENT GOAL:    Did not state. Will further assess during treatment. SHORT TERM GOALS:  Short Term Goals  Time Frame for Short Term Goals: 2 weeks  Goal 1: Patient will consume puree diet with moderatly thick liquids (no straws) (Per MBS 2/14/23; silent aspiration of thin and mildly thick) without s/s of aspiration in order to safely maintain adequate hydration and nutrition  Goal 2: Patient will complete advanced textures (soft, no mixed consistencies) without s/s of aspiration in order to safely advance patient diet (textures)  Goal 3: Patient will complete pharyngeal exericses x15 in order to improve overall pharyngeal function and airway protection AND complete repeat instrumental evaluation in 2 weeks in order to determine readiness to safely advance patient diet (liquids)  Goal 4: Staff will exhibit return demonstration for completion of comprehensive oral care procedural analysis to maximize oral integrity and to reduce potential bacteria colonization. Goal 5: Monitor mentation (hx dementia, CABG); complete alternate cognitive linguistic assessment should it be clinically indicated.       LONG TERM GOALS:  No LTGs due to short CRYSTAL Deleon STELLA Bustillos

## 2023-03-14 NOTE — PROGRESS NOTES
CT/CV Surgery Progress Note    3/14/2023 11:06 AM  Surgeon:  Dr. Edgard Stephens     Procedure: POD #14  CABG STONEY,  coronary artery bypass grafting x3 using LIMA to LAD, vein graft to to's marginal 1, vein graft to posterior descending artery, transesophageal echocardiography, endoscopic vein harvesting    Subjective:  Mr. Destiney Yoder is resting comfortably in chair with NG tube removed yesterday. He is scheduled for swallow evaluation today. He has been much more alert past couple days and in no acute distress. Pt denies chest pressure, SOB, fever,chills, N/V/D. Intake/Output Summary (Last 24 hours) at 3/14/2023 1106  Last data filed at 3/14/2023 1018  Gross per 24 hour   Intake 497.22 ml   Output 925 ml   Net -427.78 ml       Vital Signs: BP (!) 159/53   Pulse 90   Temp 98.3 °F (36.8 °C) (Axillary)   Resp 19   Ht 5' 9\" (1.753 m)   Wt 182 lb 5.1 oz (82.7 kg)   SpO2 97%   BMI 26.92 kg/m²    Temp (24hrs), Av °F (36.7 °C), Min:97.5 °F (36.4 °C), Max:98.6 °F (37 °C)    Labs:   CBC:  Recent Labs     23  0807 23  0512 23  0404   WBC 10.6 11.0* 11.1*   HGB 9.2* 8.9* 10.2*   HCT 31.6* 30.2* 34.9*   .7* 109.4* 113.7*    418* 490*       BMP:   Recent Labs     23  0807 23  0839 23  1849 23  2149 23  0500 23  0750 23  0404 23  0756   *  --  155*  --  155*  --  157*  --    K 4.5  --   --   --  3.8  --  3.8  --    *  --   --   --  113*  --  118*  --    CO2 30  --   --   --  31  --  26  --    PHOS  --   --   --   --  3.6  --  3.6  --    BUN 37*  --   --   --  35*  --  28*  --    CREATININE 0.9  --   --   --  0.9  --  0.9  --    MG  --   --   --   --  3.3*  --  3.3*  --    POCGLU  --    < >  --    < >  --    < >  --  103    < > = values in this interval not displayed. Last HgA1C:   Lab Results   Component Value Date    LABA1C 5.1 2023     Imaging:  MBS: 3/14/2023  1.  Laryngeal penetration and aspiration of thin and mildly thick barium. 2. Additional recommendations from the speech therapist will follow. Scheduled Meds:    carBAMazepine  400 mg Oral TID    modafinil  200 mg Oral QAM    piperacillin-tazobactam  3,375 mg IntraVENous Q8H    magnesium hydroxide  30 mL Oral Daily    carvedilol  12.5 mg Oral BID WC    nystatin  5 mL Oral 4x Daily    tiotropium-olodaterol  2 puff Inhalation Daily    polyethylene glycol  17 g Oral Daily    amiodarone  200 mg Oral Daily    bisacodyl  10 mg Rectal Daily    calcium replacement protocol   Other RX Placeholder    aspirin  324 mg Oral Daily    potassium bicarb-citric acid  20 mEq Oral BID    insulin lispro  0-4 Units SubCUTAneous TID WC    insulin lispro  0-4 Units SubCUTAneous Nightly    [Held by provider] furosemide  40 mg IntraVENous Daily    sodium chloride flush  5-40 mL IntraVENous 2 times per day    enoxaparin  40 mg SubCUTAneous Daily    sennosides-docusate sodium  1 tablet Oral BID    famotidine  20 mg Oral BID    Or    famotidine (PEPCID) injection  20 mg IntraVENous BID    atorvastatin  40 mg Oral Daily    escitalopram  20 mg Oral Daily    levothyroxine  50 mcg Oral Daily    risperiDONE  0.5 mg Oral BID    multivitamin  1 tablet Oral Daily     ROS: All neg unless specifically mentioned in subjective section. Exam:  General Appearance: alert ,conversing, in no acute distress   Cardiovascular: normal rate, regular rhythm, normal S1 and S2, no murmurs, rubs, clicks, or gallops  Pulmonary/Chest: clear to auscultation bilaterally- no wheezes, rales or rhonchi, normal air movement, no respiratory distress  Neurological: alert, oriented, normal speech, no focal findings or movement disorder noted  Sternum: Incision healing appropriately and no wound dehiscence noted.      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 Erica Gilbert PA-C

## 2023-03-14 NOTE — PROGRESS NOTES
CRITICAL CARE PROGRESS NOTE      Patient:  Abeba Ham    Unit/Bed:4D-08/008-A  YOB: 1940  MRN: [id number]   PCP: Julio Cesar Toussaint MD  Date of Admission: 2/21/2023  Chief Complaint:- Leandra Lebron I go to sleep now\"     Assessment and Plan:       CAD status post CABG: Underwent three-vessel CABG on 2/28. Status post chest tubes. Continue with carvedilol amiodarone. Pneumonia: On Zosyn day #5. No fever. Chest x-ray appears clear. We will continue with 7-day course  Hypernatremia: Likely secondary to dehydration. Stop Lasix. NG tube was removed so tube feeds and free water were stopped. Started D5 at 100 cc/h. Sodium increased to 157 overnight. Sleep apnea: CPAP. At night and for naps. 12/6. Dementia: At baseline. Dysphagia: Working with speech therapy. Failed fees. CV surgery request that NG tube is removed. Will repeat modified today. Anxiety: Lexapro  Right ICA: 70% right severe stenosis. Was seen by vascular surgeon. We will follow  Macrocytic anemia: Hemoglobin 10.2, hematocrit 34.9. No active signs of bleeding. Thrombocytosis: Platelet count 346 likely reactive. Hypermagnesemia: Magnesium 3.3, monitor. Mildly elevated liver enzymes: ALT/AST, bilirubin normal   Hyperlipidemia: Lipitor 40 mg  Hypothyroidism: Synthroid 50 mcg daily  Acute hyperglycemia: Continue with sliding scale insulin     INITIAL H AND P AND ICU COURSE:  Anita Wakefield is an 26-year-old white male who presented to [facility] on 2/21/2023 with complaints of shortness of breath and sputum production. He has a past medical history of reformed smoker, CVA hyperlipidemia, seizures, leukocytosis. Per report patient was admitted for concerns of COPD exacerbation and started on bronchodilators and antibiotics. Cardiology was consulted secondary to EKG changes and concern for ischemia. Elevated troponin was noted and patient underwent left heart cath on 2/23/2023 which showed multivessel disease. He was scheduled for CABG on 2/78 without complication and was transferred to the ICU postoperatively. He was extubated and placed on BiPAP the day of surgery. Patient continued to require additional support including NT suctioning. He was having problems with orientation. Patient was started on Zyprexa. He required bedside sitter. Past Medical History: Per HPI. Family History: No known family history. Social History: Reformed smoker, denies alcohol or drug use. .    Review of Systems   Constitutional:  Negative for fatigue and fever. HENT:  Positive for trouble swallowing. Negative for sore throat. Eyes:  Negative for photophobia. Respiratory:  Negative for chest tightness, shortness of breath and wheezing. Cardiovascular:  Negative for chest pain and leg swelling. Gastrointestinal:  Negative for anal bleeding, nausea and vomiting. Endocrine: Negative for polyphagia. Genitourinary:  Negative for decreased urine volume and flank pain. Musculoskeletal:  Negative for back pain and neck pain. Skin:  Negative for color change, pallor and rash. Allergic/Immunologic: Negative for food allergies. Neurological:  Negative for dizziness, weakness and headaches. Hematological:  Negative for adenopathy. Psychiatric/Behavioral:  Positive for confusion. Negative for agitation. The patient is nervous/anxious.         Scheduled Meds:   carBAMazepine  400 mg Oral TID    modafinil  200 mg Oral QAM    piperacillin-tazobactam  3,375 mg IntraVENous Q8H    magnesium hydroxide  30 mL Oral Daily    carvedilol  12.5 mg Oral BID WC    nystatin  5 mL Oral 4x Daily    tiotropium-olodaterol  2 puff Inhalation Daily    polyethylene glycol  17 g Oral Daily    amiodarone  200 mg Oral Daily    bisacodyl  10 mg Rectal Daily    calcium replacement protocol   Other RX Placeholder    aspirin  324 mg Oral Daily    potassium bicarb-citric acid  20 mEq Oral BID    insulin lispro  0-4 Units SubCUTAneous TID WC insulin lispro  0-4 Units SubCUTAneous Nightly    [Held by provider] furosemide  40 mg IntraVENous Daily    sodium chloride flush  5-40 mL IntraVENous 2 times per day    enoxaparin  40 mg SubCUTAneous Daily    sennosides-docusate sodium  1 tablet Oral BID    famotidine  20 mg Oral BID    Or    famotidine (PEPCID) injection  20 mg IntraVENous BID    atorvastatin  40 mg Oral Daily    escitalopram  20 mg Oral Daily    levothyroxine  50 mcg Oral Daily    risperiDONE  0.5 mg Oral BID    multivitamin  1 tablet Oral Daily     Continuous Infusions:    PHYSICAL EXAMINATION:  T:  97.8. P:  76. RR:  14. B/P:  129/26. FiO2:  0. O2 Sat:  96.  I/O:  413/1325  Body mass index is 28.06 kg/m². General:   Acute on chronically ill-appearing white male  HEENT:  normocephalic and atraumatic. No scleral icterus. PERR  Neck: supple. No Thyromegaly. Lungs: clear to auscultation. No retractions  Cardiac: RRR. No JVD. Abdomen: soft. Nontender. Extremities:  No clubbing, cyanosis, or edema x 4. Vasculature: capillary refill < 3 seconds. Palpable dorsalis pedis pulses. Skin:  warm and dry. Psych:  Alert and oriented x1. Confused at baseline  Lymph:  No supraclavicular adenopathy. Neurologic:  No focal deficit. No seizures. Data: (All radiographs, tracings, PFTs, and imaging are personally viewed and interpreted unless otherwise noted). Sodium 157, potassium 3.8, chloride 118, CO2 26, BUN 28, creatinine 0.9, anion gap 13.0, glucose 102.   WBC 11.1, hemoglobin 10.2, hematocrit 34.9, platelet count 038  Telemetry shows NSR   Chest x-ray 3/13/2023 reports no acute findings  KUB 3/10/2023 reports feeding tube bowel gas pattern  CT head 3/2/2023 reports no new abnormalities  Echocardiogram 2/22/2023 reports ejection fraction 50%      Meets Continued ICU Level Care Criteria:    [x] Yes   [] No - Transfer Planned to listed location:  [] HOSPITALIST CONTACTED-      Case and plan discussed with Dr. Karlene Fonseca and CV surgery ALIYA Georges Deja Bautista        Electronically signed by Ban Dorado. ANGELIC Sorto - CNP  CRITICAL CARE SPECIALIST   Patient seen by me including key components of medical care. Case discussed with NP. Case discussed with [facility].  Increase free water. Await MBS. Italicized font, if present,  represents changes to the note made by me. CC time 35 minutes. Time was discontiguous. Time does not include procedure. Time does include my direct assessment of the patient and coordination of care. Time represents more than 50% of the time involved with patient care by the [address] [place] [address] team.  Electronically signed by Catalina Swanson.  Clay Alcazar MD.

## 2023-03-14 NOTE — PROGRESS NOTES
[facility] ICU 4D  Occupational Therapy  Daily Note  Time:    Time In: 2459  Time Out: 2612  Timed Code Treatment Minutes: 25 Minutes  Minutes: 25          Date: 3/14/2023  Patient Name: Surya Hurtado,   Gender: male      Room: [facility]008-A  MRN: [id number]  : 1940  (81 y.o.)  Referring Practitioner: Angela Ramachandran PA-C  Diagnosis: elevated troponin  Additional Pertinent Hx: per chart review; \"The patient is an 81year old male former smoker with an approximately 20-pack-year history who quit in  with a past medical history of leukemia with remission in , hypothyroidism, \"seizures,\" hyperlipidemia, \"coma,\" and 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 \" patient underwent a CABG STONEY,  coronary artery bypass grafting x3 using LIMA to LAD, vein graft to to's marginal 1, vein graft to posterior descending artery, transesophageal echocardiography, endoscopic vein harvesting  on 23. Restrictions/Precautions:  Restrictions/Precautions: Fall Risk, General Precautions, Surgical Protocols  Position Activity Restriction  Sternal Precautions: move in the tube  Other position/activity restrictions: hx of brain injury      SUBJECTIVE: cooperative with encouragement. Short attention span, making \"jokes\" throughout session. Pt perseverating on having water & going to sleep    PAIN: 0/10: no c/o pain during session    Vitals: Blood Pressure: 171/67  Oxygen: 96% on RA  Heart Rate: 76    COGNITION: Slow Processing, Decreased Insight, Impaired Memory, Decreased Problem Solving, Decreased Safety Awareness, and Impaired Attention    ADL:   Grooming: with set-up. For washing face while seated, vcs for completeness  Footwear Management: Dependent.   For adjusting slipper socks  Toileting: Dependent. For wiping bottom after incontinent of loose stools during session  Toilet Transfer: Minimal Assistance. From [facility]; vcs for technique for \"move in the tube\" precautions . BALANCE:  Standing Balance: Contact Guard Assistance. BED MOBILITY:  Not Tested    TRANSFERS:  Sit to Stand:  Contact Guard Assistance. From recliner 2x occassions    FUNCTIONAL MOBILITY:  Assistive Device: Rolling Walker  Assist Level:  Minimal Assistance. Distance:  20ft   Vcs for posture & A for maneuvering walker; L neglect noted-Pt not scanning to L side despite max encouragement to turn his head     ADDITIONAL ACTIVITIES:  Unable to get Pt to engage in exercises at this time. ASSESSMENT:     Activity Tolerance:  Patient tolerance of  treatment: Fair treatment tolerance       Discharge Recommendations: Continue to assess pending progress  Equipment Recommendations: Equipment Needed: Yes  Mobility Devices: ADL Assistive Devices  Other: Pt reports he has a rolling walker and a shower chair but was only using off and on. Recommendations provided to use both for fall prevention. Plan: Times Per Week: 6x  Times Per Day: Once a day  Current Treatment Recommendations: Strengthening, Balance training, Functional mobility training, Neuromuscular re-education, Safety education & training, Self-Care / ADL, Endurance training, Patient/Caregiver education & training, Equipment evaluation, education, & procurement, ROM    Patient Education  Patient Education:  see above    Goals  Short Term Goals  Time Frame for Short Term Goals: by discharge  Short Term Goal 1: patient will tolerate 2 min static standing with sit to stand and maintain balance with MOD A x 1 with O2 >/= 90% to increase activity tolerance for self care routine. MET, REVISE  Short Term Goal 2: patient will complete UB ADLs with MIN A and 1-2 cues to initiate and sequence task.  NOT MET, CONTINUE  Short Term Goal 3: Pt will complete mobility to/from [facility] or bedside chair with RW, CGA, & 0-2 vcs for posture & walker safety NOT MET, CONTINUE  Short Term Goal 4: patient will tolerate CABG exer to increase activity tolerance for self care routine. MET, REVISE    Revised Short-Term Goals  Short Term Goals  Time Frame for Short Term Goals: by discharge  Short Term Goal 1: Patient will tolerate 2-3 min static standing with SBA for increased ease of sinkside grooming  Short Term Goal 2: patient will complete UB ADLs with MIN A and 1-2 cues to initiate and sequence task. Short Term Goal 3: Pt will complete mobility to/from bathroom with RW, CGA, & 0-2 vcs for posture & walker safety/scanning L  Short Term Goal 4: Pt will complete step 2 cardiac exercises x 12 reps with min RBs to increase endurance for BADL    Following session, patient left in safe position with all fall risk precautions in place.

## 2023-03-14 NOTE — CARE COORDINATION
3/14/23, 3:18 PM EDT    DISCHARGE ON GOING EVALUATION    [facility] day: 18  Location: -08/008-A Reason for admit: Elevated troponin level [R77.8]  Elevated troponin [R77.8]  Elevated brain natriuretic peptide (BNP) level [R79.89]  Dyspnea, unspecified type [R06.00]  NSTEMI (non-ST elevated myocardial infarction) ([facility] [address] [I21.4]   Procedure:   2/23 Cardiac Cath: Severe MVD  2/24 Bilateral Carotid Dopplers & Vein mapping  2/27 CTA Neck: Atherosclerosis of the right carotid bulb with a 76% stenosis at the origin the right internal carotid artery; No significant stenosis at the origin of the left internal carotid artery; Moderate stenosis at the origin of the left vertebral artery. 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 This can indicate acute mastoiditis in the appropriate clinical setting; Stable CT appearance the brain. No new abnormalities  3/5 Chest tubes removed  3/7 MBS: Laryngeal penetration and aspiration with thin and mildly thick barium  3/13 CXR: No acute process  3/14 MBS: Laryngeal penetration and aspiration of thin and mildly thick barium    Barriers to Discharge: POD #14. NG removed today for MBS. Passed MBS and started on pureed diet with moderately thickened liquids. Afebrile. NSR. On room air. Bipap at HS. Oriented to person and place. Follows commands. Slurred/garbled speech. Sitter. SLP/CR/PT/OT. Dietitian consulted. Dietary ileus prevention protocol. +VRE rectum. Telemetry, I&O, daily weight, IS, sternal precautions, wound care, external urinary catheter, SCDs, ambulate. D5 @ 100 ml/hr, Amio, asa, lipitor, dulcolax, tegretol, coreg, lovenox, lexapro, pepcid, SSI, synthroid, provigil, nystatin suspension, IV zosyn, K+, risperdal, senokot, inhaler, electrolyte replacement protocols.  Na+ 157, Mg 3.3, alb 3.3, alt 75, ast 46, wbc 11.1, hgb 10.2. PCP: Richard Samano MD  Readmission Risk Score: 19.7%  Patient Goals/Plan/Treatment Preferences: Plan for [facility] for rehab. No precert required. SW on case.

## 2023-03-14 NOTE — PROGRESS NOTES
[facility] THERAPY COMMUNICATION NOTE  [facility] ICU 4D      Date: 3/14/2023  Patient Name: Brittany Royal        MRN: [id number]    : 1940  (81 y.o.)    COMMUNICATION NOTE:  Novel order received per SHA Rucker for completion of cognitive linguistic evaluation. Ongoing documentation to support patient's current level of mentation to be at baseline per family. No alternate assessment clinically indicated at this time. Please re-consult should further needs arise.        Jovan Mack M.A., [address] [place] [address]

## 2023-03-14 NOTE — PROGRESS NOTES
Comprehensive Nutrition Assessment    Type and Reason for Visit:  Reassess    Nutrition Recommendations/Plan:   Diet as per SLP/Provider  Trial Magic Cup TID     Malnutrition Assessment:  Malnutrition Status: At risk for malnutrition (Comment) (03/10/23 1416)    Context:  Acute Illness     Findings of the 6 clinical characteristics of malnutrition:  Energy Intake:  Mild decrease in energy intake (Comment) (variable po admit to OHS; tolerating EN since 3/1)  Weight Loss:  No significant weight loss     Body Fat Loss:  No significant body fat loss     Muscle Mass Loss:  No significant muscle mass loss    Fluid Accumulation:  Mild     Strength:  Not Performed    Nutrition Assessment:     Pt. improving nutritionally AEB tolerated total nutrition support 3/1-3/13 via NGT; diet advanced 3/14. At risk for further nutrition compromise r/t admit with NSTEMI, cardiac cath 2/23, CABG 2/28 with intubation and extubation 2/28, increased needs with COPD and OHS for healing process, confusion noted, LOS day 18 and underlying medical condition advanced age, HLD, leukemia. Nutrition Related Findings:    Pt. Report/Treatments/Miscellaneous: Pt seen- sleeping soundly; He has been more alert past couple of days; NG tube removed 3/13; Passed MBS today, however poor po intake at lunch; Will trial ONS to aid in po intake. GI Status: Loose BM 3/13  Pertinent Labs: BUN 28, Cr 0.9, Mg 3.3, Na 157, Glucose 102, Phos 3.6, Hgb 10.2   Pertinent Meds: Dulcolax, Tegretol, Pepcid, Humalog, Synthroid, MOM, MVI, Abx, Senokot-S,  ml/hr    Wound Type: Surgical Incision (CABG x3 2/28; pressue ulcer stage 2 nose)       Current Nutrition Intake & Therapies:    Average Meal Intake: 1-25%  Average Supplements Intake:  (just ordered)  ADULT DIET; Dysphagia - Pureed; Moderately Thick (Honey);  No Drinking Straws  ADULT ORAL NUTRITION SUPPLEMENT; Breakfast, Lunch, Dinner; Frozen Oral Supplement    Anthropometric Measures:  Height: 5' 9\" (175.3 cm)  Ideal Body Weight (IBW): 160 lbs (73 kg)    Admission Body Weight: 202 lb (91.6 kg) (2/28 with scleral edema)  Current Body Weight: 182 lb 5.1 oz (82.7 kg) (3/14; trace to +1 Edema),   IBW. Weight Source: Bed Scale  Current BMI (kg/m2): 26.9  Usual Body Weight: 199 lb (90.3 kg) (2/22/23; 204# 1/2017; 239# 12/2015; 207# 3/2014)  % Weight Change (Calculated): 1.5  Weight Adjustment For: No Adjustment                 BMI Categories: Overweight (BMI 25.0-29. 9)    Estimated Daily Nutrient Needs:  Energy Requirements Based On: Kcal/kg  Weight Used for Energy Requirements: Admission (92kgm)  Energy (kcal/day): 0068-9411 (20-25/kgm)  Weight Used for Protein Requirements: Ideal (73kgm)  Protein (g/day): 88-110gms (1.2-1.5/kgm IBW)     Fluid (ml/day): per Physician    Nutrition Diagnosis:   Inadequate oral intake related to swallowing difficulty as evidenced by swallow study results    Nutrition Interventions:   Food and/or Nutrient Delivery: Continue Current Diet, Start Oral Nutrition Supplement  Nutrition Education/Counseling: Education needed (mentation improving, will educate on ONS when appropriate)  Coordination of Nutrition Care: Continue to monitor while inpatient, Interdisciplinary Rounds       Goals:  Previous Goal Met: Goal(s) Achieved  Goals: PO intake 75% or greater, by next RD assessment       Nutrition Monitoring and Evaluation:   Behavioral-Environmental Outcomes: None Identified  Food/Nutrient Intake Outcomes: Diet Advancement/Tolerance, Food and Nutrient Intake, Supplement Intake  Physical Signs/Symptoms Outcomes: Biochemical Data, Chewing or Swallowing, GI Status, Fluid Status or Edema, Hemodynamic Status, Nutrition Focused Physical Findings, Skin, Weight    Discharge Planning:     Too soon to determine     Shelly Saul, [address]  Contact: [phone]

## 2023-03-14 NOTE — PROGRESS NOTES
[address] [facility] PHYSICAL THERAPY  DAILY NOTE  [facility] ICU 4D - [id number]-[id number]      Time In: 1427  Time Out: 1522  Timed Code Treatment Minutes: 54 Minutes  Minutes: 55          Date: 3/14/2023  Patient Name: Surya Hurtado,  Gender:  male        MRN: [id number]  : 1940  (81 y.o.)     Referring Practitioner: ANGELIC Kevin CNP  Diagnosis: elevated tropnin level  Additional Pertinent Hx: per EMR- Surya Hurtado is a 81 y.o. male with past medical history of anemia, brain abscess, seizures, former smoker approximately 40 pack year who presents to the emergency department for evaluation of fatigue, shortness of breath. Patient brought in by family as family has been reporting he has been more tired, fatigued for the past week however more so in the past 3 days. Today, patient verbalized that he was short of breath and had generalized muscle aches which prompted today's ED visit. Patient lives in a two-story home and states that it has been getting harder to walk around. Has been having dyspnea on exertion. Pt s/p CABG STONEY,  coronary artery bypass grafting x3 using LIMA to LAD, vein graft to to's marginal 1, vein graft to posterior descending artery, transesophageal echocardiography, endoscopic vein harvesting DOS . Prior Level of Function:  Lives With: Daughter  Type of Home: House  Home Layout: Two level, Bed/Bath upstairs  Home Access: Stairs to enter with rails  Entrance Stairs - Number of Steps: 2 LEVY and ~15 steps to second level  Home Equipment: Eleonore Fail, rolling, Cane   Bathroom Shower/Tub: Tub/Shower unit  Bathroom Accessibility: Accessible    Receives Help From: Family  ADL Assistance: Independent  Homemaking Assistance: Needs assistance  Homemaking Responsibilities: Yes  Ambulation Assistance: Independent  Transfer Assistance: Independent  Active : No  Additional Comments: Daughter and son in law both work outside of the home.  Pt was previously independent with ADLs and functional mobility. Pt was not previously using AD for functional mobility. Pt is a questionable historian. Hx of brain surgery in 1995 and visual deficits. Restrictions/Precautions:  Restrictions/Precautions: Fall Risk, General Precautions, Surgical Protocols  Position Activity Restriction  Sternal Precautions: move in the tube  Other position/activity restrictions: hx of brain injury       SUBJECTIVE: nursing ok'd therapy, pt cooperative yet needed redirected throughout session he is very talkative- he also demonstrated some difficulty scanning the room however this wasn't consistent to the right or left,  he was incont of BM and was cont of urine on the toilet- at end of session pt did ask to return to bed     PAIN: occ c/o right UE pain     Vitals: Blood Pressure: 151/74  Oxygen: > 90% on room air- hard to get accurate reading at times   Heart Rate: 70-80's     OBJECTIVE:  Bed Mobility:  Supine to Sit: Moderate Assistance, with head of bed flat with extra time given   Sit to Supine: Moderate Assistance, he needed assist at ana luisa LEs and once in bed he needed assist to get repositioned in bed    Scooting:  Moderate Assistance, to edge of bed - gave cues for him to assist but he struggled to follow     Transfers:  Sit to Stand: [address], to min assist- cues given not to place UEs on walker and poor carryover throughout session    Stand to [address] [facility], to [place] [address] with cues to get square to the surface often times he would say \"can I sit\" but he still needed to turn another quarter of the way     Ambulation:  Minimal Assistance  Distance: 5x2 , 20x1  Surface: Level Tile  Device:Rolling Walker  Gait Deviations:  noted narrow base of support with decreased step length and slight decreased quad control and lacking TKE at ana luisa LEs and decreased heelstrike and toe off, pt needed assist to guide the walker and max cues for lines     Balance:  -standing to complete toileting task pt had UEs at support and needed CGA for balance however needed much assist for otis care and clothing management   - pt stood 2 trials for ~ 4 min each to complete reaching task- he struggled at times to scan to find the object this was to the right/left and just above his eye ht noted decreased accuracy when using his right UE to reach for object and had more difficulty with timing and release however he is missing the tips of 2 of his fingers on his right hand  ** during standing activity he did c/o of occ dizziness however vitals were all WNL   Exercise:  Patient was guided in 1 set(s) 10-12 reps of exercise to both lower extremities. Ankle pumps, Upper trunk rotations, Shoulder horizontal abduction/adduction, Shoulder rolls, Seated marches, Seated heel/toe raises, Long arc quads, and with demonstration for ex and occ hands on assist for full range of LEs  . Exercises were completed for increased independence with functional mobility. Functional Outcome Measures: Completed  -PAC Inpatient Mobility without Stair Climbing Raw Score : 13  AM-PAC Inpatient without Stair Climbing T-Scale Score : 38.96    ASSESSMENT:  Assessment: Patient progressing toward established goals. Pt demonstrated generalized weakness in ana luisa LEs along with decreased balance, endurance and safety awareness. Pt would greatly benefit from cont skilled therapy prior to discharge home to improve functional independence and safety with functional mobility. Activity Tolerance:  Patient tolerance of  treatment: fair.  Rest breaks needed      Equipment Recommendations:Equipment Needed: No  Discharge Recommendations:  pt would benefit from a therapy stay prior to return home- anticipate he will need 24 hour hands on assist at discharge   Plan: Current Treatment Recommendations: Strengthening, Balance training, Functional mobility training, Transfer training, Safety education & training, Equipment evaluation, education, & procurement, Home exercise program, Therapeutic activities, Patient/Caregiver education & training, Neuromuscular re-education, Endurance training, Gait training  General Plan:  (6x CABG)    Patient Education  Patient Education: Plan of Care, Transfers, Gait    Goals:  Patient Goals : family wants rehab following CABG  Short Term Goals  Time Frame for Short Term Goals: by discharge  Short Term Goal 1: Pt to complete supine <-> sit with CGA for ease getting out of bed with move in the tube guidelines  Short Term Goal 2: Pt to complete sit <-> stand with CGA for ease with mobiltiy from various surfaces  Short Term Goal 3: Pt to ambulate >10 feet with RW CGA for room ambulation. Short Term Goal 4: Pt to sit EOB for >=10 minutes with SBA with good midline posture for ease with sitting EOB to progress further mobility  Short Term Goal 5: .  Long Term Goals  Time Frame for Long Term Goals : NA due to short ELOS    Following session, patient left in safe position with all fall risk precautions in place.

## 2023-03-14 NOTE — PLAN OF CARE
Problem: Discharge Planning  Goal: Discharge to home or other facility with appropriate resources  3/14/2023 1358 by Silvano Dc RN  Outcome: Progressing  Flowsheets (Taken 3/13/2023 1945 by Eliecer Barnett RN)  Discharge to home or other facility with appropriate resources:   Identify barriers to discharge with patient and caregiver   Arrange for needed discharge resources and transportation as appropriate   Identify discharge learning needs (meds, wound care, etc)   Refer to discharge planning if patient needs post-hospital services based on physician order or complex needs related to functional status, cognitive ability or social support system     Problem: Neurosensory - Adult  Goal: Achieves stable or improved neurological status  3/14/2023 1358 by Silvano Dc RN  Outcome: Progressing  Flowsheets (Taken 3/13/2023 1945 by Eliecer Barnett RN)  Achieves stable or improved neurological status:   Assess for and report changes in neurological status   Initiate measures to prevent increased intracranial pressure   Maintain blood pressure and fluid volume within ordered parameters to optimize cerebral perfusion and minimize risk of hemorrhage   Monitor temperature, glucose, and sodium.  Initiate appropriate interventions as ordered     Problem: Neurosensory - Adult  Goal: Achieves maximal functionality and self care  3/14/2023 1358 by Silvano Dc RN  Outcome: Progressing  Flowsheets (Taken 3/13/2023 1945 by Eliecer Barnett RN)  Achieves maximal functionality and self care:   Monitor swallowing and airway patency with patient fatigue and changes in neurological status   Encourage and assist patient to increase activity and self care with guidance from physical therapy/occupational therapy   Encourage visually impaired, hearing impaired and aphasic patients to use assistive/communication devices     Problem: Musculoskeletal - Adult  Goal: Return mobility to safest level of function  3/14/2023 1358 by Radha Gomez Kraig Lopes RN  Outcome: Progressing  Flowsheets (Taken 3/13/2023 1945 by Azael Malave, RN)  Return Mobility to Safest Level of Function:   Assess patient stability and activity tolerance for standing, transferring and ambulating with or without assistive devices   Assist with transfers and ambulation using safe patient handling equipment as needed   Obtain physical therapy/occupational therapy consults as needed   Apply continuous passive motion per provider or physical therapy orders to increase flexion toward goal     Problem: Confusion  Goal: Confusion, delirium, dementia, or psychosis is improved or at baseline  Description: INTERVENTIONS:  1. Assess for possible contributors to thought disturbance, including medications, impaired vision or hearing, underlying metabolic abnormalities, dehydration, psychiatric diagnoses, and notify attending LIP  2. [place] high risk fall precautions, as indicated  3. Provide frequent short contacts to provide reality reorientation, refocusing and direction  4. Decrease environmental stimuli, including noise as appropriate  5. Monitor and intervene to maintain adequate nutrition, hydration, elimination, sleep and activity  6. If unable to ensure safety without constant attention obtain sitter and review sitter guidelines with assigned personnel  7.  Initiate Psychosocial CNS and Spiritual Care consult, as indicated  3/14/2023 1358 by Quynh Sanchez RN  Outcome: Progressing  Flowsheets (Taken 3/13/2023 1945 by Azael Malave, RN)  Effect of thought disturbance (confusion, delirium, dementia, or psychosis) are managed with adequate functional status:   Assess for contributors to thought disturbance, including medications, impaired vision or hearing, underlying metabolic abnormalities, dehydration, psychiatric diagnoses, notify [place] high risk fall precautions, as indicated   Provide frequent short contacts to provide reality reorientation, refocusing and direction Decrease environmental stimuli, including noise as appropriate   Monitor and intervene to maintain adequate nutrition, hydration, elimination, sleep and activity   If unable to ensure safety without constant attention obtain sitter and review sitter guidelines with assigned personnel   Initiate Psychosocial Clinical Nurse Specialist and Spiritual Care consult, as indicated     Care plan reviewed with patient and family. Patient and family verbalize understanding of the plan of care and contribute to goal setting.

## 2023-03-14 NOTE — PROGRESS NOTES
Physical Medicine & Rehabilitation   Progress Note    Chief Complaint:  CABG. Rehab needs    Subjective:  Patient seen today, resting in bed. Patient with ongoing cognition issues. Reportedly baseline? Patient unable to problem solve how to make a sandwich, also reports that he was driving previously.  If this is baseline, patient was not safe at home previously. Discussed with patient about [place] at discharge. Patient placed on diet today after MBS. Patient with thickened liquids, taking large gulps, encouraged to take sips, continued with gulps. +clearing and coughing after thickened water.     Rehabilitation:  PT: Reviewed.    OT: Reviewed.    ST: Reviewed.      Review of Systems:  CONSTITUTIONAL:  positive for  fatigue and confusion  EYES:  negative for  double vision, blurred vision, blind spots, and visual disturbance  HEENT:  negative  RESPIRATORY:  positive for  PAP needs  CARDIOVASCULAR:  recent CABG  GASTROINTESTINAL:  positive for dysphagia  GENITOURINARY:  negative  SKIN:  sternal incision  HEMATOLOGIC/LYMPHATIC:  negative  MUSCULOSKELETAL:  positive for  muscle weakness  NEUROLOGICAL:  positive for memory problems, coordination problems, gait problems, dysphagia, weakness, and numbness  BEHAVIOR/PSYCH:  negative  System review otherwise negative      Objective:  BP (!) 131/56   Pulse 71   Temp 97.8 °F (36.6 °C) (Oral)   Resp 17   Ht 5' 9\" (1.753 m)   Wt 182 lb 5.1 oz (82.7 kg)   SpO2 (!) 82%   BMI 26.92 kg/m²   awake  Orientation:   person only  Mood: within normal limits, talkative, pleasant  Affect: slightly anxious  General appearance: Patient is well nourished, well developed, well groomed and in no acute distress     Memory:   abnormal    Attention/Concentration: normal  Language:  some slight dysarthria/mumbling at times.      Cranial Nerves:  cranial nerves II-XII are grossly intact  ROM:  normal  Motor Exam:  Finger amputations noted to right hand of 2nd and 3rd digits.   Tone:   normal  Muscle bulk: within normal limits  Sensory:  decreased sensation bilateral upper and lower extremities with numbness. Patient reports this is old, but unable to give further details.    Coordination:   normal     Skin: warm and dry, no rash or erythema and sternal incision without redness, swelling, or drainage   Peripheral vascular: Pulses: Normal upper and lower extremity pulses; Edema: trace      Diagnostics:   Recent Results (from the past 24 hour(s))   POCT glucose    Collection Time: 03/13/23  4:11 PM   Result Value Ref Range    POC Glucose 97 70 - 108 mg/dl   POCT glucose    Collection Time: 03/13/23  8:53 PM   Result Value Ref Range    POC Glucose 100 70 - 108 mg/dl   POCT glucose    Collection Time: 03/14/23 12:06 AM   Result Value Ref Range    POC Glucose 94 70 - 108 mg/dl   POCT glucose    Collection Time: 03/14/23  3:55 AM   Result Value Ref Range    POC Glucose 101 70 - 108 mg/dl   CBC    Collection Time: 03/14/23  4:04 AM   Result Value Ref Range    WBC 11.1 (H) 4.8 - 10.8 thou/mm3    RBC 3.07 (L) 4.70 - 6.10 mill/mm3    Hemoglobin 10.2 (L) 14.0 - 18.0 gm/dl    Hematocrit 34.9 (L) 42.0 - 52.0 %    .7 (H) 80.0 - 94.0 fL    MCH 33.2 (H) 26.0 - 33.0 pg    MCHC 29.2 (L) 32.2 - 35.5 gm/dl    RDW-CV 12.6 11.5 - 14.5 %    RDW-SD 52.2 (H) 35.0 - 45.0 fL    Platelets 625 (H) 109 - 400 thou/mm3    MPV 11.2 9.4 - 12.4 fL   Comprehensive Metabolic Panel    Collection Time: 03/14/23  4:04 AM   Result Value Ref Range    Glucose 102 70 - 108 mg/dL    Creatinine 0.9 0.4 - 1.2 mg/dL    BUN 28 (H) 7 - 22 mg/dL    Sodium 157 (H) 135 - 145 meq/L    Potassium 3.8 3.5 - 5.2 meq/L    Chloride 118 (H) 98 - 111 meq/L    CO2 26 23 - 33 meq/L    Calcium 8.0 (L) 8.5 - 10.5 mg/dL    AST 46 (H) 5 - 40 U/L    Alkaline Phosphatase 66 38 - 126 U/L    Total Protein 6.3 6.1 - 8.0 g/dL    Albumin 3.3 (L) 3.5 - 5.1 g/dL    Total Bilirubin 0.4 0.3 - 1.2 mg/dL    ALT 75 (H) 11 - 66 U/L   Magnesium    Collection Time: 03/14/23 4:04 AM   Result Value Ref Range    Magnesium 3.3 (H) 1.6 - 2.4 mg/dL   Phosphorus    Collection Time: 03/14/23  4:04 AM   Result Value Ref Range    Phosphorus 3.6 2.4 - 4.7 mg/dL   Glomerular Filtration Rate, Estimated    Collection Time: 03/14/23  4:04 AM   Result Value Ref Range    Est, Glom Filt Rate >60 >60 ml/min/1.73m2   Anion Gap    Collection Time: 03/14/23  4:04 AM   Result Value Ref Range    Anion Gap 13.0 8.0 - 16.0 meq/L   POCT glucose    Collection Time: 03/14/23  7:56 AM   Result Value Ref Range    POC Glucose 103 70 - 108 mg/dl   POCT glucose    Collection Time: 03/14/23 11:39 AM   Result Value Ref Range    POC Glucose 165 (H) 70 - 108 mg/dl       Impression:  CAD, LAD  S/p LHC 2/23/23  S/p CABGx3 2/28/23  Dysphagia  Non-SARS pneumonia   NORMA, suspected  COPD  Right ICA stenosis ans subclavian stenosis  Cognitive deficits   Hypothyroidism  Seizures  Hyperlipidemia  CVA 1995  History of leukemia with remission in 1984    Plan:  Continue current therapies  CLOSE PULMONARY MONITORING: continues with clearing and cough with thickened liquids. Patient with altered mentation. Discussed with SLP who discussed this with family. Family evidently reports that his mentation is baseline, this was also reported by patient's RN on 3/13/23. If this is truly patient's baseline mentation, patient is not safe to return home and will need SNF with possible LTC in future. If this is not patient's baseline mentation, with acute dysphagia and cognitive changes, these should be reviewed for cause. At this time, recommending SNF for further therapy.          Rolly Machado, APRN - CNP

## 2023-03-15 LAB
ALBUMIN SERPL BCG-MCNC: 3.1 G/DL (ref 3.5–5.1)
ALP SERPL-CCNC: 61 U/L (ref 38–126)
ALT SERPL W/O P-5'-P-CCNC: 58 U/L (ref 11–66)
ANION GAP SERPL CALC-SCNC: 14 MEQ/L (ref 8–16)
AST SERPL-CCNC: 39 U/L (ref 5–40)
BILIRUB SERPL-MCNC: 0.2 MG/DL (ref 0.3–1.2)
BUN SERPL-MCNC: 23 MG/DL (ref 7–22)
CALCIUM SERPL-MCNC: 7.6 MG/DL (ref 8.5–10.5)
CHLORIDE SERPL-SCNC: 109 MEQ/L (ref 98–111)
CO2 SERPL-SCNC: 25 MEQ/L (ref 23–33)
CREAT SERPL-MCNC: 1 MG/DL (ref 0.4–1.2)
DEPRECATED RDW RBC AUTO: 49.7 FL (ref 35–45)
ERYTHROCYTE [DISTWIDTH] IN BLOOD BY AUTOMATED COUNT: 12.7 % (ref 11.5–14.5)
GFR SERPL CREATININE-BSD FRML MDRD: > 60 ML/MIN/1.73M2
GLUCOSE BLD STRIP.AUTO-MCNC: 104 MG/DL (ref 70–108)
GLUCOSE BLD STRIP.AUTO-MCNC: 126 MG/DL (ref 70–108)
GLUCOSE BLD STRIP.AUTO-MCNC: 139 MG/DL (ref 70–108)
GLUCOSE SERPL-MCNC: 135 MG/DL (ref 70–108)
HCT VFR BLD AUTO: 28.5 % (ref 42–52)
HGB BLD-MCNC: 8.4 GM/DL (ref 14–18)
MAGNESIUM SERPL-MCNC: 2.9 MG/DL (ref 1.6–2.4)
MCH RBC QN AUTO: 32.2 PG (ref 26–33)
MCHC RBC AUTO-ENTMCNC: 29.5 GM/DL (ref 32.2–35.5)
MCV RBC AUTO: 109.2 FL (ref 80–94)
PHOSPHATE SERPL-MCNC: 3.7 MG/DL (ref 2.4–4.7)
PLATELET # BLD AUTO: 416 THOU/MM3 (ref 130–400)
PMV BLD AUTO: 10.5 FL (ref 9.4–12.4)
POTASSIUM SERPL-SCNC: 3.6 MEQ/L (ref 3.5–5.2)
PROT SERPL-MCNC: 5.5 G/DL (ref 6.1–8)
RBC # BLD AUTO: 2.61 MILL/MM3 (ref 4.7–6.1)
SODIUM SERPL-SCNC: 148 MEQ/L (ref 135–145)
WBC # BLD AUTO: 9.2 THOU/MM3 (ref 4.8–10.8)

## 2023-03-15 PROCEDURE — 2700000000 HC OXYGEN THERAPY PER DAY

## 2023-03-15 PROCEDURE — 83735 ASSAY OF MAGNESIUM: CPT

## 2023-03-15 PROCEDURE — 6360000002 HC RX W HCPCS

## 2023-03-15 PROCEDURE — 2580000003 HC RX 258: Performed by: PHYSICIAN ASSISTANT

## 2023-03-15 PROCEDURE — 6370000000 HC RX 637 (ALT 250 FOR IP)

## 2023-03-15 PROCEDURE — 6360000002 HC RX W HCPCS: Performed by: PHYSICIAN ASSISTANT

## 2023-03-15 PROCEDURE — 6370000000 HC RX 637 (ALT 250 FOR IP): Performed by: PHYSICIAN ASSISTANT

## 2023-03-15 PROCEDURE — 97110 THERAPEUTIC EXERCISES: CPT

## 2023-03-15 PROCEDURE — 2580000003 HC RX 258

## 2023-03-15 PROCEDURE — 80053 COMPREHEN METABOLIC PANEL: CPT

## 2023-03-15 PROCEDURE — 6370000000 HC RX 637 (ALT 250 FOR IP): Performed by: NURSE PRACTITIONER

## 2023-03-15 PROCEDURE — 99233 SBSQ HOSP IP/OBS HIGH 50: CPT | Performed by: INTERNAL MEDICINE

## 2023-03-15 PROCEDURE — 2060000000 HC ICU INTERMEDIATE R&B

## 2023-03-15 PROCEDURE — 84100 ASSAY OF PHOSPHORUS: CPT

## 2023-03-15 PROCEDURE — 6370000000 HC RX 637 (ALT 250 FOR IP): Performed by: THORACIC SURGERY (CARDIOTHORACIC VASCULAR SURGERY)

## 2023-03-15 PROCEDURE — 36415 COLL VENOUS BLD VENIPUNCTURE: CPT

## 2023-03-15 PROCEDURE — 97116 GAIT TRAINING THERAPY: CPT

## 2023-03-15 PROCEDURE — 94660 CPAP INITIATION&MGMT: CPT

## 2023-03-15 PROCEDURE — 82948 REAGENT STRIP/BLOOD GLUCOSE: CPT

## 2023-03-15 PROCEDURE — 85027 COMPLETE CBC AUTOMATED: CPT

## 2023-03-15 PROCEDURE — 94640 AIRWAY INHALATION TREATMENT: CPT

## 2023-03-15 PROCEDURE — 94761 N-INVAS EAR/PLS OXIMETRY MLT: CPT

## 2023-03-15 PROCEDURE — APPSS30 APP SPLIT SHARED TIME 16-30 MINUTES: Performed by: PHYSICIAN ASSISTANT

## 2023-03-15 PROCEDURE — 2580000003 HC RX 258: Performed by: NURSE PRACTITIONER

## 2023-03-15 RX ADMIN — CARVEDILOL 12.5 MG: 6.25 TABLET, FILM COATED ORAL at 18:07

## 2023-03-15 RX ADMIN — MODAFINIL 200 MG: 100 TABLET ORAL at 09:13

## 2023-03-15 RX ADMIN — Medication 500000 UNITS: at 18:10

## 2023-03-15 RX ADMIN — TIOTROPIUM BROMIDE AND OLODATEROL 2 PUFF: 3.124; 2.736 SPRAY, METERED RESPIRATORY (INHALATION) at 09:27

## 2023-03-15 RX ADMIN — ESCITALOPRAM 20 MG: 20 TABLET, FILM COATED ORAL at 09:13

## 2023-03-15 RX ADMIN — Medication 500000 UNITS: at 21:59

## 2023-03-15 RX ADMIN — AMIODARONE HYDROCHLORIDE 200 MG: 200 TABLET ORAL at 09:14

## 2023-03-15 RX ADMIN — Medication 500000 UNITS: at 11:08

## 2023-03-15 RX ADMIN — PIPERACILLIN AND TAZOBACTAM 3375 MG: 3; .375 INJECTION, POWDER, FOR SOLUTION INTRAVENOUS at 09:26

## 2023-03-15 RX ADMIN — Medication 500000 UNITS: at 09:13

## 2023-03-15 RX ADMIN — ACETAMINOPHEN 650 MG: 325 TABLET ORAL at 13:59

## 2023-03-15 RX ADMIN — RISPERIDONE 0.5 MG: 0.25 TABLET, FILM COATED ORAL at 09:13

## 2023-03-15 RX ADMIN — Medication 1 TABLET: at 09:13

## 2023-03-15 RX ADMIN — CARBAMAZEPINE 400 MG: 100 TABLET, CHEWABLE ORAL at 14:00

## 2023-03-15 RX ADMIN — PIPERACILLIN AND TAZOBACTAM 3375 MG: 3; .375 INJECTION, POWDER, FOR SOLUTION INTRAVENOUS at 02:16

## 2023-03-15 RX ADMIN — FAMOTIDINE 20 MG: 20 TABLET, FILM COATED ORAL at 21:59

## 2023-03-15 RX ADMIN — CARBAMAZEPINE 400 MG: 100 TABLET, CHEWABLE ORAL at 21:59

## 2023-03-15 RX ADMIN — CARVEDILOL 12.5 MG: 6.25 TABLET, FILM COATED ORAL at 09:13

## 2023-03-15 RX ADMIN — ENOXAPARIN SODIUM 40 MG: 100 INJECTION SUBCUTANEOUS at 09:14

## 2023-03-15 RX ADMIN — LEVOTHYROXINE SODIUM 50 MCG: 0.05 TABLET ORAL at 07:24

## 2023-03-15 RX ADMIN — SODIUM CHLORIDE, PRESERVATIVE FREE 10 ML: 5 INJECTION INTRAVENOUS at 09:22

## 2023-03-15 RX ADMIN — PIPERACILLIN AND TAZOBACTAM 3375 MG: 3; .375 INJECTION, POWDER, FOR SOLUTION INTRAVENOUS at 18:04

## 2023-03-15 RX ADMIN — POTASSIUM BICARBONATE 20 MEQ: 782 TABLET, EFFERVESCENT ORAL at 21:59

## 2023-03-15 RX ADMIN — RISPERIDONE 0.5 MG: 0.25 TABLET, FILM COATED ORAL at 21:59

## 2023-03-15 RX ADMIN — CARBAMAZEPINE 400 MG: 100 TABLET, CHEWABLE ORAL at 09:14

## 2023-03-15 RX ADMIN — POTASSIUM BICARBONATE 20 MEQ: 782 TABLET, EFFERVESCENT ORAL at 09:12

## 2023-03-15 RX ADMIN — FAMOTIDINE 20 MG: 20 TABLET, FILM COATED ORAL at 09:13

## 2023-03-15 RX ADMIN — ATORVASTATIN CALCIUM 40 MG: 40 TABLET, FILM COATED ORAL at 09:15

## 2023-03-15 RX ADMIN — ASPIRIN 81 MG 324 MG: 81 TABLET ORAL at 09:14

## 2023-03-15 RX ADMIN — DEXTROSE MONOHYDRATE: 50 INJECTION, SOLUTION INTRAVENOUS at 07:26

## 2023-03-15 ASSESSMENT — PAIN SCALES - PAIN ASSESSMENT IN ADVANCED DEMENTIA (PAINAD)
CONSOLABILITY: 0
CONSOLABILITY: 0
FACIALEXPRESSION: 0
NEGVOCALIZATION: 0
FACIALEXPRESSION: 0
BREATHING: 0
TOTALSCORE: 0
BREATHING: 0
BREATHING: 0
NEGVOCALIZATION: 0
BODYLANGUAGE: 0
BODYLANGUAGE: 0
FACIALEXPRESSION: 0
CONSOLABILITY: 0
NEGVOCALIZATION: 0
CONSOLABILITY: 0
FACIALEXPRESSION: 0
FACIALEXPRESSION: 0
BODYLANGUAGE: 0
FACIALEXPRESSION: 0
BODYLANGUAGE: 0
BODYLANGUAGE: 0
TOTALSCORE: 0
NEGVOCALIZATION: 0
BREATHING: 0
TOTALSCORE: 0
CONSOLABILITY: 0
FACIALEXPRESSION: 0
BREATHING: 0
CONSOLABILITY: 0
CONSOLABILITY: 0
NEGVOCALIZATION: 0
BODYLANGUAGE: 0
TOTALSCORE: 0
NEGVOCALIZATION: 0
NEGVOCALIZATION: 0
BREATHING: 0
BREATHING: 0
TOTALSCORE: 0
BODYLANGUAGE: 0

## 2023-03-15 ASSESSMENT — PAIN SCALES - GENERAL
PAINLEVEL_OUTOF10: 10
PAINLEVEL_OUTOF10: 0
PAINLEVEL_OUTOF10: 9
PAINLEVEL_OUTOF10: 0

## 2023-03-15 ASSESSMENT — ENCOUNTER SYMPTOMS
TROUBLE SWALLOWING: 1
EYE REDNESS: 0
SINUS PRESSURE: 0
CONSTIPATION: 0
BACK PAIN: 0
NAUSEA: 0
SHORTNESS OF BREATH: 0
COLOR CHANGE: 0
DIARRHEA: 0
CHEST TIGHTNESS: 0
WHEEZING: 0
COUGH: 0

## 2023-03-15 ASSESSMENT — PAIN DESCRIPTION - LOCATION
LOCATION: FOOT
LOCATION: KNEE

## 2023-03-15 ASSESSMENT — PAIN DESCRIPTION - FREQUENCY: FREQUENCY: INTERMITTENT

## 2023-03-15 ASSESSMENT — PAIN DESCRIPTION - ORIENTATION
ORIENTATION: LEFT
ORIENTATION: RIGHT

## 2023-03-15 ASSESSMENT — PAIN DESCRIPTION - ONSET: ONSET: GRADUAL

## 2023-03-15 ASSESSMENT — PAIN - FUNCTIONAL ASSESSMENT: PAIN_FUNCTIONAL_ASSESSMENT: ACTIVITIES ARE NOT PREVENTED

## 2023-03-15 ASSESSMENT — PAIN DESCRIPTION - DESCRIPTORS
DESCRIPTORS: ACHING
DESCRIPTORS: ACHING

## 2023-03-15 ASSESSMENT — PAIN DESCRIPTION - PAIN TYPE: TYPE: ACUTE PAIN

## 2023-03-15 NOTE — PROGRESS NOTES
Patient:  Belinda Fernandez    Unit/Bed:4B-03/003-A  MRN: [id number]   PCP: Rebecca Rivera MD  Date of Admission: 2/21/2023    Assessment and Plan(All pulmonary edema, renal failure, PE, and respiratory failure diagnoses are acute in nature unless otherwise specified):        CAD: s/p 3-vessel CABG 2/28/23, now POD #15. On antiplatelet, BB, statin, lovenox prophylaxis, amio per CT surgery. Remains hemodynamically stable and on RA. S/p chest drains removal. Transfer to  step-down per CT surgery. Resolving pneumonia:  radiographic resolution. Remains afebrile. No sputum production. Zosyn day #6 of 7. Dysphagia: Positive for silent aspiration per SLP/MBS assessment 3/14/23, when taking thin/mildly-thick liquids. SLP following. On dysphagia pureed diet/moderately-thickened liquids per SLP. Sleep apnea: multiple risk factors for central and/or obstructive process. Witnessed apnea episodes at home and inpatient. On BiPAP at night and during naps. Polysomnography ordered for at discharge. Will need outpatient pulmonology follow-up. Hypernatremia: likely was 2/2 dehydration. Improving. Diuretics held. Was managed with D5 infusion, which has been stopped. BMP tomorrow. Dementia: At baseline per documentation review. On home psychiatric medications. Tele-sitter device at bedside. Seizures, history of: home medications continued. Right ICA stenosis, right subclavia stenosis: asymptomatic. Was evaluated by CV surgery prior to CABG. They will plan for follow-up in vascular clinic 2-4 weeks post-discharge. They recommend DAPT; CT surgery managing. Acute hyperglycemia: Hgb a1c 5.1% on 2/27/23. On SSI. HLD, history of: on statin. Hypothyroidism, history of: on synthroid. CC:  dyspnea  HPI: Amor Stiles is an 82 y/o male former-smoker with PMH of CAD s/p CABG, CVA, Leukemia, seizures, HLD, hypothyroidism, dementia.      Patient initially presented to [address] [place] [address] 3/14/23 for complaints of dyspnea and sputum production. Patient was admitted for treatment of diagnosed COPD exacerbation, managed with bronchodilators and antibiotics. Patient subsequently developed ischemic ECG changes during his stay. His troponin was elevated. Cardiac catheterization was done on 2/23/23, revealing multivessel disease. Patient thus underwent CABG on 2/28/23, transferred to the ICU post-op. He was extubated the same day of surgery, to BiPAP. Patient remained in ICU for  high nursing care needs with agitation requiring sitter and medication management, as well as close airway monitoring with NT suctioning needs for secretion assistance. His stay has also been complicated by pneumonia requiring Zosyn. For further details, please see assessment and plan. ROS: Review of Systems   Constitutional:  Negative for chills, fatigue and fever. HENT:  Positive for trouble swallowing. Negative for congestion and sinus pressure. Eyes:  Negative for redness and visual disturbance. Respiratory:  Negative for cough, chest tightness, shortness of breath and wheezing. Cardiovascular:  Negative for chest pain, palpitations and leg swelling. Gastrointestinal:  Negative for constipation, diarrhea and nausea. Genitourinary:  Negative for difficulty urinating and dysuria. Musculoskeletal:  Negative for back pain and neck pain. Skin:  Negative for color change and pallor. Neurological:  Negative for dizziness, light-headedness and headaches. Psychiatric/Behavioral:  Positive for confusion. The patient is not nervous/anxious. PMH:  Per HPI  SHX:  Former-smoker. No alcohol or substance use reported. FHX: None reported. Allergies: NKDA.    Medications:       carBAMazepine  400 mg Oral TID    modafinil  200 mg Oral QAM    piperacillin-tazobactam  3,375 mg IntraVENous Q8H    magnesium hydroxide  30 mL Oral Daily    carvedilol  12.5 mg Oral BID WC    nystatin  5 mL Oral 4x Daily    tiotropium-olodaterol  2 puff Inhalation Daily    polyethylene glycol  17 g Oral Daily    amiodarone  200 mg Oral Daily    bisacodyl  10 mg Rectal Daily    calcium replacement protocol   Other RX Placeholder    aspirin  324 mg Oral Daily    potassium bicarb-citric acid  20 mEq Oral BID    insulin lispro  0-4 Units SubCUTAneous TID     insulin lispro  0-4 Units SubCUTAneous Nightly    [Held by provider] furosemide  40 mg IntraVENous Daily    sodium chloride flush  5-40 mL IntraVENous 2 times per day    enoxaparin  40 mg SubCUTAneous Daily    sennosides-docusate sodium  1 tablet Oral BID    famotidine  20 mg Oral BID    Or    famotidine (PEPCID) injection  20 mg IntraVENous BID    atorvastatin  40 mg Oral Daily    escitalopram  20 mg Oral Daily    levothyroxine  50 mcg Oral Daily    risperiDONE  0.5 mg Oral BID    multivitamin  1 tablet Oral Daily       Vital Signs:   T: 98.3: P: 64 RR: 14 B/P: 102/50: FiO2: RA: O2 Sat:98%: I/O: Net +5.8L since admit Wt: Net +2 lbs since admit GCS: 14  Body mass index is 26.92 kg/m². Dago Gomez General:   Acute on chronically ill adult male. Appears stated age. HEENT:  normocephalic and atraumatic. No scleral icterus. PERR  Neck: supple. No Thyromegaly. Lungs: clear to auscultation. Unlabored. No retractions  Cardiac: RRR. No JVD. Abdomen: soft. Nontender. Extremities:  No clubbing, cyanosis, or edema x 4. Vasculature: capillary refill < 3 seconds. Palpable dorsalis pedis pulses. Skin:  warm and dry. Psych:  Alert and oriented to self, place, family. Disoriented to situation. Affect appropriate  Lymph:  No supraclavicular adenopathy. Neurologic:  No focal deficit. No seizures. Data: (All radiographs, tracings, PFTs, and imaging are personally viewed and interpreted unless otherwise noted). Telemetry: NSR.    Na 148 K 3.6 Cl 109 CO2 25 BUN 23 Cr 1.0 Mg 2.9 Gluc 139 Ca 7.6 Phos 3.7   Albumin 33.1 Alk phos 61 ALT 58 AST 39 Bili 0.2  WBC 9.2 Hgb 8.4 Hct 28.5 Plt 416  Modified barium swallow 3/14/23 report: Laryngeal penetration and aspiration of thin and mildly thick barium. Additional recommendations from the speech therapist will follow. CXR 3/13/23 report: No acute processes. Case discussed with Dr. Lynn Max. Electronically signed by SHA Castro                                     Patient seen by me including key components of medical care. Case discussed with NP. Agree with transfer to medical floor. .  Italicized font, if present,  represents changes to the note made by me. CC time 35 minutes. Time was discontiguous. Time does not include procedure. Time does include my direct assessment of the patient and coordination of care. Time represents more than 50% of the time involved with patient care by the [address] [place] [address] team.  Electronically signed by Alejandrina Devlin.  Lynn Max MD.

## 2023-03-15 NOTE — PROGRESS NOTES
[facility]  INPATIENT PHYSICAL THERAPY  DAILY NOTE  [facility] CVICU 4B - 4B-03/003-A      Time In: 5222  Time Out: 1356  Timed Code Treatment Minutes: 31 Minutes  Minutes: 31          Date: 3/15/2023  Patient Name: Cuco Aguila,  Gender:  male        MRN: [id number]  : 1940  (81 y.o.)     Referring Practitioner: ANGELIC Albert CNP  Diagnosis: elevated tropnin level  Additional Pertinent Hx: per EMR- Cuco Aguila is a 81 y.o. male with past medical history of anemia, brain abscess, seizures, former smoker approximately 40 pack year who presents to the emergency department for evaluation of fatigue, shortness of breath. Patient brought in by family as family has been reporting he has been more tired, fatigued for the past week however more so in the past 3 days. Today, patient verbalized that he was short of breath and had generalized muscle aches which prompted today's ED visit. Patient lives in a two-story home and states that it has been getting harder to walk around. Has been having dyspnea on exertion. Pt s/p CABG STONEY,  coronary artery bypass grafting x3 using LIMA to LAD, vein graft to to's marginal 1, vein graft to posterior descending artery, transesophageal echocardiography, endoscopic vein harvesting DOS . Prior Level of Function:  Lives With: Daughter  Type of Home: House  Home Layout: Two level, Bed/Bath upstairs  Home Access: Stairs to enter with rails  Entrance Stairs - Number of Steps: 2 LEVY and ~15 steps to second level  Home Equipment: Nyoka Feil, rolling, Cane   Bathroom Shower/Tub: Tub/Shower unit  Bathroom Accessibility: Accessible    Receives Help From: Family  ADL Assistance: Independent  Homemaking Assistance: Needs assistance  Homemaking Responsibilities: Yes  Ambulation Assistance: Independent  Transfer Assistance: Independent  Active : No  Additional Comments: Daughter and son in law both work outside of the home.  Pt was previously independent with ADLs and functional mobility. Pt was not previously using AD for functional mobility. Pt is a questionable historian. Hx of brain surgery in 1995 and visual deficits. Restrictions/Precautions:  Restrictions/Precautions: Fall Risk, General Precautions, Surgical Protocols  Position Activity Restriction  Sternal Precautions: move in the tube  Other position/activity restrictions: hx of brain injury       SUBJECTIVE: pt in bed on arrival and wanting to get up, pts family present for part of session, pt able to state how to utilize the call light correctly, he does need redirected througout session      PAIN: pt c/o of pain in left foot initially however once sitting down he reported pain in his left hip- he did favor his left LE throughout session this date     Vitals: Oxygen: > 90% on room air   Heart Rate: 60-80's with activity     OBJECTIVE:  Bed Mobility:  Rolling to Right: Moderate Assistance   Supine to Sit: Moderate Assistance, with increased time for completion, with cues for technique   Scooting: Minimal Assistance    Transfers:  Sit to Stand: Minimal Assistance  Stand to Sit:Minimal Assistance, with cues to back all the way upto surface     Ambulation:  Minimal Assistance  Distance: 25x1  Surface: Level Tile  Device:Rolling Walker  Gait Deviations:  Slow So and with decreased stance time at left LE, noted decreasd heel strike and toe off at ana luisa LEs, pt lacking TKE at ana luisa LEs with shorten steps- he needed assist to guide the walker and for hand placement on walker     Exercise:  Patient was guided in 1 set(s) 10 reps of exercise to both lower extremities. Ankle pumps, Heelslides, Seated marches, Long arc quads, and decreased active df at right ankle followed with heelcord stretch  . Exercises were completed for increased independence with functional mobility.     Functional Outcome Measures: Completed  AM-PAC Inpatient Mobility without Stair Climbing Raw Score : 13  AM-PAC Inpatient without Stair Climbing T-Scale Score : 38.96    ASSESSMENT:  Assessment: Patient progressing toward established goals. Activity Tolerance:  Patient tolerance of  treatment: fair. Equipment Recommendations:Equipment Needed: No  Discharge Recommendations:  pt would benefit from a therapy stay prior to return home and anticipate he will need 24 hour assist at discharge   Plan: Current Treatment Recommendations: Strengthening, Balance training, Functional mobility training, Transfer training, Safety education & training, Equipment evaluation, education, & procurement, Home exercise program, Therapeutic activities, Patient/Caregiver education & training, Neuromuscular re-education, Endurance training, Gait training  General Plan:  (6x CABG)    Patient Education  Patient Education: Plan of Care, Precautions/Restrictions    Goals:  Patient Goals : family wants rehab following CABG  Short Term Goals  Time Frame for Short Term Goals: by discharge  Short Term Goal 1: Pt to complete supine <-> sit with CGA for ease getting out of bed with move in the tube guidelines  Short Term Goal 2: Pt to complete sit <-> stand with CGA for ease with mobiltiy from various surfaces  Short Term Goal 3: Pt to ambulate >10 feet with RW CGA for room ambulation. Short Term Goal 4: Pt to sit EOB for >=10 minutes with SBA with good midline posture for ease with sitting EOB to progress further mobility  Short Term Goal 5: .  Long Term Goals  Time Frame for Long Term Goals : NA due to short ELOS    Following session, patient left in safe position with all fall risk precautions in place.

## 2023-03-15 NOTE — PLAN OF CARE
Problem: Respiratory - Adult  Goal: Achieves optimal ventilation and oxygenation  3/15/2023 0435 by Deacon Lyon RCP  Outcome: Progressing   Patient mutually agreed with goal of care

## 2023-03-15 NOTE — CARE COORDINATION
3/15/23, 3:08 PM EDT    DISCHARGE ON GOING EVALUATION    Colten Cardenas day: 19  Location: -03/003-A Reason for admit: Elevated troponin level [R77.8]  Elevated troponin [R77.8]  Elevated brain natriuretic peptide (BNP) level [R79.89]  Dyspnea, unspecified type [R06.00]  NSTEMI (non-ST elevated myocardial infarction) ([facility] [address] [I21.4]   Procedure:   2/23 Cardiac Cath: Severe MVD  2/24 Bilateral Carotid Dopplers & Vein mapping  2/27 CTA Neck: Atherosclerosis of the right carotid bulb with a 76% stenosis at the origin the right internal carotid artery; No significant stenosis at the origin of the left internal carotid artery; Moderate stenosis at the origin of the left vertebral artery. 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 This can indicate acute mastoiditis in the appropriate clinical setting; Stable CT appearance the brain. No new abnormalities  3/5 Chest tubes removed  3/7 MBS: Laryngeal penetration and aspiration with thin and mildly thick barium  3/13 CXR: No acute process  3/14 MBS: Laryngeal penetration and aspiration of thin and mildly thick barium    Barriers to Discharge: POD #15. Sitter removed today. Transferring to  stepdown this shift. Afebrile. NSR. On room air. Bipap at HS. Oriented to person and place. Follows commands. Slurred/garbled speech. Sitter. SLP/CR/PT/OT. Dietitian consulted. Dietary ileus prevention protocol. +VRE rectum. Telemetry, I&O, daily weight, IS, sternal precautions, wound care, external urinary catheter, SCDs, ambulate. Amio, asa, lipitor, dulcolax, tegretol, coreg, lovenox, lexapro, pepcid, SSI, synthroid, provigil, nystatin suspension, IV zosyn, K+, risperdal, senokot, inhaler, electrolyte replacement protocols. Na+ 148, alb 3.1, total pro 5.5, hgb 8.4.      PCP: Kaye Johnson Ankur Sanchez MD  Readmission Risk Score: 18.4%  Patient Goals/Plan/Treatment Preferences: Plan for [facility] for rehab. No precert required. SW on case.

## 2023-03-15 NOTE — PROGRESS NOTES
CT/CV Surgery Progress Note    3/15/2023 7:59 AM  Surgeon:  Dr. Stephanie Escalante     Procedure: POD #15  CABG STONEY,  coronary artery bypass grafting x3 using LIMA to LAD, vein graft to to's marginal 1, vein graft to posterior descending artery, transesophageal echocardiography, endoscopic vein harvesting    Subjective:  Mr. Sofia Dorado is resting comfortably in chair with NG tube removed 2 days ago. Swallow evaluation performed with results below. He has been much more alert past couple days and in no acute distress. Pt denies chest pressure, SOB, fever,chills, N/V/D. Diet Recommendations:  Puree diet with moderately thick liquids     Intake/Output Summary (Last 24 hours) at 3/15/2023 0759  Last data filed at 3/15/2023 0600  Gross per 24 hour   Intake 2257.18 ml   Output 550 ml   Net 1707.18 ml       Vital Signs: /67   Pulse 70   Temp 98.1 °F (36.7 °C) (Oral)   Resp 15   Ht 5' 9\" (1.753 m)   Wt 182 lb 5.1 oz (82.7 kg)   SpO2 100%   BMI 26.92 kg/m²    Temp (24hrs), Av.9 °F (36.6 °C), Min:97.6 °F (36.4 °C), Max:98.4 °F (36.9 °C)    Labs:   CBC:  Recent Labs     23  0512 23  0404 03/15/23  0403   WBC 11.0* 11.1* 9.2   HGB 8.9* 10.2* 8.4*   HCT 30.2* 34.9* 28.5*   .4* 113.7* 109.2*   * 490* 416*       BMP:   Recent Labs     23  0500 23  0750 23  0404 23  0756 23  1939 03/15/23  0403   *  --  157*  --   --  148*   K 3.8  --  3.8  --   --  3.6   *  --  118*  --   --  109   CO2 31  --  26  --   --  25   PHOS 3.6  --  3.6  --   --  3.7   BUN 35*  --  28*  --   --  23*   CREATININE 0.9  --  0.9  --   --  1.0   MG 3.3*  --  3.3*  --   --  2.9*   POCGLU  --    < >  --    < > 103  --     < > = values in this interval not displayed. Last HgA1C:   Lab Results   Component Value Date    LABA1C 5.1 2023     Imaging:  MBS: 3/15/2023  1. Laryngeal penetration and aspiration of thin and mildly thick barium.    2. Additional recommendations from the speech therapist will follow. Scheduled Meds:    carBAMazepine  400 mg Oral TID    modafinil  200 mg Oral QAM    piperacillin-tazobactam  3,375 mg IntraVENous Q8H    magnesium hydroxide  30 mL Oral Daily    carvedilol  12.5 mg Oral BID WC    nystatin  5 mL Oral 4x Daily    tiotropium-olodaterol  2 puff Inhalation Daily    polyethylene glycol  17 g Oral Daily    amiodarone  200 mg Oral Daily    bisacodyl  10 mg Rectal Daily    calcium replacement protocol   Other RX Placeholder    aspirin  324 mg Oral Daily    potassium bicarb-citric acid  20 mEq Oral BID    insulin lispro  0-4 Units SubCUTAneous TID WC    insulin lispro  0-4 Units SubCUTAneous Nightly    [Held by provider] furosemide  40 mg IntraVENous Daily    sodium chloride flush  5-40 mL IntraVENous 2 times per day    enoxaparin  40 mg SubCUTAneous Daily    sennosides-docusate sodium  1 tablet Oral BID    famotidine  20 mg Oral BID    Or    famotidine (PEPCID) injection  20 mg IntraVENous BID    atorvastatin  40 mg Oral Daily    escitalopram  20 mg Oral Daily    levothyroxine  50 mcg Oral Daily    risperiDONE  0.5 mg Oral BID    multivitamin  1 tablet Oral Daily     ROS: All neg unless specifically mentioned in subjective section. Exam:  General Appearance: alert ,conversing, in no acute distress   Cardiovascular: normal rate, regular rhythm, normal S1 and S2, no murmurs, rubs, clicks, or gallops  Pulmonary/Chest: clear to auscultation bilaterally- no wheezes, rales or rhonchi, normal air movement, no respiratory distress  Neurological: alert, oriented, normal speech, no focal findings or movement disorder noted  Sternum: Incision healing appropriately and no wound dehiscence noted.      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 Stop IVF as Na has improved.   Continue current therapy    The plan of care was discussed in detail with Dr. Benjie Cassidy PA-C

## 2023-03-15 NOTE — PLAN OF CARE
Problem: Discharge Planning  Goal: Discharge to home or other facility with appropriate resources  Outcome: Progressing  Flowsheets (Taken 3/14/2023 2000 by Boris Cordova RN)  Discharge to home or other facility with appropriate resources:   Identify barriers to discharge with patient and caregiver   Arrange for needed discharge resources and transportation as appropriate   Identify discharge learning needs (meds, wound care, etc)   Refer to discharge planning if patient needs post-hospital services based on physician order or complex needs related to functional status, cognitive ability or social support system     Problem: Pain  Goal: Verbalizes/displays adequate comfort level or baseline comfort level  Outcome: Progressing  Flowsheets (Taken 3/14/2023 2330 by Boris Cordova RN)  Verbalizes/displays adequate comfort level or baseline comfort level:   Encourage patient to monitor pain and request assistance   Assess pain using appropriate pain scale   Administer analgesics based on type and severity of pain and evaluate response   Implement non-pharmacological measures as appropriate and evaluate response   Consider cultural and social influences on pain and pain management     Problem: Neurosensory - Adult  Goal: Achieves stable or improved neurological status  Outcome: Progressing  Flowsheets (Taken 3/14/2023 2000 by Boris Cordova RN)  Achieves stable or improved neurological status:   Assess for and report changes in neurological status   Initiate measures to prevent increased intracranial pressure   Maintain blood pressure and fluid volume within ordered parameters to optimize cerebral perfusion and minimize risk of hemorrhage   Monitor temperature, glucose, and sodium.  Initiate appropriate interventions as ordered     Problem: Neurosensory - Adult  Goal: Achieves maximal functionality and self care  Outcome: Progressing  Flowsheets (Taken 3/14/2023 2000 by Boris Cordova RN)  Achieves maximal functionality and self care:   Monitor swallowing and airway patency with patient fatigue and changes in neurological status   Encourage and assist patient to increase activity and self care with guidance from physical therapy/occupational therapy   Encourage visually impaired, hearing impaired and aphasic patients to use assistive/communication devices     Problem: Confusion  Goal: Confusion, delirium, dementia, or psychosis is improved or at baseline  Description: INTERVENTIONS:  1. Assess for possible contributors to thought disturbance, including medications, impaired vision or hearing, underlying metabolic abnormalities, dehydration, psychiatric diagnoses, and notify attending LIP  2. [place] high risk fall precautions, as indicated  3. Provide frequent short contacts to provide reality reorientation, refocusing and direction  4. Decrease environmental stimuli, including noise as appropriate  5. Monitor and intervene to maintain adequate nutrition, hydration, elimination, sleep and activity  6. If unable to ensure safety without constant attention obtain sitter and review sitter guidelines with assigned personnel  7.  Initiate Psychosocial CNS and Spiritual Care consult, as indicated  Outcome: Progressing  Flowsheets (Taken 3/14/2023 2000 by Garret Adames RN)  Effect of thought disturbance (confusion, delirium, dementia, or psychosis) are managed with adequate functional status:   Assess for contributors to thought disturbance, including medications, impaired vision or hearing, underlying metabolic abnormalities, dehydration, psychiatric diagnoses, notify [facility] high risk fall precautions, as indicated   Provide frequent short contacts to provide reality reorientation, refocusing and direction   Monitor and intervene to maintain adequate nutrition, hydration, elimination, sleep and activity   If unable to ensure safety without constant attention obtain sitter and review sitter guidelines with assigned personnel     Care plan reviewed with patient and family. Patient and family verbalize understanding of the plan of care and contribute to goal setting.

## 2023-03-15 NOTE — CARE COORDINATION
3/15/23, 1:26 PM EDT    DISCHARGE PLANNING EVALUATION     SW spoke with Angela Tavares with [facility] for update, she will look at pt today.

## 2023-03-15 NOTE — PLAN OF CARE
Problem: Discharge Planning  Goal: Discharge to home or other facility with appropriate resources  3/15/2023 1748 by Faith Olea  Outcome: Progressing  Flowsheets (Taken 3/14/2023 2000 by Colette Parnell, RN)  Discharge to home or other facility with appropriate resources:   Identify barriers to discharge with patient and caregiver   Arrange for needed discharge resources and transportation as appropriate   Identify discharge learning needs (meds, wound care, etc)   Refer to discharge planning if patient needs post-hospital services based on physician order or complex needs related to functional status, cognitive ability or social support system  3/15/2023 1424 by Javid Aaron RN  Outcome: Progressing  Flowsheets (Taken 3/14/2023 2000 by Colette Parnell RN)  Discharge to home or other facility with appropriate resources:   Identify barriers to discharge with patient and caregiver   Arrange for needed discharge resources and transportation as appropriate   Identify discharge learning needs (meds, wound care, etc)   Refer to discharge planning if patient needs post-hospital services based on physician order or complex needs related to functional status, cognitive ability or social support system     Problem: Pain  Goal: Verbalizes/displays adequate comfort level or baseline comfort level  3/15/2023 1748 by Faith Olea  Outcome: Progressing  Flowsheets (Taken 3/14/2023 2330 by Colette Parnell RN)  Verbalizes/displays adequate comfort level or baseline comfort level:   Encourage patient to monitor pain and request assistance   Assess pain using appropriate pain scale   Administer analgesics based on type and severity of pain and evaluate response   Implement non-pharmacological measures as appropriate and evaluate response   Consider cultural and social influences on pain and pain management  Note: Pt verbalizes understanding of notifying staff of pain  3/15/2023 1424 by Javid Aaron RN  Outcome: Progressing  Flowsheets (Taken 3/14/2023 2330 by Yaritza Faria, RN)  Verbalizes/displays adequate comfort level or baseline comfort level:   Encourage patient to monitor pain and request assistance   Assess pain using appropriate pain scale   Administer analgesics based on type and severity of pain and evaluate response   Implement non-pharmacological measures as appropriate and evaluate response   Consider cultural and social influences on pain and pain management     Problem: Respiratory - Adult  Goal: Achieves optimal ventilation and oxygenation  3/15/2023 1748 by Faith Olea  Outcome: Progressing  Flowsheets (Taken 3/14/2023 2000 by Yaritza Faria RN)  Achieves optimal ventilation and oxygenation:   Assess for changes in respiratory status   Assess for changes in mentation and behavior   Position to facilitate oxygenation and minimize respiratory effort   Assess the need for suctioning and aspirate as needed   Assess and instruct to report shortness of breath or any respiratory difficulty   Respiratory therapy support as indicated  Note: Pt is on room air  3/15/2023 0435 by Roena Denver, RCP  Outcome: Progressing  Goal: Clear lung sounds  3/15/2023 0932 by Sneha Aguilar RCP  Outcome: Progressing     Problem: Cardiovascular - Adult  Goal: Maintains optimal cardiac output and hemodynamic stability  Outcome: Progressing  Flowsheets (Taken 3/14/2023 2000 by Yaritza Faria RN)  Maintains optimal cardiac output and hemodynamic stability:   Monitor blood pressure and heart rate   Monitor urine output and notify Licensed Independent Practitioner for values outside of normal range   Assess for signs of decreased cardiac output   Administer fluid and/or volume expanders as ordered  Note: Vitals within normal range  Goal: Absence of cardiac dysrhythmias or at baseline  Outcome: Progressing  Flowsheets (Taken 3/14/2023 2000 by Yaritza Faria RN)  Absence of cardiac dysrhythmias or at baseline:   Monitor cardiac rate and rhythm   Assess for signs of decreased cardiac output   Administer antiarrhythmia medication and electrolyte replacement as ordered  Note: Pt placed on portable tele box     Problem: Metabolic/Fluid and Electrolytes - Adult  Goal: Electrolytes maintained within normal limits  Outcome: Progressing  Flowsheets (Taken 3/14/2023 2000 by Carine Guzman, RN)  Electrolytes maintained within normal limits:   Monitor labs and assess patient for signs and symptoms of electrolyte imbalances   Administer electrolyte replacement as ordered   Monitor response to electrolyte replacements, including repeat lab results as appropriate   Fluid restriction as ordered   Instruct patient on fluid and nutrition restrictions as appropriate  Goal: Glucose maintained within prescribed range  Outcome: Progressing  Flowsheets (Taken 3/14/2023 2000 by Carine Guzman, RN)  Glucose maintained within prescribed range:   Monitor blood glucose as ordered   Assess for signs and symptoms of hyperglycemia and hypoglycemia   Administer ordered medications to maintain glucose within target range   Assess barriers to adequate nutritional intake and initiate nutrition consult as needed   Instruct patient on self management of diabetes and initiate consult as needed  Note: Last glucose 126     Problem: Hematologic - Adult  Goal: Maintains hematologic stability  Outcome: Progressing  Flowsheets (Taken 3/14/2023 2000 by Carine Guzman, RN)  Maintains hematologic stability:   Assess for signs and symptoms of bleeding or hemorrhage   Monitor labs for bleeding or clotting disorders   Administer blood products/factors as ordered     Problem: Nutrition Deficit:  Goal: Optimize nutritional status  Outcome: Progressing  Flowsheets (Taken 3/14/2023 2330 by Carine Guzman, RN)  Nutrient intake appropriate for improving, restoring, or maintaining nutritional needs:   Assess nutritional status and recommend course of action   Monitor oral intake, labs, and treatment plans   Recommend appropriate diets, oral nutritional supplements, and vitamin/mineral supplements   Order, calculate, and assess calorie counts as needed   Recommend, monitor, and adjust tube feedings and TPN/PPN based on assessed needs   Provide specific nutrition education to patient or family as appropriate     Problem: Safety - Adult  Goal: Free from fall injury  Outcome: Progressing  Flowsheets (Taken 3/14/2023 2330 by Jessica Ritchie, RN)  Free From Fall Injury:   Based on caregiver fall risk screen, instruct family/caregiver to ask for assistance with transferring infant if caregiver noted to have fall risk factors   Instruct family/caregiver on patient safety     Problem: Neurosensory - Adult  Goal: Achieves stable or improved neurological status  3/15/2023 1748 by Faith Olea  Outcome: Progressing  Flowsheets (Taken 3/14/2023 2000 by Jessica Ritchie, RN)  Achieves stable or improved neurological status:   Assess for and report changes in neurological status   Initiate measures to prevent increased intracranial pressure   Maintain blood pressure and fluid volume within ordered parameters to optimize cerebral perfusion and minimize risk of hemorrhage   Monitor temperature, glucose, and sodium. Initiate appropriate interventions as ordered  3/15/2023 1424 by Carl Carballo RN  Outcome: Progressing  Flowsheets (Taken 3/14/2023 2000 by Jessica Ritchie, RN)  Achieves stable or improved neurological status:   Assess for and report changes in neurological status   Initiate measures to prevent increased intracranial pressure   Maintain blood pressure and fluid volume within ordered parameters to optimize cerebral perfusion and minimize risk of hemorrhage   Monitor temperature, glucose, and sodium. Initiate appropriate interventions as ordered  Goal: Achieves maximal functionality and self care  3/15/2023 1748 by Faith Olea  Outcome: Progressing  Flowsheets (Taken 3/14/2023 2000 by Jessica PEREYRA  Mariely Toussaint RN)  Achieves maximal functionality and self care:   Monitor swallowing and airway patency with patient fatigue and changes in neurological status   Encourage and assist patient to increase activity and self care with guidance from physical therapy/occupational therapy   Encourage visually impaired, hearing impaired and aphasic patients to use assistive/communication devices  Note: Pt educated on the importance of getting up to the chair x3  3/15/2023 1424 by Harley Ellis RN  Outcome: Progressing  Flowsheets (Taken 3/14/2023 2000 by Arben Smith RN)  Achieves maximal functionality and self care:   Monitor swallowing and airway patency with patient fatigue and changes in neurological status   Encourage and assist patient to increase activity and self care with guidance from physical therapy/occupational therapy   Encourage visually impaired, hearing impaired and aphasic patients to use assistive/communication devices     Problem: Skin/Tissue Integrity - Adult  Goal: Incisions, wounds, or drain sites healing without S/S of infection  Outcome: Progressing  Flowsheets (Taken 3/14/2023 2000 by Arben Smith RN)  Incisions, Wounds, or Drain Sites Healing Without Sign and Symptoms of Infection:   Implement wound care per orders   Initiate isolation precautions as appropriate   ADMISSION and DAILY: Assess and document risk factors for pressure ulcer development  Note: Pt skin assessed x2 daily  Goal: Oral mucous membranes remain intact  Outcome: Progressing  Flowsheets (Taken 3/14/2023 2000 by Arben Smith RN)  Oral Mucous Membranes Remain Intact:   Assess oral mucosa and hygiene practices   Implement preventative oral hygiene regimen   Implement oral medicated treatments as ordered     Problem: Musculoskeletal - Adult  Goal: Return mobility to safest level of function  Outcome: Progressing  Flowsheets (Taken 3/14/2023 2000 by Arben Smith RN)  Return Mobility to Safest Level of Function:   Assess patient stability and activity tolerance for standing, transferring and ambulating with or without assistive devices   Assist with transfers and ambulation using safe patient handling equipment as needed   Ensure adequate protection for wounds/incisions during mobilization   Obtain physical therapy/occupational therapy consults as needed   Apply continuous passive motion per provider or physical therapy orders to increase flexion toward goal   Instruct patient/family in ordered activity level  Goal: Return ADL status to a safe level of function  Outcome: Progressing  Flowsheets (Taken 3/14/2023 2000 by Yuliet Hartman RN)  Return ADL Status to a Safe Level of Function:   Administer medication as ordered   Assess activities of daily living deficits and provide assistive devices as needed   Obtain physical therapy/occupational therapy consults as needed   Assist and instruct patient to increase activity and self care as tolerated  Note: Pt educated on calling staff before getting up     Problem: Gastrointestinal - Adult  Goal: Maintains or returns to baseline bowel function  Outcome: Progressing  Flowsheets (Taken 3/14/2023 2000 by Yuliet Hartman RN)  Maintains or returns to baseline bowel function:   Assess bowel function   Encourage oral fluids to ensure adequate hydration   Administer IV fluids as ordered to ensure adequate hydration   Encourage mobilization and activity     Problem: Genitourinary - Adult  Goal: Absence of urinary retention  Outcome: Progressing  Flowsheets (Taken 3/14/2023 2000 by Yuliet Hartman RN)  Absence of urinary retention:   Assess patients ability to void and empty bladder   Monitor intake/output and perform bladder scan as needed   Place urinary catheter per Licensed Independent Practitioner order if needed  Note: Pt has a morales catheter     Problem: Infection - Adult  Goal: Absence of infection at discharge  Outcome: Progressing  Flowsgeoffrey (Taken 3/14/2023 2000 by Yuliet Hartman RN)  Absence of infection at discharge:   Assess and monitor for signs and symptoms of infection   Monitor lab/diagnostic results   Monitor all insertion sites i.e., indwelling lines, tubes and drains   Administer medications as ordered   Identify and instruct in appropriate isolation precautions for identified infection/condition   Instruct and encourage patient and family to use good hand hygiene technique     Problem: Anxiety  Goal: Will report anxiety at manageable levels  Description: INTERVENTIONS:  1. Administer medication as ordered  2. Teach and rehearse alternative coping skills  3. Provide emotional support with 1:1 interaction with staff  Outcome: Progressing  Flowsheets (Taken 3/14/2023 2000 by Loren Brown RN)  Will report anxiety at manageable levels:   Administer medication as ordered   Teach and rehearse alternative coping skills   Provide emotional support with 1:1 interaction with staff  Note: Pt medicated per order     Problem: Confusion  Goal: Confusion, delirium, dementia, or psychosis is improved or at baseline  Description: INTERVENTIONS:  1. Assess for possible contributors to thought disturbance, including medications, impaired vision or hearing, underlying metabolic abnormalities, dehydration, psychiatric diagnoses, and notify attending LIP  2. [place] high risk fall precautions, as indicated  3. Provide frequent short contacts to provide reality reorientation, refocusing and direction  4. Decrease environmental stimuli, including noise as appropriate  5. Monitor and intervene to maintain adequate nutrition, hydration, elimination, sleep and activity  6. If unable to ensure safety without constant attention obtain sitter and review sitter guidelines with assigned personnel  7.  Initiate Psychosocial CNS and Spiritual Care consult, as indicated  3/15/2023 1748 by Faith Olea  Outcome: Progressing  Flowsheets (Taken 3/14/2023 2000 by Loren Brown RN)  Effect of thought disturbance (confusion, delirium, dementia, or psychosis) are managed with adequate functional status:   Assess for contributors to thought disturbance, including medications, impaired vision or hearing, underlying metabolic abnormalities, dehydration, psychiatric diagnoses, notify [place] high risk fall precautions, as indicated   Provide frequent short contacts to provide reality reorientation, refocusing and direction   Monitor and intervene to maintain adequate nutrition, hydration, elimination, sleep and activity   If unable to ensure safety without constant attention obtain sitter and review sitter guidelines with assigned personnel  3/15/2023 1424 by Justine Escalante RN  Outcome: Progressing  Flowsheets (Taken 3/14/2023 2000 by Dominique Galvez RN)  Effect of thought disturbance (confusion, delirium, dementia, or psychosis) are managed with adequate functional status:   Assess for contributors to thought disturbance, including medications, impaired vision or hearing, underlying metabolic abnormalities, dehydration, psychiatric diagnoses, notify [place] high risk fall precautions, as indicated   Provide frequent short contacts to provide reality reorientation, refocusing and direction   Monitor and intervene to maintain adequate nutrition, hydration, elimination, sleep and activity   If unable to ensure safety without constant attention obtain sitter and review sitter guidelines with assigned personnel     Problem: Skin/Tissue Integrity  Goal: Absence of new skin breakdown  Description: 1. Monitor for areas of redness and/or skin breakdown  2. Assess vascular access sites hourly  3. Every 4-6 hours minimum:  Change oxygen saturation probe site  4. Every 4-6 hours:  If on nasal continuous positive airway pressure, respiratory therapy assess nares and determine need for appliance change or resting period.   Outcome: Progressing    Emmie Sealscott, SN ONU

## 2023-03-15 NOTE — PLAN OF CARE
Problem: Discharge Planning  Goal: Discharge to home or other facility with appropriate resources  3/14/2023 2330 by Arben Smith RN  Flowsheets (Taken 3/14/2023 2000)  Discharge to home or other facility with appropriate resources:   Identify barriers to discharge with patient and caregiver   Arrange for needed discharge resources and transportation as appropriate   Identify discharge learning needs (meds, wound care, etc)   Refer to discharge planning if patient needs post-hospital services based on physician order or complex needs related to functional status, cognitive ability or social support system     Problem: Neurosensory - Adult  Goal: Achieves stable or improved neurological status  3/14/2023 2330 by Arben Smith RN  Flowsheets (Taken 3/14/2023 2000)  Achieves stable or improved neurological status:   Assess for and report changes in neurological status   Initiate measures to prevent increased intracranial pressure   Maintain blood pressure and fluid volume within ordered parameters to optimize cerebral perfusion and minimize risk of hemorrhage   Monitor temperature, glucose, and sodium.  Initiate appropriate interventions as ordered     Problem: Neurosensory - Adult  Goal: Achieves maximal functionality and self care  3/14/2023 2330 by Arben Smith RN  Flowsheets (Taken 3/14/2023 2000)  Achieves maximal functionality and self care:   Monitor swallowing and airway patency with patient fatigue and changes in neurological status   Encourage and assist patient to increase activity and self care with guidance from physical therapy/occupational therapy   Encourage visually impaired, hearing impaired and aphasic patients to use assistive/communication devices     Problem: Musculoskeletal - Adult  Goal: Return mobility to safest level of function  3/14/2023 2330 by Arben Smith RN  Flowsheets (Taken 3/14/2023 2000)  Return Mobility to Safest Level of Function:   Assess patient stability and activity tolerance for standing, transferring and ambulating with or without assistive devices   Assist with transfers and ambulation using safe patient handling equipment as needed   Ensure adequate protection for wounds/incisions during mobilization   Obtain physical therapy/occupational therapy consults as needed   Apply continuous passive motion per provider or physical therapy orders to increase flexion toward goal   Instruct patient/family in ordered activity level     Problem: Confusion  Goal: Confusion, delirium, dementia, or psychosis is improved or at baseline  Description: INTERVENTIONS:  1. Assess for possible contributors to thought disturbance, including medications, impaired vision or hearing, underlying metabolic abnormalities, dehydration, psychiatric diagnoses, and notify attending LIP  2. [place] high risk fall precautions, as indicated  3. Provide frequent short contacts to provide reality reorientation, refocusing and direction  4. Decrease environmental stimuli, including noise as appropriate  5. Monitor and intervene to maintain adequate nutrition, hydration, elimination, sleep and activity  6. If unable to ensure safety without constant attention obtain sitter and review sitter guidelines with assigned personnel  7.  Initiate Psychosocial CNS and Spiritual Care consult, as indicated  3/14/2023 2330 by Kris Simeon RN  Flowsheets (Taken 3/14/2023 2000)  Effect of thought disturbance (confusion, delirium, dementia, or psychosis) are managed with adequate functional status:   Assess for contributors to thought disturbance, including medications, impaired vision or hearing, underlying metabolic abnormalities, dehydration, psychiatric diagnoses, notify Jerad Mccoy high risk fall precautions, as indicated   Provide frequent short contacts to provide reality reorientation, refocusing and direction   Monitor and intervene to maintain adequate nutrition, hydration, elimination, sleep and activity   If unable to ensure safety without constant attention obtain sitter and review sitter guidelines with assigned personnel     Problem: Pain  Goal: Verbalizes/displays adequate comfort level or baseline comfort level  Flowsheets (Taken 3/14/2023 2330)  Verbalizes/displays adequate comfort level or baseline comfort level:   Encourage patient to monitor pain and request assistance   Assess pain using appropriate pain scale   Administer analgesics based on type and severity of pain and evaluate response   Implement non-pharmacological measures as appropriate and evaluate response   Consider cultural and social influences on pain and pain management     Problem: Respiratory - Adult  Goal: Achieves optimal ventilation and oxygenation  Flowsheets (Taken 3/14/2023 2000)  Achieves optimal ventilation and oxygenation:   Assess for changes in respiratory status   Assess for changes in mentation and behavior   Position to facilitate oxygenation and minimize respiratory effort   Assess the need for suctioning and aspirate as needed   Assess and instruct to report shortness of breath or any respiratory difficulty   Respiratory therapy support as indicated     Problem: Cardiovascular - Adult  Goal: Maintains optimal cardiac output and hemodynamic stability  Flowsheets (Taken 3/14/2023 2000)  Maintains optimal cardiac output and hemodynamic stability:   Monitor blood pressure and heart rate   Monitor urine output and notify Licensed Independent Practitioner for values outside of normal range   Assess for signs of decreased cardiac output   Administer fluid and/or volume expanders as ordered     Problem: Cardiovascular - Adult  Goal: Absence of cardiac dysrhythmias or at baseline  Flowsheets (Taken 3/14/2023 2000)  Absence of cardiac dysrhythmias or at baseline:   Monitor cardiac rate and rhythm   Assess for signs of decreased cardiac output   Administer antiarrhythmia medication and electrolyte replacement as ordered     Problem: Metabolic/Fluid and Electrolytes - Adult  Goal: Electrolytes maintained within normal limits  Flowsheets (Taken 3/14/2023 2000)  Electrolytes maintained within normal limits:   Monitor labs and assess patient for signs and symptoms of electrolyte imbalances   Administer electrolyte replacement as ordered   Monitor response to electrolyte replacements, including repeat lab results as appropriate   Fluid restriction as ordered   Instruct patient on fluid and nutrition restrictions as appropriate     Problem: Metabolic/Fluid and Electrolytes - Adult  Goal: Glucose maintained within prescribed range  Flowsheets (Taken 3/14/2023 2000)  Glucose maintained within prescribed range:   Monitor blood glucose as ordered   Assess for signs and symptoms of hyperglycemia and hypoglycemia   Administer ordered medications to maintain glucose within target range   Assess barriers to adequate nutritional intake and initiate nutrition consult as needed   Instruct patient on self management of diabetes and initiate consult as needed     Problem: Hematologic - Adult  Goal: Maintains hematologic stability  Flowsheets (Taken 3/14/2023 2000)  Maintains hematologic stability:   Assess for signs and symptoms of bleeding or hemorrhage   Monitor labs for bleeding or clotting disorders   Administer blood products/factors as ordered     Problem: Nutrition Deficit:  Goal: Optimize nutritional status  Flowsheets (Taken 3/14/2023 2330)  Nutrient intake appropriate for improving, restoring, or maintaining nutritional needs:   Assess nutritional status and recommend course of action   Monitor oral intake, labs, and treatment plans   Recommend appropriate diets, oral nutritional supplements, and vitamin/mineral supplements   Order, calculate, and assess calorie counts as needed   Recommend, monitor, and adjust tube feedings and TPN/PPN based on assessed needs   Provide specific nutrition education to patient or family as appropriate     Problem: Safety - Adult  Goal: Free from fall injury  Flowsheets (Taken 3/14/2023 2330)  Free From Fall Injury:   Based on caregiver fall risk screen, instruct family/caregiver to ask for assistance with transferring infant if caregiver noted to have fall risk factors   Instruct family/caregiver on patient safety     Problem: Skin/Tissue Integrity - Adult  Goal: Incisions, wounds, or drain sites healing without S/S of infection  Flowsheets  Taken 3/14/2023 2000 by Garret Adames RN  Incisions, Wounds, or Drain Sites Healing Without Sign and Symptoms of Infection:   Implement wound care per orders   Initiate isolation precautions as appropriate   ADMISSION and DAILY: Assess and document risk factors for pressure ulcer development  Taken 3/14/2023 1355 by Tyesha Montague RN  Incisions, Wounds, or Drain Sites Healing Without Sign and Symptoms of Infection: ADMISSION and DAILY: Assess and document risk factors for pressure ulcer development     Problem: Skin/Tissue Integrity - Adult  Goal: Oral mucous membranes remain intact  Flowsheets (Taken 3/14/2023 2000)  Oral Mucous Membranes Remain Intact:   Assess oral mucosa and hygiene practices   Implement preventative oral hygiene regimen   Implement oral medicated treatments as ordered     Problem: Musculoskeletal - Adult  Goal: Return ADL status to a safe level of function  Flowsheets (Taken 3/14/2023 2000)  Return ADL Status to a Safe Level of Function:   Administer medication as ordered   Assess activities of daily living deficits and provide assistive devices as needed   Obtain physical therapy/occupational therapy consults as needed   Assist and instruct patient to increase activity and self care as tolerated     Problem: Gastrointestinal - Adult  Goal: Maintains or returns to baseline bowel function  Flowsheets (Taken 3/14/2023 2000)  Maintains or returns to baseline bowel function:   Assess bowel function   Encourage oral fluids to ensure adequate hydration   Administer IV fluids as ordered to ensure adequate hydration   Encourage mobilization and activity     Problem: Genitourinary - Adult  Goal: Absence of urinary retention  Flowsheets (Taken 3/14/2023 2000)  Absence of urinary retention:   Assess patients ability to void and empty bladder   Monitor intake/output and perform bladder scan as needed   Place urinary catheter per Licensed Independent Practitioner order if needed     Problem: Infection - Adult  Goal: Absence of infection at discharge  Flowsheets (Taken 3/14/2023 2000)  Absence of infection at discharge:   Assess and monitor for signs and symptoms of infection   Monitor lab/diagnostic results   Monitor all insertion sites i.e., indwelling lines, tubes and drains   Administer medications as ordered   Identify and instruct in appropriate isolation precautions for identified infection/condition   Instruct and encourage patient and family to use good hand hygiene technique     Problem: Anxiety  Goal: Will report anxiety at manageable levels  Description: INTERVENTIONS:  1. Administer medication as ordered  2. Teach and rehearse alternative coping skills  3. Provide emotional support with 1:1 interaction with staff  Flowsheets (Taken 3/14/2023 2000)  Will report anxiety at manageable levels:   Administer medication as ordered   Teach and rehearse alternative coping skills   Provide emotional support with 1:1 interaction with staff     Problem: Change in Body Image  Goal: Pt/Family communicate acceptance of loss or change in body image and feel psychological comfort and peace  Description: INTERVENTIONS:  1. Assess patient/family anxiety and grief process related to change in body image, loss of functional status, loss of sense of self, and forgiveness  2. Provide emotional and spiritual support  3. Provide information about the patient's health status with consideration of family and cultural values  4.  Communicate willingness to discuss loss and facilitate grief process with patient/family as appropriate  5. Emphasize sustaining relationships within family system and community, or roxi/spiritual traditions  6. Initiate Spiritual Care, Psychosocial Clinical Specialist consult as needed  Recent Flowsheet Documentation  Taken 3/14/2023 2000 by Loren Brown RN  Patient/family communicate acceptance of loss or change in body image and feel psychological comfort and peace:   Assess patient/family anxiety and grief process related to change in body image, loss of functional status, loss of sense of self, and forgiveness   Provide emotional and spiritual support   Provide information about the patients health status with consideration of family and cultural values   Communicate willingness to discuss loss and facilitate grief process with patient/family as appropriate     Problem: Skin/Tissue Integrity  Goal: Absence of new skin breakdown  Description: 1. Monitor for areas of redness and/or skin breakdown  2. Assess vascular access sites hourly  3. Every 4-6 hours minimum:  Change oxygen saturation probe site  4. Every 4-6 hours:  If on nasal continuous positive airway pressure, respiratory therapy assess nares and determine need for appliance change or resting period. Note: No evidence of new skin breakdown on assessment   Care plan reviewed with patient. Patient verbalizes understanding of the plan of care and contributes to goal setting.

## 2023-03-15 NOTE — PROGRESS NOTES
[address]  Occupational Therapy  Daily Note  Time:    Time In: 930  Time Out: 5804  Timed Code Treatment Minutes: 28 Minutes  Minutes: 28          Date: 3/15/2023  Patient Name: Brittany Royal,   Gender: male      Room: [facility]03/003-A  MRN: [id number]  : 1940  (81 y.o.)  Referring Practitioner: Cindy Abdalla PA-C  Diagnosis: elevated troponin  Additional Pertinent Hx: per chart review; \"The patient is an 81year old male former smoker with an approximately 20-pack-year history who quit in  with a past medical history of leukemia with remission in , hypothyroidism, \"seizures,\" hyperlipidemia, \"coma,\" and 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 \" patient underwent a CABG STONEY,  coronary artery bypass grafting x3 using LIMA to LAD, vein graft to to's marginal 1, vein graft to posterior descending artery, transesophageal echocardiography, endoscopic vein harvesting  on 23. Restrictions/Precautions:  Restrictions/Precautions: Fall Risk, General Precautions, Surgical Protocols  Position Activity Restriction  Sternal Precautions: move in the tube  Other position/activity restrictions: hx of brain injury      SUBJECTIVE: required encouragement, perseverating on wanting to go back to bed    PAIN: 0/10: no c/o pain during session    Vitals: Blood Pressure: 158/50  Oxygen: 100%  Heart Rate: 73    COGNITION: Decreased Insight, Decreased Problem Solving, Decreased Safety Awareness, and L sided inattention    ADL:   Footwear Management: Dependent. For adjusting slipper socks . **declined other ADLs at this time    BALANCE:  Sitting Balance:  [address] Standing Balance: Contact Guard Assistance. BED MOBILITY:  Sit to Supine:  Moderate Assistance TRANSFERS:  Sit to Stand:  Minimal Assistance. From recliner  Stand to Sit: [employer]. To EOB    FUNCTIONAL MOBILITY:  Assistive Device: Rolling Walker  Assist Level:  Minimal Assistance. Distance:  15ft  Max vcs for posture & walker safety     ADDITIONAL ACTIVITIES:  Completed 3/5 step 2 cardiac exercises x 10 reps while seated in recliner    ASSESSMENT:     Activity Tolerance:  Patient tolerance of  treatment: Fair treatment tolerance       Discharge Recommendations: Continue to assess pending progress  Equipment Recommendations: Equipment Needed: Yes  Mobility Devices: ADL Assistive Devices  Other: Pt reports he has a rolling walker and a shower chair but was only using off and on. Recommendations provided to use both for fall prevention. Plan: Times Per Week: 6x  Times Per Day: Once a day  Current Treatment Recommendations: Strengthening, Balance training, Functional mobility training, Neuromuscular re-education, Safety education & training, Self-Care / ADL, Endurance training, Patient/Caregiver education & training, Equipment evaluation, education, & procurement, ROM    Patient Education  Patient Education:  see above    Goals  Short Term Goals  Time Frame for Short Term Goals: by discharge  Short Term Goal 1: Patient will tolerate 2-3 min static standing with SBA for increased ease of sinkside grooming  Short Term Goal 2: patient will complete UB ADLs with MIN A and 1-2 cues to initiate and sequence task. Short Term Goal 3: Pt will complete mobility to/from bathroom with RW, CGA, & 0-2 vcs for posture & walker safety/scanning L  Short Term Goal 4: Pt will complete step 2 cardiac exercises x 12 reps with min RBs to increase endurance for BADL    Following session, patient left in safe position with all fall risk precautions in place.

## 2023-03-16 VITALS
DIASTOLIC BLOOD PRESSURE: 56 MMHG | OXYGEN SATURATION: 95 % | TEMPERATURE: 98.1 F | HEIGHT: 69 IN | RESPIRATION RATE: 18 BRPM | HEART RATE: 75 BPM | SYSTOLIC BLOOD PRESSURE: 108 MMHG | WEIGHT: 187.39 LBS | BODY MASS INDEX: 27.76 KG/M2

## 2023-03-16 LAB
ANION GAP SERPL CALC-SCNC: 11 MEQ/L (ref 8–16)
BUN SERPL-MCNC: 14 MG/DL (ref 7–22)
CALCIUM SERPL-MCNC: 8.2 MG/DL (ref 8.5–10.5)
CHLORIDE SERPL-SCNC: 110 MEQ/L (ref 98–111)
CO2 SERPL-SCNC: 22 MEQ/L (ref 23–33)
CREAT SERPL-MCNC: 0.8 MG/DL (ref 0.4–1.2)
GFR SERPL CREATININE-BSD FRML MDRD: > 60 ML/MIN/1.73M2
GLUCOSE BLD STRIP.AUTO-MCNC: 103 MG/DL (ref 70–108)
GLUCOSE BLD STRIP.AUTO-MCNC: 114 MG/DL (ref 70–108)
GLUCOSE SERPL-MCNC: 91 MG/DL (ref 70–108)
POTASSIUM SERPL-SCNC: 4.2 MEQ/L (ref 3.5–5.2)
SODIUM SERPL-SCNC: 143 MEQ/L (ref 135–145)

## 2023-03-16 PROCEDURE — 6360000002 HC RX W HCPCS

## 2023-03-16 PROCEDURE — 97110 THERAPEUTIC EXERCISES: CPT

## 2023-03-16 PROCEDURE — 6370000000 HC RX 637 (ALT 250 FOR IP): Performed by: PHYSICIAN ASSISTANT

## 2023-03-16 PROCEDURE — 6360000002 HC RX W HCPCS: Performed by: PHYSICIAN ASSISTANT

## 2023-03-16 PROCEDURE — 6370000000 HC RX 637 (ALT 250 FOR IP)

## 2023-03-16 PROCEDURE — 6370000000 HC RX 637 (ALT 250 FOR IP): Performed by: NURSE PRACTITIONER

## 2023-03-16 PROCEDURE — 6370000000 HC RX 637 (ALT 250 FOR IP): Performed by: THORACIC SURGERY (CARDIOTHORACIC VASCULAR SURGERY)

## 2023-03-16 PROCEDURE — 2580000003 HC RX 258

## 2023-03-16 PROCEDURE — 36415 COLL VENOUS BLD VENIPUNCTURE: CPT

## 2023-03-16 PROCEDURE — 80048 BASIC METABOLIC PNL TOTAL CA: CPT

## 2023-03-16 PROCEDURE — 82948 REAGENT STRIP/BLOOD GLUCOSE: CPT

## 2023-03-16 PROCEDURE — APPSS60 APP SPLIT SHARED TIME 46-60 MINUTES: Performed by: PHYSICIAN ASSISTANT

## 2023-03-16 PROCEDURE — 2580000003 HC RX 258: Performed by: PHYSICIAN ASSISTANT

## 2023-03-16 PROCEDURE — 92526 ORAL FUNCTION THERAPY: CPT

## 2023-03-16 PROCEDURE — 97535 SELF CARE MNGMENT TRAINING: CPT

## 2023-03-16 RX ORDER — LISINOPRIL 2.5 MG/1
2.5 TABLET ORAL DAILY
Status: DISCONTINUED | OUTPATIENT
Start: 2023-03-16 | End: 2023-03-16 | Stop reason: HOSPADM

## 2023-03-16 RX ORDER — ASPIRIN 81 MG/1
81 TABLET, CHEWABLE ORAL DAILY
Qty: 30 TABLET | Refills: 3 | DISCHARGE
Start: 2023-03-16

## 2023-03-16 RX ORDER — CARVEDILOL 12.5 MG/1
12.5 TABLET ORAL 2 TIMES DAILY WITH MEALS
Qty: 60 TABLET | Refills: 3 | DISCHARGE
Start: 2023-03-16

## 2023-03-16 RX ORDER — MODAFINIL 200 MG/1
200 TABLET ORAL EVERY MORNING
Qty: 30 TABLET | Refills: 0 | Status: SHIPPED | DISCHARGE
Start: 2023-03-16 | End: 2023-03-17 | Stop reason: DRUGHIGH

## 2023-03-16 RX ORDER — AMIODARONE HYDROCHLORIDE 200 MG/1
200 TABLET ORAL DAILY
Qty: 30 TABLET | Refills: 0 | DISCHARGE
Start: 2023-03-16

## 2023-03-16 RX ORDER — LISINOPRIL 2.5 MG/1
2.5 TABLET ORAL DAILY
Qty: 30 TABLET | Refills: 3 | DISCHARGE
Start: 2023-03-16

## 2023-03-16 RX ORDER — ALBUTEROL SULFATE 2.5 MG/3ML
2.5 SOLUTION RESPIRATORY (INHALATION) EVERY 6 HOURS PRN
Qty: 120 EACH | Refills: 3 | DISCHARGE
Start: 2023-03-16

## 2023-03-16 RX ORDER — SENNA PLUS 8.6 MG/1
1 TABLET ORAL DAILY PRN
DISCHARGE
Start: 2023-03-16 | End: 2023-04-15

## 2023-03-16 RX ADMIN — Medication 500000 UNITS: at 09:34

## 2023-03-16 RX ADMIN — LEVOTHYROXINE SODIUM 50 MCG: 0.05 TABLET ORAL at 09:34

## 2023-03-16 RX ADMIN — RISPERIDONE 0.5 MG: 0.25 TABLET, FILM COATED ORAL at 09:34

## 2023-03-16 RX ADMIN — PIPERACILLIN AND TAZOBACTAM 3375 MG: 3; .375 INJECTION, POWDER, FOR SOLUTION INTRAVENOUS at 00:33

## 2023-03-16 RX ADMIN — Medication 1 TABLET: at 09:33

## 2023-03-16 RX ADMIN — ENOXAPARIN SODIUM 40 MG: 100 INJECTION SUBCUTANEOUS at 09:35

## 2023-03-16 RX ADMIN — ACETAMINOPHEN 650 MG: 325 TABLET ORAL at 09:32

## 2023-03-16 RX ADMIN — ATORVASTATIN CALCIUM 40 MG: 40 TABLET, FILM COATED ORAL at 09:34

## 2023-03-16 RX ADMIN — POTASSIUM BICARBONATE 20 MEQ: 782 TABLET, EFFERVESCENT ORAL at 10:40

## 2023-03-16 RX ADMIN — MODAFINIL 200 MG: 100 TABLET ORAL at 09:55

## 2023-03-16 RX ADMIN — ESCITALOPRAM 20 MG: 20 TABLET, FILM COATED ORAL at 09:34

## 2023-03-16 RX ADMIN — AMIODARONE HYDROCHLORIDE 200 MG: 200 TABLET ORAL at 09:33

## 2023-03-16 RX ADMIN — FAMOTIDINE 20 MG: 20 TABLET, FILM COATED ORAL at 09:32

## 2023-03-16 RX ADMIN — CARBAMAZEPINE 400 MG: 100 TABLET, CHEWABLE ORAL at 09:33

## 2023-03-16 RX ADMIN — ASPIRIN 81 MG 324 MG: 81 TABLET ORAL at 09:32

## 2023-03-16 RX ADMIN — PIPERACILLIN AND TAZOBACTAM 3375 MG: 3; .375 INJECTION, POWDER, FOR SOLUTION INTRAVENOUS at 09:43

## 2023-03-16 RX ADMIN — SODIUM CHLORIDE, PRESERVATIVE FREE 10 ML: 5 INJECTION INTRAVENOUS at 10:00

## 2023-03-16 RX ADMIN — CARVEDILOL 12.5 MG: 6.25 TABLET, FILM COATED ORAL at 09:33

## 2023-03-16 ASSESSMENT — PAIN SCALES - GENERAL
PAINLEVEL_OUTOF10: 3
PAINLEVEL_OUTOF10: 5

## 2023-03-16 ASSESSMENT — PAIN DESCRIPTION - DESCRIPTORS
DESCRIPTORS: ACHING
DESCRIPTORS: ACHING

## 2023-03-16 ASSESSMENT — PAIN DESCRIPTION - ORIENTATION
ORIENTATION: LEFT
ORIENTATION: LEFT

## 2023-03-16 ASSESSMENT — PAIN DESCRIPTION - LOCATION
LOCATION: HIP
LOCATION: HIP;STERNUM

## 2023-03-16 NOTE — PROGRESS NOTES
[facility]  INPATIENT SPEECH THERAPY  STRZ ICU STEPDOWN TELEMETRY 4K  DAILY NOTE    TIME   SLP Individual Minutes  Time In: 934  Time Out: 955  Minutes: 21  Timed Code Treatment Minutes: 0 Minutes       Date: 3/16/2023  Patient Name: Jimmy Carlson      CSN: [id number]   : 1940  (82 y.o.)  Gender: male   Referring Physician:    ANGELIC Ortiz CNP  Diagnosis:Elevated Troponin Level   Precautions: Aspiration; Fall Risk  Current Diet: Puree diet with moderately thick liquids   Swallowing Strategies:  Full Upright Position, Small Bite/Sip, No Straw, Medications Crushed with Puree, Direct 1:1 Supervision, Limit Distractions, and Monitor for Fatigue    Date of Last MBS/FEES:  03/15/23    Pain:  No pain reported.    Subjective:  Patient seen sitting upright in chair, alert and pleasant. Student RN and nursing supervisor HANS Lucero in throughout session. HANS Bach approved ST attempt reporting, \"good success with current diet, no concerns.\"     Patient will require skilled dysphagia interventions upon discharge to [facility].     Short-Term Goals:  SHORT TERM GOAL #1:  Goal 1: Patient will consume puree diet with moderatly thick liquids (no straws) (Per MBS 23; silent aspiration of thin and mildly thick) without s/s of aspiration in order to safely maintain adequate hydration and nutrition  INTERVENTIONS: Skilled dysphagia intervention completed with PO trials of 4-6 ounces of moderately thick liquids via cup and whole applesauce cup; patient consumed all PO trials with independent feeding/oral initiation, appropriate oral control, no s/s of aspiration.     SHORT TERM GOAL #2:  Goal 2: Patient will complete advanced textures (soft, no mixed consistencies) without s/s of aspiration in order to safely advance patient diet (textures)  INTERVENTIONS: Advanced trials attempted of PO trials of hard solid cracker dipped into pudding; patient extracting pudding off of cracker and consuming pudding only  stating \"I really don't want crackers at this time in the morning. \"     SHORT TERM GOAL #3:  Goal 3: Patient will complete pharyngeal exericses x15 in order to improve overall pharyngeal function and airway protection AND complete repeat instrumental evaluation in 2 weeks in order to determine readiness to safely advance patient diet (liquids)  INTERVENTIONS: ST provided explanation and rational to complete pharyngeal exercises; patient completed the follow:   Effortful swallows: x10 fair success, encouragement provided     SHORT TERM GOAL #4:  Goal 4: Staff will exhibit return demonstration for completion of comprehensive oral care procedural analysis to maximize oral integrity and to reduce potential bacteria colonization. INTERVENTIONS: DNT secondary to focus on other goals    SHORT TERM GOAL #5:  Goal 5: Monitor mentation (hx dementia, CABG); complete alternate cognitive linguistic assessment should it be clinically indicated. GOAL MET. NO NEW GOAL   INTERVENTIONS: Patient with memory deficits noted. Per documentation 03/03 patient's family reporting \"patient is nearing cognitive baseline. \" Patient with great improvements overall compared to 03/03. Safe discharge recommendations in place at this time. Planning to discharge to [facility] 03/16       Long-Term Goals: No LTGs due to short ELOS           EDUCATION:  Learner: Patient  Education:  Reviewed results and recommendations of this evaluation, Reviewed diet and strategies, Reviewed signs, symptoms and risks of aspiration, Reviewed ST goals and Plan of Care, Reviewed recommendations for follow-up, and Education Related to Potential Risks and Complications Due to Impairment/Illness/Injury  Evaluation of Education: Verbalizes understanding, Needs further instruction, No evidence of learning, and Family not present    ASSESSMENT/PLAN:  Activity Tolerance:  Patient tolerance of  treatment: good.       Assessment/Plan: Patient progressing toward established goals. Continues to require skilled care of licensed speech pathologist to progress toward achievement of established goals and plan of care. .     Plan for Next Session: PO trials, ?soft solids/advanced   Discharge Recommendations: SNF     STELLA Liang 23

## 2023-03-16 NOTE — PROGRESS NOTES
[facility] ICU STEPDOWN TELEMETRY 4K  Occupational Therapy  Daily Note  Time:    Time In: 6172  Time Out: 0800  Timed Code Treatment Minutes: 28 Minutes  Minutes: 28          Date: 3/16/2023  Patient Name: Cassi Hidalgo,   Gender: male      Room: [facility]011-A  MRN: [id number]  : 1940  (81 y.o.)  Referring Practitioner: Gale Ma PA-C  Diagnosis: elevated troponin  Additional Pertinent Hx: per chart review; \"The patient is an 81year old male former smoker with an approximately 20-pack-year history who quit in  with a past medical history of leukemia with remission in , hypothyroidism, \"seizures,\" hyperlipidemia, \"coma,\" and 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 \" patient underwent a CABG STONEY,  coronary artery bypass grafting x3 using LIMA to LAD, vein graft to to's marginal 1, vein graft to posterior descending artery, transesophageal echocardiography, endoscopic vein harvesting  on 23. Restrictions/Precautions:  Restrictions/Precautions: Fall Risk, General Precautions, Surgical Protocols  Position Activity Restriction  Sternal Precautions: move in the tube  Other position/activity restrictions: hx of brain injury      SUBJECTIVE: required encouragement, then very impulsive to say he had to urinate    PAIN: no number given/10: reports pain in LLE    Vitals:  student nurse checking vitals at end of session    COGNITION: Slow Processing, Decreased Insight, Decreased Problem Solving, Decreased Safety Awareness, and L sided inattention    ADL:   Bathing: Maximum Assistance. For BADL while seated EOB; mod vcs for sequencing & completeness of task  Upper Extremity Dressing: Maximum Assistance. For donning new gown  Footwear Management: Dependent.   For doffing & donning socks  Toileting: Maximum Assistance. For cleaning after incontinent of urine . BALANCE:  Sitting Balance:  Stand By Assistance. Standing Balance: Contact Guard Assistance. BED MOBILITY:  Supine to Sit: Minimal Assistance vcs for technique; impulsive with sitting up when reporting he needed to use toilet    TRANSFERS:  Sit to Stand:  Minimal Assistance. From EOB, vcs for technique  Stand to Sit: Contact Guard Assistance. FUNCTIONAL MOBILITY:  Assistive Device: Rolling Walker  Assist Level:  Minimal Assistance. Distance:  10ft in room  Vcs for posture, walker safety, & for scanning to L side     ASSESSMENT:     Activity Tolerance:  Patient tolerance of  treatment: Fair treatment tolerance       Discharge Recommendations: Subacute/skilled nursing facility  Equipment Recommendations: Equipment Needed: Yes  Mobility Devices: ADL Assistive Devices  Other: Pt reports he has a rolling walker and a shower chair but was only using off and on. Recommendations provided to use both for fall prevention. Plan: Times Per Week: 6x  Times Per Day: Once a day  Current Treatment Recommendations: Strengthening, Balance training, Functional mobility training, Neuromuscular re-education, Safety education & training, Self-Care / ADL, Endurance training, Patient/Caregiver education & training, Equipment evaluation, education, & procurement, ROM    Patient Education  Patient Education:  see above    Goals  Short Term Goals  Time Frame for Short Term Goals: by discharge  Short Term Goal 1: Patient will tolerate 2-3 min static standing with SBA for increased ease of sinkside grooming  Short Term Goal 2: patient will complete UB ADLs with MIN A and 1-2 cues to initiate and sequence task.   Short Term Goal 3: Pt will complete mobility to/from bathroom with RW, CGA, & 0-2 vcs for posture & walker safety/scanning L  Short Term Goal 4: Pt will complete step 2 cardiac exercises x 12 reps with min RBs to increase endurance for BADL    Following session, patient left in safe position with all fall risk precautions in place.

## 2023-03-16 NOTE — DISCHARGE SUMMARY
CT/CV Surgery Discharge Summary     Pt Name: Sandre Nissen  MRN: [id number]  [place]: 1940  Primary Care Physician: Gulshan Burgos MD    Admit date:  2/21/2023  8:37 AM     Discharge date:  03/16/23     Disposition: Home    Admitting Diagnosis: Multivessel coronary artery disease    Discharge Diagnosis: Multivessel coronary artery disease    Condition: Stable    Problem List:   Patient Active Problem List   Diagnosis Code    Orthostatic hypotension I95.1    Seizure ([address] R56.9    Dementia ([address] F03.90    Depression F32. 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 B02.29    Altered mental state R41.82    Syncope and collapse R55    Elevated troponin R77.8    Chronic obstructive pulmonary disease (HCC) J44.9    Personal history of tobacco use, presenting hazards to health Z87.891    Pleural effusion, bilateral J90    Mediastinal lymphadenopathy R59.0    NSTEMI (non-ST elevated myocardial infarction) ([facility]) I21.4    S/P CABG x 3 Z95.1    Excessive daytime sleepiness G47.19    Witnessed episode of apnea R06.81    Dysphagia R13.10    Sleep apnea G47.30     Procedures:  2/28/23  CABG STONEY,  coronary artery bypass grafting x3 using LIMA to LAD, vein graft to to's marginal 1, vein graft to posterior descending artery, transesophageal echocardiography, endoscopic vein harvesting     Reason for Admission: Mr. Destiney Yoder  is a 81year old with hx of fatigue and SOB with productive cough for around 1 week. He was worked up and found to have elevated troponins and abn EKG. He denies any chest pain, pressure or tightness. 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 Hospital Course: Following routine CABG, the patient progressed appropriately from hemodynamic standpoint. His dementia progressed however as well along with ability to use M/S function to eat and ambulate. He failed his swallow evaluation also leading to NG placement with tube feedings for 12 days post op.   The NG tube was removed and swallow test was repeated with improvement noted. At time of discharge, Vanessa Stanton was alert, tolerating a regular diet, having bowel movements, ambulating on his own accord and had adequate analgesia on oral pain medications, and had no signs of any complications. Discharge Vitals:  height is 5' 9\" (1.753 m) and weight is 187 lb 6.3 oz (85 kg). His oral temperature is 98.6 °F (37 °C). His blood pressure is 132/60 and his pulse is 84. His respiration is 18 and oxygen saturation is 93%. DISCHARGE INSTRUCTIONS:  Discharge Medications:         Medication List        START taking these medications      albuterol (2.5 MG/3ML) 0.083% nebulizer solution  Commonly known as: PROVENTIL  Take 3 mLs by nebulization every 6 hours as needed for Wheezing or Shortness of Breath     amiodarone 200 MG tablet  Commonly known as: CORDARONE  Take 1 tablet by mouth daily STOP AFTER 30 DAYS     aspirin 81 MG chewable tablet  Take 1 tablet by mouth daily     carvedilol 12.5 MG tablet  Commonly known as: COREG  Take 1 tablet by mouth 2 times daily (with meals)     lisinopril 2.5 MG tablet  Commonly known as: PRINIVIL;ZESTRIL  Take 1 tablet by mouth daily     modafinil 200 MG tablet  Commonly known as: PROVIGIL  Take 1 tablet by mouth every morning for 30 days.  Max Daily Amount: 200 mg     senna 8.6 MG tablet  Commonly known as: SENOKOT  Take 1 tablet by mouth daily as needed (Constipation)     tiotropium-olodaterol 2.5-2.5 MCG/ACT Aers  Commonly known as: STIOLTO  Inhale 2 puffs into the lungs daily            CONTINUE taking these medications      Acetaminophen 650 MG Tabs     atorvastatin 40 MG tablet  Commonly known as: LIPITOR     carBAMazepine 200 MG extended release tablet  Commonly known as: TEGRETOL XR     clopidogrel 75 MG tablet  Commonly known as: PLAVIX     levothyroxine 50 MCG tablet  Commonly known as: SYNTHROID     therapeutic multivitamin-minerals tablet            STOP taking these medications escitalopram 20 MG tablet  Commonly known as: LEXAPRO     risperiDONE 0.5 MG tablet  Commonly known as: RISPERDAL               Where to Get Your Medications        You can get these medications from any pharmacy    Bring a paper prescription for each of these medications  modafinil 200 MG tablet       Information about where to get these medications is not yet available    Ask your nurse or doctor about these medications  albuterol (2.5 MG/3ML) 0.083% nebulizer solution  amiodarone 200 MG tablet  aspirin 81 MG chewable tablet  carvedilol 12.5 MG tablet  lisinopril 2.5 MG tablet  senna 8.6 MG tablet  tiotropium-olodaterol 2.5-2.5 MCG/ACT Aers       Diet: AHA diet as tolerated    Activity: As instructed on discharge, no lifting more than 10 lbs for one month, no driving while on narcotics. Aerobic activity (walking, climbing stairs, etc.) is encouraged. Wound Care: Clean wounds as instructed on discharge    OFFICE VISIT   ? You should have a follow up appointment in the office in about 1 month after discharge from the hospital.   ?   You may call the office to make an appointment, if you don't have an appointment. ([phone]). ? You will need a chest xray and labs taken around 3-7 days before your follow up appointment. ?   Bring any questions you have so they may be addressed. ? You will need a follow up appointment with you primary care doctor in 7-10 days from discharge, please call and make one if you do not have one.   ? You will need a follow up appointment with you Cardiologist in 2-3 weeks from discharge, please call and make one if you do not have one.   ? BRING YOUR MEDICATION LIST and medications even over the counter medications to all your follow up apointments. ? Office Location:   [facility], [address], [address] [facility], [address] [facility]            EMERGENCIES   ?  You should either call 911 or go to the Emergency Room to be evaluated if you need seen immediately. ?         Sudden, severe shortness of breath go to the Emergency Room. If you have questions regarding these instructions, please call our office  [phone]. We have an answering service 24 hours a day to get your calls to the ON Call Physician, but we are often in surgery and it takes us awhile to get back to you. Discharge Medications for PCI/MI (performed or attempted): Trop:   Lab Results   Component Value Date    TROPONINT 0.164 (A) 02/21/2023      ASA:                            Yes                      Statin:                          Yes  ACE/ARB:                   Yes        P2Y12 Inhibitor:          Yes          Beta Blocker:               Yes        Cardiac Rehab:            Yes  Dietary Consult:           Yes           Follow-up:    1   Follow up with Ye Ritchie MD 2-3 weeks and with pt's Cardiologist in 3 weeks. 2.  Follow up with Dr. Sandra Pal, PAC in 3-4 weeks, with PA and Lateral CXR, CBC, and BMP. 3.  Schedule OV with Sharon Sinha MD in Vascular Clinic in 3-4 weeks. 3. The pt was agreeable to discharge and future plan of care. All questions were thoroughly answered.      Claudetta London, PA-C   Electronincally signed 3/16/2023 at 9:14 AM    CC: Ye Ritchie MD

## 2023-03-16 NOTE — PROGRESS NOTES
3/16/2023 report attempted to danish cook, forwarded to [facility] Martha Fraga message left

## 2023-03-16 NOTE — PLAN OF CARE
Problem: Discharge Planning  Goal: Discharge to home or other facility with appropriate resources  Outcome: Completed  Flowsheets (Taken 3/15/2023 2015 by Kev Rodney RN)  Discharge to home or other facility with appropriate resources:   Identify barriers to discharge with patient and caregiver   Arrange for needed discharge resources and transportation as appropriate   Identify discharge learning needs (meds, wound care, etc)     Problem: Pain  Goal: Verbalizes/displays adequate comfort level or baseline comfort level  Outcome: Completed  Flowsheets (Taken 3/15/2023 2015 by Kve Rodney RN)  Verbalizes/displays adequate comfort level or baseline comfort level:   Encourage patient to monitor pain and request assistance   Assess pain using appropriate pain scale   Administer analgesics based on type and severity of pain and evaluate response   Implement non-pharmacological measures as appropriate and evaluate response   Consider cultural and social influences on pain and pain management   Notify Licensed Independent Practitioner if interventions unsuccessful or patient reports new pain     Problem: Respiratory - Adult  Goal: Achieves optimal ventilation and oxygenation  Outcome: Completed  Flowsheets (Taken 3/15/2023 2015 by Kev Rodney RN)  Achieves optimal ventilation and oxygenation:   Assess for changes in respiratory status   Assess for changes in mentation and behavior   Position to facilitate oxygenation and minimize respiratory effort   Oxygen supplementation based on oxygen saturation or arterial blood gases   Encourage broncho-pulmonary hygiene including cough, deep breathe, incentive spirometry   Initiate smoking cessation protocol as indicated   Assess the need for suctioning and aspirate as needed   Assess and instruct to report shortness of breath or any respiratory difficulty   Respiratory therapy support as indicated     Problem: Cardiovascular - Adult  Goal: Maintains optimal cardiac output and hemodynamic stability  Outcome: Completed  Flowsheets (Taken 3/15/2023 2015 by Marshall Buitrago RN)  Maintains optimal cardiac output and hemodynamic stability:   Monitor blood pressure and heart rate   Monitor urine output and notify Licensed Independent Practitioner for values outside of normal range   Assess for signs of decreased cardiac output   Administer fluid and/or volume expanders as ordered  Goal: Absence of cardiac dysrhythmias or at baseline  Outcome: Completed  Flowsheets (Taken 3/15/2023 2015 by Marshall Buitrago RN)  Absence of cardiac dysrhythmias or at baseline:   Monitor cardiac rate and rhythm   Administer antiarrhythmia medication and electrolyte replacement as ordered     Problem: Metabolic/Fluid and Electrolytes - Adult  Goal: Electrolytes maintained within normal limits  Outcome: Completed  Flowsheets (Taken 3/15/2023 2015 by Marshall Buitrago RN)  Electrolytes maintained within normal limits:   Monitor labs and assess patient for signs and symptoms of electrolyte imbalances   Administer electrolyte replacement as ordered   Monitor response to electrolyte replacements, including repeat lab results as appropriate   Instruct patient on fluid and nutrition restrictions as appropriate  Goal: Glucose maintained within prescribed range  Outcome: Completed  Flowsheets (Taken 3/15/2023 2015 by Marshall Buitrago RN)  Glucose maintained within prescribed range:   Monitor blood glucose as ordered   Assess for signs and symptoms of hyperglycemia and hypoglycemia   Administer ordered medications to maintain glucose within target range   Instruct patient on self management of diabetes and initiate consult as needed   Assess barriers to adequate nutritional intake and initiate nutrition consult as needed     Problem: Hematologic - Adult  Goal: Maintains hematologic stability  Outcome: Completed  Flowsheets (Taken 3/15/2023 2015 by Marshall Buitrago RN)  Maintains hematologic stability: Assess for signs and symptoms of bleeding or hemorrhage   Administer blood products/factors as ordered   Monitor labs for bleeding or clotting disorders     Problem: Nutrition Deficit:  Goal: Optimize nutritional status  Outcome: Completed     Problem: Safety - Adult  Goal: Free from fall injury  Outcome: Completed     Problem: Neurosensory - Adult  Goal: Achieves stable or improved neurological status  Outcome: Completed  Flowsheets (Taken 3/15/2023 2015 by Zaina Caldera RN)  Achieves stable or improved neurological status:   Assess for and report changes in neurological status   Initiate measures to prevent increased intracranial pressure   Maintain blood pressure and fluid volume within ordered parameters to optimize cerebral perfusion and minimize risk of hemorrhage   Monitor temperature, glucose, and sodium.  Initiate appropriate interventions as ordered  Goal: Achieves maximal functionality and self care  Outcome: Completed  Flowsheets (Taken 3/15/2023 2015 by Zaina Caldera RN)  Achieves maximal functionality and self care:   Monitor swallowing and airway patency with patient fatigue and changes in neurological status   Encourage and assist patient to increase activity and self care with guidance from physical therapy/occupational therapy   Encourage visually impaired, hearing impaired and aphasic patients to use assistive/communication devices     Problem: Skin/Tissue Integrity - Adult  Goal: Incisions, wounds, or drain sites healing without S/S of infection  Outcome: Completed  Flowsheets (Taken 3/15/2023 2015 by Zaina Caldera RN)  Incisions, Wounds, or Drain Sites Healing Without Sign and Symptoms of Infection:   ADMISSION and DAILY: Assess and document risk factors for pressure ulcer development   TWICE DAILY: Assess and document skin integrity   TWICE DAILY: Assess and document dressing/incision, wound bed, drain sites and surrounding tissue   Implement wound care per orders   Initiate isolation precautions as appropriate   Initiate pressure ulcer prevention bundle as indicated  Goal: Oral mucous membranes remain intact  Outcome: Completed  Flowsheets (Taken 3/15/2023 2015 by Luis Melgar RN)  Oral Mucous Membranes Remain Intact:   Assess oral mucosa and hygiene practices   Implement preventative oral hygiene regimen   Implement oral medicated treatments as ordered     Problem: Musculoskeletal - Adult  Goal: Return mobility to safest level of function  Outcome: Completed  Flowsheets (Taken 3/15/2023 2015 by Luis Melgar, RN)  Return Mobility to Safest Level of Function:   Assess patient stability and activity tolerance for standing, transferring and ambulating with or without assistive devices   Assist with transfers and ambulation using safe patient handling equipment as needed   Ensure adequate protection for wounds/incisions during mobilization   Obtain physical therapy/occupational therapy consults as needed   Instruct patient/family in ordered activity level  Goal: Return ADL status to a safe level of function  Outcome: Completed  Flowsheets (Taken 3/15/2023 2015 by Luis Melgar RN)  Return ADL Status to a Safe Level of Function:   Administer medication as ordered   Assess activities of daily living deficits and provide assistive devices as needed   Obtain physical therapy/occupational therapy consults as needed   Assist and instruct patient to increase activity and self care as tolerated     Problem: Gastrointestinal - Adult  Goal: Maintains or returns to baseline bowel function  Outcome: Completed  Flowsheets (Taken 3/15/2023 2015 by Luis Melgar RN)  Maintains or returns to baseline bowel function:   Assess bowel function   Encourage oral fluids to ensure adequate hydration   Administer ordered medications as needed   Encourage mobilization and activity   Nutrition consult to assist patient with appropriate food choices     Problem: Genitourinary - Adult  Goal: Absence of urinary retention  Outcome: Completed  Flowsheets (Taken 3/15/2023 2015 by Rj James RN)  Absence of urinary retention:   Assess patients ability to void and empty bladder   Monitor intake/output and perform bladder scan as needed     Problem: Infection - Adult  Goal: Absence of infection at discharge  Outcome: Completed  Flowsheets (Taken 3/15/2023 2015 by Rj James RN)  Absence of infection at discharge:   Assess and monitor for signs and symptoms of infection   Monitor lab/diagnostic results   Monitor all insertion sites i.e., indwelling lines, tubes and drains     Problem: Anxiety  Goal: Will report anxiety at manageable levels  Description: INTERVENTIONS:  1. Administer medication as ordered  2. Teach and rehearse alternative coping skills  3. Provide emotional support with 1:1 interaction with staff  Outcome: Completed     Problem: Confusion  Goal: Confusion, delirium, dementia, or psychosis is improved or at baseline  Description: INTERVENTIONS:  1. Assess for possible contributors to thought disturbance, including medications, impaired vision or hearing, underlying metabolic abnormalities, dehydration, psychiatric diagnoses, and notify attending LIP  2. [place] high risk fall precautions, as indicated  3. Provide frequent short contacts to provide reality reorientation, refocusing and direction  4. Decrease environmental stimuli, including noise as appropriate  5. Monitor and intervene to maintain adequate nutrition, hydration, elimination, sleep and activity  6. If unable to ensure safety without constant attention obtain sitter and review sitter guidelines with assigned personnel  7. Initiate Psychosocial CNS and Spiritual Care consult, as indicated  Outcome: Completed     Problem: Skin/Tissue Integrity  Goal: Absence of new skin breakdown  Description: 1. Monitor for areas of redness and/or skin breakdown  2. Assess vascular access sites hourly  3.   Every 4-6 hours minimum:  Change oxygen saturation probe site  4. Every 4-6 hours:  If on nasal continuous positive airway pressure, respiratory therapy assess nares and determine need for appliance change or resting period.   Outcome: Completed

## 2023-03-16 NOTE — PROGRESS NOTES
CLINICAL PHARMACY: DISCHARGE MED RECONCILIATION/REVIEW    [address] [place] Patient?: No  Total # of Interventions Recommended: 0     Total # Interventions Accepted: 0  Intervention Severity:   - Level 1 Intervention Present?: No   - Level 2 #: 0   - Level 3 #: 0   Time Spent (min): Angella Tran ([place]) PGY1 Pharmacy Resident 3/16/2023 11:55 AM

## 2023-03-16 NOTE — PROGRESS NOTES
Pharmacy Inpatient Medication Counseling Note    Patient admitted to [facility] for: Elevated troponin    Counseled patient on all new medications started since admission and medications that were discontinued utilizing teach-back method. Reviewed indication, directions and most common side effects. Patient and caretaker voiced understanding.       [address] Eilleen Cranker) , PGY1 Pharmacy Resident 3/16/2023 11:57 AM

## 2023-03-16 NOTE — CARE COORDINATION
3/16/23, 9:38 AM EDT    Patient goals/plan/ treatment preferences discussed by  and . Patient goals/plan/ treatment preferences reviewed with patient/ family. Patient/ family verbalize understanding of discharge plan and are in agreement with goal/plan/treatment preferences. Understanding was demonstrated using the teach back method. AVS provided by RN at time of discharge, which includes all necessary medical information pertaining to the patients current course of illness, treatment, post-discharge goals of care, and treatment preferences. Services At/After Discharge: [place] (SNF)       IMM Letter  IMM Letter given to Patient/Family/Significant other/Guardian/POA/by[de-identified] - Jose Kwong  IMM Letter date given[de-identified] 03/16/23  IMM Letter time given[de-identified] 9255  Observation Status Letter date given[de-identified] 02/21/23  Observation Status Letter time given[de-identified] 8180  Observation Status Letter given to Patient/Family/Significant other/Guardian/POA/by[de-identified] patient registration        [address] today. TK spoke with nurse, pt is up with walker and sitting in chair therefore, pt can be transported via private vehicle. TK spoke with Fatuma Cheng at [facility] for admission to [facility] today. TK spoke with pts daughter Dawit Villa, she will transport pt at discharge. No time established yet. Pc received from nurse, family requesting W/C transport. TK spoke with daughter, states she will pay out of pocket cost.   TK faxed paperwork to [facility] and arranged time for noon . TK notified nurse and Aniya with [facility]. Nurse updated daughter.

## 2023-03-16 NOTE — PLAN OF CARE
Problem: Discharge Planning  Goal: Discharge to home or other facility with appropriate resources  Outcome: Completed  Flowsheets (Taken 3/15/2023 2015 by Kev Rodney RN)  Discharge to home or other facility with appropriate resources:   Identify barriers to discharge with patient and caregiver   Arrange for needed discharge resources and transportation as appropriate   Identify discharge learning needs (meds, wound care, etc)     Problem: Pain  Goal: Verbalizes/displays adequate comfort level or baseline comfort level  Outcome: Completed  Flowsheets (Taken 3/15/2023 2015 by Kev Rodney RN)  Verbalizes/displays adequate comfort level or baseline comfort level:   Encourage patient to monitor pain and request assistance   Assess pain using appropriate pain scale   Administer analgesics based on type and severity of pain and evaluate response   Implement non-pharmacological measures as appropriate and evaluate response   Consider cultural and social influences on pain and pain management   Notify Licensed Independent Practitioner if interventions unsuccessful or patient reports new pain     Problem: Respiratory - Adult  Goal: Achieves optimal ventilation and oxygenation  Outcome: Completed  Flowsheets (Taken 3/15/2023 2015 by Kev Rodney RN)  Achieves optimal ventilation and oxygenation:   Assess for changes in respiratory status   Assess for changes in mentation and behavior   Position to facilitate oxygenation and minimize respiratory effort   Oxygen supplementation based on oxygen saturation or arterial blood gases   Encourage broncho-pulmonary hygiene including cough, deep breathe, incentive spirometry   Initiate smoking cessation protocol as indicated   Assess the need for suctioning and aspirate as needed   Assess and instruct to report shortness of breath or any respiratory difficulty   Respiratory therapy support as indicated     Problem: Cardiovascular - Adult  Goal: Maintains optimal cardiac output and hemodynamic stability  Outcome: Completed  Flowsheets (Taken 3/15/2023 2015 by Kev Rodney RN)  Maintains optimal cardiac output and hemodynamic stability:   Monitor blood pressure and heart rate   Monitor urine output and notify Licensed Independent Practitioner for values outside of normal range   Assess for signs of decreased cardiac output   Administer fluid and/or volume expanders as ordered  Goal: Absence of cardiac dysrhythmias or at baseline  Outcome: Completed  Flowsheets (Taken 3/15/2023 2015 by Kev Rodney RN)  Absence of cardiac dysrhythmias or at baseline:   Monitor cardiac rate and rhythm   Administer antiarrhythmia medication and electrolyte replacement as ordered     Problem: Metabolic/Fluid and Electrolytes - Adult  Goal: Electrolytes maintained within normal limits  Outcome: Completed  Flowsheets (Taken 3/15/2023 2015 by Kev Rodney RN)  Electrolytes maintained within normal limits:   Monitor labs and assess patient for signs and symptoms of electrolyte imbalances   Administer electrolyte replacement as ordered   Monitor response to electrolyte replacements, including repeat lab results as appropriate   Instruct patient on fluid and nutrition restrictions as appropriate  Goal: Glucose maintained within prescribed range  Outcome: Completed  Flowsheets (Taken 3/15/2023 2015 by Kev Rodney RN)  Glucose maintained within prescribed range:   Monitor blood glucose as ordered   Assess for signs and symptoms of hyperglycemia and hypoglycemia   Administer ordered medications to maintain glucose within target range   Instruct patient on self management of diabetes and initiate consult as needed   Assess barriers to adequate nutritional intake and initiate nutrition consult as needed     Problem: Hematologic - Adult  Goal: Maintains hematologic stability  Outcome: Completed  Flowsheets (Taken 3/15/2023 2015 by Kev Rodney RN)  Maintains hematologic stability: Assess for signs and symptoms of bleeding or hemorrhage   Administer blood products/factors as ordered   Monitor labs for bleeding or clotting disorders     Problem: Nutrition Deficit:  Goal: Optimize nutritional status  Outcome: Completed     Problem: Safety - Adult  Goal: Free from fall injury  Outcome: Completed     Problem: Neurosensory - Adult  Goal: Achieves stable or improved neurological status  Outcome: Completed  Flowsheets (Taken 3/15/2023 2015 by Zaina Caldera RN)  Achieves stable or improved neurological status:   Assess for and report changes in neurological status   Initiate measures to prevent increased intracranial pressure   Maintain blood pressure and fluid volume within ordered parameters to optimize cerebral perfusion and minimize risk of hemorrhage   Monitor temperature, glucose, and sodium.  Initiate appropriate interventions as ordered  Goal: Achieves maximal functionality and self care  Outcome: Completed  Flowsheets (Taken 3/15/2023 2015 by Zaina Caldera RN)  Achieves maximal functionality and self care:   Monitor swallowing and airway patency with patient fatigue and changes in neurological status   Encourage and assist patient to increase activity and self care with guidance from physical therapy/occupational therapy   Encourage visually impaired, hearing impaired and aphasic patients to use assistive/communication devices     Problem: Skin/Tissue Integrity - Adult  Goal: Incisions, wounds, or drain sites healing without S/S of infection  Outcome: Completed  Flowsheets (Taken 3/15/2023 2015 by Zaina Caldera RN)  Incisions, Wounds, or Drain Sites Healing Without Sign and Symptoms of Infection:   ADMISSION and DAILY: Assess and document risk factors for pressure ulcer development   TWICE DAILY: Assess and document skin integrity   TWICE DAILY: Assess and document dressing/incision, wound bed, drain sites and surrounding tissue   Implement wound care per orders   Initiate isolation precautions as appropriate   Initiate pressure ulcer prevention bundle as indicated  Goal: Oral mucous membranes remain intact  Outcome: Completed  Flowsheets (Taken 3/15/2023 2015 by Stephanie Carver, RN)  Oral Mucous Membranes Remain Intact:   Assess oral mucosa and hygiene practices   Implement preventative oral hygiene regimen   Implement oral medicated treatments as ordered     Problem: Musculoskeletal - Adult  Goal: Return mobility to safest level of function  Outcome: Completed  Flowsheets (Taken 3/15/2023 2015 by Stephanie Carver, RN)  Return Mobility to Safest Level of Function:   Assess patient stability and activity tolerance for standing, transferring and ambulating with or without assistive devices   Assist with transfers and ambulation using safe patient handling equipment as needed   Ensure adequate protection for wounds/incisions during mobilization   Obtain physical therapy/occupational therapy consults as needed   Instruct patient/family in ordered activity level  Goal: Return ADL status to a safe level of function  Outcome: Completed  Flowsheets (Taken 3/15/2023 2015 by Stephanie Carver RN)  Return ADL Status to a Safe Level of Function:   Administer medication as ordered   Assess activities of daily living deficits and provide assistive devices as needed   Obtain physical therapy/occupational therapy consults as needed   Assist and instruct patient to increase activity and self care as tolerated     Problem: Gastrointestinal - Adult  Goal: Maintains or returns to baseline bowel function  Outcome: Completed  Flowsheets (Taken 3/15/2023 2015 by Stephanie Carver RN)  Maintains or returns to baseline bowel function:   Assess bowel function   Encourage oral fluids to ensure adequate hydration   Administer ordered medications as needed   Encourage mobilization and activity   Nutrition consult to assist patient with appropriate food choices     Problem: Genitourinary - Adult  Goal: Absence of urinary retention  Outcome: Completed  Flowsheets (Taken 3/15/2023 2015 by Tracy Mckeon RN)  Absence of urinary retention:   Assess patients ability to void and empty bladder   Monitor intake/output and perform bladder scan as needed     Problem: Infection - Adult  Goal: Absence of infection at discharge  Outcome: Completed  Flowsheets (Taken 3/15/2023 2015 by Tracy Mckeon RN)  Absence of infection at discharge:   Assess and monitor for signs and symptoms of infection   Monitor lab/diagnostic results   Monitor all insertion sites i.e., indwelling lines, tubes and drains     Problem: Anxiety  Goal: Will report anxiety at manageable levels  Description: INTERVENTIONS:  1. Administer medication as ordered  2. Teach and rehearse alternative coping skills  3. Provide emotional support with 1:1 interaction with staff  Outcome: Completed     Problem: Confusion  Goal: Confusion, delirium, dementia, or psychosis is improved or at baseline  Description: INTERVENTIONS:  1. Assess for possible contributors to thought disturbance, including medications, impaired vision or hearing, underlying metabolic abnormalities, dehydration, psychiatric diagnoses, and notify attending LIP  2. [place] high risk fall precautions, as indicated  3. Provide frequent short contacts to provide reality reorientation, refocusing and direction  4. Decrease environmental stimuli, including noise as appropriate  5. Monitor and intervene to maintain adequate nutrition, hydration, elimination, sleep and activity  6. If unable to ensure safety without constant attention obtain sitter and review sitter guidelines with assigned personnel  7. Initiate Psychosocial CNS and Spiritual Care consult, as indicated  Outcome: Completed     Problem: Skin/Tissue Integrity  Goal: Absence of new skin breakdown  Description: 1. Monitor for areas of redness and/or skin breakdown  2. Assess vascular access sites hourly  3.   Every 4-6 hours minimum:  Change oxygen saturation probe site  4. Every 4-6 hours:  If on nasal continuous positive airway pressure, respiratory therapy assess nares and determine need for appliance change or resting period.   Outcome: Completed

## 2023-03-16 NOTE — PROGRESS NOTES
3/16/2023 transport at bedside pt transported to [facility] in stable condition

## 2023-03-16 NOTE — PLAN OF CARE
Problem: Discharge Planning  Goal: Discharge to home or other facility with appropriate resources  Outcome: Completed  Flowsheets (Taken 3/15/2023 2015 by Todd Knight RN)  Discharge to home or other facility with appropriate resources:   Identify barriers to discharge with patient and caregiver   Arrange for needed discharge resources and transportation as appropriate   Identify discharge learning needs (meds, wound care, etc)     Problem: Pain  Goal: Verbalizes/displays adequate comfort level or baseline comfort level  Outcome: Completed  Flowsheets (Taken 3/15/2023 2015 by Todd Knight RN)  Verbalizes/displays adequate comfort level or baseline comfort level:   Encourage patient to monitor pain and request assistance   Assess pain using appropriate pain scale   Administer analgesics based on type and severity of pain and evaluate response   Implement non-pharmacological measures as appropriate and evaluate response   Consider cultural and social influences on pain and pain management   Notify Licensed Independent Practitioner if interventions unsuccessful or patient reports new pain     Problem: Respiratory - Adult  Goal: Achieves optimal ventilation and oxygenation  Outcome: Completed  Flowsheets (Taken 3/15/2023 2015 by Todd Knight RN)  Achieves optimal ventilation and oxygenation:   Assess for changes in respiratory status   Assess for changes in mentation and behavior   Position to facilitate oxygenation and minimize respiratory effort   Oxygen supplementation based on oxygen saturation or arterial blood gases   Encourage broncho-pulmonary hygiene including cough, deep breathe, incentive spirometry   Initiate smoking cessation protocol as indicated   Assess the need for suctioning and aspirate as needed   Assess and instruct to report shortness of breath or any respiratory difficulty   Respiratory therapy support as indicated     Problem: Cardiovascular - Adult  Goal: Maintains optimal cardiac output and hemodynamic stability  Outcome: Completed  Flowsheets (Taken 3/15/2023 2015 by Davina Guillen RN)  Maintains optimal cardiac output and hemodynamic stability:   Monitor blood pressure and heart rate   Monitor urine output and notify Licensed Independent Practitioner for values outside of normal range   Assess for signs of decreased cardiac output   Administer fluid and/or volume expanders as ordered  Goal: Absence of cardiac dysrhythmias or at baseline  Outcome: Completed  Flowsheets (Taken 3/15/2023 2015 by Davina Guillen, RN)  Absence of cardiac dysrhythmias or at baseline:   Monitor cardiac rate and rhythm   Administer antiarrhythmia medication and electrolyte replacement as ordered     Problem: Metabolic/Fluid and Electrolytes - Adult  Goal: Electrolytes maintained within normal limits  Outcome: Completed  Flowsheets (Taken 3/15/2023 2015 by Davina Guillen RN)  Electrolytes maintained within normal limits:   Monitor labs and assess patient for signs and symptoms of electrolyte imbalances   Administer electrolyte replacement as ordered   Monitor response to electrolyte replacements, including repeat lab results as appropriate   Instruct patient on fluid and nutrition restrictions as appropriate  Goal: Glucose maintained within prescribed range  Outcome: Completed  Flowsheets (Taken 3/15/2023 2015 by Davina Guillen, HANS)  Glucose maintained within prescribed range:   Monitor blood glucose as ordered   Assess for signs and symptoms of hyperglycemia and hypoglycemia   Administer ordered medications to maintain glucose within target range   Instruct patient on self management of diabetes and initiate consult as needed   Assess barriers to adequate nutritional intake and initiate nutrition consult as needed     Problem: Hematologic - Adult  Goal: Maintains hematologic stability  Outcome: Completed  Flowsheets (Taken 3/15/2023 2015 by Davina Guillen RN)  Maintains hematologic stability: Assess for signs and symptoms of bleeding or hemorrhage   Administer blood products/factors as ordered   Monitor labs for bleeding or clotting disorders     Problem: Nutrition Deficit:  Goal: Optimize nutritional status  Outcome: Completed     Problem: Safety - Adult  Goal: Free from fall injury  Outcome: Completed     Problem: Neurosensory - Adult  Goal: Achieves stable or improved neurological status  Outcome: Completed  Flowsheets (Taken 3/15/2023 2015 by Dada Vanessa RN)  Achieves stable or improved neurological status:   Assess for and report changes in neurological status   Initiate measures to prevent increased intracranial pressure   Maintain blood pressure and fluid volume within ordered parameters to optimize cerebral perfusion and minimize risk of hemorrhage   Monitor temperature, glucose, and sodium.  Initiate appropriate interventions as ordered  Goal: Achieves maximal functionality and self care  Outcome: Completed  Flowsheets (Taken 3/15/2023 2015 by Dada Vanessa RN)  Achieves maximal functionality and self care:   Monitor swallowing and airway patency with patient fatigue and changes in neurological status   Encourage and assist patient to increase activity and self care with guidance from physical therapy/occupational therapy   Encourage visually impaired, hearing impaired and aphasic patients to use assistive/communication devices     Problem: Skin/Tissue Integrity - Adult  Goal: Incisions, wounds, or drain sites healing without S/S of infection  Outcome: Completed  Flowsheets (Taken 3/15/2023 2015 by Dada Vanessa RN)  Incisions, Wounds, or Drain Sites Healing Without Sign and Symptoms of Infection:   ADMISSION and DAILY: Assess and document risk factors for pressure ulcer development   TWICE DAILY: Assess and document skin integrity   TWICE DAILY: Assess and document dressing/incision, wound bed, drain sites and surrounding tissue   Implement wound care per orders   Initiate isolation precautions as appropriate   Initiate pressure ulcer prevention bundle as indicated  Goal: Oral mucous membranes remain intact  Outcome: Completed  Flowsheets (Taken 3/15/2023 2015 by Hai Manzano RN)  Oral Mucous Membranes Remain Intact:   Assess oral mucosa and hygiene practices   Implement preventative oral hygiene regimen   Implement oral medicated treatments as ordered     Problem: Musculoskeletal - Adult  Goal: Return mobility to safest level of function  Outcome: Completed  Flowsheets (Taken 3/15/2023 2015 by Hai Manzano, RN)  Return Mobility to Safest Level of Function:   Assess patient stability and activity tolerance for standing, transferring and ambulating with or without assistive devices   Assist with transfers and ambulation using safe patient handling equipment as needed   Ensure adequate protection for wounds/incisions during mobilization   Obtain physical therapy/occupational therapy consults as needed   Instruct patient/family in ordered activity level  Goal: Return ADL status to a safe level of function  Outcome: Completed  Flowsheets (Taken 3/15/2023 2015 by Hai Manzano RN)  Return ADL Status to a Safe Level of Function:   Administer medication as ordered   Assess activities of daily living deficits and provide assistive devices as needed   Obtain physical therapy/occupational therapy consults as needed   Assist and instruct patient to increase activity and self care as tolerated     Problem: Gastrointestinal - Adult  Goal: Maintains or returns to baseline bowel function  Outcome: Completed  Flowsheets (Taken 3/15/2023 2015 by Hai Manzano RN)  Maintains or returns to baseline bowel function:   Assess bowel function   Encourage oral fluids to ensure adequate hydration   Administer ordered medications as needed   Encourage mobilization and activity   Nutrition consult to assist patient with appropriate food choices     Problem: Genitourinary - Adult  Goal: Absence  of urinary retention  Outcome: Completed  Flowsheets (Taken 3/15/2023 2015 by Hai Manzano RN)  Absence of urinary retention:   Assess patient’s ability to void and empty bladder   Monitor intake/output and perform bladder scan as needed     Problem: Infection - Adult  Goal: Absence of infection at discharge  Outcome: Completed  Flowsheets (Taken 3/15/2023 2015 by Hai Manzano RN)  Absence of infection at discharge:   Assess and monitor for signs and symptoms of infection   Monitor lab/diagnostic results   Monitor all insertion sites i.e., indwelling lines, tubes and drains     Problem: Anxiety  Goal: Will report anxiety at manageable levels  Description: INTERVENTIONS:  1. Administer medication as ordered  2. Teach and rehearse alternative coping skills  3. Provide emotional support with 1:1 interaction with staff  Outcome: Completed     Problem: Confusion  Goal: Confusion, delirium, dementia, or psychosis is improved or at baseline  Description: INTERVENTIONS:  1. Assess for possible contributors to thought disturbance, including medications, impaired vision or hearing, underlying metabolic abnormalities, dehydration, psychiatric diagnoses, and notify attending LIP  2. [place] high risk fall precautions, as indicated  3. Provide frequent short contacts to provide reality reorientation, refocusing and direction  4. Decrease environmental stimuli, including noise as appropriate  5. Monitor and intervene to maintain adequate nutrition, hydration, elimination, sleep and activity  6. If unable to ensure safety without constant attention obtain sitter and review sitter guidelines with assigned personnel  7. Initiate Psychosocial CNS and Spiritual Care consult, as indicated  Outcome: Completed     Problem: Skin/Tissue Integrity  Goal: Absence of new skin breakdown  Description: 1.  Monitor for areas of redness and/or skin breakdown  2.  Assess vascular access sites hourly  3.  Every 4-6 hours minimum:  Change  oxygen saturation probe site  4. Every 4-6 hours:  If on nasal continuous positive airway pressure, respiratory therapy assess nares and determine need for appliance change or resting period.   Outcome: Completed

## 2023-03-20 ENCOUNTER — TELEPHONE (OUTPATIENT)
Dept: CARDIOTHORACIC SURGERY | Age: 83
End: 2023-03-20

## 2023-03-20 DIAGNOSIS — G89.18 POST-OP PAIN: Primary | ICD-10-CM

## 2023-03-20 RX ORDER — OXYCODONE HYDROCHLORIDE AND ACETAMINOPHEN 5; 325 MG/1; MG/1
1 TABLET ORAL EVERY 8 HOURS PRN
Qty: 21 TABLET | Refills: 0 | Status: SHIPPED | OUTPATIENT
Start: 2023-03-20 | End: 2023-03-27

## 2023-03-20 NOTE — TELEPHONE ENCOUNTER
Pt's nurse at [facility] and pt's daughter both called with plan for percocet for 21 pills to help with post-op pain.   Thanks

## 2023-03-20 NOTE — CARE COORDINATION
3/20/23, 1:53 PM EDT    DISCHARGE PLANNING EVALUATION     Pc received from pts daughter Allyson Aguila, asking for help getting pt transferred  from [facility] to [facility]. SW informed Rochelle she would need to let [facility] know that pt wants to transfer to another facility and staff will assist in the process. Rochelle verbalized understanding and will speak to staff.

## 2023-03-20 NOTE — TELEPHONE ENCOUNTER
Received message from someone (did not leave name) at [facility]. Patient c/o increase in pain since surgery. This person is requesting prescription for pain medication - states Tylenol is not helping. Please advise.     Call daughter Geeta Pellet: [phone]

## 2023-03-29 ENCOUNTER — TELEPHONE (OUTPATIENT)
Dept: SLEEP CENTER | Age: 83
End: 2023-03-29

## 2023-03-29 NOTE — TELEPHONE ENCOUNTER
Have attempted to call patient at [facility] countless times in order to get him scheduled for his sleep study. Have tried for 2 weeks or more. Have also tried contacting his home number in hopes of getting in touch with him or a family member but have not been able to.         Laurie Brunner

## 2023-03-31 PROBLEM — R79.89 ELEVATED TROPONIN: Status: RESOLVED | Noted: 2023-02-21 | Resolved: 2023-03-31

## 2023-03-31 PROBLEM — R77.8 ELEVATED TROPONIN: Status: RESOLVED | Noted: 2023-02-21 | Resolved: 2023-03-31

## 2023-04-01 LAB
EKG ATRIAL RATE: 74 BPM
EKG ATRIAL RATE: 77 BPM
EKG ATRIAL RATE: 78 BPM
EKG ATRIAL RATE: 87 BPM
EKG P AXIS: -15 DEGREES
EKG P AXIS: -20 DEGREES
EKG P AXIS: -8 DEGREES
EKG P AXIS: 14 DEGREES
EKG P-R INTERVAL: 216 MS
EKG P-R INTERVAL: 236 MS
EKG P-R INTERVAL: 248 MS
EKG P-R INTERVAL: 254 MS
EKG Q-T INTERVAL: 364 MS
EKG Q-T INTERVAL: 376 MS
EKG Q-T INTERVAL: 386 MS
EKG Q-T INTERVAL: 394 MS
EKG QRS DURATION: 100 MS
EKG QRS DURATION: 90 MS
EKG QRS DURATION: 96 MS
EKG QRS DURATION: 98 MS
EKG QTC CALCULATION (BAZETT): 425 MS
EKG QTC CALCULATION (BAZETT): 437 MS
EKG QTC CALCULATION (BAZETT): 438 MS
EKG QTC CALCULATION (BAZETT): 440 MS
EKG R AXIS: -13 DEGREES
EKG R AXIS: -3 DEGREES
EKG R AXIS: 0 DEGREES
EKG T AXIS: -10 DEGREES
EKG T AXIS: -57 DEGREES
EKG T AXIS: 138 DEGREES
EKG T AXIS: 168 DEGREES
EKG VENTRICULAR RATE: 74 BPM
EKG VENTRICULAR RATE: 77 BPM
EKG VENTRICULAR RATE: 78 BPM
EKG VENTRICULAR RATE: 87 BPM

## 2023-04-03 ENCOUNTER — HOSPITAL ENCOUNTER (INPATIENT)
Age: 83
LOS: 2 days | Discharge: NURSING, REHAB OR LONG-TERM CARE | DRG: 312 | End: 2023-04-05
Attending: STUDENT IN AN ORGANIZED HEALTH CARE EDUCATION/TRAINING PROGRAM | Admitting: INTERNAL MEDICINE
Payer: MEDICARE

## 2023-04-03 ENCOUNTER — APPOINTMENT (OUTPATIENT)
Dept: GENERAL RADIOLOGY | Age: 83
DRG: 312 | End: 2023-04-03
Payer: MEDICARE

## 2023-04-03 ENCOUNTER — APPOINTMENT (OUTPATIENT)
Dept: CT IMAGING | Age: 83
DRG: 312 | End: 2023-04-03
Payer: MEDICARE

## 2023-04-03 DIAGNOSIS — R13.10 DYSPHAGIA, UNSPECIFIED TYPE: ICD-10-CM

## 2023-04-03 DIAGNOSIS — R55 SYNCOPE AND COLLAPSE: Primary | ICD-10-CM

## 2023-04-03 DIAGNOSIS — I95.9 HYPOTENSION, UNSPECIFIED HYPOTENSION TYPE: ICD-10-CM

## 2023-04-03 LAB
ALBUMIN SERPL BCG-MCNC: 3.4 G/DL (ref 3.5–5.1)
ALP SERPL-CCNC: 138 U/L (ref 38–126)
ALT SERPL W/O P-5'-P-CCNC: 15 U/L (ref 11–66)
ANION GAP SERPL CALC-SCNC: 10 MEQ/L (ref 8–16)
AST SERPL-CCNC: 17 U/L (ref 5–40)
BACTERIA URNS QL MICRO: ABNORMAL /HPF
BASOPHILS ABSOLUTE: 0 THOU/MM3 (ref 0–0.1)
BASOPHILS NFR BLD AUTO: 0.4 %
BILIRUB SERPL-MCNC: 0.2 MG/DL (ref 0.3–1.2)
BILIRUB UR QL STRIP.AUTO: NEGATIVE
BUN SERPL-MCNC: 11 MG/DL (ref 7–22)
CALCIUM SERPL-MCNC: 7.9 MG/DL (ref 8.5–10.5)
CASTS #/AREA URNS LPF: ABNORMAL /LPF
CASTS 2: ABNORMAL /LPF
CHARACTER UR: CLEAR
CHLORIDE SERPL-SCNC: 104 MEQ/L (ref 98–111)
CO2 SERPL-SCNC: 26 MEQ/L (ref 23–33)
COLOR: ABNORMAL
CREAT SERPL-MCNC: 0.8 MG/DL (ref 0.4–1.2)
CRYSTALS URNS MICRO: ABNORMAL
DEPRECATED RDW RBC AUTO: 55.8 FL (ref 35–45)
EOSINOPHIL NFR BLD AUTO: 13 %
EOSINOPHILS ABSOLUTE: 0.6 THOU/MM3 (ref 0–0.4)
EPITHELIAL CELLS, UA: ABNORMAL /HPF
ERYTHROCYTE [DISTWIDTH] IN BLOOD BY AUTOMATED COUNT: 15 % (ref 11.5–14.5)
FLUAV RNA RESP QL NAA+PROBE: NOT DETECTED
FLUBV RNA RESP QL NAA+PROBE: NOT DETECTED
GFR SERPL CREATININE-BSD FRML MDRD: > 60 ML/MIN/1.73M2
GLUCOSE BLD STRIP.AUTO-MCNC: 104 MG/DL (ref 70–108)
GLUCOSE SERPL-MCNC: 97 MG/DL (ref 70–108)
GLUCOSE UR QL STRIP.AUTO: NEGATIVE MG/DL
HCT VFR BLD AUTO: 35.5 % (ref 42–52)
HGB BLD-MCNC: 11 GM/DL (ref 14–18)
HGB UR QL STRIP.AUTO: NEGATIVE
IMM GRANULOCYTES # BLD AUTO: 0.02 THOU/MM3 (ref 0–0.07)
IMM GRANULOCYTES NFR BLD AUTO: 0.4 %
INR PPP: 1.06 (ref 0.85–1.13)
KETONES UR QL STRIP.AUTO: ABNORMAL
LACTIC ACID, SEPSIS: 1.9 MMOL/L (ref 0.5–1.9)
LACTIC ACID, SEPSIS: 1.9 MMOL/L (ref 0.5–1.9)
LYMPHOCYTES ABSOLUTE: 1.2 THOU/MM3 (ref 1–4.8)
LYMPHOCYTES NFR BLD AUTO: 26.5 %
MCH RBC QN AUTO: 31.7 PG (ref 26–33)
MCHC RBC AUTO-ENTMCNC: 31 GM/DL (ref 32.2–35.5)
MCV RBC AUTO: 102.3 FL (ref 80–94)
MISCELLANEOUS 2: ABNORMAL
MONOCYTES ABSOLUTE: 0.5 THOU/MM3 (ref 0.4–1.3)
MONOCYTES NFR BLD AUTO: 12.1 %
NEUTROPHILS NFR BLD AUTO: 47.6 %
NITRITE UR QL STRIP: NEGATIVE
NRBC BLD AUTO-RTO: 0 /100 WBC
NT-PROBNP SERPL IA-MCNC: 1171 PG/ML (ref 0–449)
OSMOLALITY SERPL CALC.SUM OF ELEC: 278.7 MOSMOL/KG (ref 275–300)
PH UR STRIP.AUTO: 5.5 [PH] (ref 5–9)
PLATELET # BLD AUTO: 291 THOU/MM3 (ref 130–400)
PMV BLD AUTO: 10 FL (ref 9.4–12.4)
POTASSIUM SERPL-SCNC: 4.3 MEQ/L (ref 3.5–5.2)
PROT SERPL-MCNC: 5.7 G/DL (ref 6.1–8)
PROT UR STRIP.AUTO-MCNC: 30 MG/DL
RBC # BLD AUTO: 3.47 MILL/MM3 (ref 4.7–6.1)
RBC URINE: ABNORMAL /HPF
REASON FOR REJECTION: NORMAL
REJECTED TEST: NORMAL
RENAL EPI CELLS #/AREA URNS HPF: ABNORMAL /[HPF]
SARS-COV-2 RNA RESP QL NAA+PROBE: NOT DETECTED
SEGMENTED NEUTROPHILS ABSOLUTE COUNT: 2.1 THOU/MM3 (ref 1.8–7.7)
SODIUM SERPL-SCNC: 140 MEQ/L (ref 135–145)
SP GR UR REFRACT.AUTO: > 1.03 (ref 1–1.03)
TROPONIN T: < 0.01 NG/ML
UROBILINOGEN, URINE: 0.2 EU/DL (ref 0–1)
WBC # BLD AUTO: 4.5 THOU/MM3 (ref 4.8–10.8)
WBC #/AREA URNS HPF: ABNORMAL /HPF
WBC #/AREA URNS HPF: ABNORMAL /[HPF]
YEAST LIKE FUNGI URNS QL MICRO: ABNORMAL

## 2023-04-03 PROCEDURE — 82948 REAGENT STRIP/BLOOD GLUCOSE: CPT

## 2023-04-03 PROCEDURE — 2060000000 HC ICU INTERMEDIATE R&B

## 2023-04-03 PROCEDURE — 70450 CT HEAD/BRAIN W/O DYE: CPT

## 2023-04-03 PROCEDURE — 87636 SARSCOV2 & INF A&B AMP PRB: CPT

## 2023-04-03 PROCEDURE — 93005 ELECTROCARDIOGRAM TRACING: CPT | Performed by: STUDENT IN AN ORGANIZED HEALTH CARE EDUCATION/TRAINING PROGRAM

## 2023-04-03 PROCEDURE — 87086 URINE CULTURE/COLONY COUNT: CPT

## 2023-04-03 PROCEDURE — 80053 COMPREHEN METABOLIC PANEL: CPT

## 2023-04-03 PROCEDURE — 87040 BLOOD CULTURE FOR BACTERIA: CPT

## 2023-04-03 PROCEDURE — 99285 EMERGENCY DEPT VISIT HI MDM: CPT

## 2023-04-03 PROCEDURE — 71045 X-RAY EXAM CHEST 1 VIEW: CPT

## 2023-04-03 PROCEDURE — 81001 URINALYSIS AUTO W/SCOPE: CPT

## 2023-04-03 PROCEDURE — 36415 COLL VENOUS BLD VENIPUNCTURE: CPT

## 2023-04-03 PROCEDURE — 84484 ASSAY OF TROPONIN QUANT: CPT

## 2023-04-03 PROCEDURE — 85025 COMPLETE CBC W/AUTO DIFF WBC: CPT

## 2023-04-03 PROCEDURE — 83605 ASSAY OF LACTIC ACID: CPT

## 2023-04-03 PROCEDURE — 83880 ASSAY OF NATRIURETIC PEPTIDE: CPT

## 2023-04-03 PROCEDURE — 85610 PROTHROMBIN TIME: CPT

## 2023-04-04 ENCOUNTER — APPOINTMENT (OUTPATIENT)
Dept: GENERAL RADIOLOGY | Age: 83
DRG: 312 | End: 2023-04-04
Payer: MEDICARE

## 2023-04-04 ENCOUNTER — APPOINTMENT (OUTPATIENT)
Dept: INTERVENTIONAL RADIOLOGY/VASCULAR | Age: 83
DRG: 312 | End: 2023-04-04
Payer: MEDICARE

## 2023-04-04 ENCOUNTER — TELEPHONE (OUTPATIENT)
Dept: CARDIOTHORACIC SURGERY | Age: 83
End: 2023-04-04

## 2023-04-04 LAB
25(OH)D3 SERPL-MCNC: 17 NG/ML (ref 30–100)
ANION GAP SERPL CALC-SCNC: 9 MEQ/L (ref 8–16)
BASOPHILS ABSOLUTE: 0 THOU/MM3 (ref 0–0.1)
BASOPHILS NFR BLD AUTO: 0.3 %
BUN SERPL-MCNC: 8 MG/DL (ref 7–22)
CA-I BLD ISE-SCNC: 1.01 MMOL/L (ref 1.12–1.32)
CA-I BLD ISE-SCNC: 1.13 MMOL/L (ref 1.12–1.32)
CALCIUM SERPL-MCNC: 7.8 MG/DL (ref 8.5–10.5)
CHLORIDE SERPL-SCNC: 107 MEQ/L (ref 98–111)
CK SERPL-CCNC: 32 U/L (ref 55–170)
CO2 SERPL-SCNC: 25 MEQ/L (ref 23–33)
CREAT SERPL-MCNC: 0.6 MG/DL (ref 0.4–1.2)
DEPRECATED RDW RBC AUTO: 55.8 FL (ref 35–45)
EOSINOPHIL NFR BLD AUTO: 10.3 %
EOSINOPHILS ABSOLUTE: 0.4 THOU/MM3 (ref 0–0.4)
ERYTHROCYTE [DISTWIDTH] IN BLOOD BY AUTOMATED COUNT: 14.7 % (ref 11.5–14.5)
GFR SERPL CREATININE-BSD FRML MDRD: > 60 ML/MIN/1.73M2
GLUCOSE SERPL-MCNC: 87 MG/DL (ref 70–108)
HCT VFR BLD AUTO: 33.4 % (ref 42–52)
HGB BLD-MCNC: 10.1 GM/DL (ref 14–18)
IMM GRANULOCYTES # BLD AUTO: 0.01 THOU/MM3 (ref 0–0.07)
IMM GRANULOCYTES NFR BLD AUTO: 0.3 %
LYMPHOCYTES ABSOLUTE: 0.9 THOU/MM3 (ref 1–4.8)
LYMPHOCYTES NFR BLD AUTO: 24 %
MAGNESIUM SERPL-MCNC: 2.1 MG/DL (ref 1.6–2.4)
MCH RBC QN AUTO: 31.2 PG (ref 26–33)
MCHC RBC AUTO-ENTMCNC: 30.2 GM/DL (ref 32.2–35.5)
MCV RBC AUTO: 103.1 FL (ref 80–94)
MONOCYTES ABSOLUTE: 0.4 THOU/MM3 (ref 0.4–1.3)
MONOCYTES NFR BLD AUTO: 9.8 %
MRSA DNA SPEC QL NAA+PROBE: NEGATIVE
NEUTROPHILS NFR BLD AUTO: 55.3 %
NRBC BLD AUTO-RTO: 0 /100 WBC
PHOSPHATE SERPL-MCNC: 2.9 MG/DL (ref 2.4–4.7)
PLATELET # BLD AUTO: 226 THOU/MM3 (ref 130–400)
PMV BLD AUTO: 9.4 FL (ref 9.4–12.4)
POTASSIUM SERPL-SCNC: 4.4 MEQ/L (ref 3.5–5.2)
PREALB SERPL-MCNC: 17.2 MG/DL (ref 20–40)
RBC # BLD AUTO: 3.24 MILL/MM3 (ref 4.7–6.1)
SEGMENTED NEUTROPHILS ABSOLUTE COUNT: 2.1 THOU/MM3 (ref 1.8–7.7)
SODIUM SERPL-SCNC: 141 MEQ/L (ref 135–145)
T4 FREE SERPL-MCNC: 1.22 NG/DL (ref 0.93–1.76)
TSH SERPL DL<=0.005 MIU/L-ACNC: 3.44 UIU/ML (ref 0.4–4.2)
VANA ISLT/SPM QL: POSITIVE
WBC # BLD AUTO: 3.8 THOU/MM3 (ref 4.8–10.8)

## 2023-04-04 PROCEDURE — 85025 COMPLETE CBC W/AUTO DIFF WBC: CPT

## 2023-04-04 PROCEDURE — 93005 ELECTROCARDIOGRAM TRACING: CPT

## 2023-04-04 PROCEDURE — 93010 ELECTROCARDIOGRAM REPORT: CPT | Performed by: NUCLEAR MEDICINE

## 2023-04-04 PROCEDURE — 84443 ASSAY THYROID STIM HORMONE: CPT

## 2023-04-04 PROCEDURE — 6370000000 HC RX 637 (ALT 250 FOR IP)

## 2023-04-04 PROCEDURE — 83735 ASSAY OF MAGNESIUM: CPT

## 2023-04-04 PROCEDURE — 6370000000 HC RX 637 (ALT 250 FOR IP): Performed by: STUDENT IN AN ORGANIZED HEALTH CARE EDUCATION/TRAINING PROGRAM

## 2023-04-04 PROCEDURE — 6360000002 HC RX W HCPCS: Performed by: STUDENT IN AN ORGANIZED HEALTH CARE EDUCATION/TRAINING PROGRAM

## 2023-04-04 PROCEDURE — 6370000000 HC RX 637 (ALT 250 FOR IP): Performed by: PSYCHIATRY & NEUROLOGY

## 2023-04-04 PROCEDURE — 2580000003 HC RX 258: Performed by: STUDENT IN AN ORGANIZED HEALTH CARE EDUCATION/TRAINING PROGRAM

## 2023-04-04 PROCEDURE — 99223 1ST HOSP IP/OBS HIGH 75: CPT | Performed by: INTERNAL MEDICINE

## 2023-04-04 PROCEDURE — 84100 ASSAY OF PHOSPHORUS: CPT

## 2023-04-04 PROCEDURE — 6360000002 HC RX W HCPCS: Performed by: PSYCHIATRY & NEUROLOGY

## 2023-04-04 PROCEDURE — 82550 ASSAY OF CK (CPK): CPT

## 2023-04-04 PROCEDURE — 92611 MOTION FLUOROSCOPY/SWALLOW: CPT

## 2023-04-04 PROCEDURE — 87500 VANOMYCIN DNA AMP PROBE: CPT

## 2023-04-04 PROCEDURE — 93970 EXTREMITY STUDY: CPT

## 2023-04-04 PROCEDURE — 74230 X-RAY XM SWLNG FUNCJ C+: CPT

## 2023-04-04 PROCEDURE — 36415 COLL VENOUS BLD VENIPUNCTURE: CPT

## 2023-04-04 PROCEDURE — 82330 ASSAY OF CALCIUM: CPT

## 2023-04-04 PROCEDURE — 84134 ASSAY OF PREALBUMIN: CPT

## 2023-04-04 PROCEDURE — 82306 VITAMIN D 25 HYDROXY: CPT

## 2023-04-04 PROCEDURE — 92610 EVALUATE SWALLOWING FUNCTION: CPT

## 2023-04-04 PROCEDURE — 2580000003 HC RX 258: Performed by: PSYCHIATRY & NEUROLOGY

## 2023-04-04 PROCEDURE — 2500000003 HC RX 250 WO HCPCS: Performed by: INTERNAL MEDICINE

## 2023-04-04 PROCEDURE — 80048 BASIC METABOLIC PNL TOTAL CA: CPT

## 2023-04-04 PROCEDURE — 94640 AIRWAY INHALATION TREATMENT: CPT

## 2023-04-04 PROCEDURE — 84439 ASSAY OF FREE THYROXINE: CPT

## 2023-04-04 PROCEDURE — 87641 MR-STAPH DNA AMP PROBE: CPT

## 2023-04-04 PROCEDURE — 71045 X-RAY EXAM CHEST 1 VIEW: CPT

## 2023-04-04 PROCEDURE — 87070 CULTURE OTHR SPECIMN AEROBIC: CPT

## 2023-04-04 PROCEDURE — 2060000000 HC ICU INTERMEDIATE R&B

## 2023-04-04 RX ORDER — CALCIUM GLUCONATE 10 MG/ML
1000 INJECTION, SOLUTION INTRAVENOUS ONCE
Status: COMPLETED | OUTPATIENT
Start: 2023-04-04 | End: 2023-04-04

## 2023-04-04 RX ORDER — ACETAMINOPHEN 650 MG/1
650 SUPPOSITORY RECTAL EVERY 6 HOURS PRN
Status: DISCONTINUED | OUTPATIENT
Start: 2023-04-04 | End: 2023-04-05 | Stop reason: HOSPADM

## 2023-04-04 RX ORDER — CARBAMAZEPINE 200 MG/1
600 TABLET, EXTENDED RELEASE ORAL 2 TIMES DAILY
Status: DISCONTINUED | OUTPATIENT
Start: 2023-04-04 | End: 2023-04-05 | Stop reason: HOSPADM

## 2023-04-04 RX ORDER — CLOPIDOGREL BISULFATE 75 MG/1
75 TABLET ORAL DAILY
Status: DISCONTINUED | OUTPATIENT
Start: 2023-04-04 | End: 2023-04-05 | Stop reason: HOSPADM

## 2023-04-04 RX ORDER — SODIUM CHLORIDE 9 MG/ML
INJECTION, SOLUTION INTRAVENOUS PRN
Status: DISCONTINUED | OUTPATIENT
Start: 2023-04-04 | End: 2023-04-05 | Stop reason: HOSPADM

## 2023-04-04 RX ORDER — ACETAMINOPHEN 325 MG/1
650 TABLET ORAL EVERY 6 HOURS PRN
Status: DISCONTINUED | OUTPATIENT
Start: 2023-04-04 | End: 2023-04-05 | Stop reason: HOSPADM

## 2023-04-04 RX ORDER — ESCITALOPRAM OXALATE 10 MG/1
10 TABLET ORAL DAILY
Status: ON HOLD | COMMUNITY
End: 2023-04-05 | Stop reason: SDUPTHER

## 2023-04-04 RX ORDER — ESCITALOPRAM OXALATE 10 MG/1
5 TABLET ORAL DAILY
Status: CANCELLED | OUTPATIENT
Start: 2023-04-04

## 2023-04-04 RX ORDER — ONDANSETRON 4 MG/1
4 TABLET, ORALLY DISINTEGRATING ORAL EVERY 8 HOURS PRN
Status: DISCONTINUED | OUTPATIENT
Start: 2023-04-04 | End: 2023-04-05 | Stop reason: HOSPADM

## 2023-04-04 RX ORDER — SODIUM CHLORIDE 0.9 % (FLUSH) 0.9 %
5-40 SYRINGE (ML) INJECTION PRN
Status: DISCONTINUED | OUTPATIENT
Start: 2023-04-04 | End: 2023-04-05 | Stop reason: HOSPADM

## 2023-04-04 RX ORDER — SENNA PLUS 8.6 MG/1
1 TABLET ORAL DAILY PRN
Status: DISCONTINUED | OUTPATIENT
Start: 2023-04-04 | End: 2023-04-05 | Stop reason: HOSPADM

## 2023-04-04 RX ORDER — MULTIVITAMIN WITH IRON
1 TABLET ORAL DAILY
Status: DISCONTINUED | OUTPATIENT
Start: 2023-04-04 | End: 2023-04-05 | Stop reason: HOSPADM

## 2023-04-04 RX ORDER — LEVOTHYROXINE SODIUM 0.05 MG/1
50 TABLET ORAL DAILY
Status: DISCONTINUED | OUTPATIENT
Start: 2023-04-04 | End: 2023-04-05 | Stop reason: HOSPADM

## 2023-04-04 RX ORDER — LIDOCAINE HCL 4% 4 G/100G
CREAM TOPICAL
COMMUNITY

## 2023-04-04 RX ORDER — POLYETHYLENE GLYCOL 3350 17 G/17G
17 POWDER, FOR SOLUTION ORAL DAILY
COMMUNITY

## 2023-04-04 RX ORDER — AMIODARONE HYDROCHLORIDE 200 MG/1
200 TABLET ORAL DAILY
Status: DISCONTINUED | OUTPATIENT
Start: 2023-04-04 | End: 2023-04-05 | Stop reason: HOSPADM

## 2023-04-04 RX ORDER — ALBUTEROL SULFATE 2.5 MG/3ML
2.5 SOLUTION RESPIRATORY (INHALATION) EVERY 6 HOURS PRN
Status: DISCONTINUED | OUTPATIENT
Start: 2023-04-04 | End: 2023-04-05 | Stop reason: HOSPADM

## 2023-04-04 RX ORDER — SODIUM CHLORIDE, SODIUM LACTATE, POTASSIUM CHLORIDE, CALCIUM CHLORIDE 600; 310; 30; 20 MG/100ML; MG/100ML; MG/100ML; MG/100ML
INJECTION, SOLUTION INTRAVENOUS CONTINUOUS
Status: DISCONTINUED | OUTPATIENT
Start: 2023-04-04 | End: 2023-04-05 | Stop reason: HOSPADM

## 2023-04-04 RX ORDER — ROSUVASTATIN CALCIUM 20 MG/1
20 TABLET, COATED ORAL NIGHTLY
Status: DISCONTINUED | OUTPATIENT
Start: 2023-04-04 | End: 2023-04-05 | Stop reason: HOSPADM

## 2023-04-04 RX ORDER — MODAFINIL 100 MG/1
100 TABLET ORAL DAILY
Status: DISCONTINUED | OUTPATIENT
Start: 2023-04-04 | End: 2023-04-05 | Stop reason: HOSPADM

## 2023-04-04 RX ORDER — LIDOCAINE 4 G/G
1 PATCH TOPICAL DAILY
COMMUNITY

## 2023-04-04 RX ORDER — 0.9 % SODIUM CHLORIDE 0.9 %
500 INTRAVENOUS SOLUTION INTRAVENOUS ONCE
Status: COMPLETED | OUTPATIENT
Start: 2023-04-04 | End: 2023-04-04

## 2023-04-04 RX ORDER — ASPIRIN 81 MG/1
81 TABLET, CHEWABLE ORAL DAILY
Status: DISCONTINUED | OUTPATIENT
Start: 2023-04-04 | End: 2023-04-05 | Stop reason: HOSPADM

## 2023-04-04 RX ORDER — VITAMIN B COMPLEX
1000 TABLET ORAL DAILY
Status: DISCONTINUED | OUTPATIENT
Start: 2023-04-04 | End: 2023-04-05 | Stop reason: HOSPADM

## 2023-04-04 RX ORDER — ONDANSETRON 2 MG/ML
4 INJECTION INTRAMUSCULAR; INTRAVENOUS EVERY 6 HOURS PRN
Status: DISCONTINUED | OUTPATIENT
Start: 2023-04-04 | End: 2023-04-05 | Stop reason: HOSPADM

## 2023-04-04 RX ORDER — ROSUVASTATIN CALCIUM 20 MG/1
20 TABLET, COATED ORAL DAILY
COMMUNITY

## 2023-04-04 RX ORDER — ENOXAPARIN SODIUM 100 MG/ML
40 INJECTION SUBCUTANEOUS DAILY
Status: DISCONTINUED | OUTPATIENT
Start: 2023-04-04 | End: 2023-04-05 | Stop reason: HOSPADM

## 2023-04-04 RX ORDER — CARVEDILOL 6.25 MG/1
12.5 TABLET ORAL 2 TIMES DAILY WITH MEALS
Status: DISCONTINUED | OUTPATIENT
Start: 2023-04-04 | End: 2023-04-05 | Stop reason: HOSPADM

## 2023-04-04 RX ORDER — SODIUM CHLORIDE 0.9 % (FLUSH) 0.9 %
5-40 SYRINGE (ML) INJECTION EVERY 12 HOURS SCHEDULED
Status: DISCONTINUED | OUTPATIENT
Start: 2023-04-04 | End: 2023-04-05 | Stop reason: HOSPADM

## 2023-04-04 RX ORDER — ATORVASTATIN CALCIUM 40 MG/1
40 TABLET, FILM COATED ORAL DAILY
Status: DISCONTINUED | OUTPATIENT
Start: 2023-04-04 | End: 2023-04-04 | Stop reason: ALTCHOICE

## 2023-04-04 RX ORDER — POLYETHYLENE GLYCOL 3350 17 G/17G
17 POWDER, FOR SOLUTION ORAL DAILY PRN
Status: DISCONTINUED | OUTPATIENT
Start: 2023-04-04 | End: 2023-04-05 | Stop reason: HOSPADM

## 2023-04-04 RX ADMIN — CARBAMAZEPINE 600 MG: 200 TABLET, EXTENDED RELEASE ORAL at 09:11

## 2023-04-04 RX ADMIN — SODIUM CHLORIDE, PRESERVATIVE FREE 10 ML: 5 INJECTION INTRAVENOUS at 20:22

## 2023-04-04 RX ADMIN — SODIUM CHLORIDE, POTASSIUM CHLORIDE, SODIUM LACTATE AND CALCIUM CHLORIDE: 600; 310; 30; 20 INJECTION, SOLUTION INTRAVENOUS at 21:28

## 2023-04-04 RX ADMIN — CLOPIDOGREL BISULFATE 75 MG: 75 TABLET ORAL at 09:05

## 2023-04-04 RX ADMIN — SODIUM CHLORIDE, POTASSIUM CHLORIDE, SODIUM LACTATE AND CALCIUM CHLORIDE: 600; 310; 30; 20 INJECTION, SOLUTION INTRAVENOUS at 00:15

## 2023-04-04 RX ADMIN — SODIUM CHLORIDE, PRESERVATIVE FREE 10 ML: 5 INJECTION INTRAVENOUS at 09:05

## 2023-04-04 RX ADMIN — ACETAMINOPHEN 650 MG: 325 TABLET ORAL at 20:29

## 2023-04-04 RX ADMIN — BARIUM SULFATE 30 ML: 0.81 POWDER, FOR SUSPENSION ORAL at 10:09

## 2023-04-04 RX ADMIN — MODAFINIL 100 MG: 100 TABLET ORAL at 09:05

## 2023-04-04 RX ADMIN — TIOTROPIUM BROMIDE AND OLODATEROL 2 PUFF: 3.124; 2.736 SPRAY, METERED RESPIRATORY (INHALATION) at 07:42

## 2023-04-04 RX ADMIN — CALCIUM GLUCONATE 1000 MG: 10 INJECTION, SOLUTION INTRAVENOUS at 09:25

## 2023-04-04 RX ADMIN — ACETAMINOPHEN 650 MG: 325 TABLET ORAL at 09:11

## 2023-04-04 RX ADMIN — Medication 1000 UNITS: at 20:22

## 2023-04-04 RX ADMIN — BARIUM SULFATE 50 ML: 400 PASTE ORAL at 10:09

## 2023-04-04 RX ADMIN — ROSUVASTATIN CALCIUM 20 MG: 20 TABLET, FILM COATED ORAL at 20:21

## 2023-04-04 RX ADMIN — ASPIRIN 81 MG 81 MG: 81 TABLET ORAL at 09:05

## 2023-04-04 RX ADMIN — CARBAMAZEPINE 600 MG: 200 TABLET, EXTENDED RELEASE ORAL at 20:30

## 2023-04-04 RX ADMIN — ENOXAPARIN SODIUM 40 MG: 100 INJECTION SUBCUTANEOUS at 09:05

## 2023-04-04 RX ADMIN — Medication 1 TABLET: at 09:05

## 2023-04-04 RX ADMIN — SODIUM CHLORIDE 500 ML: 9 INJECTION, SOLUTION INTRAVENOUS at 08:19

## 2023-04-04 RX ADMIN — SODIUM CHLORIDE, POTASSIUM CHLORIDE, SODIUM LACTATE AND CALCIUM CHLORIDE: 600; 310; 30; 20 INJECTION, SOLUTION INTRAVENOUS at 11:52

## 2023-04-04 ASSESSMENT — PAIN DESCRIPTION - LOCATION
LOCATION: CHEST
LOCATION: LEG

## 2023-04-04 ASSESSMENT — PAIN DESCRIPTION - FREQUENCY: FREQUENCY: INTERMITTENT

## 2023-04-04 ASSESSMENT — PAIN SCALES - GENERAL
PAINLEVEL_OUTOF10: 10
PAINLEVEL_OUTOF10: 3
PAINLEVEL_OUTOF10: 7
PAINLEVEL_OUTOF10: 0

## 2023-04-04 ASSESSMENT — PAIN DESCRIPTION - ONSET: ONSET: SUDDEN

## 2023-04-04 ASSESSMENT — PAIN DESCRIPTION - PAIN TYPE: TYPE: ACUTE PAIN

## 2023-04-04 ASSESSMENT — PAIN SCALES - WONG BAKER: WONGBAKER_NUMERICALRESPONSE: 0

## 2023-04-04 ASSESSMENT — PAIN DESCRIPTION - ORIENTATION
ORIENTATION: LEFT
ORIENTATION: LEFT

## 2023-04-04 ASSESSMENT — PAIN - FUNCTIONAL ASSESSMENT: PAIN_FUNCTIONAL_ASSESSMENT: ACTIVITIES ARE NOT PREVENTED

## 2023-04-04 ASSESSMENT — PAIN DESCRIPTION - DESCRIPTORS: DESCRIPTORS: ACHING

## 2023-04-04 NOTE — ED NOTES
MD Geogria Felipe RN; Glen Piper MD   Caller: Unspecified (Yesterday,  3:12 PM)             Hi,     She needs to hold the coumadin 3 days prior to the procedure.  She needs to be bridged with lovenox at 1mg/kg q 12 h bid during th RN spoke with Twyla at [facility] about PT status and patient admission.       Gino Walker RN  04/03/23 [id number]

## 2023-04-04 NOTE — ED NOTES
Spoke to [facility] staff to approve pt transport to [facility] in stable condition in 10 minutes.      Bonny Braden LPN  31/01/66 [id number]

## 2023-04-04 NOTE — ED NOTES
ED to inpatient nurses report    Chief Complaint   Patient presents with    Loss of Consciousness      Present to ED from nursing home  LOC: alert and orientated to name and place  Vital signs   Vitals:    04/03/23 2141 04/03/23 2215 04/03/23 2239 04/03/23 2253   BP: (!) 117/59 (S) (!) 99/45 (!) 109/49 (!) 131/48   Pulse: 60 60  65   Resp: 13 18  14   Temp:       TempSrc:       SpO2: 94% 100%  99%   Weight:       Height:          Oxygen Baseline RA    Current needs required RA Bipap/Cpap No  LDAs:   Peripheral IV 04/03/23 Left Antecubital (Active)   Site Assessment Clean, dry & intact 04/03/23 2023   Line Status Flushed;Normal saline locked 04/03/23 2023   Phlebitis Assessment No symptoms 04/03/23 2023   Infiltration Assessment 0 04/03/23 2023       Peripheral IV 04/03/23 Left Forearm (Active)   Site Assessment Clean, dry & intact 04/03/23 2024   Line Status Normal saline locked 04/03/23 2024   Phlebitis Assessment No symptoms 04/03/23 2024   Infiltration Assessment 0 04/03/23 2024     Mobility: Requires assistance * 2  Pending ED orders: none  Present condition: stable    C-SSRS    Swallow Screening    Preferred Language: [place]     Electronically signed by Marcy Durbin RN on 4/3/2023 at 10:55 PM       Gino Porras RN  04/03/23 3551

## 2023-04-04 NOTE — SIGNIFICANT EVENT
Resident physician called to bedside by primary RN for 30-year-old male complaining of chest pain. Patient is well-known to resident physician from previous admission. Patient endorses left-sided chest pain that is worse with deep breathing as well as with palpation of the chest.  Patient is oriented to self and location. He is not oriented to time or situation. On physical examination this is an elderly male lying in bed. He is able to move all 4 limbs spontaneously. Lungs are clear to auscultation bilaterally. Pain is elicited with palpation of the left side of the chest.  Normal S1-S2 with no murmurs rubs or gallops. Clean and well approximated sternal incision from previous CABG. Vital signs reviewed.     Assessment:  Costochondritis, rule out  Ischemic cardiomyopathy, rule out  Acute musculoskeletal pathology concerning for rib involvement, rule out    Plan:  EKG  CXR  CBC, BMP, magnesium, phosphorus  Updated Tiffanie Fuller MD and Severo Charlton DO regarding plan of care    -Temi Beal MD IM Resident

## 2023-04-04 NOTE — ED NOTES
PT resting in bed. PT and VS assessed. Call light in reach. Respirations even and unlabored. PT denies needs at this time.       Ed Malin RN  04/03/23 5551

## 2023-04-04 NOTE — ED NOTES
PT resting in bed. PT and VS assessed. Family at bedside.  PT provided with blankets per request.      Neema Stephens RN  04/03/23 2050

## 2023-04-04 NOTE — ED NOTES
0.9% normal saline started per verbal order of  Dr Jacquelyn Winston.       Tracy Ventura, RN  04/03/23 2020

## 2023-04-04 NOTE — ED PROVIDER NOTES
electronically signed by: Zaid Song MD on    04/03/2023 09:48 PM      All CTs at this facility use dose modulation techniques and iterative    reconstructions, and/or weight-based dosing   when appropriate to reduce radiation to a low as reasonably achievable. XR CHEST PORTABLE   Final Result   Previous enteric tube is removed. No other acute findings.       This document has been electronically signed by: Zaid Song MD on    04/03/2023 09:00 PM          LABS: (none if blank)  Labs Reviewed   CBC WITH AUTO DIFFERENTIAL - Abnormal; Notable for the following components:       Result Value    WBC 4.5 (*)     RBC 3.47 (*)     Hemoglobin 11.0 (*)     Hematocrit 35.5 (*)     .3 (*)     MCHC 31.0 (*)     RDW-CV 15.0 (*)     RDW-SD 55.8 (*)     Eosinophils Absolute 0.6 (*)     All other components within normal limits   COMPREHENSIVE METABOLIC PANEL - Abnormal; Notable for the following components:    Calcium 7.9 (*)     Alkaline Phosphatase 138 (*)     Total Protein 5.7 (*)     Albumin 3.4 (*)     Total Bilirubin 0.2 (*)     All other components within normal limits   BRAIN NATRIURETIC PEPTIDE - Abnormal; Notable for the following components:    Pro-BNP 1171.0 (*)     All other components within normal limits   URINE WITH REFLEXED MICRO - Abnormal; Notable for the following components:    Ketones, Urine TRACE (*)     Specific Gravity, Urine > 1.030 (*)     Protein, UA 30 (*)     Leukocyte Esterase, Urine SMALL (*)     Color, UA DK YELLOW (*)     All other components within normal limits   COVID-19 & INFLUENZA COMBO   CULTURE, BLOOD 2   CULTURE, BLOOD 1   CULTURE, REFLEXED, URINE   PROTIME-INR   LACTATE, SEPSIS   LACTATE, SEPSIS   SPECIMEN REJECTION   TROPONIN   ANION GAP   GLOMERULAR FILTRATION RATE, ESTIMATED   OSMOLALITY   CBC WITH AUTO DIFFERENTIAL   BASIC METABOLIC PANEL W/ REFLEX TO MG FOR LOW K   CALCIUM, IONIZED   POCT GLUCOSE       (Any cultures that may have been sent were not resulted at the time

## 2023-04-04 NOTE — ED NOTES
Dr Bob Mc at at [address] [facility], [address], [address] [facility]  04/03/23 2012

## 2023-04-04 NOTE — ED TRIAGE NOTES
PT to the ED with complaint of loss of consciousness. EMS states PT resides at [facility]. EMS states PT walked to the restroom without complication but was found unresponsive on the toilet. EMS states PT was pale and diaphoretic after the incident but have since returned back to his baseline. EMS states PT has some memory problems and \"confused\" is his baseline. PT alert and joking on arrival. PT knows name, place but not date. Respirations even and unlabored.

## 2023-04-04 NOTE — DISCHARGE INSTR - COC
the above information and transfer of Danny Ho  is necessary for the continuing treatment of the diagnosis listed and that he requires [place] for greater 30 days.      Update Admission H&P: No change in H&P    PHYSICIAN SIGNATURE:  Electronically signed by Guzman Hernandez MD on 4/5/23 at 1:40 PM EDT

## 2023-04-04 NOTE — ED NOTES
PT resting in bed. PT alert. PT and VS assessed. Provider notified of PT vital signs.       Neema Stephens RN  04/03/23 7109

## 2023-04-04 NOTE — PLAN OF CARE
outside of normal range   Administer vasoactive medications as ordered     Problem: Skin/Tissue Integrity - Adult  Goal: Skin integrity remains intact  Outcome: Progressing  Flowsheets (Taken 4/4/2023 0151)  Skin Integrity Remains Intact: Monitor for areas of redness and/or skin breakdown     Problem: Musculoskeletal - Adult  Goal: Return mobility to safest level of function  Outcome: Progressing  Flowsheets (Taken 4/4/2023 0151)  Return Mobility to Safest Level of Function:   Assess patient stability and activity tolerance for standing, transferring and ambulating with or without assistive devices   Assist with transfers and ambulation using safe patient handling equipment as needed   Obtain physical therapy/occupational therapy consults as needed     Problem: Skin/Tissue Integrity  Goal: Absence of new skin breakdown  Description: 1. Monitor for areas of redness and/or skin breakdown  2. Assess vascular access sites hourly  3. Every 4-6 hours minimum:  Change oxygen saturation probe site  4. Every 4-6 hours:  If on nasal continuous positive airway pressure, respiratory therapy assess nares and determine need for appliance change or resting period. Outcome: Progressing  Note: No new skin breakdown this shift. Patient is assisted with turning every two hours and as needed.       Problem: Chronic Conditions and Co-morbidities  Goal: Patient's chronic conditions and co-morbidity symptoms are monitored and maintained or improved  Outcome: Progressing  Flowsheets (Taken 4/4/2023 0151)  Care Plan - Patient's Chronic Conditions and Co-Morbidity Symptoms are Monitored and Maintained or Improved:   Monitor and assess patient's chronic conditions and comorbid symptoms for stability, deterioration, or improvement   Collaborate with multidisciplinary team to address chronic and comorbid conditions and prevent exacerbation or deterioration   Update acute care plan with appropriate goals if chronic or comorbid symptoms are

## 2023-04-04 NOTE — H&P
Abby Rosenberg MD at [address] [place] [address]      Tooth Abscess removed '95       Medications Prior to Admission:   Prior to Admission medications    Medication Sig Start Date End Date Taking? Authorizing Provider   rosuvastatin (CRESTOR) 20 MG tablet Take 1 tablet by mouth daily   Yes Historical Provider, MD   modafinil (PROVIGIL) 100 MG tablet Take 1 tablet by mouth daily for 30 days. Max Daily Amount: 100 mg 3/17/23 4/16/23  ANGELIC Jean Baptiste - CNP   aspirin 81 MG chewable tablet Take 1 tablet by mouth daily 3/16/23   Priscella Plants PA-C   amiodarone (CORDARONE) 200 MG tablet Take 1 tablet by mouth daily STOP AFTER 30 DAYS 3/16/23   Priscella Plants PA-C   albuterol (PROVENTIL) (2.5 MG/3ML) 0.083% nebulizer solution Take 3 mLs by nebulization every 6 hours as needed for Wheezing or Shortness of Breath 3/16/23   Priscella Plants PA-C   tiotropium-olodaterol (STIOLTO) 2.5-2.5 MCG/ACT AERS Inhale 2 puffs into the lungs daily 3/16/23   Priscella Plants PA-C   carvedilol (COREG) 12.5 MG tablet Take 1 tablet by mouth 2 times daily (with meals) 3/16/23   Priscella Plants PA-C   senna (SENOKOT) 8.6 MG tablet Take 1 tablet by mouth daily as needed (Constipation) 3/16/23 4/15/23  Priscella Plants PA-C   lisinopril (PRINIVIL;ZESTRIL) 2.5 MG tablet Take 1 tablet by mouth daily 3/16/23   Priscella Plants PA-C   levothyroxine (SYNTHROID) 50 MCG tablet Take 1 tablet by mouth Daily    Historical Provider, MD   acetaminophen 650 MG TABS Take 650 mg by mouth every 6 hours as needed. 3/4/14   Severo Rodriguez MD   carBAMazepine (TEGRETOL XR) 200 MG SR tablet Take 3 tablets by mouth 2 times daily    Historical Provider, MD   clopidogrel (PLAVIX) 75 MG tablet Take 1 tablet by mouth daily    Historical Provider, MD   atorvastatin (LIPITOR) 40 MG tablet Take 40 mg by mouth daily.   Patient not taking: Reported on 4/4/2023    Historical Provider, MD   therapeutic multivitamin-minerals [facility]) tablet Take 1 tablet by mouth daily

## 2023-04-04 NOTE — ED NOTES
PT resting in bed. PT and VS assessed. Urine sample collected. PT denies needs at this time. Bed alarm on.       Ed Malin RN  04/03/23 7386

## 2023-04-05 ENCOUNTER — APPOINTMENT (OUTPATIENT)
Dept: GENERAL RADIOLOGY | Age: 83
DRG: 312 | End: 2023-04-05
Payer: MEDICARE

## 2023-04-05 VITALS
HEIGHT: 69 IN | SYSTOLIC BLOOD PRESSURE: 105 MMHG | RESPIRATION RATE: 18 BRPM | HEART RATE: 71 BPM | TEMPERATURE: 98.2 F | DIASTOLIC BLOOD PRESSURE: 62 MMHG | OXYGEN SATURATION: 99 % | WEIGHT: 186.51 LBS | BODY MASS INDEX: 27.62 KG/M2

## 2023-04-05 LAB
ANION GAP SERPL CALC-SCNC: 10 MEQ/L (ref 8–16)
BACTERIA UR CULT: ABNORMAL
BASOPHILS ABSOLUTE: 0 THOU/MM3 (ref 0–0.1)
BASOPHILS NFR BLD AUTO: 0.5 %
BUN SERPL-MCNC: 6 MG/DL (ref 7–22)
CALCIUM SERPL-MCNC: 7.8 MG/DL (ref 8.5–10.5)
CHLORIDE SERPL-SCNC: 108 MEQ/L (ref 98–111)
CO2 SERPL-SCNC: 25 MEQ/L (ref 23–33)
CORTIS SERPL-MCNC: 5.21 UG/DL
CORTISOL COLLECTION INFO: NORMAL
CREAT SERPL-MCNC: 0.6 MG/DL (ref 0.4–1.2)
DEPRECATED RDW RBC AUTO: 54.4 FL (ref 35–45)
EOSINOPHIL NFR BLD AUTO: 12.9 %
EOSINOPHILS ABSOLUTE: 0.5 THOU/MM3 (ref 0–0.4)
ERYTHROCYTE [DISTWIDTH] IN BLOOD BY AUTOMATED COUNT: 14.7 % (ref 11.5–14.5)
GFR SERPL CREATININE-BSD FRML MDRD: > 60 ML/MIN/1.73M2
GLUCOSE SERPL-MCNC: 87 MG/DL (ref 70–108)
HCT VFR BLD AUTO: 31.6 % (ref 42–52)
HGB BLD-MCNC: 9.9 GM/DL (ref 14–18)
IMM GRANULOCYTES # BLD AUTO: 0 THOU/MM3 (ref 0–0.07)
IMM GRANULOCYTES NFR BLD AUTO: 0 %
LYMPHOCYTES ABSOLUTE: 1.1 THOU/MM3 (ref 1–4.8)
LYMPHOCYTES NFR BLD AUTO: 26.9 %
MCH RBC QN AUTO: 31.6 PG (ref 26–33)
MCHC RBC AUTO-ENTMCNC: 31.3 GM/DL (ref 32.2–35.5)
MCV RBC AUTO: 101 FL (ref 80–94)
MONOCYTES ABSOLUTE: 0.4 THOU/MM3 (ref 0.4–1.3)
MONOCYTES NFR BLD AUTO: 10 %
NEUTROPHILS NFR BLD AUTO: 49.7 %
NRBC BLD AUTO-RTO: 0 /100 WBC
ORGANISM: ABNORMAL
PLATELET # BLD AUTO: 212 THOU/MM3 (ref 130–400)
PMV BLD AUTO: 9.4 FL (ref 9.4–12.4)
POTASSIUM SERPL-SCNC: 4.1 MEQ/L (ref 3.5–5.2)
RBC # BLD AUTO: 3.13 MILL/MM3 (ref 4.7–6.1)
SEGMENTED NEUTROPHILS ABSOLUTE COUNT: 2.1 THOU/MM3 (ref 1.8–7.7)
SODIUM SERPL-SCNC: 143 MEQ/L (ref 135–145)
WBC # BLD AUTO: 4.2 THOU/MM3 (ref 4.8–10.8)

## 2023-04-05 PROCEDURE — 6370000000 HC RX 637 (ALT 250 FOR IP): Performed by: STUDENT IN AN ORGANIZED HEALTH CARE EDUCATION/TRAINING PROGRAM

## 2023-04-05 PROCEDURE — 2580000003 HC RX 258: Performed by: PSYCHIATRY & NEUROLOGY

## 2023-04-05 PROCEDURE — 99239 HOSP IP/OBS DSCHRG MGMT >30: CPT | Performed by: INTERNAL MEDICINE

## 2023-04-05 PROCEDURE — 6370000000 HC RX 637 (ALT 250 FOR IP): Performed by: PSYCHIATRY & NEUROLOGY

## 2023-04-05 PROCEDURE — 73610 X-RAY EXAM OF ANKLE: CPT

## 2023-04-05 PROCEDURE — 97166 OT EVAL MOD COMPLEX 45 MIN: CPT

## 2023-04-05 PROCEDURE — 94640 AIRWAY INHALATION TREATMENT: CPT

## 2023-04-05 PROCEDURE — 73590 X-RAY EXAM OF LOWER LEG: CPT

## 2023-04-05 PROCEDURE — 82533 TOTAL CORTISOL: CPT

## 2023-04-05 PROCEDURE — 2580000003 HC RX 258: Performed by: STUDENT IN AN ORGANIZED HEALTH CARE EDUCATION/TRAINING PROGRAM

## 2023-04-05 PROCEDURE — 36415 COLL VENOUS BLD VENIPUNCTURE: CPT

## 2023-04-05 PROCEDURE — 6360000002 HC RX W HCPCS: Performed by: PSYCHIATRY & NEUROLOGY

## 2023-04-05 PROCEDURE — 93270 REMOTE 30 DAY ECG REV/REPORT: CPT

## 2023-04-05 PROCEDURE — 85025 COMPLETE CBC W/AUTO DIFF WBC: CPT

## 2023-04-05 PROCEDURE — 80048 BASIC METABOLIC PNL TOTAL CA: CPT

## 2023-04-05 PROCEDURE — 92526 ORAL FUNCTION THERAPY: CPT

## 2023-04-05 PROCEDURE — 97530 THERAPEUTIC ACTIVITIES: CPT

## 2023-04-05 RX ORDER — FLUDROCORTISONE ACETATE 0.1 MG/1
0.1 TABLET ORAL DAILY
Status: DISCONTINUED | OUTPATIENT
Start: 2023-04-05 | End: 2023-04-05 | Stop reason: HOSPADM

## 2023-04-05 RX ORDER — MIDODRINE HYDROCHLORIDE 5 MG/1
5 TABLET ORAL
Qty: 90 TABLET | Refills: 3 | Status: CANCELLED | OUTPATIENT
Start: 2023-04-05

## 2023-04-05 RX ORDER — FLUDROCORTISONE ACETATE 0.1 MG/1
0.1 TABLET ORAL DAILY
Refills: 3 | DISCHARGE
Start: 2023-04-06 | End: 2023-05-06

## 2023-04-05 RX ORDER — MIDODRINE HYDROCHLORIDE 5 MG/1
5 TABLET ORAL
Status: DISCONTINUED | OUTPATIENT
Start: 2023-04-05 | End: 2023-04-05 | Stop reason: HOSPADM

## 2023-04-05 RX ORDER — MIDODRINE HYDROCHLORIDE 5 MG/1
5 TABLET ORAL
Qty: 90 TABLET | Refills: 3 | DISCHARGE
Start: 2023-04-05

## 2023-04-05 RX ORDER — COSYNTROPIN 0.25 MG/ML
250 INJECTION, POWDER, FOR SOLUTION INTRAMUSCULAR; INTRAVENOUS ONCE
Status: DISCONTINUED | OUTPATIENT
Start: 2023-04-06 | End: 2023-04-05 | Stop reason: HOSPADM

## 2023-04-05 RX ORDER — ESCITALOPRAM OXALATE 5 MG/1
5 TABLET ORAL DAILY
Refills: 0 | DISCHARGE
Start: 2023-04-05 | End: 2023-05-05

## 2023-04-05 RX ORDER — FLUDROCORTISONE ACETATE 0.1 MG/1
0.1 TABLET ORAL DAILY
Qty: 30 TABLET | Refills: 3 | Status: CANCELLED | OUTPATIENT
Start: 2023-04-06 | End: 2023-08-04

## 2023-04-05 RX ORDER — CHOLECALCIFEROL (VITAMIN D3) 25 MCG
1000 TABLET ORAL DAILY
Qty: 30 TABLET | Refills: 0 | Status: CANCELLED | OUTPATIENT
Start: 2023-04-06

## 2023-04-05 RX ORDER — 0.9 % SODIUM CHLORIDE 0.9 %
250 INTRAVENOUS SOLUTION INTRAVENOUS ONCE
Status: COMPLETED | OUTPATIENT
Start: 2023-04-05 | End: 2023-04-05

## 2023-04-05 RX ORDER — CHOLECALCIFEROL (VITAMIN D3) 25 MCG
1000 TABLET ORAL DAILY
Qty: 60 TABLET | DISCHARGE
Start: 2023-04-06

## 2023-04-05 RX ADMIN — FLUDROCORTISONE ACETATE 0.1 MG: 0.1 TABLET ORAL at 09:34

## 2023-04-05 RX ADMIN — MODAFINIL 100 MG: 100 TABLET ORAL at 07:50

## 2023-04-05 RX ADMIN — TIOTROPIUM BROMIDE AND OLODATEROL 2 PUFF: 3.124; 2.736 SPRAY, METERED RESPIRATORY (INHALATION) at 09:48

## 2023-04-05 RX ADMIN — ASPIRIN 81 MG 81 MG: 81 TABLET ORAL at 07:51

## 2023-04-05 RX ADMIN — SODIUM CHLORIDE, PRESERVATIVE FREE 10 ML: 5 INJECTION INTRAVENOUS at 07:51

## 2023-04-05 RX ADMIN — Medication 1000 UNITS: at 07:50

## 2023-04-05 RX ADMIN — ENOXAPARIN SODIUM 40 MG: 100 INJECTION SUBCUTANEOUS at 07:50

## 2023-04-05 RX ADMIN — SODIUM CHLORIDE, POTASSIUM CHLORIDE, SODIUM LACTATE AND CALCIUM CHLORIDE: 600; 310; 30; 20 INJECTION, SOLUTION INTRAVENOUS at 07:48

## 2023-04-05 RX ADMIN — LEVOTHYROXINE SODIUM 50 MCG: 0.05 TABLET ORAL at 06:37

## 2023-04-05 RX ADMIN — Medication 1 TABLET: at 07:50

## 2023-04-05 RX ADMIN — CLOPIDOGREL BISULFATE 75 MG: 75 TABLET ORAL at 07:50

## 2023-04-05 RX ADMIN — MIDODRINE HYDROCHLORIDE 5 MG: 5 TABLET ORAL at 11:14

## 2023-04-05 RX ADMIN — ACETAMINOPHEN 650 MG: 325 TABLET ORAL at 09:34

## 2023-04-05 RX ADMIN — SODIUM CHLORIDE 250 ML: 9 INJECTION, SOLUTION INTRAVENOUS at 08:50

## 2023-04-05 RX ADMIN — CARBAMAZEPINE 600 MG: 200 TABLET, EXTENDED RELEASE ORAL at 07:51

## 2023-04-05 ASSESSMENT — PAIN DESCRIPTION - PAIN TYPE
TYPE: ACUTE PAIN
TYPE: ACUTE PAIN

## 2023-04-05 ASSESSMENT — PAIN DESCRIPTION - ONSET
ONSET: SUDDEN
ONSET: SUDDEN

## 2023-04-05 ASSESSMENT — PAIN DESCRIPTION - LOCATION
LOCATION: FOOT
LOCATION: FOOT

## 2023-04-05 ASSESSMENT — PAIN - FUNCTIONAL ASSESSMENT
PAIN_FUNCTIONAL_ASSESSMENT: ACTIVITIES ARE NOT PREVENTED
PAIN_FUNCTIONAL_ASSESSMENT: ACTIVITIES ARE NOT PREVENTED

## 2023-04-05 ASSESSMENT — PAIN DESCRIPTION - FREQUENCY
FREQUENCY: INTERMITTENT
FREQUENCY: INTERMITTENT

## 2023-04-05 ASSESSMENT — PAIN DESCRIPTION - DESCRIPTORS
DESCRIPTORS: ACHING
DESCRIPTORS: ACHING

## 2023-04-05 ASSESSMENT — PAIN DESCRIPTION - ORIENTATION
ORIENTATION: LEFT
ORIENTATION: LEFT

## 2023-04-05 ASSESSMENT — PAIN SCALES - GENERAL
PAINLEVEL_OUTOF10: 6
PAINLEVEL_OUTOF10: 0
PAINLEVEL_OUTOF10: 5

## 2023-04-05 NOTE — PROGRESS NOTES
[address] [facility]  PHYSICAL THERAPY MISSED TREATMENT NOTE  ACUTE CARE  STRZ ICU STEPDOWN TELEMETRY 4K              Missed Treatment     Attempted to see patient this AM.  Pt awaiting LE dopplers and RN advising to hold until completed. PT to check back at a later time/date as able.
[address] [facility]  PHYSICAL THERAPY MISSED TREATMENT NOTE  ACUTE CARE  STRZ ICU STEPDOWN TELEMETRY 4K              Missed Treatment     Attempted to see patient, pt does not wish to complete therapy at this time due to pain in his legs. PT educated pt on benefits of PT and pt expressed interest, but wishes to wait until tomorrow.
[address] ICU STEPDOWN TELEMETRY 4K  Modified Barium Swallow    SLP Individual Minutes  Time In: 0282  Time Out: 3522  Minutes: 10  Timed Code Treatment Minutes: 0 Minutes       Date: 2023  Patient Name: Leia Mosqueda      CSN: [id number]   : 1940  (81 y.o.)  Gender: male   Referring Physician:  Elaina Kaufman MD  Diagnosis: Syncope and collapse  Precautions: fall risk, aspiration  History of Present Illness/Injury: Patient presents to [facility] with above dx. Per physician H+P:\" Leia Mosqueda is a 81 y.o. male with PMHx of CAD s/p CABG, COPD, hypertension, seizure disorder, dementia who presents to [facility] with syncope. Patient comes from [facility]. Patient is confused at baseline. A&Ox2 to name and location. There is no family at bedside, history from ED attending, Dr. Izzy Grande. Patient reportedly went to the bathroom and then became unresponsive for an unknown period of time. Nursing home team found the patient states that he woke up but seem to have some slurred speech and she was waking up but then returned to baseline afterwards. Patient recently received Macrobid at nursing home. Patient was unable to describe event. Patient denies headache, SOB, chest pain, N/V/C/D. Of note, patient recently received CABG x3 from Dr. Bandar Escobar on 8606. ED course: Initial vitals: Temperature 97.8, RR 18 HR 94 BP 65/53 SPO2 94% on RA. Initial labs include calcium 7.9, total protein 5.7, proBNP 1171.0, negative troponin, albumin 3.4 alk phos 138 hemoglobin 11.0 .3 RDW-SD 55.8.  UA displayed trace ketones, specific gravity 1.030, protein UA 30, leukocyte esterase small. Chest x-ray displayed no acute findings, CT head without contrast displayed no acute intracranial findings. Patient received 1500 cc LR in the ED. Patient admitted to stepdown for further evaluation and treatment. \"    ST consulted to further assess
CLINICAL PHARMACY: DISCHARGE MED RECONCILIATION/REVIEW    [address] [facility] Patient?: No  Total # of Interventions Recommended: 0   -   Total # Interventions Accepted: 0  Intervention Severity:   - Level 1 Intervention Present?: No   - Level 2 #: 0   - Level 3 #: 0   Time Spent (min): 15    Additional Documentation:
Discharge teaching and instructions for diagnosis/procedure of syncope and collapse completed with patient using teachback method. AVS reviewed. Printed prescriptions given to patient. Patient voiced understanding regarding prescriptions, follow up appointments, and care of self at home. Discharged via ambulance to  skilled nursing per EMS transportation. Last dose MAR and AVS faxed to [employer]. 30 day heart monitor placed on patient. Family aware of discharge to nursing home.
Internal Medicine Resident Progress Note    Patient:  Ayla Pineda    YOB: 1940  Unit/Bed:4K-07/007-A  MRN: [id number]    Acct: [de-identified]   PCP: Gia Noonan MD    Date of Admission: 4/3/2023      Assessment/Plan:    Syncope 2/2 likely orthostatic hypotension vs vasovagal syncope:   Unknown downtime and unwitnessed fall. Nursing staff found pt unresponsive. Pt does not recall preceding event prior to fall, does not remember fall. TTE 02/23/2023: EF 50%, with mild MR. On arrival to ED BP 65/53. Given 500 mL in ED. Started on IV LR at 100mL/hr. Patient likely had syncopal episode due to poor oral hydration leading to hypovolemic state. Orthostats positive. - Continuing with LR at 100 mL/hr.   - Following on Tele for arrhythmogenic causes of syncope. - On discharge 30 day cardiac event monitor. Hx of oropharyngeal dysphagia 2/2 Hx of CVA and worsening dementia:   On previous admission, MBS showed laryngeal penetration with thin and mildly thick barium. Patient required NG tube placement post CABG. According to SLP note,pt has significant dysphagia hx with completion of MBS on 03/14/23 with recommendations for purees and moderately thick liquids. SNF reports patient was consuming puree diet with moderately thick liquids. - Dietician consulted: Recommend increased oral nutrition supplement.   - SLP recommends Soft and bite sized with mildly thick liquids    CAD s/p recent CABG 2/28/2023:  - Done by Dr. Heri Bledsoe, CABG x3.   - On ASA, statin, and Plavix 75 mg.   - Amiodarone discontinued as pt had CABG procedure on 2/28, 30 days over. 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  -Currently at baseline    Hx of seizures: On carbamazepine 600 mg BID and modafinil 100 mg. Hypothyroidism: TSH and fT4 wnl. On Synthroid 50mcg. HTN: Home medication of lisinopril 2.5 mg + Coreg 12.5 mg, currently held for hypotension.   COPD; not in
Pharmacy Medication History Note      List of current medications patient is taking is complete. Source of information: [place], [address] med list (4/3)    Changes made to medication list:  Medications removed (include reason, ex. therapy complete or physician discontinued):  Removed atorvastatin - Pt was switched    Medications added/doses adjusted: Added escitalopram 10mg Qday   Added multivitamin Qday  Added lidocaine 4% patch apply to left hip 12 hours on 12 hours off  Added lidocaine 4% cream Q6H to Q8H prnp up to three times daily  Added miralax 17g daily prn constipation    Other notes (ex. Recent course of antibiotics, Coumadin dosing):  Note previous admission (on 3/25/23) that lexapro 10mg was discontinued. [address] restarted on admission. Fill history 2/6/23 for 20mg daily. Denies use of other OTC or herbal medications. Flor Connelly  Pharm D.  Candidate 2023  [facility]  Allergies reviewed    Electronically signed by Flor Connelly on 4/4/2023 at 11:14 AM
Pt admitted to  4K7 from ED and via cart/stretcher. Complaints: syncope and collapse. IV none infusing into the forearm left, condition patent and no redness and antecubital left, condition patent and no redness at a rate of 0 mls/ hour with about 0 mls in the bag still. IV site free of s/s of infection or infiltration. Vital signs obtained. Assessment and data collection initiated. Two nurse skin assessment performed by [employer] and Kip Wyman RN. Oriented to room. Policies and procedures for  explained. All questions answered with no further questions at this time. Fall prevention and safety brochure discussed with patient. Bed alarm on. Call light in reach.
Virtual nurse called Marilu Figueredo at  to request medication list to be faxed to the virtual nursing office at [phone].
Virtual nurse completed admission with patient. Patient is alert and slightly confused. He is answering questions appropriately. Medication reconciliation will be completed later by requesting a faxed med list from [facility]. Call light in reach. Referred patient to page 13 of the handbook for fall prevention review.  Virtual nursing explained to patient and services are accepted
([place])  ADULT ORAL NUTRITION SUPPLEMENT; Lunch, Breakfast, Dinner; Frozen Oral Supplement    Anthropometric Measures:  Height: 5' 9\" (175.3 cm)  Ideal Body Weight (IBW): 160 lbs (73 kg)    Admission Body Weight: 182 lb (82.6 kg) (4/3/23 stated, no edema)  Current Body Weight: 186 lb 8.2 oz (84.6 kg) (4/4/23 no edema),     Current BMI (kg/m2): 27.5  Usual Body Weight:  (Per patient~ 165# (hx: dementia). Per EMr: 2/22/23: 199#3oz, 3/4/23: 197#5oz)                       BMI Categories: Overweight (BMI 25.0-29. 9)    Estimated Daily Nutrient Needs:  Energy Requirements Based On: Kcal/kg  Weight Used for Energy Requirements:  (85 kg)  Energy (kcal/day): ~ 0749-2220 kcals (20-25 kcals/kg/day)  Weight Used for Protein Requirements: Ideal (73 kg)  Protein (g/day): ~ 95 grams or more (1.3 grams/kg/day)       Nutrition Diagnosis:   Inadequate oral intake related to inadequate protein-energy intake as evidenced by poor intake prior to admission    Nutrition Interventions:   Food and/or Nutrient Delivery: Continue Current Diet, Modify Oral Nutrition Supplement  Nutrition Education/Counseling: Education initiated (Encouraged oral intake and ONS use.)  Coordination of Nutrition Care: Continue to monitor while inpatient, Interdisciplinary Rounds       Goals:     Goals: PO intake 75% or greater, by next RD assessment       Nutrition Monitoring and Evaluation:      Food/Nutrient Intake Outcomes: Food and Nutrient Intake, Diet Advancement/Tolerance, Supplement Intake  Physical Signs/Symptoms Outcomes: Biochemical Data, Chewing or Swallowing, GI Status, Fluid Status or Edema, Nutrition Focused Physical Findings, Skin, Weight    Discharge Planning:     Too soon to determine     Laura Parikh, RD, LD  Contact: [phone]
improve swallow strength and coordination. INTERVENTIONS: Pharyngeal strengthening exercises to enhance hyolaryngeal excursion and pharyngeal constriction:     Effortful swallows: x15 with and without PO intake: fair success   [place]: unable to elicit despite demonstrations, verbal instructions and multiple patient attempts. Long-Term Goals:   No established LTG's given short ELOS       EDUCATION:  Learner: Patient  Education:  Reviewed results and recommendations of this evaluation, Reviewed diet and strategies, Reviewed ST goals and Plan of Care, and Reviewed recommendations for follow-up  Evaluation of Education: Shana Parsons understanding, Needs further instruction, No evidence of learning, and Family not present    ASSESSMENT/PLAN:  Activity Tolerance:  Patient tolerance of  treatment: good. Assessment/Plan: Patient progressing toward established goals. Continues to require skilled care of licensed speech pathologist to progress toward achievement of established goals and plan of care. .     Plan for Next Session: pharyngeal strengthening exercises   Discharge Recommendations: SNF and ongoing ST intervention     Lavern Zuñiga M.S. CCC-SLP [id number] 4/5/2023
wet vocal quality noted following the swallow. Puree solids unremarkable. Advanced trials not attempted d/t edentulous state and previous MBS recommendations. At this time, ST recommends patient complete formal instrumental assessment via MBS to determine safest level of PO consumption. Patient to remain NPO pending completion of MBS on this date. RECOMMENDATIONS/ASSESSMENT:  Instrumental Evaluation: Modified Barium Swallow (MBS)  Diet Recommendations:  NPO  Strategies:  Recommend NPO   Rehabilitation Potential: poor  Discharge Recommendations: SNF    EDUCATION:  Learner: Patient  Education:  Reviewed results and recommendations of this evaluation, Reviewed signs, symptoms and risks of aspiration, and Reviewed ST goals and Plan of Care  Evaluation of Education: Verbalizes understanding and Needs further instruction    PLAN:  Skilled SLP intervention on acute care 3-5 x per week or until goals met and/or pt plateaus in function. Specific interventions for next session may include: MBS. PATIENT GOAL:    Did not state. Will further assess during treatment.     SHORT TERM GOALS:  Short Term Goals  Time Frame for Short Term Goals: 1-2 sessions  Goal 1: Patient will complete MBS to r/o pharyngeal dysfunction and determine safest diet for PO consumption    LONG TERM GOALS:  No LTGs established d/t short [address] [facility], [address] [place] [address], [address] [place] [address]
included skilled education and facilitation of tasks to increase safety and independence with ADL's for improved functional independence and quality of life. Discharge Recommendations:  Continue to assess pending progress, Patient would benefit from continued therapy after discharge, [address] [facility], [address] [facility] with OT, 24 hour supervision or assist    Patient Education:     Patient Education  Education Given To: Patient  Education Provided: Role of Therapy, ADL Adaptive Strategies, Fall Prevention Strategies, Plan of Care, Transfer Training, Precautions  Barriers to Learning: Cognition    Equipment Recommendations:  Equipment Needed: No    Plan:  Times Per Week: 3-5x  Times Per Day: Once a day  Current Treatment Recommendations: Strengthening, Balance training, Functional mobility training, Endurance training, Safety education & training, Patient/Caregiver education & training, Neuromuscular re-education, Self-Care / ADL. See long-term goal time frame for expected duration of plan of care. If no long-term goals established, a short length of stay is anticipated. Goals:  Patient goals : return home at [facility]  Short Term Goals  Time Frame for Short Term Goals: by discharge  Short Term Goal 1: patient will tolerate 3 min static standing with CGA to increase activity tolerance for grooming and toileting. Short Term Goal 2: patient will functionally ambulate short distances with CGA. Short Term Goal 3: patient will complete ADL routine with MIN A. Short Term Goal 4: patient will participate in moderate resistive UB exer to increase UB strength for functional transfers. Following session, patient left in safe position with all fall risk precautions in place.

## 2023-04-05 NOTE — DISCHARGE SUMMARY
IP CONSULT TO SOCIAL WORK    Disposition: SNF  Condition at Discharge: Stable    Code Status:  Full Code     Patient Instructions:    Discharge lab work: BMP CBC  Activity: activity as tolerated  Diet: ADULT ORAL NUTRITION SUPPLEMENT; Lunch, Breakfast, Dinner; Frozen Oral Supplement  ADULT DIET;  Regular; Mildly Thick (Nectar)      Follow-up visits:   Jennifer Sosa [id number]  [address] [facility], [address]  [address] [facility] [address]  [phone]    Schedule an appointment as soon as possible for a visit      Sonja Huizar MD  [address] [facility] [employer] [place] [address] [address] [phone]    Schedule an appointment as soon as possible for a visit      Ayla Mohan MD  [address] [facility]  [phone]               Discharge Medications:      Medication List        START taking these medications      fludrocortisone 0.1 MG tablet  Commonly known as: FLORINEF  Take 1 tablet by mouth daily  Start taking on: April 6, 2023     midodrine 5 MG tablet  Commonly known as: PROAMATINE  Take 1 tablet by mouth 3 times daily (with meals)     Vitamin D3 25 MCG Tabs  Take 1 tablet by mouth daily  Start taking on: April 6, 2023            CHANGE how you take these medications      escitalopram 5 MG tablet  Commonly known as: LEXAPRO  Take 1 tablet by mouth daily  What changed:   medication strength  how much to take            CONTINUE taking these medications      Acetaminophen 650 MG Tabs     albuterol (2.5 MG/3ML) 0.083% nebulizer solution  Commonly known as: PROVENTIL  Take 3 mLs by nebulization every 6 hours as needed for Wheezing or Shortness of Breath     aspirin 81 MG chewable tablet  Take 1 tablet by mouth daily     carBAMazepine 200 MG extended release tablet  Commonly known as: TEGRETOL XR     clopidogrel 75 MG tablet  Commonly known as: PLAVIX     levothyroxine 50 MCG

## 2023-04-05 NOTE — FLOWSHEET NOTE
04/05/23 1349   Safe Environment   Safety Measures Other (comment)  (vn safety round , pt resting in room with lights dim , no noted distress , call light visible)

## 2023-04-05 NOTE — PROCEDURES
The skin was prepped and a 30 day cardiac event monitor was applied. The patient was instructed on the documentation of symptoms and the purpose of the monitor as well as the things to avoid while wearing the monitor.    The patient was instructed to remove and return the monitor on 05/05/2023  The serial number of the monitor that was applied is [id number]

## 2023-04-05 NOTE — CARE COORDINATION
04/04/23 1502   Readmission Assessment   Previous Disposition SNF   Who is being Interviewed   (from SNF)   What was the patient's/caregiver's perception as to why they think they needed to return back to the hospital? Other (Comment)  (Syncope)   Did you visit your Primary Care Physician after you left the hospital, before you returned this time? Yes  (at SNF)   Did you see a specialist, such as Cardiac, Pulmonary, Orthopedic Physician, etc. after you left the hospital? No   Who advised the patient to return to the hospital? Skilled Unit   Does the patient report anything that got in the way of taking their medications? No   In our efforts to provide the best possible care to you and others like you, can you think of anything that we could have done to help you after you left the hospital the first time, so that you might not have needed to return so soon?  Other (Comment)  (from SNF)
4/4/23, 3:04 PM EDT      DISCHARGE PLANNING EVALUATION    Yesenia Bliss  Admitted: 4/3/2023  Hospital Day: 1    Location: [facility], [id number] Reason for admit: Syncope and collapse [R55]  Hypotension, unspecified hypotension type [I95.9]    Past Medical History:   Diagnosis Date    Cancer ([facility] [facility]     Coma ([facility]     Hyperlipidemia     Leukemia ([facility]     Seizures ([facility]      Barriers to Discharge:   Syncope  History: Leukemia, Seizures, OHS, COPD, Dementia  IVF    PCP: Ayla Mohan MD    Readmission Risk Low 0-14, Mod 15-19), High > 20: Readmission Risk Score: 23      Advance Directives:      Code Status: Full Code   Patient's Primary Decision Maker is: Named in [address] [place] [address]    Primary Decision Maker: Consuelo Campoverde Next of Kin - Child - [phone]    Patient Goals/Plan/Treatment Preferences: plans return [place]    Transportation/Food Security/Housekeeping Addressed: No issues identified.
4/5/23, 10:24 AM EDT    Patient goals/plan/ treatment preferences discussed by  and . Patient goals/plan/ treatment preferences reviewed with patient/ family. Patient/ family verbalize understanding of discharge plan and are in agreement with goal/plan/treatment preferences. Understanding was demonstrated using the teach back method. AVS provided by RN at time of discharge, which includes all necessary medical information pertaining to the patients current course of illness, treatment, post-discharge goals of care, and treatment preferences. Services At/After Discharge: [facility] ([facility]) [address]    Patient to discharge back to [address] today. TK scheduled transportation for 4pm.    TK notified [address]. RN made aware and discharge instructions placed on chart. IMM Letter  IMM Letter given to Patient/Family/Significant other/Guardian/POA/by[de-identified] patient access  IMM Letter date given[de-identified] 04/03/23  IMM Letter time given[de-identified] 5056         4/5/23, 11:26 AM EDT  DISCHARGE PLANNING EVALUATION    Family expressed concerns about patient returning to [address]. They asked for a referral to [facility]. TK explained that TK will call and make referral but if they cannot accept today patient will need to return to [address] and work on transferring to other ECF from there as doctor is ready for discharge. TK called Marcos Warren at [facility] and made referral. TK notified him that patient is discharging today and if they do not accept patient will return to other ECF.       4/5/23, 11:59 AM EDT  DISCHARGE PLANNING EVALUATION    SW received a call from Marcos Warren at [facility] and he reported that they will accept at discharge. TK notified [address]. TK notified daughter of [facility] have accepted. She reported that they prefer to go there. TK called [facility] and notified them of destination change.
members/significant others, and if so, who?  Yes  Plans to Return to Present Housing: Yes  Other Identified Issues/Barriers to RETURNING to current housing: None  Potential Assistance needed at discharge: Delon Markham            Potential DME:    Patient expects to discharge to: Long-term care  Plan for transportation at discharge:      Financial  Payor: [address] [place] [address] / Plan: MEDICARE PART A / Product Type: *No Product type* /     Potential assistance Purchasing Medications:  NO

## 2023-04-06 ENCOUNTER — CARE COORDINATION (OUTPATIENT)
Dept: CARE COORDINATION | Age: 83
End: 2023-04-06

## 2023-04-06 LAB
BACTERIA SPEC AEROBE CULT: NORMAL
EKG ATRIAL RATE: 64 BPM
EKG ATRIAL RATE: 64 BPM
EKG P AXIS: -18 DEGREES
EKG P AXIS: 31 DEGREES
EKG P-R INTERVAL: 268 MS
EKG P-R INTERVAL: 312 MS
EKG Q-T INTERVAL: 424 MS
EKG Q-T INTERVAL: 424 MS
EKG QRS DURATION: 94 MS
EKG QRS DURATION: 98 MS
EKG QTC CALCULATION (BAZETT): 437 MS
EKG QTC CALCULATION (BAZETT): 437 MS
EKG R AXIS: -5 DEGREES
EKG R AXIS: 18 DEGREES
EKG T AXIS: 154 DEGREES
EKG T AXIS: 159 DEGREES
EKG VENTRICULAR RATE: 64 BPM
EKG VENTRICULAR RATE: 64 BPM

## 2023-04-06 NOTE — CARE COORDINATION
Confirmed with Brennan Hammond that patient did return to facility after hospital admission. Will notify CTN at this time.

## 2023-04-08 LAB
BACTERIA BLD AEROBE CULT: NORMAL
BACTERIA BLD AEROBE CULT: NORMAL

## 2023-04-23 LAB
EKG ATRIAL RATE: 64 BPM
EKG ATRIAL RATE: 64 BPM
EKG P AXIS: -18 DEGREES
EKG P AXIS: 31 DEGREES
EKG P-R INTERVAL: 268 MS
EKG P-R INTERVAL: 312 MS
EKG Q-T INTERVAL: 424 MS
EKG Q-T INTERVAL: 424 MS
EKG QRS DURATION: 94 MS
EKG QRS DURATION: 98 MS
EKG QTC CALCULATION (BAZETT): 437 MS
EKG QTC CALCULATION (BAZETT): 437 MS
EKG R AXIS: -5 DEGREES
EKG R AXIS: 18 DEGREES
EKG T AXIS: 154 DEGREES
EKG T AXIS: 159 DEGREES
EKG VENTRICULAR RATE: 64 BPM
EKG VENTRICULAR RATE: 64 BPM

## 2023-04-24 NOTE — TELEPHONE ENCOUNTER
Spoke with patient family ([phone]). Appt with Andra Marques rescheduled for 5/2/23.  They did not want to r/s appt with Dr. Princess Bangura.

## 2023-05-01 NOTE — PATIENT INSTRUCTIONS
You may receive a survey regarding the care you received during your visit. Your input is valuable to us. We encourage you to complete and return your survey. We hope you will choose us in the future for your healthcare needs. Thank you for choosing [facility]!   Your Medical Assistant Today:  Shruti NGUYEN   Your Provider for Today: Olga Garcia PA-C  Ph. [phone] opt 1

## 2023-05-02 ENCOUNTER — OFFICE VISIT (OUTPATIENT)
Dept: CARDIOTHORACIC SURGERY | Age: 83
End: 2023-05-02

## 2023-05-02 VITALS
WEIGHT: 181 LBS | HEIGHT: 69 IN | DIASTOLIC BLOOD PRESSURE: 72 MMHG | OXYGEN SATURATION: 97 % | SYSTOLIC BLOOD PRESSURE: 94 MMHG | HEART RATE: 75 BPM | BODY MASS INDEX: 26.81 KG/M2

## 2023-05-02 DIAGNOSIS — Z95.1 S/P CABG (CORONARY ARTERY BYPASS GRAFT): Primary | ICD-10-CM

## 2023-05-02 PROCEDURE — 99024 POSTOP FOLLOW-UP VISIT: CPT | Performed by: PHYSICIAN ASSISTANT

## 2023-05-02 NOTE — PROGRESS NOTES
CT/CV Surgery Follow Up Office Visit      Patient's Name/Date of Birth: Les Viera / 1940 (81 y.o.)    CC:   Chief Complaint   Patient presents with    Follow-up     CABG x3  [phone] with Dr. Joe Moe      PCP: Otoniel Orona MD    Date: May 2, 2023     HPI:   We had the pleasure of seeing Lse Viera in the office today, as you know this is a very pleasant 81y.o. year old male with a history of CAD leading to the procedure listed below. The pt denies chest pressure, SOB, fever, chills, N/V/D.     2/28/23  CABG STONEY,  coronary artery bypass grafting x3 using LIMA to LAD, vein graft to to's marginal 1, vein graft to posterior descending artery, transesophageal echocardiography, endoscopic vein harvesting    Past Medical History:  Almas Rios  has a past medical history of Cancer ([facility] [address] Coma ([facility] [address] Hyperlipidemia, Leukemia ([facility] [address] and Seizures ([facility] [address] Past Surgical History:  The patient  has a past surgical history that includes other surgical history; brain surgery; and Coronary artery bypass graft (N/A, 2/28/2023). Allergies: The patient has No Known Allergies. Medications:  Prior to Admission medications    Medication Sig Start Date End Date Taking?  Authorizing Provider   fludrocortisone (FLORINEF) 0.1 MG tablet Take 1 tablet by mouth daily 4/6/23 5/6/23  Earnest Garcia MD   midodrine (PROAMATINE) 5 MG tablet Take 1 tablet by mouth 3 times daily (with meals) 4/5/23   Earnest Garcia MD   Cholecalciferol (VITAMIN D) 25 MCG TABS Take 1 tablet by mouth daily 4/6/23   Earnest Garcia MD   escitalopram (LEXAPRO) 5 MG tablet Take 1 tablet by mouth daily 4/5/23 5/5/23  Earnest Garcia MD   rosuvastatin (CRESTOR) 20 MG tablet Take 1 tablet by mouth daily    Historical Provider, MD   Lidocaine HCl 4 % CREA Apply topically to affected area every 6 to 8 hours as needed for pain max 3 times daily    Historical Provider, MD   polyethylene glycol (GLYCOLAX) 17 GM/SCOOP powder Take 17 g by mouth daily As

## 2025-04-16 ENCOUNTER — APPOINTMENT (OUTPATIENT)
Dept: GENERAL RADIOLOGY | Age: 85
DRG: 193 | End: 2025-04-16
Payer: MEDICARE

## 2025-04-16 ENCOUNTER — HOSPITAL ENCOUNTER (INPATIENT)
Age: 85
LOS: 1 days | Discharge: HOME | DRG: 193 | End: 2025-04-17
Attending: EMERGENCY MEDICINE | Admitting: STUDENT IN AN ORGANIZED HEALTH CARE EDUCATION/TRAINING PROGRAM
Payer: MEDICARE

## 2025-04-16 DIAGNOSIS — R06.02 SOB (SHORTNESS OF BREATH): ICD-10-CM

## 2025-04-16 DIAGNOSIS — R06.02 SHORTNESS OF BREATH: ICD-10-CM

## 2025-04-16 DIAGNOSIS — J18.9 PNEUMONIA OF LEFT LOWER LOBE DUE TO INFECTIOUS ORGANISM: Primary | ICD-10-CM

## 2025-04-16 LAB
ALBUMIN SERPL BCG-MCNC: 4.1 G/DL (ref 3.4–4.9)
ALP SERPL-CCNC: 53 U/L (ref 40–129)
ALT SERPL W/O P-5'-P-CCNC: 26 U/L (ref 10–50)
ANION GAP SERPL CALC-SCNC: 12 MEQ/L (ref 8–16)
AST SERPL-CCNC: 31 U/L (ref 10–50)
BASOPHILS ABSOLUTE: 0 THOU/MM3 (ref 0–0.1)
BASOPHILS NFR BLD AUTO: 0.2 %
BILIRUB CONJ SERPL-MCNC: 0.3 MG/DL (ref 0–0.2)
BILIRUB SERPL-MCNC: 0.5 MG/DL (ref 0.3–1.2)
BUN SERPL-MCNC: 12 MG/DL (ref 8–23)
CALCIUM SERPL-MCNC: 8.7 MG/DL (ref 8.8–10.2)
CHLORIDE SERPL-SCNC: 102 MEQ/L (ref 98–111)
CO2 SERPL-SCNC: 24 MEQ/L (ref 22–29)
CREAT SERPL-MCNC: 0.9 MG/DL (ref 0.7–1.2)
DEPRECATED RDW RBC AUTO: 45.5 FL (ref 35–45)
EKG ATRIAL RATE: 96 BPM
EKG P-R INTERVAL: 276 MS
EKG Q-T INTERVAL: 326 MS
EKG QRS DURATION: 86 MS
EKG QTC CALCULATION (BAZETT): 411 MS
EKG R AXIS: 3 DEGREES
EKG T AXIS: 160 DEGREES
EKG VENTRICULAR RATE: 96 BPM
EOSINOPHIL NFR BLD AUTO: 1.3 %
EOSINOPHILS ABSOLUTE: 0.2 THOU/MM3 (ref 0–0.4)
ERYTHROCYTE [DISTWIDTH] IN BLOOD BY AUTOMATED COUNT: 12.8 % (ref 11.5–14.5)
FLUAV RNA RESP QL NAA+PROBE: NOT DETECTED
FLUBV RNA RESP QL NAA+PROBE: NOT DETECTED
GFR SERPL CREATININE-BSD FRML MDRD: 84 ML/MIN/1.73M2
GLUCOSE SERPL-MCNC: 109 MG/DL (ref 74–109)
HCT VFR BLD AUTO: 41.3 % (ref 42–52)
HGB BLD-MCNC: 13.9 GM/DL (ref 14–18)
IMM GRANULOCYTES # BLD AUTO: 0.07 THOU/MM3 (ref 0–0.07)
IMM GRANULOCYTES NFR BLD AUTO: 0.6 %
LIPASE SERPL-CCNC: 37 U/L (ref 13–60)
LYMPHOCYTES ABSOLUTE: 0.5 THOU/MM3 (ref 1–4.8)
LYMPHOCYTES NFR BLD AUTO: 4.1 %
MAGNESIUM SERPL-MCNC: 2.1 MG/DL (ref 1.6–2.6)
MCH RBC QN AUTO: 32.7 PG (ref 26–33)
MCHC RBC AUTO-ENTMCNC: 33.7 GM/DL (ref 32.2–35.5)
MCV RBC AUTO: 97.2 FL (ref 80–94)
MONOCYTES ABSOLUTE: 0.6 THOU/MM3 (ref 0.4–1.3)
MONOCYTES NFR BLD AUTO: 4.8 %
NEUTROPHILS ABSOLUTE: 11.3 THOU/MM3 (ref 1.8–7.7)
NEUTROPHILS NFR BLD AUTO: 89 %
NRBC BLD AUTO-RTO: 0 /100 WBC
NT-PROBNP SERPL IA-MCNC: 751 PG/ML (ref 0–449)
OSMOLALITY SERPL CALC.SUM OF ELEC: 276 MOSMOL/KG (ref 275–300)
PLATELET # BLD AUTO: 282 THOU/MM3 (ref 130–400)
PMV BLD AUTO: 9 FL (ref 9.4–12.4)
POTASSIUM SERPL-SCNC: 4.4 MEQ/L (ref 3.5–5.2)
PROT SERPL-MCNC: 7.4 G/DL (ref 6.4–8.3)
RBC # BLD AUTO: 4.25 MILL/MM3 (ref 4.7–6.1)
SARS-COV-2 RNA RESP QL NAA+PROBE: NOT DETECTED
SODIUM SERPL-SCNC: 138 MEQ/L (ref 135–145)
TROPONIN, HIGH SENSITIVITY: 26 NG/L (ref 0–12)
WBC # BLD AUTO: 12.7 THOU/MM3 (ref 4.8–10.8)

## 2025-04-16 PROCEDURE — 82248 BILIRUBIN DIRECT: CPT

## 2025-04-16 PROCEDURE — 36415 COLL VENOUS BLD VENIPUNCTURE: CPT

## 2025-04-16 PROCEDURE — 83880 ASSAY OF NATRIURETIC PEPTIDE: CPT

## 2025-04-16 PROCEDURE — 83690 ASSAY OF LIPASE: CPT

## 2025-04-16 PROCEDURE — 6370000000 HC RX 637 (ALT 250 FOR IP)

## 2025-04-16 PROCEDURE — 93005 ELECTROCARDIOGRAM TRACING: CPT | Performed by: EMERGENCY MEDICINE

## 2025-04-16 PROCEDURE — 87040 BLOOD CULTURE FOR BACTERIA: CPT

## 2025-04-16 PROCEDURE — 2580000003 HC RX 258: Performed by: EMERGENCY MEDICINE

## 2025-04-16 PROCEDURE — 87636 SARSCOV2 & INF A&B AMP PRB: CPT

## 2025-04-16 PROCEDURE — 80053 COMPREHEN METABOLIC PANEL: CPT

## 2025-04-16 PROCEDURE — 99223 1ST HOSP IP/OBS HIGH 75: CPT | Performed by: STUDENT IN AN ORGANIZED HEALTH CARE EDUCATION/TRAINING PROGRAM

## 2025-04-16 PROCEDURE — 94640 AIRWAY INHALATION TREATMENT: CPT

## 2025-04-16 PROCEDURE — 94761 N-INVAS EAR/PLS OXIMETRY MLT: CPT

## 2025-04-16 PROCEDURE — 6370000000 HC RX 637 (ALT 250 FOR IP): Performed by: EMERGENCY MEDICINE

## 2025-04-16 PROCEDURE — 2700000000 HC OXYGEN THERAPY PER DAY

## 2025-04-16 PROCEDURE — 83605 ASSAY OF LACTIC ACID: CPT

## 2025-04-16 PROCEDURE — 85025 COMPLETE CBC W/AUTO DIFF WBC: CPT

## 2025-04-16 PROCEDURE — 84484 ASSAY OF TROPONIN QUANT: CPT

## 2025-04-16 PROCEDURE — 71045 X-RAY EXAM CHEST 1 VIEW: CPT

## 2025-04-16 PROCEDURE — 2140000000 HC CCU INTERMEDIATE R&B

## 2025-04-16 PROCEDURE — 83735 ASSAY OF MAGNESIUM: CPT

## 2025-04-16 PROCEDURE — 84145 PROCALCITONIN (PCT): CPT

## 2025-04-16 PROCEDURE — 99285 EMERGENCY DEPT VISIT HI MDM: CPT

## 2025-04-16 RX ORDER — ASPIRIN 81 MG/1
324 TABLET, CHEWABLE ORAL ONCE
Status: COMPLETED | OUTPATIENT
Start: 2025-04-16 | End: 2025-04-16

## 2025-04-16 RX ORDER — LIDOCAINE 4 G/G
1 PATCH TOPICAL ONCE
Status: COMPLETED | OUTPATIENT
Start: 2025-04-17 | End: 2025-04-17

## 2025-04-16 RX ORDER — CARBAMAZEPINE 200 MG/1
600 TABLET, EXTENDED RELEASE ORAL 2 TIMES DAILY
Status: DISCONTINUED | OUTPATIENT
Start: 2025-04-17 | End: 2025-04-17 | Stop reason: HOSPADM

## 2025-04-16 RX ORDER — POLYETHYLENE GLYCOL 3350 17 G/17G
17 POWDER, FOR SOLUTION ORAL DAILY PRN
Status: DISCONTINUED | OUTPATIENT
Start: 2025-04-16 | End: 2025-04-17 | Stop reason: HOSPADM

## 2025-04-16 RX ORDER — SODIUM CHLORIDE 0.9 % (FLUSH) 0.9 %
5-40 SYRINGE (ML) INJECTION EVERY 12 HOURS SCHEDULED
Status: DISCONTINUED | OUTPATIENT
Start: 2025-04-17 | End: 2025-04-17 | Stop reason: HOSPADM

## 2025-04-16 RX ORDER — ONDANSETRON 2 MG/ML
4 INJECTION INTRAMUSCULAR; INTRAVENOUS EVERY 6 HOURS PRN
Status: DISCONTINUED | OUTPATIENT
Start: 2025-04-16 | End: 2025-04-17 | Stop reason: HOSPADM

## 2025-04-16 RX ORDER — ELECTROLYTES/DEXTROSE
1 SOLUTION, ORAL ORAL DAILY
Status: DISCONTINUED | OUTPATIENT
Start: 2025-04-17 | End: 2025-04-17

## 2025-04-16 RX ORDER — SODIUM CHLORIDE 9 MG/ML
INJECTION, SOLUTION INTRAVENOUS CONTINUOUS
Status: ACTIVE | OUTPATIENT
Start: 2025-04-17 | End: 2025-04-17

## 2025-04-16 RX ORDER — SODIUM CHLORIDE 0.9 % (FLUSH) 0.9 %
5-40 SYRINGE (ML) INJECTION PRN
Status: DISCONTINUED | OUTPATIENT
Start: 2025-04-16 | End: 2025-04-17 | Stop reason: HOSPADM

## 2025-04-16 RX ORDER — ROSUVASTATIN CALCIUM 20 MG/1
20 TABLET, COATED ORAL NIGHTLY
Status: DISCONTINUED | OUTPATIENT
Start: 2025-04-17 | End: 2025-04-17 | Stop reason: HOSPADM

## 2025-04-16 RX ORDER — IPRATROPIUM BROMIDE AND ALBUTEROL SULFATE 2.5; .5 MG/3ML; MG/3ML
2 SOLUTION RESPIRATORY (INHALATION) ONCE
Status: COMPLETED | OUTPATIENT
Start: 2025-04-16 | End: 2025-04-16

## 2025-04-16 RX ORDER — MIDODRINE HYDROCHLORIDE 5 MG/1
5 TABLET ORAL ONCE
Status: COMPLETED | OUTPATIENT
Start: 2025-04-16 | End: 2025-04-16

## 2025-04-16 RX ORDER — 0.9 % SODIUM CHLORIDE 0.9 %
1000 INTRAVENOUS SOLUTION INTRAVENOUS ONCE
Status: COMPLETED | OUTPATIENT
Start: 2025-04-16 | End: 2025-04-17

## 2025-04-16 RX ORDER — MULTIVITAMIN WITH IRON
1 TABLET ORAL DAILY
Status: DISCONTINUED | OUTPATIENT
Start: 2025-04-17 | End: 2025-04-17 | Stop reason: HOSPADM

## 2025-04-16 RX ORDER — ALBUTEROL SULFATE 0.83 MG/ML
2.5 SOLUTION RESPIRATORY (INHALATION) EVERY 6 HOURS PRN
Status: DISCONTINUED | OUTPATIENT
Start: 2025-04-16 | End: 2025-04-17 | Stop reason: HOSPADM

## 2025-04-16 RX ORDER — ESCITALOPRAM OXALATE 10 MG/1
5 TABLET ORAL DAILY
Status: DISCONTINUED | OUTPATIENT
Start: 2025-04-17 | End: 2025-04-17 | Stop reason: HOSPADM

## 2025-04-16 RX ORDER — POTASSIUM CHLORIDE 7.45 MG/ML
10 INJECTION INTRAVENOUS PRN
Status: DISCONTINUED | OUTPATIENT
Start: 2025-04-16 | End: 2025-04-17 | Stop reason: HOSPADM

## 2025-04-16 RX ORDER — POTASSIUM CHLORIDE 1500 MG/1
40 TABLET, EXTENDED RELEASE ORAL PRN
Status: DISCONTINUED | OUTPATIENT
Start: 2025-04-16 | End: 2025-04-17 | Stop reason: HOSPADM

## 2025-04-16 RX ORDER — ACETAMINOPHEN 325 MG/1
650 TABLET ORAL EVERY 6 HOURS PRN
Status: DISCONTINUED | OUTPATIENT
Start: 2025-04-16 | End: 2025-04-17 | Stop reason: HOSPADM

## 2025-04-16 RX ORDER — MAGNESIUM SULFATE IN WATER 40 MG/ML
2000 INJECTION, SOLUTION INTRAVENOUS PRN
Status: DISCONTINUED | OUTPATIENT
Start: 2025-04-16 | End: 2025-04-17 | Stop reason: HOSPADM

## 2025-04-16 RX ORDER — LEVOTHYROXINE SODIUM 50 UG/1
50 TABLET ORAL DAILY
Status: DISCONTINUED | OUTPATIENT
Start: 2025-04-17 | End: 2025-04-17 | Stop reason: HOSPADM

## 2025-04-16 RX ORDER — 0.9 % SODIUM CHLORIDE 0.9 %
1000 INTRAVENOUS SOLUTION INTRAVENOUS ONCE
Status: COMPLETED | OUTPATIENT
Start: 2025-04-17 | End: 2025-04-17

## 2025-04-16 RX ORDER — CLOPIDOGREL BISULFATE 75 MG/1
75 TABLET ORAL DAILY
Status: DISCONTINUED | OUTPATIENT
Start: 2025-04-17 | End: 2025-04-17 | Stop reason: HOSPADM

## 2025-04-16 RX ORDER — ENOXAPARIN SODIUM 100 MG/ML
40 INJECTION SUBCUTANEOUS DAILY
Status: DISCONTINUED | OUTPATIENT
Start: 2025-04-17 | End: 2025-04-17 | Stop reason: HOSPADM

## 2025-04-16 RX ORDER — AZITHROMYCIN 250 MG/1
500 TABLET, FILM COATED ORAL DAILY
Status: DISCONTINUED | OUTPATIENT
Start: 2025-04-17 | End: 2025-04-17

## 2025-04-16 RX ORDER — ONDANSETRON 4 MG/1
4 TABLET, ORALLY DISINTEGRATING ORAL EVERY 8 HOURS PRN
Status: DISCONTINUED | OUTPATIENT
Start: 2025-04-16 | End: 2025-04-17 | Stop reason: HOSPADM

## 2025-04-16 RX ORDER — SODIUM CHLORIDE 9 MG/ML
INJECTION, SOLUTION INTRAVENOUS PRN
Status: DISCONTINUED | OUTPATIENT
Start: 2025-04-16 | End: 2025-04-17 | Stop reason: HOSPADM

## 2025-04-16 RX ORDER — ACETAMINOPHEN 650 MG/1
650 SUPPOSITORY RECTAL EVERY 6 HOURS PRN
Status: DISCONTINUED | OUTPATIENT
Start: 2025-04-16 | End: 2025-04-17 | Stop reason: HOSPADM

## 2025-04-16 RX ORDER — ASPIRIN 81 MG/1
81 TABLET, CHEWABLE ORAL DAILY
Status: DISCONTINUED | OUTPATIENT
Start: 2025-04-17 | End: 2025-04-17 | Stop reason: HOSPADM

## 2025-04-16 RX ADMIN — IPRATROPIUM BROMIDE AND ALBUTEROL SULFATE 2 DOSE: .5; 3 SOLUTION RESPIRATORY (INHALATION) at 21:36

## 2025-04-16 RX ADMIN — SODIUM CHLORIDE 1000 ML: 9 INJECTION, SOLUTION INTRAVENOUS at 23:12

## 2025-04-16 RX ADMIN — MIDODRINE HYDROCHLORIDE 5 MG: 5 TABLET ORAL at 23:40

## 2025-04-16 RX ADMIN — ASPIRIN 324 MG: 81 TABLET, CHEWABLE ORAL at 21:55

## 2025-04-16 ASSESSMENT — ENCOUNTER SYMPTOMS
ABDOMINAL PAIN: 0
NAUSEA: 0
CHEST TIGHTNESS: 0
SINUS PRESSURE: 0
BLOOD IN STOOL: 0
WHEEZING: 0
ABDOMINAL DISTENTION: 0
DIARRHEA: 0
EYE ITCHING: 0
BACK PAIN: 0
SHORTNESS OF BREATH: 1
CHOKING: 0
CONSTIPATION: 0
COUGH: 0
VOMITING: 0
EYE PAIN: 0
EYE DISCHARGE: 0
TROUBLE SWALLOWING: 0
EYE REDNESS: 0
SORE THROAT: 0
VOICE CHANGE: 0
PHOTOPHOBIA: 0
RHINORRHEA: 0

## 2025-04-16 ASSESSMENT — PAIN - FUNCTIONAL ASSESSMENT
PAIN_FUNCTIONAL_ASSESSMENT: 0-10
PAIN_FUNCTIONAL_ASSESSMENT: NONE - DENIES PAIN
PAIN_FUNCTIONAL_ASSESSMENT: 0-10
PAIN_FUNCTIONAL_ASSESSMENT: NONE - DENIES PAIN

## 2025-04-16 ASSESSMENT — PAIN SCALES - GENERAL
PAINLEVEL_OUTOF10: 3

## 2025-04-17 ENCOUNTER — APPOINTMENT (OUTPATIENT)
Dept: INTERVENTIONAL RADIOLOGY/VASCULAR | Age: 85
DRG: 193 | End: 2025-04-17
Payer: MEDICARE

## 2025-04-17 ENCOUNTER — APPOINTMENT (OUTPATIENT)
Age: 85
DRG: 193 | End: 2025-04-17
Payer: MEDICARE

## 2025-04-17 VITALS
HEART RATE: 74 BPM | TEMPERATURE: 98.3 F | SYSTOLIC BLOOD PRESSURE: 146 MMHG | OXYGEN SATURATION: 91 % | RESPIRATION RATE: 19 BRPM | WEIGHT: 180 LBS | DIASTOLIC BLOOD PRESSURE: 51 MMHG | BODY MASS INDEX: 26.66 KG/M2 | HEIGHT: 69 IN

## 2025-04-17 PROBLEM — J18.9 PNEUMONIA OF LEFT LOWER LOBE DUE TO INFECTIOUS ORGANISM: Status: ACTIVE | Noted: 2025-04-17

## 2025-04-17 LAB
ANION GAP SERPL CALC-SCNC: 11 MEQ/L (ref 8–16)
BASOPHILS ABSOLUTE: 0 THOU/MM3 (ref 0–0.1)
BASOPHILS NFR BLD AUTO: 0.2 %
BUN SERPL-MCNC: 12 MG/DL (ref 8–23)
CA-I BLD ISE-SCNC: 1.01 MMOL/L (ref 1.12–1.32)
CALCIUM SERPL-MCNC: 7.6 MG/DL (ref 8.8–10.2)
CHLORIDE SERPL-SCNC: 106 MEQ/L (ref 98–111)
CO2 SERPL-SCNC: 21 MEQ/L (ref 22–29)
CREAT SERPL-MCNC: 0.8 MG/DL (ref 0.7–1.2)
DEPRECATED RDW RBC AUTO: 47.1 FL (ref 35–45)
ECHO BSA: 1.99 M2
EKG Q-T INTERVAL: 392 MS
EKG QRS DURATION: 84 MS
EKG QTC CALCULATION (BAZETT): 429 MS
EKG R AXIS: 1 DEGREES
EKG T AXIS: 170 DEGREES
EKG VENTRICULAR RATE: 72 BPM
EOSINOPHIL NFR BLD AUTO: 0.1 %
EOSINOPHILS ABSOLUTE: 0 THOU/MM3 (ref 0–0.4)
ERYTHROCYTE [DISTWIDTH] IN BLOOD BY AUTOMATED COUNT: 12.7 % (ref 11.5–14.5)
GFR SERPL CREATININE-BSD FRML MDRD: 87 ML/MIN/1.73M2
GLUCOSE SERPL-MCNC: 105 MG/DL (ref 74–109)
HCT VFR BLD AUTO: 36.5 % (ref 42–52)
HGB BLD-MCNC: 11.9 GM/DL (ref 14–18)
IMM GRANULOCYTES # BLD AUTO: 0.07 THOU/MM3 (ref 0–0.07)
IMM GRANULOCYTES NFR BLD AUTO: 0.5 %
LACTATE SERPL-SCNC: 1.4 MMOL/L (ref 0.5–2)
LACTIC ACID, SEPSIS: 2 MMOL/L (ref 0.5–1.9)
LACTIC ACID, SEPSIS: 2.6 MMOL/L (ref 0.5–1.9)
LYMPHOCYTES ABSOLUTE: 0.8 THOU/MM3 (ref 1–4.8)
LYMPHOCYTES NFR BLD AUTO: 5.9 %
MCH RBC QN AUTO: 32.7 PG (ref 26–33)
MCHC RBC AUTO-ENTMCNC: 32.6 GM/DL (ref 32.2–35.5)
MCV RBC AUTO: 100.3 FL (ref 80–94)
MONOCYTES ABSOLUTE: 0.8 THOU/MM3 (ref 0.4–1.3)
MONOCYTES NFR BLD AUTO: 5.9 %
NEUTROPHILS ABSOLUTE: 12.3 THOU/MM3 (ref 1.8–7.7)
NEUTROPHILS NFR BLD AUTO: 87.4 %
NRBC BLD AUTO-RTO: 0 /100 WBC
OSMOLALITY SERPL CALC.SUM OF ELEC: 275.8 MOSMOL/KG (ref 275–300)
PLATELET # BLD AUTO: 250 THOU/MM3 (ref 130–400)
PMV BLD AUTO: 9.3 FL (ref 9.4–12.4)
POTASSIUM SERPL-SCNC: 3.9 MEQ/L (ref 3.5–5.2)
PROCALCITONIN SERPL IA-MCNC: 0.12 NG/ML (ref 0.01–0.09)
RBC # BLD AUTO: 3.64 MILL/MM3 (ref 4.7–6.1)
SODIUM SERPL-SCNC: 138 MEQ/L (ref 135–145)
TROPONIN, HIGH SENSITIVITY: 26 NG/L (ref 0–12)
TROPONIN, HIGH SENSITIVITY: 28 NG/L (ref 0–12)
WBC # BLD AUTO: 14.1 THOU/MM3 (ref 4.8–10.8)

## 2025-04-17 PROCEDURE — 2700000000 HC OXYGEN THERAPY PER DAY

## 2025-04-17 PROCEDURE — 2500000003 HC RX 250 WO HCPCS

## 2025-04-17 PROCEDURE — 6360000002 HC RX W HCPCS

## 2025-04-17 PROCEDURE — 80048 BASIC METABOLIC PNL TOTAL CA: CPT

## 2025-04-17 PROCEDURE — 6370000000 HC RX 637 (ALT 250 FOR IP)

## 2025-04-17 PROCEDURE — 84484 ASSAY OF TROPONIN QUANT: CPT

## 2025-04-17 PROCEDURE — 93005 ELECTROCARDIOGRAM TRACING: CPT

## 2025-04-17 PROCEDURE — 83605 ASSAY OF LACTIC ACID: CPT

## 2025-04-17 PROCEDURE — 36415 COLL VENOUS BLD VENIPUNCTURE: CPT

## 2025-04-17 PROCEDURE — 94761 N-INVAS EAR/PLS OXIMETRY MLT: CPT

## 2025-04-17 PROCEDURE — 85025 COMPLETE CBC W/AUTO DIFF WBC: CPT

## 2025-04-17 PROCEDURE — 82330 ASSAY OF CALCIUM: CPT

## 2025-04-17 PROCEDURE — 2580000003 HC RX 258

## 2025-04-17 PROCEDURE — 93971 EXTREMITY STUDY: CPT

## 2025-04-17 PROCEDURE — 94640 AIRWAY INHALATION TREATMENT: CPT

## 2025-04-17 PROCEDURE — 99239 HOSP IP/OBS DSCHRG MGMT >30: CPT | Performed by: STUDENT IN AN ORGANIZED HEALTH CARE EDUCATION/TRAINING PROGRAM

## 2025-04-17 RX ORDER — AZITHROMYCIN 250 MG/1
TABLET, FILM COATED ORAL
Qty: 6 TABLET | Refills: 0 | Status: SHIPPED | OUTPATIENT
Start: 2025-04-17 | End: 2025-04-27

## 2025-04-17 RX ORDER — ALBUTEROL SULFATE 0.83 MG/ML
2.5 SOLUTION RESPIRATORY (INHALATION)
Status: DISCONTINUED | OUTPATIENT
Start: 2025-04-17 | End: 2025-04-17 | Stop reason: HOSPADM

## 2025-04-17 RX ORDER — ALBUTEROL SULFATE 5 MG/ML
5 SOLUTION RESPIRATORY (INHALATION) 4 TIMES DAILY PRN
Qty: 120 EACH | Refills: 1 | Status: SHIPPED | OUTPATIENT
Start: 2025-04-17

## 2025-04-17 RX ORDER — AZITHROMYCIN 250 MG/1
500 TABLET, FILM COATED ORAL ONCE
Status: COMPLETED | OUTPATIENT
Start: 2025-04-17 | End: 2025-04-17

## 2025-04-17 RX ORDER — NEBULIZER ACCESSORIES
1 KIT MISCELLANEOUS DAILY PRN
Qty: 1 KIT | Refills: 0 | Status: SHIPPED | OUTPATIENT
Start: 2025-04-17

## 2025-04-17 RX ORDER — AZITHROMYCIN 250 MG/1
250 TABLET, FILM COATED ORAL DAILY
Status: DISCONTINUED | OUTPATIENT
Start: 2025-04-17 | End: 2025-04-17

## 2025-04-17 RX ORDER — CALCIUM GLUCONATE 20 MG/ML
2000 INJECTION, SOLUTION INTRAVENOUS PRN
Status: DISCONTINUED | OUTPATIENT
Start: 2025-04-17 | End: 2025-04-17 | Stop reason: HOSPADM

## 2025-04-17 RX ORDER — PREDNISONE 20 MG/1
40 TABLET ORAL DAILY
Qty: 10 TABLET | Refills: 0 | Status: SHIPPED | OUTPATIENT
Start: 2025-04-17 | End: 2025-04-22

## 2025-04-17 RX ORDER — AZITHROMYCIN 250 MG/1
250 TABLET, FILM COATED ORAL DAILY
Status: DISCONTINUED | OUTPATIENT
Start: 2025-04-18 | End: 2025-04-17 | Stop reason: HOSPADM

## 2025-04-17 RX ADMIN — CARBAMAZEPINE 600 MG: 200 TABLET, EXTENDED RELEASE ORAL at 03:17

## 2025-04-17 RX ADMIN — ACETAMINOPHEN 650 MG: 325 TABLET ORAL at 04:07

## 2025-04-17 RX ADMIN — ROSUVASTATIN CALCIUM 20 MG: 20 TABLET, FILM COATED ORAL at 03:17

## 2025-04-17 RX ADMIN — AZITHROMYCIN DIHYDRATE 500 MG: 250 TABLET ORAL at 12:45

## 2025-04-17 RX ADMIN — CALCIUM GLUCONATE 2000 MG: 20 INJECTION, SOLUTION INTRAVENOUS at 05:57

## 2025-04-17 RX ADMIN — CARBAMAZEPINE 600 MG: 200 TABLET, EXTENDED RELEASE ORAL at 09:53

## 2025-04-17 RX ADMIN — ENOXAPARIN SODIUM 40 MG: 100 INJECTION SUBCUTANEOUS at 09:53

## 2025-04-17 RX ADMIN — CLOPIDOGREL BISULFATE 75 MG: 75 TABLET ORAL at 09:53

## 2025-04-17 RX ADMIN — PIPERACILLIN AND TAZOBACTAM 3375 MG: 3; .375 INJECTION, POWDER, FOR SOLUTION INTRAVENOUS at 03:21

## 2025-04-17 RX ADMIN — SODIUM CHLORIDE: 0.9 INJECTION, SOLUTION INTRAVENOUS at 03:14

## 2025-04-17 RX ADMIN — LEVOTHYROXINE SODIUM 50 MCG: 0.05 TABLET ORAL at 05:57

## 2025-04-17 RX ADMIN — ESCITALOPRAM OXALATE 5 MG: 10 TABLET ORAL at 09:53

## 2025-04-17 RX ADMIN — ASPIRIN 81 MG: 81 TABLET, CHEWABLE ORAL at 09:53

## 2025-04-17 RX ADMIN — ALBUTEROL SULFATE 2.5 MG: 2.5 SOLUTION RESPIRATORY (INHALATION) at 08:36

## 2025-04-17 RX ADMIN — Medication 1 TABLET: at 09:53

## 2025-04-17 RX ADMIN — SODIUM CHLORIDE, PRESERVATIVE FREE 10 ML: 5 INJECTION INTRAVENOUS at 09:53

## 2025-04-17 RX ADMIN — TIOTROPIUM BROMIDE AND OLODATEROL 2 PUFF: 3.124; 2.736 SPRAY, METERED RESPIRATORY (INHALATION) at 08:43

## 2025-04-17 RX ADMIN — PIPERACILLIN AND TAZOBACTAM 4500 MG: 4; .5 INJECTION, POWDER, LYOPHILIZED, FOR SOLUTION INTRAVENOUS at 00:02

## 2025-04-17 RX ADMIN — SODIUM CHLORIDE 1000 ML: 0.9 INJECTION, SOLUTION INTRAVENOUS at 00:44

## 2025-04-17 ASSESSMENT — PAIN DESCRIPTION - ORIENTATION: ORIENTATION: RIGHT

## 2025-04-17 ASSESSMENT — PAIN SCALES - GENERAL
PAINLEVEL_OUTOF10: 3
PAINLEVEL_OUTOF10: 0
PAINLEVEL_OUTOF10: 3

## 2025-04-17 ASSESSMENT — PAIN DESCRIPTION - LOCATION: LOCATION: CHEST

## 2025-04-17 ASSESSMENT — PAIN DESCRIPTION - DESCRIPTORS: DESCRIPTORS: TENDER;ACHING

## 2025-04-17 ASSESSMENT — PAIN - FUNCTIONAL ASSESSMENT
PAIN_FUNCTIONAL_ASSESSMENT: ACTIVITIES ARE NOT PREVENTED
PAIN_FUNCTIONAL_ASSESSMENT: 0-10

## 2025-04-17 ASSESSMENT — PAIN DESCRIPTION - ONSET: ONSET: GRADUAL

## 2025-04-17 ASSESSMENT — PAIN DESCRIPTION - PAIN TYPE: TYPE: CHRONIC PAIN

## 2025-04-17 ASSESSMENT — PAIN DESCRIPTION - FREQUENCY: FREQUENCY: INTERMITTENT

## 2025-04-17 NOTE — ED TRIAGE NOTES
Pt to ED from [facility]. Pt c/c SOB. Family reports SOB starting around 1730 after dinner. Pt 93% RA on arrival. Pt place on 1 L NC due to increase WOB. Hx COPD. Family/caregiver at bedside. Pt not able to answer year, A&Ox3. Family reports this is baseline for him. Family reports pt had chills today. Covid swab obtained. EKG complete. Vitals assessed. Call light in reach.

## 2025-04-17 NOTE — DISCHARGE INSTRUCTIONS
Continue w/ Z-Raheem and prednisone for 5 days along with as needed albuterol nebulizer  Continue other home medications as prescribed  Follow up with PCP in 1 week   May need dermatology evaluation for skin lesions (either here or in [place] depending on when appointment can be made)

## 2025-04-17 NOTE — CARE COORDINATION
Case Management Assessment Initial Evaluation    Date/Time of Evaluation: 2025 7:16 AM  Assessment Completed by: Dagmar Ramos RN    If patient is discharged prior to next notation, then this note serves as note for discharge by case management.    Patient Name: Jimmy Carlson                   YOB: 1940  Diagnosis: SOB (shortness of breath) [R06.02]  Pneumonia of left lower lobe due to infectious organism [J18.9]                   Date / Time: 2025  8:57 PM  [facility]     Patient Admission Status: Inpatient   Readmission Risk Low 0-14, Mod 15-19), High > 20: Readmission Risk Score: 15.8    Current PCP: Lee Ann Cleary MD  Health Care Decision Makers:   Primary Decision Maker: Consuelo Campoverde - Child - [phone]    Additional Case Management Notes: To ED w/ sudden SOB. SBP 80s in ED. Hospitalist following. PT/OT. Telemetry. IVF stopped. Nebulizers/Inhalers. Asa. Plavix.Lovenox. Lidocaine patch. IV zosyn q8h.  Ical 1.01- IV replacement. Weaned to room air.     Procedures: none    Imagin/16 CXR: Postoperative chest. 2. Patchy interstitial scarring/atelectasis bilaterally.    Patient Goals/Plan/Treatment Preferences: Met w/ Jimmy. Verified insurance and PCP. He is mostly independent. His family assists w/ household chores and transportation. Has needed DME, listed below. Denies needs for HH/OP services. Plans to return home w/ his daughter, her boyfriend, granddaughter and grandson.     Verified discharge plan w/ patient' daughter Consuelo via telephone.     1150: Received call from Consuelo. Patient is being discharged and she now feels he needs a WC and nebulizer. Prefers to obtain from [facility]; willing to  from [facility]. Awaiting orders from hospitalist.     1227: Orders/Medical Necessity note for WC and nebulizer received. [facility]    [phone]   Service Assessment   Patient Orientation Alert and Oriented   Cognition Alert

## 2025-04-17 NOTE — PROGRESS NOTES
Patient arrived per cart to 3B. Heart monitor applied and vitals taken.  Admission paperwork completed.  Explained to patient that [facility] is not responsible for any lost or stolen items.  Patient verbalized understanding. Oriented to room and use of call light and bed controls.  Patient denies pain or needs. No signs of distress noted.  Bed locked & in low position, side-rails up x2.  Call light in reach.  Reminded patient to call nurse if any needs arise.     2 person skin assessment performed by this nurse and Terese MAXWELL.    Explained patients right to have family, representative or physician notified of their admission.  Patient has Declined for physician to be notified.  Patient has Requested for family/representative to be notified. Daughter Consuelo notified at 0058 at phone number listed in chart. Home medications reviewed. Questions/concerns answered at this time. Consuelo states she will be up in the morning to see the patient.

## 2025-04-17 NOTE — RT PROTOCOL NOTE
RT Inhaler-Nebulizer Bronchodilator Protocol Note    There is a bronchodilator order in the chart from a provider indicating to follow the RT Bronchodilator Protocol and there is an “Initiate RT Inhaler-Nebulizer Bronchodilator Protocol” order as well (see protocol at bottom of note).    CXR Findings:  XR CHEST PORTABLE  Result Date: 4/16/2025  Impression: 1. Postoperative chest. 2. Patchy interstitial scarring/atelectasis bilaterally. This document has been electronically signed by: Bassem Ordonez MD on 04/16/2025 10:02 PM      The findings from the last RT Protocol Assessment were as follows:   History Pulmonary Disease: Chronic pulmonary disease  Respiratory Pattern: Dyspnea on exertion or RR 21-25 bpm  Breath Sounds: Slightly diminished and/or crackles  Cough: Strong, spontaneous, non-productive  Indication for Bronchodilator Therapy:    Bronchodilator Assessment Score: 6    Aerosolized bronchodilator medication orders have been revised according to the RT Inhaler-Nebulizer Bronchodilator Protocol below.    Respiratory Therapist to perform RT Therapy Protocol Assessment initially then follow the protocol.  Repeat RT Therapy Protocol Assessment PRN for score 0-3 or on second treatment, BID, and PRN for scores above 3.    No Indications - adjust the frequency to every 6 hours PRN wheezing or bronchospasm, if no treatments needed after 48 hours then discontinue using Per Protocol order mode.     If indication present, adjust the RT bronchodilator orders based on the Bronchodilator Assessment Score as indicated below.  Use Inhaler orders unless patient has one or more of the following: on home nebulizer, not able to hold breath for 10 seconds, is not alert and oriented, cannot activate and use MDI correctly, or respiratory rate 25 breaths per minute or more, then use the equivalent nebulizer order(s) with same Frequency and PRN reasons based on the score.  If a patient is on this medication at home then do not

## 2025-04-17 NOTE — ED PROVIDER NOTES
[facility]  eMERGENCY dEPARTMENT eNCOUnter          CHIEF COMPLAINT       Chief Complaint   Patient presents with    Shortness of Breath       Nurses Notes reviewed and I agree except as noted in the HPI.      HISTORY OF PRESENT ILLNESS    Jimmy Carlson is a 84 y.o. male who presents for shortness of breath.  This is a gentleman who today according to daughter at bedside started clasping his chest, he started complaining of shortness of breath.  He was not endorsing any chest pain.  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  He is not a very good historian.  He states he is not having any kind of pain at this time.  He states he is slightly short of breath.  Patient does have a history of COPD.  He was not hypoxic but he was low on his oxygen when he came in.  Patient is not complaining of any chest pain at this time.  He is not having any abdominal pain.  He said no nausea or vomiting.  Daughter was very worried because of the brain injury he is never really able to communicate with her effectively.  Here today the patient is hemodynamically stable.  Resting comfortably on the cot no apparent distress.  Patient does have a history of low blood pressure he is on midodrine.  He is post to have nebulizer solutions and inhalers at home he only has 1 inhaler at this time.  Patient is also on Plavix.  Patient has severe multivessel disease had a CABG in 2/8/2023.  Patient is otherwise resting comfortably on the cot no apparent distress no other physical complaints at this time.  Patient as of 4/4/2023 is still full code.    Catheter:  SUMMARY:  Severe multi-vessel CAD with heavy calcification of left main  stenosis with preserved LV ejection fraction and patent aortoiliac  system.     PLAN:  1.  Bedrest.  2.  Optimal medical therapy.  3.  Risk factor management.  4.  Routine access site care.  5.  Avoid DAPT.  6.  IV heparin.  7.  IV  following components:    Lactic Acid, Sepsis 2.0 (*)     All other components within normal limits   PROCALCITONIN - Abnormal; Notable for the following components:    Procalcitonin 0.12 (*)     All other components within normal limits   BASIC METABOLIC PANEL W/ REFLEX TO MG FOR LOW K - Abnormal; Notable for the following components:    CO2 21 (*)     Calcium 7.6 (*)     All other components within normal limits   CBC WITH AUTO DIFFERENTIAL - Abnormal; Notable for the following components:    WBC 14.1 (*)     RBC 3.64 (*)     Hemoglobin 11.9 (*)     Hematocrit 36.5 (*)     .3 (*)     RDW-SD 47.1 (*)     MPV 9.3 (*)     Neutrophils Absolute 12.3 (*)     Lymphocytes Absolute 0.8 (*)     All other components within normal limits   CALCIUM, IONIZED - Abnormal; Notable for the following components:    Calcium, Ionized 1.01 (*)     All other components within normal limits   COVID-19 & INFLUENZA COMBO   CULTURE, BLOOD 1   CULTURE, BLOOD 2   CULTURE, RESPIRATORY (WITH GRAM STAIN)   LIPASE   MAGNESIUM   ANION GAP   GLOMERULAR FILTRATION RATE, ESTIMATED   OSMOLALITY   LACTIC ACID   ANION GAP   GLOMERULAR FILTRATION RATE, ESTIMATED   OSMOLALITY       EMERGENCY DEPARTMENT COURSE:   Vitals:    Vitals:    04/17/25 0159 04/17/25 0407 04/17/25 0529 04/17/25 0545   BP: (!) 155/51 (!) 147/58 (!) 128/50 (!) 147/59   Pulse: 83 74 72 70   Resp:  16     Temp:  98.7 °F (37.1 °C)     TempSrc:  Oral     SpO2:  96%     Weight:       Height:         Patient was assessed at bedside labs and imaging were ordered.  Patient was given 4 baby aspirin.  He was also given 2 DuoNeb treatments.  Here today the patient is coughing he is bringing up sputum.  He remains hemodynamically stable.  His shortness of breath is still there, he is still on oxygen here.  Patient was given midodrine and fluids because his pressure was getting low.  He notoriously runs low blood pressure.  Blood pressure responded well to this.  Patient's troponins are

## 2025-04-17 NOTE — DISCHARGE SUMMARY
Resident Discharge Summary (Hospitalist)      Patient Identification:   Jimmy Carlson   : 1940  MRN: [id number]   Account: [id number]   Patient's PCP: Lee Ann Cleary MD    Admit Date: 2025   Discharge Date:   25    Admitting Physician: Donald Kumari MD  Discharge Physician: Jose Elias Chang DO       Discharge Diagnoses:  Mild AHRF, resolved  Mild COPD exacerbation  Discharged home with Z-Raheem, prednisone, and breathing treatments  Follow-up with PCP outpatient  Atypical reproducible chest pain  Troponin trend flat  Chest wall/abdominal lesions  Unclear when it started  Raised and rough, no blanching, no tenderness, no thrombocytopenia  Follow-up with PCP, may need dermatology referral  Mild leukocytosis  CAD s/p CABG   Chronic macrocytic anemia  Hypothyroidism  Seizure disorder  Hx of leukemia      Hospital Course:   Jimmy Carlson is a 84 y.o. male with PMHx CAD s/p CABG , hypothyroidism, chronic macrocytic anemia, seizure disorder, history of leukemia admitted to [facility] on 2025 for shortness of breath. Patient found to be mildly hypoxic requiring 2L NC initially.Initial concern for pneumonia. COVID/influenza negative. Procalcitonin minimally elevated. Patient not having any sputum production and patient was quickly weaned back to room air on admission to the hospital. Patient not complaining of any shortness of breath at this time.  Was initially on Zosyn.  Was switched to azithromycin and Rocephin.  Given the patient is no longer having any shortness of breath and back to room air, decision was made to discharge home.  Will send home with Z-Raheem and prednisone.  Will also send home with nebulizer treatments.  Patient to follow-up with PCP outpatient.  PCP to evaluate skin lesions, may need dermatology evaluation.  Patient and family leaving for [place] in 2 weeks for their summer home, may need to schedule dermatology appointment in that area.  Date/Time     04/17/2025 01:54 AM    K 3.9 04/17/2025 01:54 AM     04/17/2025 01:54 AM    CO2 21 04/17/2025 01:54 AM    BUN 12 04/17/2025 01:54 AM    CREATININE 0.8 04/17/2025 01:54 AM    CALCIUM 7.6 04/17/2025 01:54 AM    PHOS 2.9 04/04/2023 04:42 AM     Liver:   Lab Results   Component Value Date/Time    AST 31 04/16/2025 09:35 PM    ALT 26 04/16/2025 09:35 PM         Significant Diagnostic Studies    Radiology:   Vascular duplex lower extremity venous left   Final Result   Normal venous ultrasound. No evidence for acute deep venous   thrombosis.            **This report has been created using voice recognition software.  It may contain   minor errors which are inherent in voice recognition technology.**      Electronically signed by Dr. Surinder Canseco      XR CHEST PORTABLE   Final Result   Impression:   1. Postoperative chest.   2. Patchy interstitial scarring/atelectasis bilaterally.      This document has been electronically signed by: Bassem Ordonez MD on    04/16/2025 10:02 PM             Consults:   None    Disposition: Home  Condition at Discharge: Stable    Code Status:  Full Code     Patient Instructions:    Discharge lab work: N/A  Activity: activity as tolerated  Diet: ADULT DIET; Regular      Follow-up visits:   Lee Ann Cleary MD  [address] [place] [address]  [phone]    Go on 4/24/2025  Hospital follow up, appointment time: 2pm         Discharge Medications:      Medication List        START taking these medications      azithromycin 250 MG tablet  Commonly known as: ZITHROMAX  500mg on day 1 followed by 250mg on days 2 - 5     predniSONE 20 MG tablet  Commonly known as: DELTASONE  Take 2 tablets by mouth daily for 5 days            CONTINUE taking these medications      Acetaminophen 650 MG Tabs     aspirin 81 MG chewable tablet  Take 1 tablet by mouth daily     carBAMazepine 200 MG extended release tablet  Commonly known as: TEGRETOL XR

## 2025-04-17 NOTE — ED NOTES
Abx started at this time.   
Hospitalist verbal order at bedside for lidocaine patch on chest. Hospitalist aware of BP  
Lidocaine patch administered to pt right side chest where pt states pain is worse. Vitals assessed. Pt remains on 1 L NC  
Medication administered with food at this time.Vitals assessed. RR easy and unlabored. Call light in reach.   
Phone call placed to [facility] to approve pt transport to [facility] in stable condition.  
Provider aware of pt BP. Midodrine administered. NS bolus continues.  
Pt medicated per MAR. Vitals assessed. Pt remains on 1 L NC. Pt family at bedside.Call light in reach.   
Pt resting in bed. Vitals assessed. Pt remains on 1 L NC. NS bolus started due to BP. Dr. Magallanes notified. See MAR for new orders. Pt family updated on plan of care.   
    Past Surgical History:   Procedure Laterality Date    BRAIN SURGERY      infection    CORONARY ARTERY BYPASS GRAFT N/A 2/28/2023    CABG STONEY performed by Lex Chaudhry MD at [facility] OR    OTHER SURGICAL HISTORY      Tooth Abscess removed '95       PAST MEDICAL HISTORY       Past Medical History:   Diagnosis Date    Cancer (HCC)     Coma     COPD (chronic obstructive pulmonary disease) (HCC)     Hyperlipidemia     Leukemia (HCC)     Seizures (HCC)            Electronically signed by Lorie Roy RN on 4/17/2025 at 12:12 AM

## 2025-04-17 NOTE — PROCEDURES
PROCEDURE NOTE  Date: 4/17/2025   Name: Jimmy Carlson  YOB: 1940    Procedures  12 lead EKG completed. Results handed to Yue MAXWELL.

## 2025-04-17 NOTE — PROGRESS NOTES
Changed Zosyn loading dose to 4500mg per [facility] policy.  Joselyn Rizzo RP, BCPS, BCGP  3/15/2025     11:31 PM

## 2025-04-17 NOTE — PROGRESS NOTES
AVS discussed with daughter. All questions and concerns addressed. Daughter aware to pickup wheelchair and nebulizer from [facility]. Daughter also aware to pickup medications from [facility] pharmacy. Patient discharged in stable condition per wheelchair with transport.

## 2025-04-17 NOTE — PLAN OF CARE
Problem: Discharge Planning  Goal: Discharge to home or other facility with appropriate resources  Outcome: Progressing  Flowsheets (Taken 4/17/2025 0216)  Discharge to home or other facility with appropriate resources: Identify barriers to discharge with patient and caregiver     Problem: Safety - Adult  Goal: Free from fall injury  Outcome: Progressing  Flowsheets (Taken 4/17/2025 0216)  Free From Fall Injury: Instruct family/caregiver on patient safety  Note: Bed locked & in low position, call light in reach, side-rails up x2, bed/chair alarm utilized, non-slip socks on when ambulating, reminded patient to use call light to call for assistance.      Problem: Skin/Tissue Integrity  Goal: Skin integrity remains intact  Description: 1.  Monitor for areas of redness and/or skin breakdown2.  Assess vascular access sites hourly3.  Every 4-6 hours minimum:  Change oxygen saturation probe site4.  Every 4-6 hours:  If on nasal continuous positive airway pressure, respiratory therapy assess nares and determine need for appliance change or resting period  Outcome: Progressing  Flowsheets (Taken 4/17/2025 0216)  Skin Integrity Remains Intact: Monitor for areas of redness and/or skin breakdown  Note: Ongoing assessment & interventions provided throughout shift.  Skin assessments provided.  Encouraging/assisting patient to turn as needed.      Problem: ABCDS Injury Assessment  Goal: Absence of physical injury  Outcome: Progressing  Flowsheets (Taken 4/17/2025 0216)  Absence of Physical Injury: Implement safety measures based on patient assessment  Note: Bed locked & in low position, call light in reach, side-rails up x2, bed/chair alarm utilized, non-slip socks on when ambulating, reminded patient to use call light to call for assistance.      Problem: Pain  Goal: Verbalizes/displays adequate comfort level or baseline comfort level  Outcome: Progressing  Flowsheets (Taken 4/17/2025 0216)  Verbalizes/displays adequate comfort

## 2025-04-17 NOTE — PLAN OF CARE
Jimmy Carlson was evaluated today and a DME order was entered for a standard wheelchair because he requires this to successfully complete daily living tasks of eating, personal cares, and ambulating.  A standard manual wheelchair is necessary due to patient's impaired ambulation and mobility restrictions and would be unable to resolve these daily living tasks using a cane or walker.  The patient is capable of using a standard wheelchair safely in their home and can maneuver within their home with adequate access.  There is a caregiver available to provide necessary assistance.  The need for this equipment was discussed with the patient and he understands, is in agreement, and has not expressed an unwillingness to use the wheelchair.      Jimmy Carlson was evaluated today and a DME order was entered for a nebulizer compressor in order to administer Albuterol due to the diagnosis of COPD.  The need for this equipment and treatment was discussed with the patient and he understands and is in agreement.          Electronically Signed by  Jose Elias Chang DO, MBA  PGY-2 Internal Medicine Resident  [facility]  On 4/17/2025 at 11:51 AM

## 2025-04-17 NOTE — PROGRESS NOTES
Patient received sacramental anointing by a . If you are in need of  support, please call [phone]. If you are in need of a  after 6:30pm, please call the house supervisor for the on-call .      Sharon Alex  [facility] Health Coordinator  [phone]

## 2025-04-19 LAB
BACTERIA BLD AEROBE CULT: NORMAL
BACTERIA BLD AEROBE CULT: NORMAL

## 2025-04-21 RX ORDER — ALBUTEROL SULFATE 0.83 MG/ML
2.5 SOLUTION RESPIRATORY (INHALATION) 4 TIMES DAILY PRN
Qty: 120 EACH | Refills: 1 | Status: SHIPPED | OUTPATIENT
Start: 2025-04-21

## 2025-04-22 LAB
BACTERIA BLD AEROBE CULT: NORMAL
BACTERIA BLD AEROBE CULT: NORMAL

## 2025-04-22 NOTE — PROGRESS NOTES
Physician Progress Note      PATIENT:               ERNESTO CRUZ  [facility] #:                  [id number]  :                       1940  ADMIT DATE:       2025 8:57 PM  DISCH DATE:        2025 2:13 PM  RESPONDING  PROVIDER #:        NUSRAT MILNER          QUERY TEXT:    Pneumonia is documented in the IM notes. Please provide additional clinical   indicators supportive of your documentation. Or please document if the   diagnosis of pneumonia has been ruled out after study.    The clinical indicators include:  H&P:  \"84 y.o. male...Respiratory distress, unclear etiology. Low suspicion for   infectious process. Possibly viral? Procal 0.12, LA WNL post IVF. S/p   breathing treatment. Former smoker with some hyperinflation of lungs and   history of COPD. Likely COPD exacerbation...Check respiratory cultures, will   de-escalate antibiotics. May benefit from steroids...  -Shortness of breath secondary to CAP pneumonia: Reports dry cough past few   days, no production.  Denies any fevers chills.Chest x-ray showing hazy   infiltrate left lower lobe.  Will admit to hospital for IV antibiotics,   started on Zosyn in emergency department, will continue, blood cultures   pending.  -Leukocytosis: WBCs on presentation 12.7.  Pro-Norm pending, blood cultures   pending.\"    Discharge summary:  \"-Mild AHRF, resolved  -Mild COPD exacerbation  -Discharged home with Z-Raheem, prednisone, and breathing treatments  -Follow-up with PCP outpatient  -Atypical reproducible chest pain  -Troponin trend flat...  Initial concern for pneumonia. COVID/influenza negative. Procalcitonin   minimally elevated. Patient not having any sputum production and patient was   quickly weaned back to room air on admission to the hospital. Patient not   complaining of any shortness of breath at this time.  Was initially on Zosyn.    Was switched to azithromycin and Rocephin.  Given the patient is no longer   having any shortness of breath and

## (undated) DEVICE — PACK SUCT CATHETER 14FR OPN WHSTL W NO VLV

## (undated) DEVICE — OPEN HRT CDS LF

## (undated) DEVICE — DRAIN SURG SGL COLL PT TB FOR ATS BG OASIS

## (undated) DEVICE — BLANKET THER AD W24XL60IN FAB COVERING SUP SFT ULT THN LTWT

## (undated) DEVICE — SURGICAL PROCEDURE PACK OXGNTR ST MARYS

## (undated) DEVICE — SUTURE PROL SZ 3-0 L36IN NONABSORBABLE BLU L26MM SH 1/2 CIR 8522H

## (undated) DEVICE — SUTURE MCRYL SZ 4-0 L27IN ABSRB UD L19MM PS-2 1/2 CIR PRIM Y426H

## (undated) DEVICE — SUTURE VCRL + SZ 0 L36IN ABSRB VLT L36MM CT-1 1/2 CIR VCP346H

## (undated) DEVICE — GLOVE ORANGE PI 7 1/2   MSG9075

## (undated) DEVICE — SUTURE SILK PERMAHAND PRECUT 6 X 30 IN SZ 1 BLK BRAID A307H

## (undated) DEVICE — SUTURE VCRL + SZ 2-0 L27IN ABSRB CLR CT-1 1/2 CIR TAPERCUT VCP259H

## (undated) DEVICE — SUTURE SOFSILK SZ 1 L30IN NABSORBABLE BLK C-17 L39MM 3/8 SS646

## (undated) DEVICE — DRAPE SLUSH DISC W44XL66IN ST FOR RND BSIN HUSH SLUSH SYS

## (undated) DEVICE — SUTURE PROL 7-0 L24IN NONABSORBABLE BLU L9.3MM CC 3/8 CIR M8704

## (undated) DEVICE — Device: Brand: SUCTION TIP

## (undated) DEVICE — SUTURE VCRL + SZ 3-0 L27IN ABSRB WHT CT-1 1/2 CIR VCP258H

## (undated) DEVICE — STRIP,CLOSURE,WOUND,MEDI-STRIP,1/2X4: Brand: MEDLINE

## (undated) DEVICE — DUAL STAGE VENOUS RETURN CANNULA: Brand: THIN-FLEX DUAL STAGE VENOUS DRAINAGE CANNULA

## (undated) DEVICE — PROVE COVER: Brand: UNBRANDED

## (undated) DEVICE — DRESSING GRMCDL 6 12FR D1N CNTR HOLE 4MM ANTMCRBL PRTCTVE DI

## (undated) DEVICE — CANNULA PERF 18FR L12IN CONN SITE 3/8IN ART VENT DIL TIP

## (undated) DEVICE — SET AUTOTRNS C175ML BOWL BTM OUTLT RESERVOIRXTRA

## (undated) DEVICE — SUTURE VCRL + SZ 0 L27IN ABSRB VLT L36MM CT-1 1/2 CIR VCPB260H

## (undated) DEVICE — CLIP LIG M TI 6 SIL HNDL FOR OPN AND ENDOSCP SGL APPL

## (undated) DEVICE — SUTURE PROL 6-0 L24IN NONABSORBABLE BLU L13MM C-1 3/8 CIR M8726

## (undated) DEVICE — APPLIER LIG CLP M L11IN TI STR RNG HNDL FOR 20 CLP DISP

## (undated) DEVICE — ADHESIVE SKIN CLSR 0.7ML TOP DERMBND ADV

## (undated) DEVICE — STABILIZER SURG TISS W/ CANSTR TBNG EVOLUTION OCTPS

## (undated) DEVICE — APPLIER CLP L9.38IN M LIG TI DISP STR RNG HNDL LIGACLP

## (undated) DEVICE — ADAPTER CARDPLG AG/RETROGRADE 26.5 IN FEM LUER 1 LEG DLP

## (undated) DEVICE — RETRACTOR SURG INSRT SUT HLD OCTOBASE

## (undated) DEVICE — BASIC SINGLE BASIN BTC-LF: Brand: MEDLINE INDUSTRIES, INC.

## (undated) DEVICE — SYSTEM ENDOSCP VES HARV W/ TOOL CANN SEAL SHT PRT BLNT TIP

## (undated) DEVICE — CANNULA PERFUSION 5.5IN 9FR AORTIC ROOT

## (undated) DEVICE — APPLIER CLP L9.375IN APER 2.1MM CLS L3.8MM 20 SM TI CLP

## (undated) DEVICE — APPLICATOR MEDICATED 26 CC SOLUTION HI LT ORNG CHLORAPREP

## (undated) DEVICE — ADAPTER PERF L7.5IN INLET LEG 3IN Y TYP VENT CLR CODE CLMP

## (undated) DEVICE — GLOVE SURG SZ 7.5 L11.73IN FNGR THK9.8MIL STRW LTX POLYMER

## (undated) DEVICE — SUTURE SZ 7 L18IN NONABSORBABLE SIL CCS L48MM 1/2 CIR STRNM M655G

## (undated) DEVICE — PROBE VASC STD 1-1.5 MM 15 CM OLV

## (undated) DEVICE — SUTURE PROL 3-0 36IN NONABSORB BLU 26MM SH 1/2 CIR P8522H

## (undated) DEVICE — CLIP LIG SM TI 6 BLU HNDL FOR OPN AND ENDOSCP SGL APPL

## (undated) DEVICE — TUBING INSUFFLATION SMK EVAC HI FLO SET PNEUMOCLEAR

## (undated) DEVICE — SUTURE NONABSORBABLE MONOFILAMENT 4-0 RB-1 36 IN BLU PROLENE 8557H

## (undated) DEVICE — SUTURE PROL SZ 4-0 L36IN NONABSORBABLE BLU L26MM SH 1/2 CIR 8521H

## (undated) DEVICE — APPLICATOR MEDICATED 26 CC SOLUTION CLR STRL CHLORAPREP

## (undated) DEVICE — KIT BLWR MISTER 5P 15L W/ TBNG SET IRRIG MIST TO IMPROVE